# Patient Record
Sex: FEMALE | Race: WHITE | NOT HISPANIC OR LATINO | Employment: FULL TIME | URBAN - METROPOLITAN AREA
[De-identification: names, ages, dates, MRNs, and addresses within clinical notes are randomized per-mention and may not be internally consistent; named-entity substitution may affect disease eponyms.]

---

## 2017-01-03 ENCOUNTER — COMMUNICATION - HEALTHEAST (OUTPATIENT)
Dept: NEUROSURGERY | Facility: CLINIC | Age: 57
End: 2017-01-03

## 2017-01-11 ENCOUNTER — AMBULATORY - HEALTHEAST (OUTPATIENT)
Dept: NEUROSURGERY | Facility: CLINIC | Age: 57
End: 2017-01-11

## 2017-02-01 ENCOUNTER — AMBULATORY - HEALTHEAST (OUTPATIENT)
Dept: NEUROSURGERY | Facility: CLINIC | Age: 57
End: 2017-02-01

## 2017-02-22 ENCOUNTER — AMBULATORY - HEALTHEAST (OUTPATIENT)
Dept: NEUROSURGERY | Facility: CLINIC | Age: 57
End: 2017-02-22

## 2017-02-22 ENCOUNTER — OFFICE VISIT - HEALTHEAST (OUTPATIENT)
Dept: NEUROSURGERY | Facility: CLINIC | Age: 57
End: 2017-02-22

## 2017-02-22 DIAGNOSIS — R51.9 HEAD ACHE: ICD-10-CM

## 2017-03-06 ENCOUNTER — TRANSFERRED RECORDS (OUTPATIENT)
Dept: HEALTH INFORMATION MANAGEMENT | Facility: CLINIC | Age: 57
End: 2017-03-06

## 2017-03-07 ENCOUNTER — TRANSFERRED RECORDS (OUTPATIENT)
Dept: HEALTH INFORMATION MANAGEMENT | Facility: CLINIC | Age: 57
End: 2017-03-07

## 2017-03-08 ENCOUNTER — TRANSFERRED RECORDS (OUTPATIENT)
Dept: HEALTH INFORMATION MANAGEMENT | Facility: CLINIC | Age: 57
End: 2017-03-08

## 2017-03-13 ENCOUNTER — TRANSFERRED RECORDS (OUTPATIENT)
Dept: HEALTH INFORMATION MANAGEMENT | Facility: CLINIC | Age: 57
End: 2017-03-13

## 2017-03-21 ENCOUNTER — TRANSFERRED RECORDS (OUTPATIENT)
Dept: HEALTH INFORMATION MANAGEMENT | Facility: CLINIC | Age: 57
End: 2017-03-21

## 2017-03-23 ENCOUNTER — TRANSFERRED RECORDS (OUTPATIENT)
Dept: HEALTH INFORMATION MANAGEMENT | Facility: CLINIC | Age: 57
End: 2017-03-23

## 2017-03-27 ENCOUNTER — OFFICE VISIT (OUTPATIENT)
Dept: NEUROSURGERY | Facility: CLINIC | Age: 57
End: 2017-03-27

## 2017-03-27 VITALS
DIASTOLIC BLOOD PRESSURE: 67 MMHG | HEART RATE: 70 BPM | RESPIRATION RATE: 20 BRPM | OXYGEN SATURATION: 96 % | HEIGHT: 65 IN | SYSTOLIC BLOOD PRESSURE: 98 MMHG | WEIGHT: 178 LBS | BODY MASS INDEX: 29.66 KG/M2 | TEMPERATURE: 97.6 F

## 2017-03-27 DIAGNOSIS — Z91.89 AT RISK FOR CEREBROSPINAL FLUID LEAKAGE: ICD-10-CM

## 2017-03-27 DIAGNOSIS — M54.2 CERVICALGIA: Primary | ICD-10-CM

## 2017-03-27 RX ORDER — LIDOCAINE 50 MG/G
1 PATCH TOPICAL PRN
COMMUNITY
Start: 2016-10-31 | End: 2017-10-09

## 2017-03-27 RX ORDER — CYCLOBENZAPRINE HCL 10 MG
5-10 TABLET ORAL PRN
COMMUNITY
Start: 2016-10-28 | End: 2017-10-09

## 2017-03-27 ASSESSMENT — ENCOUNTER SYMPTOMS
EYE IRRITATION: 0
MYALGIAS: 1
JOINT SWELLING: 0
MUSCLE CRAMPS: 0
BACK PAIN: 1
EYE PAIN: 0
NECK PAIN: 1
MUSCLE WEAKNESS: 0
HEADACHES: 1
POLYDIPSIA: 0
EYE REDNESS: 0
POLYPHAGIA: 0
TINGLING: 0
DIZZINESS: 1
FEVER: 0
WEIGHT LOSS: 0
ARTHRALGIAS: 0
NUMBNESS: 0
SEIZURES: 0
CHILLS: 0
ALTERED TEMPERATURE REGULATION: 0
NIGHT SWEATS: 0
LOSS OF CONSCIOUSNESS: 0
WEAKNESS: 0
DOUBLE VISION: 0
HALLUCINATIONS: 0
FATIGUE: 0
EYE WATERING: 0
DECREASED APPETITE: 0
SPEECH CHANGE: 0
INCREASED ENERGY: 0
STIFFNESS: 1
WEIGHT GAIN: 0
PARALYSIS: 0
TREMORS: 0
MEMORY LOSS: 1
DISTURBANCES IN COORDINATION: 1

## 2017-03-27 ASSESSMENT — PAIN SCALES - GENERAL: PAINLEVEL: EXTREME PAIN (8)

## 2017-03-27 NOTE — NURSING NOTE
Chief Complaint   Patient presents with     Consult     UMP- HEADACHES AFTER CERVICAL STENOSIS SURGERY

## 2017-03-27 NOTE — MR AVS SNAPSHOT
After Visit Summary   3/27/2017    Layla Starks    MRN: [id number]           Patient Information     Date Of Birth          1960        Visit Information        Provider Department      3/27/2017 10:30 AM Byron Barclay MD [facility] Neurosurgery        Today's Diagnoses     Cervicalgia    -  1    At risk for cerebrospinal fluid leakage           Follow-ups after your visit        Your next 10 appointments already scheduled     May 03, 2017  1:00 PM CDT   XR MYELOGRAPHY LUMBAR SPINE with UCXR3   [facility] Xray ([facility])    [address] [address] [phone]           For nerve root injection, please send or bring copies of any MRIs or other scans you have had.  Bring a list of your current medicines to your exam. (Include vitamins, minerals and over-the-counter medicines.) Leave your valuables at home.  Plan to have someone drive you home afterward.  Stop taking the following medicines (but talk to your doctor first):   If you take blood thinners, you may need to stop taking them a few days before treatment. Talk to your doctor before stopping these medicines.Stop taking Coumadin (warfarin) 3 days before treatment. Restart the day after treatment.   If you take Plavix, Ticlid, Pletal or Persantine, please ask your doctor if you should stop these medicines. You may need extra tests on the morning of your scan.   Stop taking medicine for depression or other mental health concerns 24 hours before your exam, if your doctor says it is safe to do so. You may take your other medicines as normal.  Stop all food and drink (including water) 3 hours before your test or treatment. Drink at least four to six glasses of water the night before your exam.  Please tell the doctor:   If you are allergic to X-ray dye (contrast fluid).   If you may be pregnant.  Injections take about 30 to 45 minutes. Most people spend up to 2  hours in the clinic or hospital.  Please call the Imaging Department at your exam site with any questions            May 03, 2017  1:30 PM CDT   XR MYELOGRAM THORACIC SPINE with UCXR3   [facility] Xray ([facility])    [address] [place] [address] [phone]           For nerve root injection, please send or bring copies of any MRIs or other scans you have had.  Bring a list of your current medicines to your exam. (Include vitamins, minerals and over-the-counter medicines.) Leave your valuables at home.  Plan to have someone drive you home afterward.  Stop taking the following medicines (but talk to your doctor first):   If you take blood thinners, you may need to stop taking them a few days before treatment. Talk to your doctor before stopping these medicines.Stop taking Coumadin (warfarin) 3 days before treatment. Restart the day after treatment.   If you take Plavix, Ticlid, Pletal or Persantine, please ask your doctor if you should stop these medicines. You may need extra tests on the morning of your scan.   Stop taking medicine for depression or other mental health concerns 24 hours before your exam, if your doctor says it is safe to do so. You may take your other medicines as normal.  Stop all food and drink (including water) 3 hours before your test or treatment. Drink at least four to six glasses of water the night before your exam.  Please tell the doctor:   If you are allergic to X-ray dye (contrast fluid).   If you may be pregnant.  Injections take about 30 to 45 minutes. Most people spend up to 2 hours in the clinic or hospital.  Please call the Imaging Department at your exam site with any questions            May 03, 2017  1:40 PM CDT   (Arrive by 1:25 PM)   CT LUMBAR SPINE W CONTRAST with UCCT1   [facility] CT ([facility])    [address] [address]    [phone]           Please bring any scans or X-rays taken at other hospitals, if similar tests were done. Also bring a list of your medicines, including vitamins, minerals and over-the-counter drugs. It is safest to leave personal items at home.  Be sure to tell your doctor:   If you have any allergies.   If there s any chance you are pregnant.   If you are breastfeeding.   If you have any special needs.  You will have contrast for this exam. To prepare:   Do not eat or drink for 2 hours before your exam. If you need to take medicine, you may take it with small sips of water. (We may ask you to take liquid medicine as well.)   The day before your exam, drink extra fluids at least six 8-ounce glasses (unless your doctor tells you to restrict your fluids).  Patients over 70 or patients with diabetes or kidney problems:   If you haven t had a blood test (creatinine test) within the last 30 days, go to your clinic or Diagnostic Imaging Department for this test.  If you have diabetes:   If your kidney function is normal, continue taking your metformin (Avandamet, Glucophage, Glucovance, Metaglip) on the day of your exam.   If your kidney function is abnormal, wait 48 hours before restarting this medicine.  Please wear loose clothing, such as a sweat suit or jogging clothes. Avoid snaps, zippers and other metal. We may ask you to undress and put on a hospital gown.  If you have any questions, please call the Imaging Department where you will have your exam.            May 03, 2017  2:00 PM CDT   (Arrive by 1:45 PM)   CT THORACIC SPINE W CONTRAST with UCCT1   [facility] CT ([facility])    [address]  [facility] [address] [phone]           Please bring any scans or X-rays taken at other hospitals, if similar tests were done. Also bring a list of your medicines, including vitamins, minerals and over-the-counter drugs. It is safest to leave personal  items at home.  Be sure to tell your doctor:   If you have any allergies.   If there s any chance you are pregnant.   If you are breastfeeding.   If you have any special needs.  You will have contrast for this exam. To prepare:   Do not eat or drink for 2 hours before your exam. If you need to take medicine, you may take it with small sips of water. (We may ask you to take liquid medicine as well.)   The day before your exam, drink extra fluids at least six 8-ounce glasses (unless your doctor tells you to restrict your fluids).  Patients over 70 or patients with diabetes or kidney problems:   If you haven t had a blood test (creatinine test) within the last 30 days, go to your clinic or Diagnostic Imaging Department for this test.  If you have diabetes:   If your kidney function is normal, continue taking your metformin (Avandamet, Glucophage, Glucovance, Metaglip) on the day of your exam.   If your kidney function is abnormal, wait 48 hours before restarting this medicine.  Please wear loose clothing, such as a sweat suit or jogging clothes. Avoid snaps, zippers and other metal. We may ask you to undress and put on a hospital gown.  If you have any questions, please call the Imaging Department where you will have your exam.              Who to contact     Please call your clinic at [phone] to:    Ask questions about your health    Make or cancel appointments    Discuss your medicines    Learn about your test results    Speak to your doctor   If you have compliments or concerns about an experience at your clinic, or if you wish to file a complaint, please contact [facility] Patient Relations at [phone] or email us at [email or web address]         Additional Information About Your Visit        [employer] Information     [employer] is an electronic gateway that provides easy, online access to your medical records. With [employer], you can request a clinic appointment, read your  "test results, renew a prescription or communicate with your care team.     To sign up for [employer] visit the website at [email or web address]   You will be asked to enter the access code listed below, as well as some personal information. Please follow the directions to create your username and password.     Your access code is: [email or web address]  Expires: 2017  3:09 PM     Your access code will  in 90 days. If you need help or a new code, please contact your [facility] or call [phone] for assistance.        Care EveryWhere ID     This is your Care EveryWhere ID. This could be used by other organizations to access your [place] medical records  [id number]        Your Vitals Were     Pulse Temperature Respirations Height Pulse Oximetry Breastfeeding?    70 97.6  F (36.4  C) (Oral) 20 1.657 m (5' 5.25\") 96% No    BMI (Body Mass Index)                   29.39 kg/m2            Blood Pressure from Last 3 Encounters:   17 98/67    Weight from Last 3 Encounters:   17 80.7 kg (178 lb)              We Performed the Following     X-ray Cervical spine 2-3 views (AP and lateral) [IMG56]     X-ray Cervical spine 4 views (AP, lateral, flexion, extension)  [[id number]]        Primary Care Provider Office Phone # Fax #    Brittani Grant [phone]       [facility] [address]    [facility] [address]        Thank you!     Thank you for choosing [facility] NEUROSURGERY  for your care. Our goal is always to provide you with excellent care. Hearing back from our patients is one way we can continue to improve our services. Please take a few minutes to complete the written survey that you may receive in the mail after your visit with us. Thank you!             Your Updated Medication List - Protect others around you: Learn how to safely use, store and throw away your medicines at www.disposemymeds.org.          This list is accurate as " of: 3/27/17 11:59 PM.  Always use your most recent med list.                   Brand Name Dispense Instructions for use    calcium 500 MG Chew      Take 2 tablets by mouth daily       cyclobenzaprine 10 MG tablet    FLEXERIL     Take 5-10 mg by mouth as needed       GABAPENTIN PO      Take 600 mg by mouth At Bedtime       lidocaine 5 % Patch    LIDODERM     Place 1 patch onto the skin as needed       MULTIVITAL PO      Take 2 tablets by mouth daily       OMEGA-3 EPA FISH OIL PO      Take 1 g by mouth daily       VERAPAMIL HCL PO      Take 120 mg by mouth once       VITAMIN D-1000 MAX ST 1000 UNITS Tabs   Generic drug:  cholecalciferol      Take 1,000 Units by mouth daily

## 2017-03-27 NOTE — LETTER
3/27/2017       RE: Layla Starks  [address] [place] [address]     Dear Colleague,    Thank you for referring your patient, Layla Starks, to the [facility] NEUROSURGERY at [facility]. Please see a copy of my visit note below.        Progress Note - Neurosurgery Clinic    REASON FOR VISIT: seeking second opinion     HISTORY OF PRESENT ILLNESS:   Ms. Starks is a 56 year old female s/p R C5-7 open door laminoplasty performed at an outside hospital on 9/9/2016, who began to develop positional headache shortly after the surgery, with extensive work-up for concern of CSF leak but has since been negative. She presents to our neurosurgery clinic now seeking a second opinion.   She states that the headache is worse when she is upright and better when laying down, and also endorses photophobia, as well as new neck pain different that the pain she had prior to surgery. Patient has undergone CT myelograms as well as blood patches in attempts to seal off any leak they may be causing this headache although there has not been definitive evidence for CSF leak. Blood patches have not been successful in providing relief.  She has also attempted facet joint injections which have not improved the pain or headache.       Denies any other weakness, tingling, or numbness. No nausea/vomiting, fever, or chills.    ROS: 10 point ROS of systems including Constitutional, Eyes, Respiratory, Cardiovascular, Gastroenterology, Genitourinary, Integumentary, Muscularskeletal, Psychiatric were all negative except for pertinent positives noted in my HPI.    PHYSICAL EXAM:  Alert and oriented to person, place, and time. NAD.   PERRL, EOMI. + photophobia. Face symmetric. Tongue midline.   Uvula midline and palate elevated symmetrically.   Strong eye closure, jaw clench, and cheek puff.  Trapezii and SCM muscles 5/5 bilaterally.  No pronator drift.   BUE and BLE 5/5 throughout.   Reflexes 2+ throughout.    Sensation intact and symmetric to light touch throughout.   Normal FNF, normal HTS test. Gait is normal.     IMAGES:   Reviewed outside hospital imaging that we currently have. No definitive evidence from CT cervical spine myelograms or MRI cervical spine imaging for CSF leak or pseudomeningocele.     No imaging of the TL-spine included in CT myelogram.     ASSESSMENT: 56 year old female presenting with symptoms that do seem consistent with CSF leak, but there is not a clear location for this leak in order to treat. Need further imaging, both new imaging of TL-spine included in a CT myelogram as well as old imaging from other hospitals in order to better evaluate.      PLAN:   - Please obtain flexion/extension XR cervical spine today   - Need all imaging from OSHs that patient has seen (including [facility], [facility], [facility] [place]) scanned into the system   - CT myelogram of TL spine at earliest availability   - F/U in clinic after these are completed   - Was counseled to please contact us with any acute worsening of symptoms, or with any questions or concerns.     I saw the patient with the resident.  I have reviewed and edited the resident note and agree with the plan of care.      Byron Barclay MD       The patient was discussed with neurosurgery faculty, and agrees with the above.    NIMESH DING MD, PGY-2

## 2017-03-27 NOTE — LETTER
2017    Re:  Layla Starks,  1960      To Whom It May Concern:      Layla is being seen by my service for her Neurosurgical needs.  She was seen today in clinic.  We would like to limit her lifting to 10 pounds at this time and until she is seen in follow up.  If you have questions or concerns, please call DAVID Gonzalez Care Coordinator at [phone].  Thank you for your attention to this matter.    Sincerely,           Byron Barclay MD

## 2017-03-29 ENCOUNTER — TELEPHONE (OUTPATIENT)
Dept: GENERAL RADIOLOGY | Facility: CLINIC | Age: 57
End: 2017-03-29

## 2017-03-30 NOTE — PROGRESS NOTES
Progress Note - Neurosurgery Clinic    REASON FOR VISIT: seeking second opinion     HISTORY OF PRESENT ILLNESS:   Ms. Starks is a 56 year old female s/p R C5-7 open door laminoplasty performed at an outside hospital on 9/9/2016, who began to develop positional headache shortly after the surgery, with extensive work-up for concern of CSF leak but has since been negative. She presents to our neurosurgery clinic now seeking a second opinion.   She states that the headache is worse when she is upright and better when laying down, and also endorses photophobia, as well as new neck pain different that the pain she had prior to surgery. Patient has undergone CT myelograms as well as blood patches in attempts to seal off any leak they may be causing this headache although there has not been definitive evidence for CSF leak. Blood patches have not been successful in providing relief.  She has also attempted facet joint injections which have not improved the pain or headache.       Denies any other weakness, tingling, or numbness. No nausea/vomiting, fever, or chills.    ROS: 10 point ROS of systems including Constitutional, Eyes, Respiratory, Cardiovascular, Gastroenterology, Genitourinary, Integumentary, Muscularskeletal, Psychiatric were all negative except for pertinent positives noted in my HPI.    PHYSICAL EXAM:  Alert and oriented to person, place, and time. NAD.   PERRL, EOMI. + photophobia. Face symmetric. Tongue midline.   Uvula midline and palate elevated symmetrically.   Strong eye closure, jaw clench, and cheek puff.  Trapezii and SCM muscles 5/5 bilaterally.  No pronator drift.   BUE and BLE 5/5 throughout.   Reflexes 2+ throughout.   Sensation intact and symmetric to light touch throughout.   Normal FNF, normal HTS test. Gait is normal.     IMAGES:   Reviewed outside hospital imaging that we currently have. No definitive evidence from CT cervical spine myelograms or MRI cervical spine imaging for CSF leak or  pseudomeningocele.     No imaging of the TL-spine included in CT myelogram.     ASSESSMENT: 56 year old female presenting with symptoms that do seem consistent with CSF leak, but there is not a clear location for this leak in order to treat. Need further imaging, both new imaging of TL-spine included in a CT myelogram as well as old imaging from other hospitals in order to better evaluate.      PLAN:   - Please obtain flexion/extension XR cervical spine today   - Need all imaging from OSHs that patient has seen (including [facility], [facility], [facility]) scanned into the system   - CT myelogram of TL spine at earliest availability   - F/U in clinic after these are completed   - Was counseled to please contact us with any acute worsening of symptoms, or with any questions or concerns.     I saw the patient with the resident.  I have reviewed and edited the resident note and agree with the plan of care.      Byron Barclay MD       The patient was discussed with neurosurgery faculty, and agrees with the above.    NIMESH DING MD, PGY-2

## 2017-03-31 ENCOUNTER — TELEPHONE (OUTPATIENT)
Dept: GENERAL RADIOLOGY | Facility: CLINIC | Age: 57
End: 2017-03-31

## 2017-05-11 ENCOUNTER — RADIANT APPOINTMENT (OUTPATIENT)
Dept: GENERAL RADIOLOGY | Facility: CLINIC | Age: 57
End: 2017-05-11
Attending: NEUROLOGICAL SURGERY

## 2017-05-11 VITALS
HEART RATE: 67 BPM | OXYGEN SATURATION: 99 % | RESPIRATION RATE: 20 BRPM | SYSTOLIC BLOOD PRESSURE: 111 MMHG | DIASTOLIC BLOOD PRESSURE: 73 MMHG | TEMPERATURE: 97.4 F

## 2017-05-11 DIAGNOSIS — Z91.89 AT RISK FOR CEREBROSPINAL FLUID LEAKAGE: ICD-10-CM

## 2017-05-11 RX ORDER — IOPAMIDOL 408 MG/ML
20 INJECTION, SOLUTION INTRATHECAL ONCE
Status: DISCONTINUED | OUTPATIENT
Start: 2017-05-11 | End: 2017-05-11 | Stop reason: HOSPADM

## 2017-05-11 RX ORDER — LIDOCAINE HYDROCHLORIDE 10 MG/ML
30 INJECTION, SOLUTION EPIDURAL; INFILTRATION; INTRACAUDAL; PERINEURAL ONCE
Status: COMPLETED | OUTPATIENT
Start: 2017-05-11 | End: 2017-05-11

## 2017-05-11 RX ORDER — IOPAMIDOL 612 MG/ML
15 INJECTION, SOLUTION INTRATHECAL ONCE
Status: COMPLETED | OUTPATIENT
Start: 2017-05-11 | End: 2017-05-11

## 2017-05-11 RX ADMIN — LIDOCAINE HYDROCHLORIDE 50 MG: 10 INJECTION, SOLUTION EPIDURAL; INFILTRATION; INTRACAUDAL; PERINEURAL at 08:22

## 2017-05-11 RX ADMIN — IOPAMIDOL 12 ML: 612 INJECTION, SOLUTION INTRATHECAL at 08:22

## 2017-05-11 NOTE — MR AVS SNAPSHOT
MRN:[id number]                      After Visit Summary   5/11/2017    Layla Starks    MRN: [id number]           Visit Information        Provider Department      5/11/2017 8:30 AM  NEURO RAD;  IMAGING NURSE; X[facility]           Review of your medicines          These changes are accurate as of: 5/11/17  8:32 AM.  If you have any questions, ask your nurse or doctor.               CONTINUE these medicines which have NOT CHANGED        Dose / Directions    calcium 500 MG Chew        Dose:  2 tablet   Take 2 tablets by mouth daily   Refills:  0       cyclobenzaprine 10 MG tablet   Commonly known as:  FLEXERIL        Dose:  5-10 mg   Take 5-10 mg by mouth as needed   Refills:  0       GABAPENTIN PO        Dose:  600 mg   Take 600 mg by mouth At Bedtime   Refills:  0       lidocaine 5 % Patch   Commonly known as:  LIDODERM        Dose:  1 patch   Place 1 patch onto the skin as needed   Refills:  0       MULTIVITAL PO        Dose:  2 tablet   Take 2 tablets by mouth daily   Refills:  0       OMEGA-3 EPA FISH OIL PO        Dose:  1 g   Take 1 g by mouth daily   Refills:  0       VERAPAMIL HCL PO        Dose:  120 mg   Take 120 mg by mouth once   Refills:  0       VITAMIN D-1000 MAX ST 1000 UNITS Tabs   Generic drug:  cholecalciferol        Dose:  1000 Units   Take 1,000 Units by mouth daily   Refills:  0                Protect others around you: Learn how to safely use, store and throw away your medicines at www.disposemymeds.org.         Follow-ups after your visit        Your next 10 appointments already scheduled     May 11, 2017  9:00 AM CDT   (Arrive by 8:45 AM)   CT LUMBAR SPINE W CONTRAST with UCCT1   [facility] ([facility])    [address] [address] [phone]           Please bring any scans or X-rays taken at other hospitals, if similar tests were done. Also bring a list of your  medicines, including vitamins, minerals and over-the-counter drugs. It is safest to leave personal items at home.  Be sure to tell your doctor:   If you have any allergies.   If there s any chance you are pregnant.   If you are breastfeeding.   If you have any special needs.  You will have contrast for this exam. To prepare:   Do not eat or drink for 2 hours before your exam. If you need to take medicine, you may take it with small sips of water. (We may ask you to take liquid medicine as well.)   The day before your exam, drink extra fluids at least six 8-ounce glasses (unless your doctor tells you to restrict your fluids).  Patients over 70 or patients with diabetes or kidney problems:   If you haven t had a blood test (creatinine test) within the last 30 days, go to your clinic or Diagnostic Imaging Department for this test.  If you have diabetes:   If your kidney function is normal, continue taking your metformin (Avandamet, Glucophage, Glucovance, Metaglip) on the day of your exam.   If your kidney function is abnormal, wait 48 hours before restarting this medicine.  Please wear loose clothing, such as a sweat suit or jogging clothes. Avoid snaps, zippers and other metal. We may ask you to undress and put on a hospital gown.  If you have any questions, please call the Imaging Department where you will have your exam.            May 11, 2017  9:20 AM CDT   (Arrive by 9:05 AM)   CT THORACIC SPINE W CONTRAST with UCCT1   [facility] CT ([facility])    [address] [facility]  [address] [facility] [address] [phone]           Please bring any scans or X-rays taken at other hospitals, if similar tests were done. Also bring a list of your medicines, including vitamins, minerals and over-the-counter drugs. It is safest to leave personal items at home.  Be sure to tell your doctor:   If you have any allergies.   If there s any chance you are pregnant.   If you are  breastfeeding.   If you have any special needs.  You will have contrast for this exam. To prepare:   Do not eat or drink for 2 hours before your exam. If you need to take medicine, you may take it with small sips of water. (We may ask you to take liquid medicine as well.)   The day before your exam, drink extra fluids at least six 8-ounce glasses (unless your doctor tells you to restrict your fluids).  Patients over 70 or patients with diabetes or kidney problems:   If you haven t had a blood test (creatinine test) within the last 30 days, go to your clinic or Diagnostic Imaging Department for this test.  If you have diabetes:   If your kidney function is normal, continue taking your metformin (Avandamet, Glucophage, Glucovance, Metaglip) on the day of your exam.   If your kidney function is abnormal, wait 48 hours before restarting this medicine.  Please wear loose clothing, such as a sweat suit or jogging clothes. Avoid snaps, zippers and other metal. We may ask you to undress and put on a hospital gown.  If you have any questions, please call the Imaging Department where you will have your exam.            May 11, 2017  9:40 AM CDT   (Arrive by 9:25 AM)   CT CERVICAL SPINE W CONTRAST with UCCT1   [facility] CT ([facility])    [address] [address] [phone]           Please bring any scans or X-rays taken at other hospitals, if similar tests were done. Also bring a list of your medicines, including vitamins, minerals and over-the-counter drugs. It is safest to leave personal items at home.  Be sure to tell your doctor:   If you have any allergies.   If there s any chance you are pregnant.   If you are breastfeeding.   If you have any special needs.  You will have contrast for this exam. To prepare:   Do not eat or drink for 2 hours before your exam. If you need to take medicine, you may take it with small sips of water. (We may ask  you to take liquid medicine as well.)   The day before your exam, drink extra fluids at least six 8-ounce glasses (unless your doctor tells you to restrict your fluids).  Patients over 70 or patients with diabetes or kidney problems:   If you haven t had a blood test (creatinine test) within the last 30 days, go to your clinic or Diagnostic Imaging Department for this test.  If you have diabetes:   If your kidney function is normal, continue taking your metformin (Avandamet, Glucophage, Glucovance, Metaglip) on the day of your exam.   If your kidney function is abnormal, wait 48 hours before restarting this medicine.  Please wear loose clothing, such as a sweat suit or jogging clothes. Avoid snaps, zippers and other metal. We may ask you to undress and put on a hospital gown.  If you have any questions, please call the Imaging Department where you will have your exam.            Jun 19, 2017 11:30 AM CDT   (Arrive by 11:15 AM)   Return Visit with Byron Barclay MD   [facility] Neurosurgery ([facility])    [address] [place] [address] [phone]               Care Instructions        Further instructions from your care team          Radiology Discharge Instructions After Your Myelogram         AFTER YOU GO HOME      Relax and take it easy for 24 hours    Keep your head elevated at least 30     You may resume normal activity tomorrow    You may remove the bandage on your back in the evening or the next morning    You may resume bathing the next day    Drink at least 4 glasses of extra fluid today if not on a fluid restriction    DO NOT drive or operate machinery at home or at work for at least 24 hours    If you develop a headache you can take over the counter medicines and lay  down.  You can try drinking caffeine-coffee, soda.       Do not submerge injection site in water for 24 hours, (no baths. pools, hot tubs).  Showers are OK.                        CALL YOU PRIMARY PHYSICIAN IF:    If you start to leak a large amount of fluid from the puncture site, lie down flat on your back. Call your primary physician; they will tell you if you need to return  to the hospital.    You develop a severe headache    You develop nausea or vomiting     You develop a temperature of 101  or greater    Call 911 if you have a seizure.     Additional Information About Your Visit        [employer] Information     "[employer]" is an electronic gateway that provides easy, online access to your medical records. With "[employer]", you can request a clinic appointment, read your test results, renew a prescription or communicate with your care team.     To sign up for "[employer]" visit the website at [email or web address]   You will be asked to enter the access code listed below, as well as some personal information. Please follow the directions to create your username and password.     Your access code is: [email or web address]  Expires: 2017  3:09 PM     Your access code will  in 90 days. If you need help or a new code, please contact your [facility] or call [phone] for assistance.        Care EveryWhere ID     This is your Care EveryWhere ID. This could be used by other organizations to access your [facility] medical records  [id number]        Your Vitals Were     Blood Pressure Pulse Temperature Respirations Pulse Oximetry       116/73 73 97.4  F (36.3  C) (Oral) 20 99%        Primary Care Provider Office Phone # Fax #    Brittani LOTT Juanita [phone]      Thank you!     Thank you for choosing [facility] for your care. Our goal is always to provide you with excellent care. Hearing back from our patients is one way we can continue to improve our services. Please take a few minutes to complete the written survey that you may receive in the mail after you visit with us. Thank you!             Medication List: This is a list of all your medications and when  to take them. Check marks below indicate your daily home schedule. Keep this list as a reference.          This list is accurate as of: 5/11/17  8:32 AM.  Always use your most recent med list.               Medications           Morning Afternoon Evening Bedtime As Needed    calcium 500 MG Chew   Take 2 tablets by mouth daily                                cyclobenzaprine 10 MG tablet   Commonly known as:  FLEXERIL   Take 5-10 mg by mouth as needed                                GABAPENTIN PO   Take 600 mg by mouth At Bedtime                                lidocaine 5 % Patch   Commonly known as:  LIDODERM   Place 1 patch onto the skin as needed                                MULTIVITAL PO   Take 2 tablets by mouth daily                                OMEGA-3 EPA FISH OIL PO   Take 1 g by mouth daily                                VERAPAMIL HCL PO   Take 120 mg by mouth once                                VITAMIN D-1000 MAX ST 1000 UNITS Tabs   Take 1,000 Units by mouth daily   Generic drug:  cholecalciferol

## 2017-05-11 NOTE — DISCHARGE INSTRUCTIONS
Radiology Discharge Instructions After Your Myelogram         AFTER YOU GO HOME      Relax and take it easy for 24 hours    Keep your head elevated at least 30     You may resume normal activity tomorrow    You may remove the bandage on your back in the evening or the next morning    You may resume bathing the next day    Drink at least 4 glasses of extra fluid today if not on a fluid restriction    DO NOT drive or operate machinery at home or at work for at least 24 hours    If you develop a headache you can take over the counter medicines and lay  down.  You can try drinking caffeine-coffee, soda.       Do not submerge injection site in water for 24 hours, (no baths. pools, hot tubs).  Showers are OK.                       CALL YOU PRIMARY PHYSICIAN IF:    If you start to leak a large amount of fluid from the puncture site, lie down flat on your back. Call your primary physician; they will tell you if you need to return  to the hospital.    You develop a severe headache    You develop nausea or vomiting     You develop a temperature of 101  or greater    Call 911 if you have a seizure.

## 2017-05-11 NOTE — PROGRESS NOTES
Pt did well with the procedure.  No complications, continues to have HA that she currently always has. VSS. AVS given and pt escorted to the waiting room.    Soumya, Imaging Nurse

## 2017-05-12 ENCOUNTER — TELEPHONE (OUTPATIENT)
Dept: NEUROSURGERY | Facility: CLINIC | Age: 57
End: 2017-05-12

## 2017-05-12 NOTE — TELEPHONE ENCOUNTER
PC to pt in response to in-basket message requesting call back in regards to CT Myelogram results.  No answer at given number.  Left VM with contact info and instructions to return call at patient's convenience.

## 2017-06-05 ENCOUNTER — OFFICE VISIT (OUTPATIENT)
Dept: NEUROSURGERY | Facility: CLINIC | Age: 57
End: 2017-06-05

## 2017-06-05 VITALS — DIASTOLIC BLOOD PRESSURE: 65 MMHG | HEIGHT: 65 IN | HEART RATE: 72 BPM | SYSTOLIC BLOOD PRESSURE: 113 MMHG

## 2017-06-05 DIAGNOSIS — M54.12 CERVICAL RADICULOPATHY AT C7: Primary | ICD-10-CM

## 2017-06-05 ASSESSMENT — PAIN SCALES - GENERAL: PAINLEVEL: EXTREME PAIN (9)

## 2017-06-05 NOTE — MR AVS SNAPSHOT
"              After Visit Summary   2017    Layla Starks    MRN: [id number]           Patient Information     Date Of Birth          1960        Visit Information        Provider Department      2017 11:30 AM Byron Barclay MD [facility] Neurosurgery        Today's Diagnoses     Cervical radiculopathy at C7    -  1       Follow-ups after your visit        Who to contact     Please call your clinic at [phone] to:    Ask questions about your health    Make or cancel appointments    Discuss your medicines    Learn about your test results    Speak to your doctor   If you have compliments or concerns about an experience at your clinic, or if you wish to file a complaint, please contact [facility] Patient Relations at [phone] or email us at [email or web address]         Additional Information About Your Visit        MyChart Information     [employer] is an electronic gateway that provides easy, online access to your medical records. With [employer], you can request a clinic appointment, read your test results, renew a prescription or communicate with your care team.     To sign up for [employer] visit the website at [email or web address]   You will be asked to enter the access code listed below, as well as some personal information. Please follow the directions to create your username and password.     Your access code is: [email or web address]  Expires: 9/10/2017 11:15 PM     Your access code will  in 90 days. If you need help or a new code, please contact your [facility] or call [phone] for assistance.        Care EveryWhere ID     This is your Care EveryWhere ID. This could be used by other organizations to access your [facility] medical records  [id number]        Your Vitals Were     Pulse Height                72 1.657 m (5' 5.25\")           Blood Pressure from Last 3 Encounters:   No data found for BP    Weight from " Last 3 Encounters:   No data found for Wt              Today, you had the following     No orders found for display       Primary Care Provider Office Phone # Fax #    Brittani LOTT Juanita [phone]       [facility] [place] [address]    [facility] [address]        Thank you!     Thank you for choosing [facility]  for your care. Our goal is always to provide you with excellent care. Hearing back from our patients is one way we can continue to improve our services. Please take a few minutes to complete the written survey that you may receive in the mail after your visit with us. Thank you!             Your Updated Medication List - Protect others around you: Learn how to safely use, store and throw away your medicines at www.disposemymeds.org.          This list is accurate as of: 6/5/17 11:59 PM.  Always use your most recent med list.                   Brand Name Dispense Instructions for use    calcium 500 MG Chew      Take 2 tablets by mouth daily       cyclobenzaprine 10 MG tablet    FLEXERIL     Take 5-10 mg by mouth as needed       CYMBALTA PO      Take 30 mg by mouth       GABAPENTIN PO      Take 600 mg by mouth At Bedtime       lidocaine 5 % Patch    LIDODERM     Place 1 patch onto the skin as needed       MULTIVITAL PO      Take 2 tablets by mouth daily       OMEGA-3 EPA FISH OIL PO      Take 1 g by mouth daily       VERAPAMIL HCL PO      Take 120 mg by mouth once       VITAMIN D-1000 MAX ST 1000 UNITS Tabs   Generic drug:  cholecalciferol      Take 1,000 Units by mouth daily

## 2017-06-05 NOTE — LETTER
6/5/2017       RE: Layla Starks  [address] [place] [address]     Dear Colleague,    Thank you for referring your patient, Layla Starks, to the [facility] NEUROSURGERY at [facility]. Please see a copy of my visit note below.    Please see dictated note #[id number].    HISTORY OF PRESENT ILLNESS:  Ms. Starks is a 56-year-old female seen in Neurosurgery Clinic today for followup of positional headaches, which developed shortly after a C5-C7 open door laminoplasty that was performed 09/09/2016 at the [facility].  She was last seen in Neurosurgery Clinic 03/27/2017, and at that time we requested a CT myelogram to evaluate for the possibility of CSF leak following the operation.      Since her last clinic visit her headache has persistent, and if anything, it has worsened.  It continues to be positional, worsening significantly when standing upright.  She believes that the headache originates from tenseness  in her neck which is provoked with being upright.  She noted that the headache persisted following the LP which was performed during a CT myelogram.  She also had several episodes of vomiting following the LP.      She notes that this sequence of workup is similar to that which she underwent at the [facility] by neurologists.  She reportedly had elevated opening pressure with LP (however, we do not have these records available to us at this time).  She also reports being started on Diamox for a brief period of time; however, this had no alleviating effect on the headache.  Finally, she was evaluated by neuroophthalmologist at [place] who did not appreciate any visual field deficits or optic nerve abnormalities.      In addition to the above persistent head and neck pain and headache, the patient also notes she has paresthesias in her left first 2 fingers.  These symptoms have worsened over the past several months, and she asked several questions related as to whether this is  related to recurrent cervical stenosis.      PHYSICAL EXAMINATION:   GENERAL:  This is a middle-aged female sitting in exam chair in no acute distress.   STRENGTH:  Appears full in the upper extremities, and she has reduced sensation over the first 2 fingers in her left upper extremity which extends up into her proximal wrist.   GAIT:  Normal base and stable.      IMAGING:  CT myelogram acquired 05/11/2017 was reviewed of the cervical, thoracic and lumbar spine.  There is no evidence of CSF leak demonstrated on the study.  There is evidence of moderate foraminal stenosis with left C6-7 level.  In addition, the opening pressure was noted to be 25 when the study was performed.      ASSESSMENT:  Ms. Starks is a 56-year-old female with persistent positional headache which does not appear to be due to a CSF leak, as there is no objective data to support this.  However, she is adamant that there is a positional component to these findings and would like to pursue further evaluation with upright MRI imaging of the cervical spine.  Furthermore, she has symptoms consistent with a left C7 radiculopathy, and we will evaluate this further with a selective nerve block at this level.  If it is effective, we will refer her for cervical epidural steroid injections.      PLAN:     1.  Upright MRI of the cervical spine.     2.  A selective nerve block of the left C7 nerve root.  If effective, we will  make further referrals for epidural steroid injections at this level. Might consider surgical intervention at that level if SNRB effective.      I saw the patient with the resident.  I have reviewed and edited the resident note and agree with the plan of care.      Byron Barclay MD          As dictated by JUAN CARLOS LAM MD, PHD, PGY1 neurosurgery resident.          Again, thank you for allowing me to participate in the care of your patient.      Sincerely,    Byron Barclay MD

## 2017-06-05 NOTE — PROGRESS NOTES
HISTORY OF PRESENT ILLNESS:  Ms. Starks is a 56-year-old female seen in Neurosurgery Clinic today for followup of positional headaches, which developed shortly after a C5-C7 open door laminoplasty that was performed 09/09/2016 at the [facility].  She was last seen in Neurosurgery Clinic 03/27/2017, and at that time we requested a CT myelogram to evaluate for the possibility of CSF leak following the operation.      Since her last clinic visit her headache has persistent, and if anything, it has worsened.  It continues to be positional, worsening significantly when standing upright.  She believes that the headache originates from tenseness  in her neck which is provoked with being upright.  She noted that the headache persisted following the LP which was performed during a CT myelogram.  She also had several episodes of vomiting following the LP.      She notes that this sequence of workup is similar to that which she underwent at the [facility] by neurologists.  She reportedly had elevated opening pressure with LP (however, we do not have these records available to us at this time).  She also reports being started on Diamox for a brief period of time; however, this had no alleviating effect on the headache.  Finally, she was evaluated by neuroophthalmologist at [place] who did not appreciate any visual field deficits or optic nerve abnormalities.      In addition to the above persistent head and neck pain and headache, the patient also notes she has paresthesias in her left first 2 fingers.  These symptoms have worsened over the past several months, and she asked several questions related as to whether this is related to recurrent cervical stenosis.      PHYSICAL EXAMINATION:   GENERAL:  This is a middle-aged female sitting in exam chair in no acute distress.   STRENGTH:  Appears full in the upper extremities, and she has reduced sensation over the first 2 fingers in her left upper extremity which extends up into her  proximal wrist.   GAIT:  Normal base and stable.      IMAGING:  CT myelogram acquired 2017 was reviewed of the cervical, thoracic and lumbar spine.  There is no evidence of CSF leak demonstrated on the study.  There is evidence of moderate foraminal stenosis with left C6-7 level.  In addition, the opening pressure was noted to be 25 when the study was performed.      ASSESSMENT:  Ms. Starks is a 56-year-old female with persistent positional headache which does not appear to be due to a CSF leak, as there is no objective data to support this.  However, she is adamant that there is a positional component to these findings and would like to pursue further evaluation with upright MRI imaging of the cervical spine.  Furthermore, she has symptoms consistent with a left C7 radiculopathy, and we will evaluate this further with a selective nerve block at this level.  If it is effective, we will refer her for cervical epidural steroid injections.      PLAN:     1.  Upright MRI of the cervical spine.     2.  A selective nerve block of the left C7 nerve root.  If effective, we will  make further referrals for epidural steroid injections at this level. Might consider surgical intervention at that level if SNRB effective.      I saw the patient with the resident.  I have reviewed and edited the resident note and agree with the plan of care.      MD CLARA Watters MD       As dictated by JUAN CARLOS LAM MD, PHD, PGY1 neurosurgery resident.             D: 2017 12:48   T: 2017 15:45   MT: JESSICA      Name:     CAYLA STARKS   MRN:      [id number]        Account:      [id number]   :      1960           Service Date: 2017      Document: [id number]

## 2017-06-09 ENCOUNTER — TRANSFERRED RECORDS (OUTPATIENT)
Dept: HEALTH INFORMATION MANAGEMENT | Facility: CLINIC | Age: 57
End: 2017-06-09

## 2017-06-16 ENCOUNTER — TELEPHONE (OUTPATIENT)
Dept: NEUROSURGERY | Facility: CLINIC | Age: 57
End: 2017-06-16

## 2017-06-16 NOTE — TELEPHONE ENCOUNTER
Phone call to patient in response to in-basket message requesting call back in regards to latest MRI reasults.   No answer at given number.  Left VM with contact info and instructions to return call at patient's convenience.     Images were done at [facility], not pushed into PACs until yesterday.  Will message Dr Barclay MRI is avoidable for review.  By [facility] charting , it appears ordered SNRB has not been done as of yet.

## 2017-07-18 ENCOUNTER — TELEPHONE (OUTPATIENT)
Dept: NEUROSURGERY | Facility: CLINIC | Age: 57
End: 2017-07-18

## 2017-07-18 NOTE — TELEPHONE ENCOUNTER
PC to pt in response to in-basket message from MD with next step recommendations.  Dr Barclay continues to recommend Left C7 SNRB originally ordered in June.  Unclear if patient has completed.  No answer at given number.  Left VM with contact info and instructions to return call at patient's convenience.

## 2017-10-09 ENCOUNTER — HOSPITAL ENCOUNTER (EMERGENCY)
Facility: CLINIC | Age: 57
Discharge: HOME | End: 2017-10-09
Attending: EMERGENCY MEDICINE | Admitting: EMERGENCY MEDICINE
Payer: COMMERCIAL

## 2017-10-09 VITALS
DIASTOLIC BLOOD PRESSURE: 80 MMHG | BODY MASS INDEX: 30.22 KG/M2 | SYSTOLIC BLOOD PRESSURE: 130 MMHG | OXYGEN SATURATION: 100 % | WEIGHT: 183 LBS | RESPIRATION RATE: 16 BRPM | TEMPERATURE: 97.8 F | HEART RATE: 60 BPM

## 2017-10-09 DIAGNOSIS — G89.29 CHRONIC NECK PAIN: ICD-10-CM

## 2017-10-09 DIAGNOSIS — M54.2 CHRONIC NECK PAIN: ICD-10-CM

## 2017-10-09 PROCEDURE — 99283 EMERGENCY DEPT VISIT LOW MDM: CPT | Mod: Z6 | Performed by: EMERGENCY MEDICINE

## 2017-10-09 PROCEDURE — 99282 EMERGENCY DEPT VISIT SF MDM: CPT | Performed by: EMERGENCY MEDICINE

## 2017-10-09 ASSESSMENT — ENCOUNTER SYMPTOMS
DIARRHEA: 0
HEMATURIA: 0
NUMBNESS: 0
RHINORRHEA: 0
DYSURIA: 0
HEADACHES: 1
SORE THROAT: 0
FEVER: 0
NAUSEA: 0
WEAKNESS: 0
NECK PAIN: 1
ABDOMINAL PAIN: 0
VOMITING: 0
SHORTNESS OF BREATH: 0

## 2017-10-09 NOTE — CONSULTS
[facility]  NEUROSURGERY CONSULTATION NOTE    This consultation was requested by Dr. Zamora from the Emergency Medicine service.    Reason for Consultation  Neck pain and headache    HPI:  Ms Starks is 56 year old female with no known medical issues who presents to the Emergency Department today with complaints of neck and right>left parascapular pain, as well as ongoing positional headaches.    The patient was recently seen in Neurosurgery clinic by Dr Byron Barclay on 06/05/2017 with complaints of similar symptoms.  At that time conservative treatment with selective nerve root injection on the left at C7 and upright Cervical MRI were recommended.  The patient states she did complete MRI imaging, however did not schedule the injection.    The patient has history of C5-7 open door laminoplasty with Dr Valdez on 09/09/2016.   The patient states she was experiencing similar neck and parascapular pain prior to surgery and did experience good relief of symptoms for approximately one month following surgery, however when the cervical collar was removed at one month the patient states the pain returned.  It should be noted that prior to the surgical procedure the patient did undergo bilateral C5-6 and C6-7 selective nerve root injections at an outside facility, per the patient she did receive improvement of pain for greater than one year.   Following surgery the patient was also complaining of positional headache and this issue was worked up extensively at [facility] by Neurology.  She reportedly had elevated opening pressure with LP (however, we do not have these records available to us at this time).  She also reports being started on Diamox for a brief period of time; however, this had no alleviating effect on the headache.  Finally, she was evaluated by neuroophthalmologist at [place] who did not appreciate any visual field deficits or optic nerve abnormalities.  It  was determined there was no objective data to support diagnosis of CSF leak.    Today the patient denies history of any recent trauma or injury.  Denies change in dexterity, problems with instability or gait, or weakness in bilateral upper or lower extremities.  No change in bowel or bladder function.  States she has been undergoing PT but has not noted significant improvement in symptoms.  Patient is currently utilizing Advil for pain.  The patient states her headaches do improve when she is supine and worsen when she is upright.  The pain does not affect her sleep.  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  The patient states overall her current symptoms have not changed compared to the symptoms she was experiencing in June when she last saw Dr Barclay.            PAST MEDICAL HISTORY:   No known past medical issues    PAST SURGICAL HISTORY:   C5-7 open door laminoplasty on 09/09/2016 with Dr Valdez     SOCIAL HISTORY:   Social History   Substance Use Topics     Smoking status: Never Smoker     Smokeless tobacco: Never Used     Alcohol use 0.6 - 1.2 oz/week     1 - 2 Standard drinks or equivalent per week      Comment: EVERY 6 MONTHS       MEDICATIONS:  Current Outpatient Prescriptions   Medication Sig Dispense Refill     calcium 500 MG CHEW Take 2 tablets by mouth daily       cholecalciferol (VITAMIN D-1000 MAX ST) 1000 UNITS TABS Take 1,000 Units by mouth daily       Multiple Vitamins-Minerals (MULTIVITAL PO) Take 2 tablets by mouth daily       Omega-3 Fatty Acids (OMEGA-3 EPA FISH OIL PO) Take 1 g by mouth daily         Allergies:  Allergies   Allergen Reactions     Dilaudid [Hydromorphone] GI Disturbance     Morphine Nausea and Vomiting     Oxycodone Nausea and Vomiting     Tramadol Nausea and Vomiting         ROS: 10 point ROS of systems including Constitutional, Eyes, Respiratory, Cardiovascular, Gastroenterology, Genitourinary, Integumentary,  Muscularskeletal, Psychiatric were all negative except for pertinent positives noted in my HPI.    EXAM:  /80  Pulse 60  Temp 97.8  F (36.6  C) (Oral)  Resp 16  Wt 83 kg (183 lb)  SpO2 100%  BMI 30.22 kg/m2  Exam:   Physical Exam  /80  Pulse 60  Temp 97.8  F (36.6  C) (Oral)  Resp 16  Wt 83 kg (183 lb)  SpO2 100%  BMI 30.22 kg/m2  General: Appears comfortable, NAD  Neurologic Exam:  - AOx3.  - Follows commands.  - Speech fluent, spontaneous. No aphasia or dysarthria.  - No gaze preference. No apparent hemineglect.  - PERRL, EOMI.  - Face symmetric with sensation intact to light touch.  - Palate elevates symmetrically, uvula midline, tongue protrudes midline.  - Trapezii and sternocleidomastoid muscles 5/5 bilaterally.  - No pronator drift.  Motor: Normal bulk/tone; no tremor, rigidity, or bradykinesia.   Right:  Deltoid 5/5, tricep 5/5, bicep 5/5, wrist flexor 5/5, wrist extensor 5/5, finger intrinsic 5/5  Left:  Deltoid 5/5, tricep 5/5, bicep 5/5, wrist flexor 5/5, wrist extensor 5/5, finger intrinsic 5/5  Right: Iliopsoas 5/5, quadricep 5/5, hamstring 5/5, tibialis anterior 5/5, gastrocnemius 5/5, EHL 5/5  Left:  Iliopsoas 5/5, quadricep 5/5, hamstring 5/5, tibialis anterior 5/5, gastrocnemius 5/5, EHL 5/5    Sensation intact in bilateral L4-S1 dermatones     Negative Mantilla's bilaterally   Negative clonus bilaterally   Reflexes 2+ bilaterally in upper and lower extremities       LABS/IMAGING:  Cervical MRI 6/2017:  Evidence of post surgical changes from C5-7 in the form of laminoplasty.  Multilevel degenerative changes noted resulting in right foraminal narrowing at C4-5, C5-6 and bilaterally at C6-7.  No significant central canal narrowing noted at any segment.     No orders to display       ASSESSMENT:  56 year old female with ongoing neck, parascapular pain and headaches with evidence of multilevel degenerative changes with bilateral foraminal narrowing at C6-7    RECOMMENDATIONS:  -  Recommend conservative management with bilateral C6-7 selective nerve root injections as previously discussed in clinic with Dr Barclay  -Continue with PT  -Follow up with Dr Barclay in clinic as scheduled  -Consider TENS unit for myofascial component of pain     The patient was discussed with Dr. Vogel, neurosurgery chief resident, and Dr Barclay, staff physician who agree with the above.    Eloise Helton, CNP  Department of Neurosurgery  Pager: [phone]

## 2017-10-09 NOTE — ED AVS SNAPSHOT
[facility], [place], Emergency Department    [address] [address]    Phone:  [phone]                                       Layla Starks   MRN: [id number]    Department:  [facility], [place], Emergency Department   Date of Visit:  10/9/2017           After Visit Summary Signature Page     I have received my discharge instructions, and my questions have been answered. I have discussed any challenges I see with this plan with the nurse or doctor.    ..........................................................................................................................................  Patient/Patient Representative Signature      ..........................................................................................................................................  Patient Representative Print Name and Relationship to Patient    ..................................................               ................................................  Date                                            Time    ..........................................................................................................................................  Reviewed by Signature/Title    ...................................................              ..............................................  Date                                                            Time

## 2017-10-09 NOTE — ED AVS SNAPSHOT
[facility], Emergency Department    [address] [address]    Phone:  [phone]                                       Layla Starks   MRN: [id number]    Department:  [facility], Emergency Department   Date of Visit:  10/9/2017           Patient Information     Date Of Birth          1960        Your diagnoses for this visit were:     Chronic neck pain        You were seen by Kristy Zamora MD.        Discharge Instructions       You have been seen in the ER for your chronic neck pain.  The neurosurgery team has seen you and recommended that you follow up in clinic with Dr. Barclay.  You can consider getting the injections as previously recommended.  The clinic can help set this up for you.    The neurosurgery nurse practitioner has sent a message to the  in the clinic to try to get you a sooner appointment.  If you do not hear from them in a few days, you can call to check on the status of this.    Please make an appointment to follow up with Neurosurgery Clinic (phone: [phone]).      Discharge References/Attachments     CHRONIC PAIN (ENGLISH)    BACK AND NECK PAIN, GENERAL (ENGLISH)    NECK PAIN (ENGLISH)    NECK PROBLEMS: RELIEVING YOUR SYMPTOMS (ENGLISH)      Future Appointments        Provider Department Dept Phone Center    11/27/2017 8:30 AM Byron Barclay MD [facility] Neurosurgery [phone] [facility]      24 Hour Appointment Hotline       To make an appointment at any [facility], call [phone] ([phone]). If you don't have a family doctor or clinic, we will help you find one. [facility] are conveniently located to serve the needs of you and your family.             Review of your medicines      Our records show that you are taking the medicines listed below. If these are incorrect, please call your family doctor or clinic.        Dose / Directions Last dose taken    calcium 500 MG Chew   Dose:  2 tablet        Take 2 tablets by  "mouth daily   Refills:  0        MULTIVITAL PO   Dose:  2 tablet        Take 2 tablets by mouth daily   Refills:  0        OMEGA-3 EPA FISH OIL PO   Dose:  1 g        Take 1 g by mouth daily   Refills:  0        VITAMIN D-1000 MAX ST 1000 UNITS Tabs   Dose:  1000 Units   Generic drug:  cholecalciferol        Take 1,000 Units by mouth daily   Refills:  0                Orders Needing Specimen Collection     None      Pending Results     No orders found from 10/7/2017 to 10/10/2017.            Pending Culture Results     No orders found from 10/7/2017 to 10/10/2017.            Pending Results Instructions     If you had any lab results that were not finalized at the time of your Discharge, you can call the ED Lab Result RN at [phone]. You will be contacted by this team for any positive Lab results or changes in treatment. The nurses are available 7 days a week from 10A to 6:30P.  You can leave a message 24 hours per day and they will return your call.        Thank you for choosing [facility]       Thank you for choosing [facility] for your care. Our goal is always to provide you with excellent care. Hearing back from our patients is one way we can continue to improve our services. Please take a few minutes to complete the written survey that you may receive in the mail after you visit with us. Thank you!        [employer] Current [employer] Information     [employer] lets you send messages to your doctor, view your test results, renew your prescriptions, schedule appointments and more. To sign up, go to [email or web address] . Click on \"Log in\" on the left side of the screen, which will take you to the Welcome page. Then click on \"Sign up Now\" on the right side of the page.     You will be asked to enter the access code listed below, as well as some personal information. Please follow the directions to create your username and password.     Your access code is: [email or web address]  Expires: 2018  2:10 PM     Your access code will  in 90 " days. If you need help or a new code, please call your [facility] clinic or [phone].        Care EveryWhere ID     This is your Care EveryWhere ID. This could be used by other organizations to access your [facility] medical records  [id number]        Equal Access to Services     DIGNA LYNN : Anni Barragan, waljda luqadaha, qaybta kaalmada ana, mickey gonzalez. So [facility] [phone].    ATENCIÓN: Si habla español, tiene a ramos disposición servicios gratuitos de asistencia lingüística. Llame al [phone].    We comply with applicable federal civil rights laws and [place] laws. We do not discriminate on the basis of race, color, national origin, age, disability, sex, sexual orientation, or gender identity.            After Visit Summary       This is your record. Keep this with you and show to your community pharmacist(s) and doctor(s) at your next visit.

## 2017-10-09 NOTE — ED PROVIDER NOTES
"  History     Chief Complaint   Patient presents with     Neck Pain     HPI  Layla Starks is a 56 year old female with a history of degenerative disc disease, cervical cord myelopathy s/p C5-C7 laminoplasty (9 September 2017), chronic neck pain and chronic cervicogenic and tension-type headaches who presents for evaluation of neck pain. The patient reports a history of neck pain. She underwent an \"injection\" in 2004 and she had resolution of her pain for 12 years, with recurrence last year. She ultimately underwent C5 through C7 laminoplasty at [facility]) on 9 September 2009 and indicates that approximately 1 month after the procedure, when her cervical collar was removed, she had immediate return of the same symptoms she had prior to the procedure with neck pain and cervicogenic headaches, which have persisting constantly since. She states that there were no complications surrounding the procedure itself, such as postoperative site infection. The patient reports that she's had extensive workup for this recurrence of neck pain and headaches. She underwent MRI in March 2017 which showed a concern for possible CSF leak; however, she states that she had a comprehensive evaluation through the [facility], and CSF leak was ruled out. The patient has also been tried on Botox injections through her primary care clinic, as well as nerve blocks--bilateral C2, C3, and C4 facet nerve blocks through [facility] in March 2017 and fluoroscopically bilateral C2-C3 facet joint injections on 12 April 2017--and none of these interventions have provided any relief. She has been taking Advil with limited effect, stating she avoids medications like muscle relaxants or narcotics as these only make her sleep, with full recurrence of symptoms upon waking. The patient has also been attending physical therapy at [facility], currently once weekly.    The patient comes in today as she " "states her pain over the past 1 month has been worsening, now to the point of being quite difficult for her to tolerate. She describes the sensation \"like someone is pushing really hard on [her] neck,\" which is the same sensation that has been ongoing since her pain recurred last year but which is more intense. Pain originates in the midline neck and occasionally radiates to the left or right side of her neck. In the past month she has also developed a sensation of \"heaviness\" in the 3rd-5th fingers of her left hand, which is new. No focal numbness, paresthesias, or weakness. She has not had trouble holding things. She is right-hand-dominant. The patient denies any recent injury or trauma. She's had no fevers or chills, or nausea or vomiting, but has had decreased PO intake due to her pain. She denies cough, cold, runny nose, sore throat, abdominal pain, loss of bowel or bladder control, or dysuria or hematuria. The patient does report some ongoing double and blurry vision since her procedure last year that is unchanged from baseline and for which she was evaluated by the [facility] with no etiology elucidated. The patient has had decreased range of motion in her neck since her procedure last year that is unchanged.    The patient has been following with Dr. Bacrlay. She was at one point recommended injection at the level of C7 but did not undergo this as she was wary to undergo any further injections. She has follow-up with Dr. Barclay next month but, again, due to the current intensity of her pain she did not feel she could wait and so presents now to the ER for evaluation. Besides the above noted the patient has undergone no other neck surgeries. She states that besides her neck-related issues as well as left knee arthritis, she is otherwise healthy and on no regular medications.      No current facility-administered medications for this encounter.      Current Outpatient Prescriptions   Medication     calcium 500 MG " "CHEW     cholecalciferol (VITAMIN D-1000 MAX ST) 1000 UNITS TABS     Multiple Vitamins-Minerals (MULTIVITAL PO)     Omega-3 Fatty Acids (OMEGA-3 EPA FISH OIL PO)     History reviewed. No pertinent past medical history.    History reviewed. No pertinent surgical history.    No family history on file.    Social History   Substance Use Topics     Smoking status: Never Smoker     Smokeless tobacco: Never Used     Alcohol use 0.6 - 1.2 oz/week     1 - 2 Standard drinks or equivalent per week      Comment: EVERY 6 MONTHS        Allergies   Allergen Reactions     Dilaudid [Hydromorphone] GI Disturbance     Morphine Nausea and Vomiting     Oxycodone Nausea and Vomiting     Tramadol Nausea and Vomiting       I have reviewed the Medications, Allergies, Past Medical and Surgical History, and Social History in the Epic system.    Review of Systems   Constitutional: Negative for fever.   HENT: Negative for congestion, rhinorrhea and sore throat.    Respiratory: Negative for shortness of breath.    Cardiovascular: Negative for chest pain.   Gastrointestinal: Negative for abdominal pain, diarrhea, nausea and vomiting.        Neg for loss of bowel control   Genitourinary: Negative for dysuria, hematuria, vaginal bleeding and vaginal discharge.        Neg for loss of bladder control   Musculoskeletal: Positive for neck pain.   Neurological: Positive for headaches. Negative for weakness and numbness.        \"Heaviness\" in left hand   All other systems reviewed and are negative.      Physical Exam   BP: 130/80  Pulse: 60  Temp: 97.8  F (36.6  C)  Resp: 16  Weight: 83 kg (183 lb)  SpO2: 100 %  Physical Exam   Constitutional: She is oriented to person, place, and time.   Middle aged female, alert, cooperative, mild distress.   HENT:   Head: Normocephalic and atraumatic.   Mouth/Throat: Oropharynx is clear and moist. No oropharyngeal exudate.   Eyes: Pupils are equal, round, and reactive to light. No scleral icterus.   Cardiovascular: " Normal rate, regular rhythm, normal heart sounds and intact distal pulses.    No murmur heard.  Pulmonary/Chest: Effort normal and breath sounds normal. No respiratory distress. She has no wheezes. She has no rales.   Abdominal: Soft. Bowel sounds are normal. There is no tenderness.   Musculoskeletal: She exhibits no edema or tenderness.   Surgical incision over posterior neck diffusely TTP particularly along inferior aspect. No erythema and no sign of open wounds, no drainage. Some TTP along L upper trapezius.    Neurological: She is alert and oriented to person, place, and time. She has normal strength. No sensory deficit. Coordination normal. GCS eye subscore is 4. GCS verbal subscore is 5. GCS motor subscore is 6.   Reflex Scores:       Brachioradialis reflexes are 2+ on the right side and 2+ on the left side.  Normal finger spread B. Sensation intact in all distributions. Intact wrist flexion/extension B, intact elbow flexion/extension B.     Skin: Skin is warm. No rash noted. She is not diaphoretic.   Nursing note and vitals reviewed.      ED Course     ED Course     Procedures             Critical Care time:  none             Labs Ordered and Resulted from Time of ED Arrival Up to the Time of Departure from the ED - No data to display    Consults  Neurosurgery: Responded (10/09/17 6993)    Assessments & Plan (with Medical Decision Making)   This is a 56-year-old female with a history of chronic neck pain who presents to the emergency department today for neck pain.  It sounds as if she has had difficulties with neck pain for quite some time.  In September 2016 she underwent a C5 through C7 open-door laminoplasty which was done at [facility].  She s had continued problems.  The patient saw Dr. Barclay in June of this year and at that point the plan was for an upright MRI of the cervical spine and a selective nerve block of the left C7 root.  There is an MRI in our system which was scanned into our system, the  MRI was done in an outside facility at [facility].  MRI was performed on June 9th.  This demonstrated a C4 to C6 laminoplasty with no central stenosis or cord impingement at the operative levels.  She has severe right-sided C4-5 and moderate right medial C5-6 foraminal stenosis with moderate bilateral C6-C7 foraminal stenosis.  There is mild to moderate multilevel facet arthropathy with no definite acute inflammatory change and no cord lesion or osseous abnormality.    The patient felt that her symptoms were greatly increasing in the past week to month and scheduled an appointment with Dr. Barclay, however it is not until the end of November and she felt she could not wait so she comes here.    On my evaluation in the ED she is alert, cooperative, in no distress.  She has multiple prior records of prior MRIs and injections and other procedures that have been done at various institutions.  On neuro exam she is intact.  She is describing almost a radicular-type sensation with a  heaviness  to the left thumb, left pointer finger and left middle finger, though sensation is intact.  I do not notice any difference in reflexes or in strength.  She is somewhat tender to palpation in the posterior cervical spine over the surgical site but I do not see any sign of erythema and there is no evidence of open skin over the surgical incision.  She has some decreased range of motion which I suspect is expected after this particular procedure.    Differential diagnosis is extensive.  My underlying suspicion for serious or new acute pathology is quite low given the chronicity of her symptoms.  Infection is always a consideration, however she does not have a fever and when I specifically asked her about postop complications she denies any infections of the surgical site after her initial surgery.    I spoke to the on-call neurosurgery NP.  She is going to come and see the patient.  Recommendations at this point are to follow up with Dr. Barclay in  clinic - the NP was able to send a message to the schedulers to try to get a sooner appointment. The patient should call the clinic to check to see if this was able to be arranged.  Also, if she is interested in an injection, she should call the clinic to have this scheduled for her.     This part of the document was transcribed by Gerard Painting for Kristy Zamora MD.    I have reviewed the nursing notes.    I have reviewed the findings, diagnosis, plan and need for follow up with the patient.    Discharge Medication List as of 10/9/2017  2:10 PM          Final diagnoses:   Chronic neck pain     I, Gerard Painting, am serving as a trained medical scribe to document services personally performed by Kristy Zamora MD, based on the provider's statements to me.      I, Kristy Zamora MD, was physically present and have reviewed and verified the accuracy of this note documented by Gerard Painting.    10/9/2017   [facility], [place], EMERGENCY DEPARTMENT     Kristy Zamora MD  10/09/17 7617

## 2017-10-09 NOTE — DISCHARGE INSTRUCTIONS
You have been seen in the ER for your chronic neck pain.  The neurosurgery team has seen you and recommended that you follow up in clinic with Dr. Barclay.  You can consider getting the injections as previously recommended.  The clinic can help set this up for you.    The neurosurgery nurse practitioner has sent a message to the  in the clinic to try to get you a sooner appointment.  If you do not hear from them in a few days, you can call to check on the status of this.    Please make an appointment to follow up with Neurosurgery Clinic (phone: [phone]).

## 2017-10-10 DIAGNOSIS — M54.12 CERVICAL RADICULOPATHY AT C7: Primary | ICD-10-CM

## 2017-10-22 ENCOUNTER — HEALTH MAINTENANCE LETTER (OUTPATIENT)
Age: 57
End: 2017-10-22

## 2017-11-27 ENCOUNTER — OFFICE VISIT (OUTPATIENT)
Dept: NEUROSURGERY | Facility: CLINIC | Age: 57
End: 2017-11-27

## 2017-11-27 VITALS
DIASTOLIC BLOOD PRESSURE: 73 MMHG | WEIGHT: 183 LBS | HEIGHT: 65 IN | BODY MASS INDEX: 30.49 KG/M2 | HEART RATE: 72 BPM | SYSTOLIC BLOOD PRESSURE: 115 MMHG

## 2017-11-27 DIAGNOSIS — M50.10 CERVICAL DISC DISORDER WITH RADICULOPATHY OF CERVICAL REGION: Primary | ICD-10-CM

## 2017-11-27 RX ORDER — SODIUM PHOSPHATE,MONO-DIBASIC 19G-7G/118
2 ENEMA (ML) RECTAL 2 TIMES DAILY
COMMUNITY
End: 2021-01-25

## 2017-11-27 RX ORDER — OMEGA-3 FATTY ACIDS/FISH OIL 300-1000MG
600 CAPSULE ORAL EVERY 8 HOURS PRN
COMMUNITY
End: 2022-05-11

## 2017-11-27 ASSESSMENT — PAIN SCALES - GENERAL: PAINLEVEL: EXTREME PAIN (9)

## 2017-11-27 NOTE — PROGRESS NOTES
"HISTORY OF PRESENT ILLNESS:  Ms. Starks is a 57-year-old female returning to Neurosurgery Clinic today for evaluation of ongoing right-sided neck pain with postural headaches.  She underwent a C5-C7 open door laminoplasty at [facility] 09/09/2016 for cervical myelopathy.  Postoperatively, she had resolution of the neck pain; however, over the interval time, she has had return of predominantly right-sided neck \"tightness\" with headaches.  She describes the headaches as radiating up from her neck over the top of her head.  At times, she complains of them being postural, worse when standing and better when lying down.  Given the postural effects of her headaches, she has been worked up for potential CSF leak at [place], which was negative.  She presented to the [facility] emergency room on 10/09/2017 with regards to her ongoing symptoms and was offered selective nerve root injections to help localize the level that was causing the most trouble.  She underwent a right C7 nerve root injection which she did not find to be helpful.  Subsequently, she underwent a right C5 selective nerve root injection which she found to be beneficial for approximately 5 days.  During this time, her headaches had also improved.      The patient continues to complain of ongoing left hand \"pressure.\"  She is unclear as to the timing of these symptoms; however, she feels that they probably started after her laminoplasty.      PHYSICAL EXAMINATION:  The patient is alert and appropriately communicative.  Sensation is intact in upper and lower extremities bilaterally.  Strength is 5/5 in upper and lower extremities bilaterally.  Left upper extremity DTRs 3+, otherwise normal and symmetric throughout.  Positive Mantilla's sign bilaterally.  No clonus.  Negative Babinski.      IMAGING:  Cervical MRI dated 06/09/2017 was reviewed.  This study demonstrates the patient's previous laminoplasties at the C5-C7 levels.  There is a right C4-C5 " neural foraminal stenosis with nerve root impingement.  No indications of ongoing myelopathy or signal change within the cord.      ASSESSMENT:    1. Possible right C5 radiculopathy.  2. Residual upper extremity myelopathy     PLAN:     1.  The patient was offered a right C4-5 foraminotomy.  After a thorough conversation of the risks, potential benefits and expected outcomes, the patient was interested in moving forward with the offered surgery.  It is not clear that decompressing the right C5 nerve root will alleviate all of the patient's symptoms, however, given the image findings, she was willing to try and assess for positive effect.  The patient was informed that the stated procedure, though minor, may not alleviate her symptoms.     I saw the patient with the resident.  I have reviewed and edited the resident note and agree with the plan of care.      Clara Yang MD          Dictated by Alexa Tate MD, Resident         CLARA YANG MD       As dictated by ALEXA TATE MD            D: 2017 10:04   T: 2017 10:31   MT: JESSICA      Name:     CAYLA TURNER   MRN:      [id number]        Account:      [id number]   :      1960           Service Date: 2017      Document: [id number]

## 2017-11-27 NOTE — LETTER
"11/27/2017       RE: Layla Starks  [address] [address] [place] [id number]     Dear Colleague,    Thank you for referring your patient, Layla Starks, to the [facility] NEUROSURGERY at [facility]. Please see a copy of my visit note below.    HISTORY OF PRESENT ILLNESS:  Ms. Starks is a 57-year-old female returning to Neurosurgery Clinic today for evaluation of ongoing right-sided neck pain with postural headaches.  She underwent a C5-C7 open door laminoplasty at [facility] 09/09/2016 for cervical myelopathy.  Postoperatively, she had resolution of the neck pain; however, over the interval time, she has had return of predominantly right-sided neck \"tightness\" with headaches.  She describes the headaches as radiating up from her neck over the top of her head.  At times, she complains of them being postural, worse when standing and better when lying down.  Given the postural effects of her headaches, she has been worked up for potential CSF leak at [place], which was negative.  She presented to the [facility] emergency room on 10/09/2017 with regards to her ongoing symptoms and was offered selective nerve root injections to help localize the level that was causing the most trouble.  She underwent a right C7 nerve root injection which she did not find to be helpful.  Subsequently, she underwent a right C5 selective nerve root injection which she found to be beneficial for approximately 5 days.  During this time, her headaches had also improved.      The patient continues to complain of ongoing left hand \"pressure.\"  She is unclear as to the timing of these symptoms; however, she feels that they probably started after her laminoplasty.      PHYSICAL EXAMINATION:  The patient is alert and appropriately communicative.  Sensation is intact in upper and lower extremities bilaterally.  Strength is 5/5 in upper and lower extremities bilaterally.  Left upper extremity DTRs 3+, " otherwise normal and symmetric throughout.  Positive Mantilla's sign bilaterally.  No clonus.  Negative Babinski.      IMAGING:  Cervical MRI dated 2017 was reviewed.  This study demonstrates the patient's previous laminoplasties at the C5-C7 levels.  There is a right C4-C5 neural foraminal stenosis with nerve root impingement.  No indications of ongoing myelopathy or signal change within the cord.      ASSESSMENT:    1. Possible right C5 radiculopathy.  2. Residual upper extremity myelopathy     PLAN:     1.  The patient was offered a right C4-5 foraminotomy.  After a thorough conversation of the risks, potential benefits and expected outcomes, the patient was interested in moving forward with the offered surgery.  It is not clear that decompressing the right C5 nerve root will alleviate all of the patient's symptoms, however, given the image findings, she was willing to try and assess for positive effect.  The patient was informed that the stated procedure, though minor, may not alleviate her symptoms.     I saw the patient with the resident.  I have reviewed and edited the resident note and agree with the plan of care.      Clara Yang MD          Dictated by Alexa Tate MD, Resident         CLARA YANG MD       As dictated by ALEXA TATE MD            D: 2017 10:04   T: 2017 10:31   MT: JESSICA      Name:     CAYLA TURNER   MRN:      [id number]        Account:      [id number]   :      1960           Service Date: 2017      Document: [id number]        Again, thank you for allowing me to participate in the care of your patient.      Sincerely,    Clara Yang MD

## 2017-11-27 NOTE — MR AVS SNAPSHOT
After Visit Summary   11/27/2017    Layla Starks    MRN: [id number]           Patient Information     Date Of Birth          1960        Visit Information        Provider Department      11/27/2017 8:30 AM Byron Barclay MD [facility] Neurosurgery        Today's Diagnoses     Cervical disc disorder with radiculopathy of cervical region    -  1       Follow-ups after your visit        Additional Services     PAC Visit Referral (For [facility] Only)                 Your next 10 appointments already scheduled     Dec 06, 2017  8:00 AM CST   (Arrive by 7:45 AM)   PAC EVALUATION with  Pac Calvin 1   [facility] Preoperative Assessment Center ([facility] [place])    [address]  [address] [facility] [address]   [phone]            Dec 06, 2017  9:00 AM CST   (Arrive by 8:45 AM)   PAC RN ASSESSMENT with  Pac Rn   [facility] Preoperative Assessment [place] ([facility] [place])    [address]  [address] [facility] [id number] [phone]            Dec 06, 2017  9:30 AM CST   (Arrive by 9:15 AM)   PAC Anesthesia Consult with  Pac Anesthesiologist   [facility] Preoperative Assessment [place] ([facility] [place])    [address]  [address] [facility] [id number] [phone]            Dec 13, 2017   Procedure with Byron Barclay MD   [facility], [place], Same Day Surgery (--)    [address] [address] [phone]              Who to contact     Please call your clinic at [phone] to:    Ask questions about your health    Make or cancel appointments    Discuss your medicines    Learn about your test results    Speak to your doctor   If you have compliments or concerns about an experience at your clinic, or if you wish to file a complaint, please contact [facility] Patient Relations at [phone] or email us at [email or web address]         Additional  "Information About Your Visit        Marvelhart Information     Chicory gives you secure access to your electronic health record. If you see a primary care provider, you can also send messages to your care team and make appointments. If you have questions, please call your primary care clinic.  If you do not have a primary care provider, please call [phone] and they will assist you.      Chicory is an electronic gateway that provides easy, online access to your medical records. With Chicory, you can request a clinic appointment, read your test results, renew a prescription or communicate with your care team.     To access your existing account, please contact your [facility] or call [phone] for assistance.        Care EveryWhere ID     This is your Care EveryWhere ID. This could be used by other organizations to access your [facility] medical records  [id number]        Your Vitals Were     Pulse Height BMI (Body Mass Index)             72 1.657 m (5' 5.25\") 30.22 kg/m2          Blood Pressure from Last 3 Encounters:   11/27/17 115/73   10/09/17 130/80   06/05/17 113/65    Weight from Last 3 Encounters:   11/27/17 83 kg (183 lb)   10/09/17 83 kg (183 lb)   03/27/17 80.7 kg (178 lb)              We Performed the Following     Evelin-Operative Worksheet        Primary Care Provider Office Phone # Fax #    Brittani LOTT Juanita [phone]       [facility] [place] [address]     [facility] [address]        Equal Access to Services     DIGNA LYNN : Hadii aad ku hadasho Soomaali, waaxda luqadaha, qaybta kaalmada adeegyada, waxmariia noemi haygirishn juan gonzalez. So [facility] [phone].    ATENCIÓN: Si habla español, tiene a ramos disposición servicios gratuitos de asistencia lingüística. Llame al [phone].    We comply with applicable federal civil rights laws and [place] laws. We do not discriminate on the basis of race, color, national origin, age, " disability, sex, sexual orientation, or gender identity.            Thank you!     Thank you for choosing [facility] NEUROSURGERY  for your care. Our goal is always to provide you with excellent care. Hearing back from our patients is one way we can continue to improve our services. Please take a few minutes to complete the written survey that you may receive in the mail after your visit with us. Thank you!             Your Updated Medication List - Protect others around you: Learn how to safely use, store and throw away your medicines at www.disposemymeds.org.          This list is accurate as of: 11/27/17 11:59 PM.  Always use your most recent med list.                   Brand Name Dispense Instructions for use Diagnosis    ADVIL 200 MG capsule   Generic drug:  ibuprofen       Cervical disc disorder with radiculopathy of cervical region       calcium 500 MG Chew      Take 2 tablets by mouth daily        glucosamine-chondroitin 500-400 MG Caps per capsule      Take 1 capsule by mouth daily    Cervical disc disorder with radiculopathy of cervical region       MULTIVITAL PO      Take 2 tablets by mouth daily        OMEGA-3 EPA FISH OIL PO      Take 1 g by mouth daily        UNABLE TO FIND      MEDICATION NAME: XenoProtx    Cervical disc disorder with radiculopathy of cervical region       VITAMIN D-1000 MAX ST 1000 UNITS Tabs   Generic drug:  cholecalciferol      Take 1,000 Units by mouth daily

## 2017-11-28 NOTE — NURSING NOTE
Pre-op Teaching    Procedure:Right C4-5 Posterior Foraminotomy  Planned Surgery Date: 12/13/2017  Surgeon: Deny                Discussed pre-op routine and requirements to include:  surgical procedure, post-op recovery and expectations, need for H&P, NPO prior to OR, pre-op antibacterial showers, pain control and importance of follow-up visits.  Surgery scheduling will coordinate OR time/date and update patient as appropriate.  3C will call to re-inforce instructions 24-48 hours prior to surgery.       Ample time was provided for patient questions and in-depth discussion of topics of heightened interest.  Reviewed medication list and provided instructions regarding what medications to stop prior to surgery: NSAID and Omega-3.   Anti-bacterial soap solution for pre-op showers will be provided at PAC visit as well as specific instructions for use. Approximately 20 minutes spent with patient discussing and reviewing.      Patient provided triage contact number for questions or concerns that may arise prior to surgery. Pre-op folder with specific written instructions given to patient for review.

## 2017-12-06 ENCOUNTER — ALLIED HEALTH/NURSE VISIT (OUTPATIENT)
Dept: SURGERY | Facility: CLINIC | Age: 57
End: 2017-12-06

## 2017-12-06 ENCOUNTER — ANESTHESIA EVENT (OUTPATIENT)
Dept: SURGERY | Facility: CLINIC | Age: 57
End: 2017-12-06
Payer: COMMERCIAL

## 2017-12-06 ENCOUNTER — OFFICE VISIT (OUTPATIENT)
Dept: SURGERY | Facility: CLINIC | Age: 57
End: 2017-12-06

## 2017-12-06 VITALS
RESPIRATION RATE: 16 BRPM | SYSTOLIC BLOOD PRESSURE: 112 MMHG | WEIGHT: 188.7 LBS | HEIGHT: 65 IN | DIASTOLIC BLOOD PRESSURE: 71 MMHG | TEMPERATURE: 98.2 F | BODY MASS INDEX: 31.44 KG/M2 | HEART RATE: 64 BPM | OXYGEN SATURATION: 97 %

## 2017-12-06 DIAGNOSIS — M54.12 CERVICAL RADICULOPATHY: ICD-10-CM

## 2017-12-06 DIAGNOSIS — Z01.818 PREOP EXAMINATION: Primary | ICD-10-CM

## 2017-12-06 LAB
CREAT SERPL-MCNC: 0.7 MG/DL (ref 0.52–1.04)
GFR SERPL CREATININE-BSD FRML MDRD: 87 ML/MIN/1.7M2
GLUCOSE SERPL-MCNC: 89 MG/DL (ref 70–99)
HGB BLD-MCNC: 14.7 G/DL (ref 11.7–15.7)
POTASSIUM SERPL-SCNC: 4.3 MMOL/L (ref 3.4–5.3)

## 2017-12-06 ASSESSMENT — LIFESTYLE VARIABLES: TOBACCO_USE: 1

## 2017-12-06 NOTE — PATIENT INSTRUCTIONS
Preparing for Your Surgery      Name:  Layla Starks   MRN:  [id number]   :  1960   Today's Date:  2017     Arriving for surgery:  Surgery date:  2017  Arrival time:  1:40 pm  Please come to:       [facility] [address]  [address] [place]  [address]    -   parking is available in front of the hospital from 5:15 am to 8:00 pm    -  Stop at the Information Desk in the lobby    -   Inform the information person that you are here for surgery. An escort to 3c will be provided. If you would not like an escort, please proceed to 3C on the 3rd floor. [phone]     What can I eat or drink?  -  You may have solid food or milk products until 8 hours prior to your surgery.  -  You may have water, apple juice or 7up/Sprite until 2 hours prior to your surgery.    Which medicines can I take?  -  Do NOT take these medications in the morning, the day of surgery:  Stop Stop omega-3 Fatty Acids 7 days before.      Stop Advil 3 days before    -  Please take these medications the day of surgery:  NONE    How do I prepare myself?  -  Take two showers: one the night before surgery; and one the morning of surgery.         Use Scrubcare or Hibiclens to wash from neck down.  You may use your own shampoo and conditioner. No other hair products.   -  Do NOT use lotion, powder, deodorant, or antiperspirant the day of your surgery.  -  Do NOT wear any makeup, fingernail polish or jewelry.    -Do not bring your own medications to the hospital, except for inhalers and eye drops.  -  Bring your ID and insurance card.    Questions or Concerns:  If you have questions or concerns, please call the  Preoperative Assessment Center, Monday-Friday 7AM-7PM:  [phone]    AFTER YOUR SURGERY  Breathing exercises   Breathing exercises help you recover faster. Take deep breaths and let the air out slowly. This will:     Help you wake up after surgery.    Help prevent complications  like pneumonia.  Preventing complications will help you go home sooner.   We may give you a breathing device (incentive spirometer) to encourage you to breathe deeply.   Nausea and vomiting   You may feel sick to your stomach after surgery; if so, let your nurse know.    Pain control:  After surgery, you may have pain. Our goal is to help you manage your pain. Pain medicine will help you feel comfortable enough to do activities that will help you heal.  These activities may include breathing exercises, walking and physical therapy.   To help your health care team treat your pain we will ask: 1) If you have pain  2) where it is located 3) describe your pain in your words  Methods of pain control include medications given by mouth, vein or by nerve block for some surgeries.  We may give you a pain control pump that will:  1) Deliver the medicine through a tube placed in your vein  2) Control the amount of medicine you receive  3) Allow you to push a button to deliver a dose of pain medicine  Sequential Compression Device (SCD) or Pneumo Boots:  You may need to wear SCD S on your legs or feet. These are wraps connected to a machine that pumps in air and releases it. The repeated pumping helps prevent blood clots from forming.

## 2017-12-06 NOTE — H&P
Pre-Operative H & P     CC:  Preoperative exam to assess for increased cardiopulmonary risk while undergoing surgery and anesthesia.    Date of Encounter: 12/6/2017  Primary Care Physician:  Brittani Grant  Layla Starks is a 57 year old female who presents for pre-operative H & P in preparation for minimally invasive posterior cervical foraminotomy with Dr. Barclay on 12/13/17 at [facility]. History is obtained from the patient.     Patient who recently consulted with Dr. Barclay for ongoing right side neck pain and postural headaches. She is s/p C5-C7 laminoplasty at [place] in 9/2016. She initially had relief of pain but over time she has had return of right side neck tightness and headaches. She had tried C7 root injection in the interim that was not helpful. She was counseled for above procedure.   Past Medical History  Past Medical History:   Diagnosis Date     Cervical disc disorder with radiculopathy of cervical region 11/27/2017     Cervical spinal stenosis      DDD (degenerative disc disease), cervical      OA (osteoarthritis)      PONV (postoperative nausea and vomiting)        Past Surgical History  Past Surgical History:   Procedure Laterality Date     C5-7 laminoplasty Right 2016     COLONOSCOPY  2016     Excision anal skin tags       MAMMOPLASTY REDUCTION  1980     Partial meniscectomy Left 05/17/2016       Hx of Blood transfusions/reactions: Denies.      Hx of abnormal bleeding or anti-platelet use: Denies.     Menstrual history: Patient's last menstrual period was 02/01/2017.    Steroid use in the last year: Yes, for neck pain.    Personal or FH with difficulty with Anesthesia:  PONV.    Prior to Admission Medications  Current Outpatient Prescriptions   Medication Sig Dispense Refill     glucosamine-chondroitin 500-400 MG CAPS per capsule Take 2 capsules by mouth 2 times daily        ibuprofen (ADVIL) 200 MG capsule Take 600 mg by mouth every 8  hours as needed        UNABLE TO FIND Take 2 capsules by mouth 2 times daily MEDICATION NAME: XenoProtx        calcium 500 MG CHEW Take 2 tablets by mouth At Bedtime        cholecalciferol (VITAMIN D-1000 MAX ST) 1000 UNITS TABS Take 1,000 Units by mouth every morning        Multiple Vitamins-Minerals (MULTIVITAL PO) Take 1 tablet by mouth 2 times daily        Omega-3 Fatty Acids (OMEGA-3 EPA FISH OIL PO) Take 1 g by mouth every morning          Allergies  Allergies   Allergen Reactions     Dilaudid [Hydromorphone] GI Disturbance     Morphine Nausea and Vomiting     Oxycodone Nausea and Vomiting     Tramadol Nausea and Vomiting       Social History  Social History     Social History     Marital status:      Spouse name: N/A     Number of children: N/A     Years of education: N/A     Occupational History     Not on file.     Social History Main Topics     Smoking status: Former Smoker     Packs/day: 0.50     Years: 3.00     Types: Cigarettes     Smokeless tobacco: Never Used      Comment: Quit 30 years ago     Alcohol use 0.6 - 1.2 oz/week     1 - 2 Standard drinks or equivalent per week      Comment: EVERY 6 MONTHS     Drug use: No     Sexual activity: Not on file     Other Topics Concern     Not on file     Social History Narrative       Family History  Family History   Problem Relation Age of Onset     Arthritis Mother      Macular Degeneration Mother      Hearing Loss Father      Multiple Sclerosis Sister        Review of Systems  The complete review of systems is negative other than noted in the HPI or here.   Constitutional: Denies fever, chills, weight loss.  HEENT: Wears glasses for reading. Denies swallowing difficulty.  Respiratory: Denies cough or shortness of breath. Denies concern for MARIA L.  CV: Denies chest pain or irregular HR. Good exercise tolerance. Reports history of murmur all her life, no testing.  GI: Denies abdominal pain or bowel issues.  : Denies dysuria.  M/S: Left knee pain.  Neuro:  "Persistent neck tightness and HEADACHEs.    Temp: 98.2  F (36.8  C) Temp src: Oral BP: 112/71 Pulse: 64   Resp: 16 SpO2: 97 %         188 lbs 11.2 oz  5' 5.25\"   Body mass index is 31.16 kg/(m^2).       Physical Exam  Constitutional: Awake, alert, cooperative, no apparent distress, and appears stated age.  Eyes: Pupils equal, round and reactive to light, extra ocular muscles intact, sclera clear, conjunctiva normal.  HENT: Normocephalic, oral pharynx with moist mucus membranes, good dentition. No goiter appreciated.   Respiratory: Clear to auscultation bilaterally, no crackles or wheezing. No cough or obvious dyspnea.  Cardiovascular: Regular rate and rhythm, normal S1 and S2, and no murmur noted. Carotids, no bruits. No edema. Palpable pulses to radial  DP and PT arteries.   GI: Normal bowel sounds, soft, non-distended, non-tender, no masses palpated, no hepatosplenomegaly.    Lymph/Hematologic: No cervical lymphadenopathy and no supraclavicular lymphadenopathy.  Genitourinary: Deferred.  Skin: Warm and dry.  No rashes at anticipated surgical site.   Musculoskeletal: Mildly limited neck extension. There is no redness, warmth, or swelling of the joints. Gross motor strength is normal.    Neurologic: Awake, alert, oriented to name, place and time. Cranial nerves II-XII are grossly intact. Gait is normal.   Neuropsychiatric: Calm, cooperative. Normal affect.     Labs: (personally reviewed) 12/6/17  Hgb 14.7  K 4.3  Cr 0.70  GFR 87  Glucose 89  EKG: OSH 2016 Sinus rhythm    MRI 6/9/17  Conclusion  C4-C6 laminoplasty with no central stenosis or cord impingement at the operative levels.   Severe right C4-5 and moderate right medial C5-6 foraminal stenosis. Also, moderate bilateral C6-7 foraminal stenosis.  Mild to moderate multilevel facet arthropathy with no definite acute inflammatory change.   No cord lesion or osseous abnormality.    Outside records reviewed from: Care Everywhere    ASSESSMENT and PLAN  Layla" Tereza is a 57 year old female scheduled to undergo minimally invasive posterior cervical foraminotomy with Dr. Barclay on 12/13/17. She has the following specific operative considerations:   - RCRI : No serious cardiac risks.    - Anesthesia considerations:  Refer to PAC assessment in anesthesia records  - VTE risk: 0.26%  - MARIA L # of risks 1/8 = Low risk.  - Risk of PONV score = 3.  If > 2, anti-emetic intervention recommended. If 3 or > anti emetic intervention recommended with two or more meds      --Residual upper extremity myelopathy and possible right C5 radiculopathy. Neck tightness and HEADACHEs. Above procedure planned.   --PONV. Significant history. Sensitive to narcotics. Final decisions regarding prophylaxis by Anesthesia on DOS.    --No cardiac history, symptoms or meds. EKG in 2016 SR. Good exercise tolerance.   --Former smoker 1.5 pack years. Quit 30 years ago. Denies pulmonary symptoms.     Arrival time, NPO, shower and medication instructions provided by nursing staff today. Preparing For Your Surgery handout given.    Patient was discussed with Dr Paris.    SIA Katz CNS  Preoperative Assessment Center  [facility]  Clinic and Surgery Center  Phone: [phone]  Fax: [phone]

## 2017-12-06 NOTE — ANESTHESIA PREPROCEDURE EVALUATION
Anesthesia Evaluation     . Pt has had prior anesthetic. Type: General    History of anesthetic complications   - PONV        ROS/MED HX    ENT/Pulmonary:     (+)tobacco use, Past use , . .    Neurologic:     (+)other neuro Possible C5 radiculopathy    Cardiovascular:  - neg cardiovascular ROS   (+) ----. : . . . :. . Previous cardiac testing date:results:date: results:ECG reviewed date:2016 results:SR date: results:          METS/Exercise Tolerance:  >4 METS   Hematologic:  - neg hematologic  ROS       Musculoskeletal:   (+) arthritis, , , other musculoskeletal- DDD      GI/Hepatic:  - neg GI/hepatic ROS       Renal/Genitourinary:  - ROS Renal section negative       Endo:  - neg endo ROS       Psychiatric:  - neg psychiatric ROS       Infectious Disease:  - neg infectious disease ROS       Malignancy:      - no malignancy   Other:    (+) C-spine cleared: N/A, H/O Chronic Pain,no other significant disability                    Physical Exam      Airway   Mallampati: II  TM distance: >3 FB  Neck ROM: full  Comment: Moves neck gingerly    Dental     Cardiovascular   Rhythm and rate: regular and normal      Pulmonary    breath sounds clear to auscultation    Other findings: For further details of assessment, testing, and physical exam please see H and P completed on same date.           PAC Discussion and Assessment    ASA Classification: 2  Case is suitable for: [place]  Anesthetic techniques and relevant risks discussed: GA  Invasive monitoring and risk discussed: No  Types:   Possibility and Risk of blood transfusion discussed: No  NPO instructions given:   Additional anesthetic preparation and risks discussed:   Needs early admission to pre-op area:   Other:     PAC Resident/NP Anesthesia Assessment:  Layla Starks is a 57 year old female scheduled to undergo minimally invasive posterior cervical foraminotomy with Dr. Barclay on 12/13/17. She has the following specific operative considerations:   - RCRI : No  serious cardiac risks.    - VTE risk: 0.26%  - MARIA L # of risks 1/8 = Low risk.  - Risk of PONV score = 3.  If > 2, anti-emetic intervention recommended. If 3 or > anti emetic intervention recommended with two or more meds    Last procedure colonoscopy. Vomiting afterwards. Last GA for laminoplasty in 2016.       --Residual upper extremity myelopathy and possible right C5 radiculopathy. Neck tightness and HEADACHEs. Above procedure planned.   --PONV. Significant history. Sensitive to narcotics. Final decisions regarding prophylaxis by Anesthesia on DOS.    --No cardiac history, symptoms or meds. EKG in 2016 SR. Good exercise tolerance.   --Former smoker 1.5 pack years. Quit 30 years ago. Denies pulmonary symptoms.     Patient was discussed with Dr Paris.      Reviewed and Signed by PAC Mid-Level Provider/Resident  Mid-Level Provider/Resident: SIA Molina, CNS  Date: 12/6/17  Time: 7:48am    Attending Anesthesiologist Anesthesia Assessment:  STAFF:  Pleasant 57 y.o. woman with neck pain cervical nerve disease for C4-5 formainotomy  by Dr. Barclay  using general anesthesia.   History summarized above.  Otherwise healthy and active.  LIMITED neck extension, but opens well.   Instructions given and questions answered.   Final plans by anesthesiology team on DOS.   ---rcp      Reviewed and Signed by PAC Anesthesiologist  Anesthesiologist: kiley  Date: 12/6  Time:   Pass/Fail: Pass  Disposition:     PAC Pharmacist Assessment:        Pharmacist:   Date:   Time:      Anesthesia Plan      History & Physical Review  History and physical reviewed and following examination; no interval change.    ASA Status:  2 .    NPO Status:  > 8 hours (>2h for clear liquids)    Plan for General and ETT with Intravenous induction. Maintenance will be Balanced.    PONV prophylaxis:  Ondansetron (or other 5HT-3), Dexamethasone or Solumedrol and Scopolamine patch  Additional equipment: 2nd IV     - Standard ASA monitors  - Abx per surgical  team  - Discuss risks of prone positioning including blindness with pt prior to surgery  - Adjust anesthetic per monitoring needs    Final anesthetic plan per staff anesthesiologist on the day of surgery.    I discussed the risks and benefits of general anesthesia with the patient.  Questions were sought and answered.      Yaron Alcaraz MD  Attending Anesthesiologist        Postoperative Care  Postoperative pain management:  IV analgesics.      Consents  Anesthetic plan, risks, benefits and alternatives discussed with:  Patient and Spouse..      Jerman Woods MD  Anesthesiology Resident CA2, PGY3                      .

## 2017-12-13 ENCOUNTER — HOSPITAL ENCOUNTER (OUTPATIENT)
Facility: CLINIC | Age: 57
Discharge: HOME | End: 2017-12-14
Attending: NEUROLOGICAL SURGERY | Admitting: NEUROLOGICAL SURGERY
Payer: COMMERCIAL

## 2017-12-13 ENCOUNTER — ANESTHESIA (OUTPATIENT)
Dept: SURGERY | Facility: CLINIC | Age: 57
End: 2017-12-13
Payer: COMMERCIAL

## 2017-12-13 ENCOUNTER — APPOINTMENT (OUTPATIENT)
Dept: GENERAL RADIOLOGY | Facility: CLINIC | Age: 57
End: 2017-12-13
Attending: NEUROLOGICAL SURGERY
Payer: COMMERCIAL

## 2017-12-13 DIAGNOSIS — M50.10 CERVICAL DISC DISORDER WITH RADICULOPATHY OF CERVICAL REGION: Primary | ICD-10-CM

## 2017-12-13 DIAGNOSIS — M54.2 CERVICALGIA: ICD-10-CM

## 2017-12-13 LAB
ABO + RH BLD: NORMAL
ABO + RH BLD: NORMAL
BLD GP AB SCN SERPL QL: NORMAL
BLOOD BANK CMNT PATIENT-IMP: NORMAL
GLUCOSE BLDC GLUCOMTR-MCNC: 81 MG/DL (ref 70–99)
HCG UR QL: NEGATIVE
SPECIMEN EXP DATE BLD: NORMAL

## 2017-12-13 PROCEDURE — 82962 GLUCOSE BLOOD TEST: CPT

## 2017-12-13 PROCEDURE — 25000125 ZZHC RX 250: Performed by: NURSE ANESTHETIST, CERTIFIED REGISTERED

## 2017-12-13 PROCEDURE — 25000128 H RX IP 250 OP 636: Performed by: NURSE ANESTHETIST, CERTIFIED REGISTERED

## 2017-12-13 PROCEDURE — 27210794 ZZH OR GENERAL SUPPLY STERILE: Performed by: NEUROLOGICAL SURGERY

## 2017-12-13 PROCEDURE — 36000066 ZZH SURGERY LEVEL 4 W FLUORO 1ST 30 MIN - UMMC: Performed by: NEUROLOGICAL SURGERY

## 2017-12-13 PROCEDURE — 86850 RBC ANTIBODY SCREEN: CPT | Performed by: ANESTHESIOLOGY

## 2017-12-13 PROCEDURE — 40000277 XR SURGERY CARM FLUORO LESS THAN 5 MIN W STILLS: Mod: TC

## 2017-12-13 PROCEDURE — 71000015 ZZH RECOVERY PHASE 1 LEVEL 2 EA ADDTL HR: Performed by: NEUROLOGICAL SURGERY

## 2017-12-13 PROCEDURE — 25000565 ZZH ISOFLURANE, EA 15 MIN: Performed by: NEUROLOGICAL SURGERY

## 2017-12-13 PROCEDURE — 37000009 ZZH ANESTHESIA TECHNICAL FEE, EACH ADDTL 15 MIN: Performed by: NEUROLOGICAL SURGERY

## 2017-12-13 PROCEDURE — 86900 BLOOD TYPING SEROLOGIC ABO: CPT | Performed by: ANESTHESIOLOGY

## 2017-12-13 PROCEDURE — 25000128 H RX IP 250 OP 636: Performed by: ANESTHESIOLOGY

## 2017-12-13 PROCEDURE — 36415 COLL VENOUS BLD VENIPUNCTURE: CPT | Performed by: ANESTHESIOLOGY

## 2017-12-13 PROCEDURE — C9399 UNCLASSIFIED DRUGS OR BIOLOG: HCPCS | Performed by: NURSE ANESTHETIST, CERTIFIED REGISTERED

## 2017-12-13 PROCEDURE — 71000014 ZZH RECOVERY PHASE 1 LEVEL 2 FIRST HR: Performed by: NEUROLOGICAL SURGERY

## 2017-12-13 PROCEDURE — 25000125 ZZHC RX 250: Performed by: ANESTHESIOLOGY

## 2017-12-13 PROCEDURE — 37000008 ZZH ANESTHESIA TECHNICAL FEE, 1ST 30 MIN: Performed by: NEUROLOGICAL SURGERY

## 2017-12-13 PROCEDURE — 25000128 H RX IP 250 OP 636: Performed by: NEUROLOGICAL SURGERY

## 2017-12-13 PROCEDURE — 81025 URINE PREGNANCY TEST: CPT | Performed by: ANESTHESIOLOGY

## 2017-12-13 PROCEDURE — 36000064 ZZH SURGERY LEVEL 4 EA 15 ADDTL MIN - UMMC: Performed by: NEUROLOGICAL SURGERY

## 2017-12-13 PROCEDURE — 86901 BLOOD TYPING SEROLOGIC RH(D): CPT | Performed by: ANESTHESIOLOGY

## 2017-12-13 PROCEDURE — 40000170 ZZH STATISTIC PRE-PROCEDURE ASSESSMENT II: Performed by: NEUROLOGICAL SURGERY

## 2017-12-13 RX ORDER — ONDANSETRON 4 MG/1
4 TABLET, ORALLY DISINTEGRATING ORAL EVERY 30 MIN PRN
Status: DISCONTINUED | OUTPATIENT
Start: 2017-12-13 | End: 2017-12-13

## 2017-12-13 RX ORDER — ONDANSETRON 4 MG/1
4 TABLET, ORALLY DISINTEGRATING ORAL EVERY 6 HOURS PRN
Status: DISCONTINUED | OUTPATIENT
Start: 2017-12-13 | End: 2017-12-14 | Stop reason: HOSPADM

## 2017-12-13 RX ORDER — AMOXICILLIN 250 MG
3 CAPSULE ORAL 2 TIMES DAILY
Status: DISCONTINUED | OUTPATIENT
Start: 2017-12-13 | End: 2017-12-14 | Stop reason: HOSPADM

## 2017-12-13 RX ORDER — LIDOCAINE HYDROCHLORIDE 20 MG/ML
INJECTION, SOLUTION INFILTRATION; PERINEURAL PRN
Status: DISCONTINUED | OUTPATIENT
Start: 2017-12-13 | End: 2017-12-13

## 2017-12-13 RX ORDER — SENNOSIDES A AND B 8.6 MG/1
2 TABLET, FILM COATED ORAL 2 TIMES DAILY
Qty: 80 TABLET | Refills: 1 | Status: SHIPPED | OUTPATIENT
Start: 2017-12-13 | End: 2018-02-26

## 2017-12-13 RX ORDER — ONDANSETRON 2 MG/ML
4 INJECTION INTRAMUSCULAR; INTRAVENOUS EVERY 6 HOURS PRN
Status: DISCONTINUED | OUTPATIENT
Start: 2017-12-13 | End: 2017-12-14 | Stop reason: HOSPADM

## 2017-12-13 RX ORDER — OXYCODONE HYDROCHLORIDE 5 MG/1
5 TABLET ORAL EVERY 4 HOURS PRN
Qty: 60 TABLET | Refills: 0 | Status: SHIPPED | OUTPATIENT
Start: 2017-12-13 | End: 2017-12-20 | Stop reason: ALTCHOICE

## 2017-12-13 RX ORDER — ACETAMINOPHEN 10 MG/ML
1000 INJECTION, SOLUTION INTRAVENOUS ONCE
Status: COMPLETED | OUTPATIENT
Start: 2017-12-13 | End: 2017-12-13

## 2017-12-13 RX ORDER — FENTANYL CITRATE 50 UG/ML
INJECTION, SOLUTION INTRAMUSCULAR; INTRAVENOUS PRN
Status: DISCONTINUED | OUTPATIENT
Start: 2017-12-13 | End: 2017-12-13

## 2017-12-13 RX ORDER — EPHEDRINE SULFATE 50 MG/ML
INJECTION, SOLUTION INTRAMUSCULAR; INTRAVENOUS; SUBCUTANEOUS PRN
Status: DISCONTINUED | OUTPATIENT
Start: 2017-12-13 | End: 2017-12-13

## 2017-12-13 RX ORDER — GLYCOPYRROLATE 0.2 MG/ML
INJECTION, SOLUTION INTRAMUSCULAR; INTRAVENOUS PRN
Status: DISCONTINUED | OUTPATIENT
Start: 2017-12-13 | End: 2017-12-13

## 2017-12-13 RX ORDER — ONDANSETRON 2 MG/ML
INJECTION INTRAMUSCULAR; INTRAVENOUS PRN
Status: DISCONTINUED | OUTPATIENT
Start: 2017-12-13 | End: 2017-12-13

## 2017-12-13 RX ORDER — POLYETHYLENE GLYCOL 3350 17 G/17G
1 POWDER, FOR SOLUTION ORAL 2 TIMES DAILY
Qty: 30 PACKET | Refills: 1 | Status: SHIPPED | OUTPATIENT
Start: 2017-12-13 | End: 2018-02-26

## 2017-12-13 RX ORDER — DEXAMETHASONE SODIUM PHOSPHATE 4 MG/ML
INJECTION, SOLUTION INTRA-ARTICULAR; INTRALESIONAL; INTRAMUSCULAR; INTRAVENOUS; SOFT TISSUE PRN
Status: DISCONTINUED | OUTPATIENT
Start: 2017-12-13 | End: 2017-12-13

## 2017-12-13 RX ORDER — ONDANSETRON 2 MG/ML
4 INJECTION INTRAMUSCULAR; INTRAVENOUS EVERY 30 MIN PRN
Status: DISCONTINUED | OUTPATIENT
Start: 2017-12-13 | End: 2017-12-13

## 2017-12-13 RX ORDER — LABETALOL HYDROCHLORIDE 5 MG/ML
10 INJECTION, SOLUTION INTRAVENOUS
Status: DISCONTINUED | OUTPATIENT
Start: 2017-12-13 | End: 2017-12-13

## 2017-12-13 RX ORDER — PROPOFOL 10 MG/ML
INJECTION, EMULSION INTRAVENOUS PRN
Status: DISCONTINUED | OUTPATIENT
Start: 2017-12-13 | End: 2017-12-13

## 2017-12-13 RX ORDER — PROPOFOL 10 MG/ML
INJECTION, EMULSION INTRAVENOUS CONTINUOUS PRN
Status: DISCONTINUED | OUTPATIENT
Start: 2017-12-13 | End: 2017-12-13

## 2017-12-13 RX ORDER — LIDOCAINE 4 G/G
3 PATCH TOPICAL
Status: DISCONTINUED | OUTPATIENT
Start: 2017-12-13 | End: 2017-12-14 | Stop reason: HOSPADM

## 2017-12-13 RX ORDER — ONDANSETRON 4 MG/1
4-8 TABLET, FILM COATED ORAL EVERY 8 HOURS PRN
Qty: 60 TABLET | Refills: 1 | Status: SHIPPED | OUTPATIENT
Start: 2017-12-13 | End: 2021-01-25

## 2017-12-13 RX ORDER — CYCLOBENZAPRINE HCL 5 MG
10 TABLET ORAL 3 TIMES DAILY PRN
Status: DISCONTINUED | OUTPATIENT
Start: 2017-12-13 | End: 2017-12-14 | Stop reason: HOSPADM

## 2017-12-13 RX ORDER — MEPERIDINE HYDROCHLORIDE 50 MG/ML
12.5 INJECTION INTRAMUSCULAR; INTRAVENOUS; SUBCUTANEOUS
Status: DISCONTINUED | OUTPATIENT
Start: 2017-12-13 | End: 2017-12-13

## 2017-12-13 RX ORDER — DIAZEPAM 2 MG
2 TABLET ORAL EVERY 6 HOURS PRN
Status: DISCONTINUED | OUTPATIENT
Start: 2017-12-13 | End: 2017-12-14 | Stop reason: HOSPADM

## 2017-12-13 RX ORDER — METOCLOPRAMIDE 10 MG/1
10 TABLET ORAL EVERY 6 HOURS PRN
Status: DISCONTINUED | OUTPATIENT
Start: 2017-12-13 | End: 2017-12-14 | Stop reason: HOSPADM

## 2017-12-13 RX ORDER — METOCLOPRAMIDE HYDROCHLORIDE 5 MG/ML
10 INJECTION INTRAMUSCULAR; INTRAVENOUS EVERY 6 HOURS PRN
Status: DISCONTINUED | OUTPATIENT
Start: 2017-12-13 | End: 2017-12-14 | Stop reason: HOSPADM

## 2017-12-13 RX ORDER — CEFAZOLIN SODIUM 2 G/100ML
2 INJECTION, SOLUTION INTRAVENOUS
Status: COMPLETED | OUTPATIENT
Start: 2017-12-13 | End: 2017-12-13

## 2017-12-13 RX ORDER — HYDROMORPHONE HYDROCHLORIDE 1 MG/ML
.3-.5 INJECTION, SOLUTION INTRAMUSCULAR; INTRAVENOUS; SUBCUTANEOUS EVERY 10 MIN PRN
Status: DISCONTINUED | OUTPATIENT
Start: 2017-12-13 | End: 2017-12-13

## 2017-12-13 RX ORDER — OXYCODONE HYDROCHLORIDE 5 MG/1
5-10 TABLET ORAL
Status: DISCONTINUED | OUTPATIENT
Start: 2017-12-13 | End: 2017-12-13

## 2017-12-13 RX ORDER — NALOXONE HYDROCHLORIDE 0.4 MG/ML
.1-.4 INJECTION, SOLUTION INTRAMUSCULAR; INTRAVENOUS; SUBCUTANEOUS
Status: DISCONTINUED | OUTPATIENT
Start: 2017-12-13 | End: 2017-12-13

## 2017-12-13 RX ORDER — PROCHLORPERAZINE MALEATE 5 MG
10 TABLET ORAL EVERY 6 HOURS PRN
Status: DISCONTINUED | OUTPATIENT
Start: 2017-12-13 | End: 2017-12-14 | Stop reason: HOSPADM

## 2017-12-13 RX ORDER — SODIUM CHLORIDE, SODIUM LACTATE, POTASSIUM CHLORIDE, CALCIUM CHLORIDE 600; 310; 30; 20 MG/100ML; MG/100ML; MG/100ML; MG/100ML
INJECTION, SOLUTION INTRAVENOUS CONTINUOUS
Status: DISCONTINUED | OUTPATIENT
Start: 2017-12-13 | End: 2017-12-13 | Stop reason: HOSPADM

## 2017-12-13 RX ORDER — IBUPROFEN 600 MG/1
600 TABLET, FILM COATED ORAL EVERY 8 HOURS PRN
Status: DISCONTINUED | OUTPATIENT
Start: 2017-12-13 | End: 2017-12-14 | Stop reason: HOSPADM

## 2017-12-13 RX ORDER — FENTANYL CITRATE 50 UG/ML
25-50 INJECTION, SOLUTION INTRAMUSCULAR; INTRAVENOUS
Status: DISCONTINUED | OUTPATIENT
Start: 2017-12-13 | End: 2017-12-13

## 2017-12-13 RX ORDER — SCOLOPAMINE TRANSDERMAL SYSTEM 1 MG/1
1 PATCH, EXTENDED RELEASE TRANSDERMAL
Status: DISCONTINUED | OUTPATIENT
Start: 2017-12-13 | End: 2017-12-13 | Stop reason: HOSPADM

## 2017-12-13 RX ORDER — CEFAZOLIN SODIUM 1 G/3ML
1 INJECTION, POWDER, FOR SOLUTION INTRAMUSCULAR; INTRAVENOUS SEE ADMIN INSTRUCTIONS
Status: DISCONTINUED | OUTPATIENT
Start: 2017-12-13 | End: 2017-12-13 | Stop reason: HOSPADM

## 2017-12-13 RX ORDER — ALBUTEROL SULFATE 0.83 MG/ML
2.5 SOLUTION RESPIRATORY (INHALATION) EVERY 4 HOURS PRN
Status: DISCONTINUED | OUTPATIENT
Start: 2017-12-13 | End: 2017-12-13

## 2017-12-13 RX ORDER — NALOXONE HYDROCHLORIDE 0.4 MG/ML
.1-.4 INJECTION, SOLUTION INTRAMUSCULAR; INTRAVENOUS; SUBCUTANEOUS
Status: DISCONTINUED | OUTPATIENT
Start: 2017-12-13 | End: 2017-12-14 | Stop reason: HOSPADM

## 2017-12-13 RX ORDER — SODIUM CHLORIDE, SODIUM LACTATE, POTASSIUM CHLORIDE, CALCIUM CHLORIDE 600; 310; 30; 20 MG/100ML; MG/100ML; MG/100ML; MG/100ML
INJECTION, SOLUTION INTRAVENOUS CONTINUOUS
Status: DISCONTINUED | OUTPATIENT
Start: 2017-12-13 | End: 2017-12-13

## 2017-12-13 RX ADMIN — SUGAMMADEX 170 MG: 100 INJECTION, SOLUTION INTRAVENOUS at 18:04

## 2017-12-13 RX ADMIN — PHENYLEPHRINE HYDROCHLORIDE 100 MCG: 10 INJECTION, SOLUTION INTRAMUSCULAR; INTRAVENOUS; SUBCUTANEOUS at 17:40

## 2017-12-13 RX ADMIN — PHENYLEPHRINE HYDROCHLORIDE 100 MCG: 10 INJECTION, SOLUTION INTRAMUSCULAR; INTRAVENOUS; SUBCUTANEOUS at 17:08

## 2017-12-13 RX ADMIN — LIDOCAINE HYDROCHLORIDE 80 MG: 20 INJECTION, SOLUTION INFILTRATION; PERINEURAL at 16:13

## 2017-12-13 RX ADMIN — ONDANSETRON 4 MG: 2 INJECTION INTRAMUSCULAR; INTRAVENOUS at 17:57

## 2017-12-13 RX ADMIN — SCOPALAMINE 1 PATCH: 1 PATCH, EXTENDED RELEASE TRANSDERMAL at 15:14

## 2017-12-13 RX ADMIN — ROCURONIUM BROMIDE 10 MG: 10 INJECTION INTRAVENOUS at 17:15

## 2017-12-13 RX ADMIN — FENTANYL CITRATE 50 MCG: 50 INJECTION INTRAMUSCULAR; INTRAVENOUS at 18:49

## 2017-12-13 RX ADMIN — Medication 0.2 MG: at 16:49

## 2017-12-13 RX ADMIN — ROCURONIUM BROMIDE 50 MG: 10 INJECTION INTRAVENOUS at 16:13

## 2017-12-13 RX ADMIN — FENTANYL CITRATE 25 MCG: 50 INJECTION, SOLUTION INTRAMUSCULAR; INTRAVENOUS at 17:31

## 2017-12-13 RX ADMIN — PROPOFOL 50 MG: 10 INJECTION, EMULSION INTRAVENOUS at 16:24

## 2017-12-13 RX ADMIN — Medication 5 MG: at 17:08

## 2017-12-13 RX ADMIN — FENTANYL CITRATE 25 MCG: 50 INJECTION, SOLUTION INTRAMUSCULAR; INTRAVENOUS at 18:09

## 2017-12-13 RX ADMIN — Medication 0.2 MG: at 19:51

## 2017-12-13 RX ADMIN — FENTANYL CITRATE 50 MCG: 50 INJECTION, SOLUTION INTRAMUSCULAR; INTRAVENOUS at 16:55

## 2017-12-13 RX ADMIN — SODIUM CHLORIDE, POTASSIUM CHLORIDE, SODIUM LACTATE AND CALCIUM CHLORIDE: 600; 310; 30; 20 INJECTION, SOLUTION INTRAVENOUS at 16:00

## 2017-12-13 RX ADMIN — PROPOFOL 150 MG: 10 INJECTION, EMULSION INTRAVENOUS at 16:13

## 2017-12-13 RX ADMIN — FENTANYL CITRATE 50 MCG: 50 INJECTION, SOLUTION INTRAMUSCULAR; INTRAVENOUS at 16:13

## 2017-12-13 RX ADMIN — ROCURONIUM BROMIDE 20 MG: 10 INJECTION INTRAVENOUS at 16:45

## 2017-12-13 RX ADMIN — PROPOFOL 20 MCG/KG/MIN: 10 INJECTION, EMULSION INTRAVENOUS at 16:50

## 2017-12-13 RX ADMIN — FENTANYL CITRATE 50 MCG: 50 INJECTION, SOLUTION INTRAMUSCULAR; INTRAVENOUS at 18:18

## 2017-12-13 RX ADMIN — MIDAZOLAM 2 MG: 1 INJECTION INTRAMUSCULAR; INTRAVENOUS at 16:00

## 2017-12-13 RX ADMIN — Medication 0.2 MG: at 20:13

## 2017-12-13 RX ADMIN — Medication 5 MG: at 17:14

## 2017-12-13 RX ADMIN — Medication 0.3 MG: at 19:14

## 2017-12-13 RX ADMIN — CEFAZOLIN SODIUM 2 G: 2 INJECTION, SOLUTION INTRAVENOUS at 16:50

## 2017-12-13 RX ADMIN — Medication 5 MG: at 17:40

## 2017-12-13 RX ADMIN — FENTANYL CITRATE 50 MCG: 50 INJECTION INTRAMUSCULAR; INTRAVENOUS at 19:04

## 2017-12-13 RX ADMIN — ACETAMINOPHEN 1000 MG: 10 INJECTION, SOLUTION INTRAVENOUS at 16:45

## 2017-12-13 RX ADMIN — DEXAMETHASONE SODIUM PHOSPHATE 4 MG: 4 INJECTION, SOLUTION INTRA-ARTICULAR; INTRALESIONAL; INTRAMUSCULAR; INTRAVENOUS; SOFT TISSUE at 16:40

## 2017-12-13 RX ADMIN — PROPOFOL 50 MG: 10 INJECTION, EMULSION INTRAVENOUS at 16:14

## 2017-12-13 RX ADMIN — Medication 5 MG: at 16:45

## 2017-12-13 RX ADMIN — Medication 5 MG: at 17:04

## 2017-12-13 RX ADMIN — Medication 5 MG: at 17:46

## 2017-12-13 ASSESSMENT — PAIN DESCRIPTION - DESCRIPTORS
DESCRIPTORS: STABBING
DESCRIPTORS: DISCOMFORT
DESCRIPTORS: STABBING

## 2017-12-13 NOTE — OR NURSING
LMP February per pt, irregular periods prior to that. Dr. Alcaraz notified. HCG to be completed. Type and screen to be ordered per MDA. Will continue to monitor.

## 2017-12-13 NOTE — IP AVS SNAPSHOT
[address] Observation [address] [address]    Phone:  [phone]    Fax:  [phone]                                       After Visit Summary   12/13/2017    Layla Starks    MRN: [id number]           After Visit Summary Signature Page     I have received my discharge instructions, and my questions have been answered. I have discussed any challenges I see with this plan with the nurse or doctor.    ..........................................................................................................................................  Patient/Patient Representative Signature      ..........................................................................................................................................  Patient Representative Print Name and Relationship to Patient    ..................................................               ................................................  Date                                            Time    ..........................................................................................................................................  Reviewed by Signature/Title    ...................................................              ..............................................  Date                                                            Time

## 2017-12-14 VITALS
OXYGEN SATURATION: 96 % | DIASTOLIC BLOOD PRESSURE: 49 MMHG | SYSTOLIC BLOOD PRESSURE: 98 MMHG | WEIGHT: 187.17 LBS | HEART RATE: 73 BPM | HEIGHT: 65 IN | BODY MASS INDEX: 31.18 KG/M2 | TEMPERATURE: 98.4 F | RESPIRATION RATE: 16 BRPM

## 2017-12-14 PROCEDURE — 25000131 ZZH RX MED GY IP 250 OP 636 PS 637: Performed by: NURSE PRACTITIONER

## 2017-12-14 PROCEDURE — 25000125 ZZHC RX 250: Performed by: STUDENT IN AN ORGANIZED HEALTH CARE EDUCATION/TRAINING PROGRAM

## 2017-12-14 PROCEDURE — 25000132 ZZH RX MED GY IP 250 OP 250 PS 637: Performed by: STUDENT IN AN ORGANIZED HEALTH CARE EDUCATION/TRAINING PROGRAM

## 2017-12-14 RX ORDER — METHYLPREDNISOLONE 4 MG
TABLET, DOSE PACK ORAL
Qty: 21 TABLET | Refills: 0 | Status: SHIPPED | OUTPATIENT
Start: 2017-12-14 | End: 2018-02-26

## 2017-12-14 RX ORDER — METHYLPREDNISOLONE 8 MG/1
8 TABLET ORAL ONCE
Status: COMPLETED | OUTPATIENT
Start: 2017-12-14 | End: 2017-12-14

## 2017-12-14 RX ORDER — METHYLPREDNISOLONE 4 MG/1
4 TABLET ORAL ONCE
Status: DISCONTINUED | OUTPATIENT
Start: 2017-12-14 | End: 2017-12-14 | Stop reason: HOSPADM

## 2017-12-14 RX ORDER — METHYL SALICYLATE MENTHOL CAPSAICIN LIDOCAINE 20; 5; .035; .5 G/100G; G/100G; G/100G; G/100G
1 PATCH TOPICAL 2 TIMES DAILY PRN
Qty: 1 PATCH | Refills: 3 | Status: SHIPPED | OUTPATIENT
Start: 2017-12-14 | End: 2021-07-15

## 2017-12-14 RX ORDER — METHYLPREDNISOLONE 4 MG/1
4 TABLET ORAL ONCE
Status: COMPLETED | OUTPATIENT
Start: 2017-12-14 | End: 2017-12-14

## 2017-12-14 RX ORDER — CYCLOBENZAPRINE HCL 10 MG
10 TABLET ORAL 3 TIMES DAILY PRN
Qty: 60 TABLET | Refills: 0 | Status: SHIPPED | OUTPATIENT
Start: 2017-12-14 | End: 2017-12-28

## 2017-12-14 RX ADMIN — METHYLPREDNISOLONE 4 MG: 4 TABLET ORAL at 11:55

## 2017-12-14 RX ADMIN — CYCLOBENZAPRINE HYDROCHLORIDE 10 MG: 5 TABLET, FILM COATED ORAL at 00:46

## 2017-12-14 RX ADMIN — SENNOSIDES AND DOCUSATE SODIUM 3 TABLET: 8.6; 5 TABLET ORAL at 08:43

## 2017-12-14 RX ADMIN — DIAZEPAM 2 MG: 2 TABLET ORAL at 02:21

## 2017-12-14 RX ADMIN — DIAZEPAM 2 MG: 2 TABLET ORAL at 08:43

## 2017-12-14 RX ADMIN — ONDANSETRON 4 MG: 4 TABLET, ORALLY DISINTEGRATING ORAL at 00:24

## 2017-12-14 RX ADMIN — METHYLPREDNISOLONE 8 MG: 8 TABLET ORAL at 08:54

## 2017-12-14 RX ADMIN — CYCLOBENZAPRINE HYDROCHLORIDE 10 MG: 5 TABLET, FILM COATED ORAL at 08:43

## 2017-12-14 ASSESSMENT — PAIN DESCRIPTION - DESCRIPTORS: DESCRIPTORS: CONSTANT

## 2017-12-14 NOTE — PROGRESS NOTES
Discharge instructions reviewed, understood, and signed by patient.Lidocaine patches (3) removed. VSS, PIV removed, new medications reviewed and understood, patient has all belongings. Patient awaiting transport with daughter

## 2017-12-14 NOTE — PLAN OF CARE
Problem: Patient Care Overview  Goal: Discharge Needs Assessment  AOX4. VSS. Anterior neck incision CDI. Pt complains of constant neck pain. Flexaril given Q3. Pt. Ambulating to the bathroom. Pt voids spontaneously. Pt tolerating regular diet.

## 2017-12-14 NOTE — ANESTHESIA CARE TRANSFER NOTE
Patient: Layla Starks    Procedure(s):  Right Minimally Invasive Posterior Cervical 4-5 Foraminotomy - Wound Class: I-Clean    Diagnosis: Cervical Disc Disorder With Radiculopathy Of Cervical Region   Diagnosis Additional Information: No value filed.    Anesthesia Type:   General, ETT     Note:  Airway :Face Mask  Patient transferred to:PACU  Comments: VSS on arrival, report to RN>       Vitals: (Last set prior to Anesthesia Care Transfer)    CRNA VITALS  12/13/2017 1744 - 12/13/2017 1818      12/13/2017             Pulse: 110    SpO2: 98 %    Resp Rate (observed): (!)  7                Electronically Signed By: SIA Arreguin CRNA  December 13, 2017  6:18 PM

## 2017-12-14 NOTE — PLAN OF CARE
Problem: Patient Care Overview  Goal: Plan of Care/Patient Progress Review  POD #1 s/p R C4-5 posterior cervical foraminectomy. VSS. RUE 4/5 d/t shoulder pain. PRN flexeril and valium effective for inc and R shoulder pain mgmt. Post neck inc dressing c,d,i. Voiding spont. PVRs low. Reg diet; ate toast and good PO fluid intake. PIV SL'd. Up SBA. Continue with POC.

## 2017-12-14 NOTE — PLAN OF CARE
Problem: Patient Care Overview  Goal: Discharge Needs Assessment  Outpatient/Observation goals to be met before discharge home:    AOX4. VSS. Anterior neck incision CDI. Pt complains of constant neck pain. Flexaril given Q3. Pt. Ambulating to the bathroom. Pt voids spontaneously. Pt tolerating regular diet.

## 2017-12-14 NOTE — BRIEF OP NOTE
Brief Op Note  Pre-operative diagnosis: c4-5 foraminal stenosis  Post-operative diagnosis: same   Procedure:   1. Right C4-5 posterior cervical foraminotomy   Surgeon: MD Deny  Assistant(s):MD Garo; Blanca STRONG   Anesthesia: GETA  EBL: 30 cc  Fluids: 800 cc crystalloid  UOP: not recorded  Drains: none  Specimens: none  Implants: none  Findings:   Stenosis at C4-5.  Complications: none.  Condition: stable  Post op location: pacu then home  Comments: See dictated report for full details.    Plan:  - d/c home tonight  - OK for diet after bedside swallow  - OOB with assist  - Pain control   Page [phone] with questions

## 2017-12-14 NOTE — OR NURSING
"Pt continues to c/o severe pain in her posterior neck.  Pt states that it feels \"like there is a knife in my neck\".  Dr Leija and Dr Wyman notified.  Informed to page Surgical [facility].  Pt resting between cares on 4L nasal cannula with O2 sats of 94% but, when aroused, states that her pain is intolerable and severe.  Pt to be admitted to Unit 6D for pain control.    "

## 2017-12-14 NOTE — ADDENDUM NOTE
Addendum  created 12/14/17 0947 by Angeles Graham APRN CRNA    Anesthesia Event edited, Procedure Event Log accessed

## 2017-12-14 NOTE — OR NURSING
Assisting primary RN. Handoff received, will care for patient for short period of time. See flowsheets for assessment,etc.

## 2017-12-14 NOTE — DISCHARGE SUMMARY
[facility] Discharge Summary and Instructions    Layla Starks MRN# [id number]   Age: 57 year old YOB: 1960     Date of Admission:  12/13/2017  Date of Discharge::  12/14/2017  Admitting Physician:  Byron Barclay MD  Discharge Physician:  Byron Barclay MD          Admission Diagnoses:   Cervicalgia [M54.2]          Discharge Diagnosis:   Cervicalgia [M54.2]          Procedures:   12/13/2017 - Right Minimally Invasive Posterior C4/5 Foraminotomy by Dr. Byron Barclay           Brief History of Illness:   Ms. Starks is a very pleasant 57 year old female whose past medical history is significant for Osteoarthritis, Degenerative Disc Disease of the Cervical Spine and Cervical Stenosis. She underwent a C5-C7 Laminoplasty in 2016 for treatment of Cervical Stenosis. This operation was performed at the [facility]. Following the operation, the patient reported severe headaches. She underwent extensive work-up for evaluation of CSF Leak, and ultimately no CSF leak was identified. She was seen in the Neurosurgery Clinic by Dr. Byron Barclay on 11/27/2017 with reports of positional headaches and right-sided neck pain. The patient underwent a Right C5 Selective Nerve Root Block which alleviated both her neck pain and headaches for about 5 days. On pre-operative examination, the patient was not found to have any focal weakness in the bilateral upper extremities. She had bilateral Mantilla's, but no clonus. A Cervical MRI that was obtained on 6/9/2017 was reviewed and demonstrated Right C4/5 Neuroforaminal Stenosis with Nerve Root Impingement. There was no evidence of central canal stenosis Surgical intervention involving an MIS Right C4/5 Foraminotomy was recommended to which the patient was agreeable and consented.            Hospital Course:   The patient was admitted to the hospital on 12/13/2017 and underwent the above named operation. There were no soren-operative complications. Following her  "operation, the patient was admitted to Unit 6D, Observation, for post-operative pain management. On post-operative day #1, the patient reported weakness in her left deltoid. Upon examination, she was found to have focal motor weakness in the right deltoid (4/5). This was thought to be related to nerve root irritation during the operation. She was initiated on a Medrol Dose Pack. No post-operative imaging was obtained. On post-operative day #1, the patient had met all Observation Goals, in that her post-operative pain was well managed with oral analgesics, she was tolerating a regular diet and drinking fluids, voiding and passing flatus, and was ambulating. She was deemed medically and neurologically stable to discharge home.     Examination:  BP 98/49  Pulse 73  Temp 98.4  F (36.9  C) (Oral)  Resp 16  Ht 1.651 m (5' 5\")  Wt 84.9 kg (187 lb 2.7 oz)  LMP 02/01/2017  SpO2 96%  BMI 31.15 kg/m2  Gen: sleepy, lying in bed in no acute distress  Wound: Incision, clean, dry, intact with primapore dressing in place   Neurologic:  - Alert & Oriented to person, place, time, and situation  - Follows commands briskly  - Speech fluent, spontaneous. No aphasia or dysarthria.  - No gaze preference. No apparent hemineglect.  - PERRL, EOMI  - Face symmetric   - No pronator drift       Del Tr Bi WE WF Gr   R 4 5 5 5 5 5   L 5 5 5 5 5 5     HF KE KF DF PF EHL   R 5 5 5 5 5 5   L 5 5 5 5 5 5      Sensation intact and symmetric to light touch throughout         Discharge Medications:     Discharge Medication List as of 12/14/2017  2:22 PM      START taking these medications    Details   cyclobenzaprine (FLEXERIL) 10 MG tablet Take 1 tablet (10 mg) by mouth 3 times daily as needed for muscle spasms, Disp-60 tablet, R-0, Local PrintIndication: Neck Pain; Cervical Paraspinous Muscle Spasm      methylPREDNISolone (MEDROL DOSEPAK) 4 MG tablet Follow package instructions, Disp-21 tablet, R-0, Local PrintPlease take the medication as " prescribed on the package. Thank you.      oxyCODONE IR (ROXICODONE) 5 MG tablet Take 1 tablet (5 mg) by mouth every 4 hours as needed for pain, Disp-60 tablet, R-0, Local Print      senna (EQL SENNA LAXATIVE) 8.6 MG tablet Take 2 tablets by mouth 2 times daily, Disp-80 tablet, R-1, Local PrintContinue to take while using narcotic pain medications. You may titrate to lower doses if runny stools.      polyethylene glycol (MIRALAX/GLYCOLAX) Packet Take 17 g by mouth 2 times daily, Disp-30 packet, R-1, Local PrintContinue to take while using narcotic pain medications. You may titrate to lower doses if runny stools.      ondansetron (ZOFRAN) 4 MG tablet Take 1-2 tablets (4-8 mg) by mouth every 8 hours as needed for nausea, Disp-60 tablet, R-1, Local Print         CONTINUE these medications which have NOT CHANGED    Details   glucosamine-chondroitin 500-400 MG CAPS per capsule Take 2 capsules by mouth 2 times daily , Historical      ibuprofen (ADVIL) 200 MG capsule Take 600 mg by mouth every 8 hours as needed , Historical      UNABLE TO FIND Take 2 capsules by mouth 2 times daily MEDICATION NAME: XenoProtx , Historical      calcium 500 MG CHEW Take 2 tablets by mouth At Bedtime , Historical      cholecalciferol (VITAMIN D-1000 MAX ST) 1000 UNITS TABS Take 1,000 Units by mouth every morning , Historical      Multiple Vitamins-Minerals (MULTIVITAL PO) Take 1 tablet by mouth 2 times daily , Historical         STOP taking these medications       Omega-3 Fatty Acids (OMEGA-3 EPA FISH OIL PO) Comments:   Reason for Stopping:                       Discharge Instructions and Follow-Up:   Discharge diet: Regular     Discharge activity: You may advance activity as tolerated. No strenuous exercise or heay lifting greater than 10 lbs for 4 weeks or until seen and cleared in clinic.     Discharge follow-up: Follow-up with Etta Munguia PA-C in the Neurosurgery Clinic in 2 weeks for suture removal and wound evaluation.      Wound  care: Ok to shower,however no scrubbing of the wound and no soaking of the wound, meaning no bathtubs or swimming pools. Pat dry only. Leave wound open to air.       Please call if you have:  1. increased pain, redness, drainage, swelling at your incision  2. fevers > 101.5 F degrees  3. with any questions or concerns.  You may reach the Neurosurgery clinic at [phone] during regular work hours. ER at [phone].    and ask for the Neurosurgery Resident on call at [phone], during off hours or weekends.         Discharge Disposition:   Discharged to home        Rosemarie Chand, FLORINP-BC, CCRN, CNRN  Department of Neurosurgery  Pager: [phone]

## 2017-12-14 NOTE — OP NOTE
DATE OF PROCEDURE:  12/13/2017      PREOPERATIVE DIAGNOSIS:  Right C4-5 stenosis with radiculopathy.      POSTOPERATIVE DIAGNOSIS:  Right C4-5 stenosis with radiculopathy.      PROCEDURES PERFORMED:     1.  Right C4-C5 foraminotomy, posterior, minimally invasive with METRx tubes.   2.  Use of intraoperative fluoroscopy.     3.  Use of intraoperative microscope.      ATTENDING SURGEON:  Byron Barclay MD      RESIDENT SURGEONS:     1.  Maryse Wyman MD   2.  Mckenzie Rios      INDICATIONS FOR PROCEDURE:  Ms. Layla Starks is a 57-year-old woman who was seen in our clinic for positional headaches after a C5-7 open door laminoplasty at [facility].  She had a significant workup for her headaches; however, upon this workup she also reported that she had paresthesias in her right first 2 fingers.  Because of this she underwent a right C5 selective nerve root injection which she found to be beneficial for about 5 days and her headaches improved at the same time.  Because of this she presented for the above-mentioned procedure.      DESCRIPTION OF PROCEDURE:  After informed consent was obtained, the patient was taken to the operating suite where general endotracheal anesthesia was induced.  She was transferred on the operating table in the supine position.  Her head was affixed in the Hassan head langford and she was positioned in the sitting position with her head slightly flexed forward.  A right paramedian incision was planned, and the area was then prepped and draped in the usual sterile fashion after padding all pressure points adequately for the duration of the case.  We then brought in C-arm and arced it over the bed.  We took a localizing xray and we were confirmed the C4-5 level, thus we marked our incision, infiltrated it with 5 mL of 0.5% Marcaine with epinephrine 1:200,000.  After an appropriate timeout we made our skin incision with a #10 blade and carried this out through the fascia using monopolar cautery  to open the fascia as well and then used the smallest METRx dilating tube to go down to the lamina of C4.  We took an x-ray confirming we were at the correct location and then serially dilated up using the METRx tube.  When we had the 16 mm tube, we brought in the 5 depth bevelled tube and connected this to the arm at the side of the table.  We took an x-ray to confirm we were at the same level and removed the METRx tubes.  We then used monopolar cautery to remove the muscle above the lamina, taking care to avoid any laminoplasty defect inferiorly.  When this was done, we used the drill to remove the lamina at C4 and carried it laterally so that we could see the nerve root exiting.  We then used a combination of curets and Kerrisons to remove the ligament.  We removed any scar tissue over the nerve root.  There was a small amount of venous bleeding, likely from near the vertebral foramen that was just packed off.  We then confirmed that the nerve root was well decompressed using a long blunt nerve hook to follow the nerve out the foramen.  When this completely obtained hemostasis, irrigated copiously and proceeded to close.  The fascia was closed using 0 Vicryl stitch in an interrupted stitch.  The subcutaneous tissue was closed using 3-0 Vicryl sutures, and the skin was closed using a running 4-0 Monocryl.  The area was cleansed and Dermabond was applied and then a sterile dressing after the Dermabond had dried.  An additional 10 mL of local had been infiltrated prior to placing the dressing.  All counts were correct x1 at the end of the case.  Dr. Yang was present and scrubbed for the critical portions of the procedure and immediately available for the rest of the procedure.  The patient was then transferred to the surgical cart and extubated and taken to PACU in stable condition.         CLARA YANG MD       As dictated by VIRGINIA ANTOINE MD            D: 12/13/2017 18:28   T: 12/14/2017 06:06   MT: rohan       Name:     CAYLA TURNER   MRN:      [id number]        Account:        [id number]   :      1960           Procedure Date: 2017      Document: [id number]

## 2017-12-14 NOTE — ANESTHESIA POSTPROCEDURE EVALUATION
Patient: Layla Starks    Procedure(s):  Right Minimally Invasive Posterior Cervical 4-5 Foraminotomy - Wound Class: I-Clean    Diagnosis:Cervical Disc Disorder With Radiculopathy Of Cervical Region   Diagnosis Additional Information: No value filed.    Anesthesia Type:  General, ETT    Note:  Anesthesia Post Evaluation    Patient location during evaluation: PACU  Patient participation: Able to fully participate in evaluation  Level of consciousness: awake and alert  Pain management: adequate  Airway patency: patent  Cardiovascular status: hemodynamically stable and acceptable  Respiratory status: acceptable and spontaneous ventilation  Hydration status: acceptable  PONV: none     Anesthetic complications: None          Last vitals:  Vitals:    12/13/17 2030 12/13/17 2045 12/13/17 2100   BP: 121/67 111/65 115/63   Pulse:      Resp:  19    Temp:  36.7  C (98  F)    SpO2: 95% 95% 94%         Electronically Signed By: Mackenzie Leija MD  December 13, 2017  9:22 PM

## 2017-12-14 NOTE — PROGRESS NOTES
[facility], [place]   Neurosurgery Progress Note:    Assessment: Layla Starks is a 57 year old female postoperative day # 1 from right C4/5 cervical foraminotomy.     Plan:  - Serial neuro exams  - Pain control  - Regular diet   - Bowel regimen  - PRN antiemetics  - SCDs for DVT ppx  - Dispo: discharge home today     Discussed with Neurosurgery chief, who agrees.    Interval History: significant pain post-operatively precluded patient from discharging home.  Admitted to observation overnight for pain control.        Objective:   Temp:  [97.5  F (36.4  C)-98.3  F (36.8  C)] 98.3  F (36.8  C)  Pulse:  [60-86] 73  Heart Rate:  [60-96] 85  Resp:  [11-19] 16  BP: (101-136)/(59-76) 101/59  SpO2:  [92 %-100 %] 96 %  I/O last 3 completed shifts:  In: 900 [I.V.:900]  Out: 20 [Blood:20]    Gen: sleepy, lying in bed in no acute distress  Wound: Incision, clean, dry, intact with primapore dressing in place   Neurologic:  - Alert & Oriented to person, place, time, and situation  - Follows commands briskly  - Speech fluent, spontaneous. No aphasia or dysarthria.  - No gaze preference. No apparent hemineglect.  - PERRL, EOMI  - Face symmetric   - No pronator drift     Del Tr Bi WE WF Gr   R 5 5 5 5 5 5   L 5 5 5 5 5 5    HF KE KF DF PF EHL   R 5 5 5 5 5 5   L 5 5 5 5 5 5     Reflexes 2+ throughout  Sensation intact and symmetric to light touch throughout    LABS  Reviewed     IMAGING    Reviewed     Please contact neurosurgery resident on call with questions.    Dial * * [phone], enter [phone] when prompted.

## 2017-12-18 ENCOUNTER — HOSPITAL ENCOUNTER (EMERGENCY)
Facility: CLINIC | Age: 57
Discharge: HOME | End: 2017-12-18
Attending: EMERGENCY MEDICINE | Admitting: EMERGENCY MEDICINE
Payer: COMMERCIAL

## 2017-12-18 ENCOUNTER — APPOINTMENT (OUTPATIENT)
Dept: MRI IMAGING | Facility: CLINIC | Age: 57
End: 2017-12-18
Attending: EMERGENCY MEDICINE
Payer: COMMERCIAL

## 2017-12-18 VITALS
HEART RATE: 78 BPM | DIASTOLIC BLOOD PRESSURE: 74 MMHG | BODY MASS INDEX: 31.12 KG/M2 | SYSTOLIC BLOOD PRESSURE: 118 MMHG | WEIGHT: 187 LBS | TEMPERATURE: 98.3 F | RESPIRATION RATE: 16 BRPM | OXYGEN SATURATION: 98 %

## 2017-12-18 DIAGNOSIS — M25.9 SHOULDER PROBLEM: ICD-10-CM

## 2017-12-18 PROCEDURE — 99285 EMERGENCY DEPT VISIT HI MDM: CPT | Mod: 25 | Performed by: EMERGENCY MEDICINE

## 2017-12-18 PROCEDURE — 99283 EMERGENCY DEPT VISIT LOW MDM: CPT | Mod: Z6 | Performed by: EMERGENCY MEDICINE

## 2017-12-18 PROCEDURE — 72141 MRI NECK SPINE W/O DYE: CPT

## 2017-12-18 ASSESSMENT — ENCOUNTER SYMPTOMS
NECK PAIN: 1
FEVER: 0

## 2017-12-18 NOTE — ED AVS SNAPSHOT
[facility], [place], Emergency Department    [address] [address]    Phone:  [phone]                                       Layla Starks   MRN: [id number]    Department:  [facility], Emergency Department   Date of Visit:  12/18/2017           After Visit Summary Signature Page     I have received my discharge instructions, and my questions have been answered. I have discussed any challenges I see with this plan with the nurse or doctor.    ..........................................................................................................................................  Patient/Patient Representative Signature      ..........................................................................................................................................  Patient Representative Print Name and Relationship to Patient    ..................................................               ................................................  Date                                            Time    ..........................................................................................................................................  Reviewed by Signature/Title    ...................................................              ..............................................  Date                                                            Time

## 2017-12-18 NOTE — ED NOTES
Pt was directed to the ED from neurosurgery for a follow up MRI post neck surgery. Pt had surgery on her neck last week Wednesday between her C4 and C5. Since surgery she has not been able to move her right arm above here head. She has equal and strong  in both hands. Pt also reports neck pain. On arrival vitals stable, afebrile. Alert and oriented x4. No other deficits noted at this time.

## 2017-12-18 NOTE — ED AVS SNAPSHOT
[facility], Emergency Department    [address] [address]    Phone:  [phone]                                       Layla Starks   MRN: [id number]    Department:  [facility], Emergency Department   Date of Visit:  12/18/2017           Patient Information     Date Of Birth          1960        Your diagnoses for this visit were:     Shoulder problem right       You were seen by Sarthak Jacob MD.        Discharge Instructions       Please make an appointment to follow up with physical therapy and your neurosurgeon in the next couple weeks.    Future Appointments        Provider Department Dept Phone Center    12/28/2017 6:00 PM Etta Munguia PA-C [facility] Neurosurgery [phone] [facility]      24 Hour Appointment Hotline       To make an appointment at any [facility], call [phone] ([phone]). If you don't have a family doctor or clinic, we will help you find one. [place] clinics are conveniently located to serve the needs of you and your family.          ED Discharge Orders     PHYSICAL THERAPY REFERRAL       *This therapy referral will be filtered to a centralized scheduling office at [facility] and the patient will receive a call to schedule an appointment at a [facility] location most convenient for them. *     [facility] provides Physical Therapy evaluation and treatment and many specialty services across the [facility] system.  If requesting a specialty program, please choose from the list below.    If you have not heard from the scheduling office within 2 business days, please call [phone] for all locations, with the exception of [place], please call [phone].  Treatment: Evaluation & Treatment  Special Instructions/Modalities:   Special Programs: None    Please be aware that coverage of these services is subject to the terms and limitations of your health insurance plan.  Call member services at  "your health plan with any benefit or coverage questions.      **Note to Provider:  If you are referring outside of [facility] for the therapy appointment, please list the name of the location in the \"special instructions\" above, print the referral and give to the patient to schedule the appointment.                     Review of your medicines      Our records show that you are taking the medicines listed below. If these are incorrect, please call your family doctor or clinic.        Dose / Directions Last dose taken    ADVIL 200 MG capsule   Dose:  600 mg   Generic drug:  ibuprofen        Take 600 mg by mouth every 8 hours as needed   Refills:  0        calcium 500 MG Chew   Dose:  2 tablet        Take 2 tablets by mouth At Bedtime   Refills:  0        cyclobenzaprine 10 MG tablet   Commonly known as:  FLEXERIL   Dose:  10 mg   Quantity:  60 tablet        Take 1 tablet (10 mg) by mouth 3 times daily as needed for muscle spasms   Refills:  0        glucosamine-chondroitin 500-400 MG Caps per capsule   Dose:  2 capsule        Take 2 capsules by mouth 2 times daily   Refills:  0        MEDI-PATCH-LIDOCAINE 0.5-0.035-5-20 % Ptch   Dose:  1 Box   Quantity:  1 patch   Generic drug:  Lido-Capsaicin-Men-Methyl Sal        Externally apply 1 Box topically 2 times daily as needed   Refills:  3        methylPREDNISolone 4 MG tablet   Commonly known as:  MEDROL DOSEPAK   Quantity:  21 tablet        Follow package instructions   Refills:  0        MULTIVITAL PO   Dose:  1 tablet        Take 1 tablet by mouth 2 times daily   Refills:  0        ondansetron 4 MG tablet   Commonly known as:  ZOFRAN   Dose:  4-8 mg   Quantity:  60 tablet        Take 1-2 tablets (4-8 mg) by mouth every 8 hours as needed for nausea   Refills:  1        oxyCODONE IR 5 MG tablet   Commonly known as:  ROXICODONE   Dose:  5 mg   Quantity:  60 tablet        Take 1 tablet (5 mg) by mouth every 4 hours as needed for pain   Refills:  0        polyethylene glycol " Packet   Commonly known as:  MIRALAX/GLYCOLAX   Dose:  1 packet   Quantity:  30 packet        Take 17 g by mouth 2 times daily   Refills:  1        senna 8.6 MG tablet   Commonly known as:  EQL SENNA LAXATIVE   Dose:  2 tablet   Quantity:  80 tablet        Take 2 tablets by mouth 2 times daily   Refills:  1        UNABLE TO FIND   Dose:  2 capsule        Take 2 capsules by mouth 2 times daily MEDICATION NAME: XenoProtx   Refills:  0        VITAMIN D-1000 MAX ST 1000 UNITS Tabs   Dose:  1000 Units   Generic drug:  cholecalciferol        Take 1,000 Units by mouth every morning   Refills:  0                Procedures and tests performed during your visit     Cervical spine MRI w/o contrast      Orders Needing Specimen Collection     None      Pending Results     No orders found from 12/16/2017 to 12/19/2017.            Pending Culture Results     No orders found from 12/16/2017 to 12/19/2017.            Pending Results Instructions     If you had any lab results that were not finalized at the time of your Discharge, you can call the ED Lab Result RN at [phone]. You will be contacted by this team for any positive Lab results or changes in treatment. The nurses are available 7 days a week from 10A to 6:30P.  You can leave a message 24 hours per day and they will return your call.        Thank you for choosing [facility]       Thank you for choosing [facility] for your care. Our goal is always to provide you with excellent care. Hearing back from our patients is one way we can continue to improve our services. Please take a few minutes to complete the written survey that you may receive in the mail after you visit with us. Thank you!        [facility] Information     [facility] gives you secure access to your electronic health record. If you see a primary care provider, you can also send messages to your care team and make appointments. If you have questions, please call your primary care clinic.  If you do not have a primary  care provider, please call [phone] and they will assist you.        Care EveryWhere ID     This is your Care EveryWhere ID. This could be used by other organizations to access your [facility] medical records  [id number]        Equal Access to Services     DIGNA LYNN : Anni Barragan, wafiorella lima, qashanti kaaljennifer perez, mickey gonzalez. So [facility] [phone].    ATENCIÓN: Si habla español, tiene a ramos disposición servicios gratuitos de asistencia lingüística. Llame al [phone].    We comply with applicable federal civil rights laws and [place] laws. We do not discriminate on the basis of race, color, national origin, age, disability, sex, sexual orientation, or gender identity.            After Visit Summary       This is your record. Keep this with you and show to your community pharmacist(s) and doctor(s) at your next visit.

## 2017-12-18 NOTE — ED PROVIDER NOTES
History     Chief Complaint   Patient presents with     One-sided Weakness     HPI  Layla Starks is a 57 year old female who underwent a right minimally invasive posterior C4-5 foraminotomy on 12/13/17 under the care of the Neurosurgery department here at the [facility].  The patient states that since that operation she has not been able to abduct her right arm more than 90 .  Patient denies any other numbness, denies other  weakness, and presents to the ER for evaluation.  She complains of pain in the right posterior neck, but no fevers and was called today by Neurosurgery and told to come to the ER for further evaluation and MRI scanning.      This part of the document was transcribed by Veronica Mistry Medical Scribe.   I have reviewed the Medications, Allergies, Past Medical and Surgical History, and Social History in the [employer] system.  Past Medical History:   Diagnosis Date     Cervical disc disorder with radiculopathy of cervical region 11/27/2017     Cervical spinal stenosis      DDD (degenerative disc disease), cervical      OA (osteoarthritis)      PONV (postoperative nausea and vomiting)        Past Surgical History:   Procedure Laterality Date     C5-7 laminoplasty Right 2016     COLONOSCOPY  2016     Excision anal skin tags       FORAMINOTOMY CERVICAL POSTERIOR MINIMALLY INVASIVE ONE LEVEL Right 12/13/2017    Procedure: FORAMINOTOMY CERVICAL POSTERIOR MINIMALLY INVASIVE ONE LEVEL;  Right Minimally Invasive Posterior Cervical 4-5 Foraminotomy;  Surgeon: Byron Barclay MD;  Location: [facility] OR     MAMMOPLASTY REDUCTION  1980     Partial meniscectomy Left 05/17/2016       Family History   Problem Relation Age of Onset     Arthritis Mother      Macular Degeneration Mother      Hearing Loss Father      Multiple Sclerosis Sister        Social History   Substance Use Topics     Smoking status: Former Smoker     Packs/day: 0.50     Years: 3.00     Types: Cigarettes     Smokeless tobacco: Never Used       Comment: Quit 30 years ago     Alcohol use 0.6 - 1.2 oz/week     1 - 2 Standard drinks or equivalent per week      Comment: EVERY 6 MONTHS     Previous Medications    CALCIUM 500 MG CHEW    Take 2 tablets by mouth At Bedtime     CHOLECALCIFEROL (VITAMIN D-1000 MAX ST) 1000 UNITS TABS    Take 1,000 Units by mouth every morning     CYCLOBENZAPRINE (FLEXERIL) 10 MG TABLET    Take 1 tablet (10 mg) by mouth 3 times daily as needed for muscle spasms    GLUCOSAMINE-CHONDROITIN 500-400 MG CAPS PER CAPSULE    Take 2 capsules by mouth 2 times daily     IBUPROFEN (ADVIL) 200 MG CAPSULE    Take 600 mg by mouth every 8 hours as needed     MEDI-PATCH-LIDOCAINE 0.5-0.035-5-20 % PTCH    Externally apply 1 Box topically 2 times daily as needed    METHYLPREDNISOLONE (MEDROL DOSEPAK) 4 MG TABLET    Follow package instructions    MULTIPLE VITAMINS-MINERALS (MULTIVITAL PO)    Take 1 tablet by mouth 2 times daily     ONDANSETRON (ZOFRAN) 4 MG TABLET    Take 1-2 tablets (4-8 mg) by mouth every 8 hours as needed for nausea    OXYCODONE IR (ROXICODONE) 5 MG TABLET    Take 1 tablet (5 mg) by mouth every 4 hours as needed for pain    POLYETHYLENE GLYCOL (MIRALAX/GLYCOLAX) PACKET    Take 17 g by mouth 2 times daily    SENNA (EQL SENNA LAXATIVE) 8.6 MG TABLET    Take 2 tablets by mouth 2 times daily    UNABLE TO FIND    Take 2 capsules by mouth 2 times daily MEDICATION NAME: XenoProtx         Allergies   Allergen Reactions     Dilaudid [Hydromorphone] GI Disturbance     Morphine Nausea and Vomiting     Oxycodone Nausea and Vomiting     Tramadol Nausea and Vomiting       Review of Systems   Constitutional: Negative for fever.   Musculoskeletal: Positive for neck pain (right posterior).   All other systems reviewed and are negative.      Physical Exam   BP: 121/70  Heart Rate: 86  Temp: 98.3  F (36.8  C)  Resp: 16  Weight: 84.8 kg (187 lb)  SpO2: 95 %      Physical Exam   Constitutional: She is oriented to person, place, and time.   Alert  conversant pleasant and ambulatory   HENT:   Head: Atraumatic.   Eyes: EOM are normal. Pupils are equal, round, and reactive to light.   Neck: Neck supple.   Posteriorly the incision looks good with no evidence of infection   Cardiovascular: Normal heart sounds.    Pulmonary/Chest: Breath sounds normal.   Abdominal: Soft.   Musculoskeletal: She exhibits no edema, tenderness or deformity.   Neurological: She is alert and oriented to person, place, and time.   Patient is able to abduct her right arm to 90  but not beyond 90 .  Internal and external rotation is intact.  Distal CMS is intact.   Skin: No rash noted.   Psychiatric: She has a normal mood and affect.       ED Course     ED Course     Procedures        Results for orders placed or performed during the hospital encounter of 12/18/17   Cervical spine MRI w/o contrast    Narrative    MR CERVICAL SPINE W/O CONTRAST 12/18/2017 6:25 PM    Provided History: 5 days s/p right C4-5 foraminotomy now with  inability to abduct right arm;     Comparison: CT 5/11/2017, radiographs 3/27/2017, cervical MR 3/7/2017,  7/20/2014    Technique: Sagittal T1-weighted, sagittal T2-weighted, sagittal STIR,  sagittal diffusion weighted, axial T2-weighted, and axial T2* gradient  echo images of the cervical spine were obtained without intravenous  contrast.      Findings:  The cervical vertebrae are normally aligned. Multilevel cervical  spondylosis with loss of intervertebral disc height at C5-6 and C6-7.   There is normal signal within and normal contour of the cervical  spinal cord.  Postoperative changes of laminoplasty from C5-C7, with  more recent postsurgical change of foraminotomy on the right at C4-5.  There is a small fluid collection measuring 1.9 x 1.5 cm (series 7,  image 14). The findings on a level by level basis are as follows:    C2-3: No spinal canal or neural foraminal stenosis.    C3-4:  No spinal canal or neural foraminal stenosis    C4-5:  Disc bulge minimally  narrows the spinal canal. Metallic  susceptibility artifact obscures detail, however there appears to be  moderate right neural foraminal narrowing.    C5-6:  Disc bulge mildly narrows the spinal canal and results in  moderate bilateral neuroforaminal narrowing. Disc bulge minimally  narrows the spinal canal without significant neural foraminal  narrowing.    C6-7:  No spinal canal or neural foraminal  stenosis.    C7-T1:  No spinal canal or neural foraminal stenosis.         Impression    Impression:   1. Postoperative changes of laminoplasty at C5-7 with recent  foraminotomy on the right at C4-5. At the site of the foraminotomy,  there is a small air fluid collection immediately posterior to the  right neural foramen. No definite dural communication visualized.  2. Otherwise multilevel cervical spondylosis with disc bulges is  grossly unchanged, including moderate neural foraminal narrowing on  the right at C4-5 and bilaterally at C5-6.    I have personally reviewed the examination and initial interpretation  and I agree with the findings.    KELLY CELIS MD       Assessments & Plan (with Medical Decision Making)     I have reviewed the nursing notes.    Case discussed with neurosurgery and at this time the problem seems to be more orthopedic in nature instead of neurologic.  Neurosurgery recommended physical therapy and follow-up in the clinic.    I have reviewed the findings, diagnosis, plan and need for follow up with the patient.    New Prescriptions    No medications on file       Final diagnoses:   Shoulder problem - right     Please make an appointment to follow up with physical therapy and your neurosurgeon in the next couple weeks.          Information for [place] of athletic medicine [address] given.    Sarthak Jacob MD    12/18/2017   [facility] EMERGENCY DEPARTMENT     Sarthak Jacob MD  12/18/17 [id number]

## 2017-12-19 NOTE — PROGRESS NOTES
Brief Neurosurgery Consult Note Recommendations:  I was not able to see this patient in the ED due to an emergency and the patient did not want to wait.  I recommended out patient physical therapy and follow up in neurosurgery clinic if develops significant shoulder pain      Milan Arana MD,PhD  Neurosurgery PGY-2    Please contact neurosurgery resident on call with questions.    Dial * * [phone], enter [phone] when prompted.

## 2017-12-19 NOTE — DISCHARGE INSTRUCTIONS
Please make an appointment to follow up with physical therapy and your neurosurgeon in the next couple weeks.

## 2017-12-20 ENCOUNTER — TELEPHONE (OUTPATIENT)
Dept: NEUROSURGERY | Facility: CLINIC | Age: 57
End: 2017-12-20

## 2017-12-20 DIAGNOSIS — G89.18 POST-OPERATIVE PAIN: Primary | ICD-10-CM

## 2017-12-20 DIAGNOSIS — Z98.1 S/P CERVICAL SPINAL FUSION: Primary | ICD-10-CM

## 2017-12-20 RX ORDER — HYDROCODONE BITARTRATE AND ACETAMINOPHEN 5; 325 MG/1; MG/1
1-2 TABLET ORAL EVERY 8 HOURS PRN
Qty: 30 TABLET | Refills: 0 | Status: SHIPPED | OUTPATIENT
Start: 2017-12-20 | End: 2017-12-28

## 2017-12-20 RX ORDER — TRAMADOL HYDROCHLORIDE 50 MG/1
50-100 TABLET ORAL EVERY 6 HOURS PRN
Qty: 20 TABLET | Refills: 0 | Status: SHIPPED | OUTPATIENT
Start: 2017-12-20 | End: 2017-12-20 | Stop reason: ALTCHOICE

## 2017-12-20 NOTE — TELEPHONE ENCOUNTER
Phone call to patient in response to voice mail message regarding MRI done in ED yesterday.  Patient arrived in ED with c/o pain and decreased RO in right arm.  See ED notes for details and assessment.     Patient is concerned over MRI finding that indicate fluid in the operative area.  Talked to her quite a while. Reassure her that the MRI findings were within post-op norms and that limited ROM at this point may be a result of nerve irritation during surgery and will take time to improve.  Did promise to pass concerns on to Dr Barclay who responded inkind with an agreement to this caller's response to her questions.  Patient will keep post-op appointment 1/28/2017.

## 2017-12-28 ENCOUNTER — OFFICE VISIT (OUTPATIENT)
Dept: NEUROSURGERY | Facility: CLINIC | Age: 57
End: 2017-12-28
Payer: COMMERCIAL

## 2017-12-28 VITALS
BODY MASS INDEX: 31.86 KG/M2 | DIASTOLIC BLOOD PRESSURE: 79 MMHG | OXYGEN SATURATION: 98 % | HEART RATE: 86 BPM | WEIGHT: 191.2 LBS | SYSTOLIC BLOOD PRESSURE: 127 MMHG | TEMPERATURE: 97.6 F | RESPIRATION RATE: 24 BRPM | HEIGHT: 65 IN

## 2017-12-28 DIAGNOSIS — Z98.890 POST-OPERATIVE STATE: ICD-10-CM

## 2017-12-28 DIAGNOSIS — G89.18 POST-OPERATIVE PAIN: Primary | ICD-10-CM

## 2017-12-28 DIAGNOSIS — M54.2 CERVICALGIA: ICD-10-CM

## 2017-12-28 DIAGNOSIS — M50.10 CERVICAL DISC DISORDER WITH RADICULOPATHY OF CERVICAL REGION: ICD-10-CM

## 2017-12-28 DIAGNOSIS — M62.838 MUSCLE SPASMS OF NECK: ICD-10-CM

## 2017-12-28 RX ORDER — METHYLPREDNISOLONE 4 MG
TABLET, DOSE PACK ORAL
Qty: 21 TABLET | Refills: 0 | Status: SHIPPED | OUTPATIENT
Start: 2017-12-28 | End: 2018-02-26

## 2017-12-28 RX ORDER — HYDROCODONE BITARTRATE AND ACETAMINOPHEN 5; 325 MG/1; MG/1
1-2 TABLET ORAL EVERY 8 HOURS PRN
Qty: 60 TABLET | Refills: 0 | Status: SHIPPED | OUTPATIENT
Start: 2017-12-28 | End: 2021-01-25

## 2017-12-28 RX ORDER — CYCLOBENZAPRINE HCL 10 MG
10 TABLET ORAL 3 TIMES DAILY PRN
Qty: 60 TABLET | Refills: 0 | Status: SHIPPED | OUTPATIENT
Start: 2017-12-28 | End: 2018-02-26

## 2017-12-28 ASSESSMENT — PAIN SCALES - GENERAL: PAINLEVEL: EXTREME PAIN (9)

## 2017-12-28 NOTE — PROGRESS NOTES
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       PROCEDURES PERFORMED:     1.  Right C4-C5 foraminotomy, posterior, minimally invasive with METRx tubes.      Ms. Layla Starks is a 57-year-old woman who was seen in our clinic for positional headaches after a C5-7 open door laminoplasty at [facility].  She had a significant workup for her headaches; however, upon this workup she also reported that she had paresthesias in her right first 2 fingers.  Because of this she underwent a right C5 selective nerve root injection which she found to be beneficial for about 5 days and her headaches improved at the same time.  Because of this she presented for the above-mentioned procedure.    Inpatient:  The patient was admitted to the hospital on 12/13/2017 and underwent the above named operation. There were no soren-operative complications. Following her operation, the patient was admitted to Unit 6D, Observation, for post-operative pain management. On post-operative day #1, the patient reported weakness in her left deltoid. Upon examination, she was found to have focal motor weakness in the right deltoid (4/5). This was thought to be related to nerve root irritation during the operation. She was initiated on a Medrol Dose Pack. No post-operative imaging was obtained. On post-operative day #1, the patient had met all Observation Goals, in that her post-operative pain was well managed with oral analgesics, she was tolerating a regular diet and drinking fluids, voiding and passing flatus, and was ambulating. She was deemed stable to discharge home.   Outpatient:   She is now 2 weeks post op.     Since discharge her left arm feels quite good, but she has residual muscle spasm and right-sided neck pain in the right trap, nothing down the right arm. She still has right-sided headaches. She went so far as  to go into the ER when she reported this. She doesn't think her deltoid is any stronger now than it was. In the ER some new imaging was done. It shows air-fluid level at the area of the surgery, this is not felt to be a leak, she denies positional headaches.  She has had no imaging done for today and presents for routine wound check.     Patient Supplied Answers To the [facility] Pain Questionnaire  [facility] Pain -  Patient Entered Questionnaire/Answers 12/28/2017   What number best describes your pain right now:  0 = No pain  to  10 = Worst pain imaginable 10   How would you describe the pain? sharp   Which of the following worsen your pain? standing, sitting, walking   Which of the following improve or reduce your pain?  lying down   What number best describes your average pain for the past week:  0 = No pain  to  10 = Worst pain imaginable 10   What number best describes your LOWEST pain in past 24 hours:  0 = No pain  to  10 = Worst pain imaginable 10   What number best describes your WORST pain in past 24 hours:  0 = No pain  to  10 = Worst pain imaginable 10   When is your pain worst? Constant   What non-medicine treatments have you already had for your pain? -   Have you tried treating your pain with medication?  Yes   Are you currently taking medications for your pain? Yes       Current Outpatient Prescriptions:      methylPREDNISolone (MEDROL DOSEPAK) 4 MG tablet, Follow package instructions, Disp: 21 tablet, Rfl: 0     cyclobenzaprine (FLEXERIL) 10 MG tablet, Take 1 tablet (10 mg) by mouth 3 times daily as needed for muscle spasms, Disp: 60 tablet, Rfl: 0     HYDROcodone-acetaminophen (NORCO) 5-325 MG per tablet, Take 1-2 tablets by mouth every 8 hours as needed for moderate to severe pain maximum 3 tablet(s) per day, Disp: 60 tablet, Rfl: 0     methylPREDNISolone (MEDROL DOSEPAK) 4 MG tablet, Follow package instructions, Disp: 21 tablet, Rfl: 0     MEDI-PATCH-LIDOCAINE 0.5-0.035-5-20 % PTCH, Externally apply 1 Box  "topically 2 times daily as needed, Disp: 1 patch, Rfl: 3     senna (EQL SENNA LAXATIVE) 8.6 MG tablet, Take 2 tablets by mouth 2 times daily, Disp: 80 tablet, Rfl: 1     polyethylene glycol (MIRALAX/GLYCOLAX) Packet, Take 17 g by mouth 2 times daily, Disp: 30 packet, Rfl: 1     ondansetron (ZOFRAN) 4 MG tablet, Take 1-2 tablets (4-8 mg) by mouth every 8 hours as needed for nausea, Disp: 60 tablet, Rfl: 1     glucosamine-chondroitin 500-400 MG CAPS per capsule, Take 2 capsules by mouth 2 times daily , Disp: , Rfl:      ibuprofen (ADVIL) 200 MG capsule, Take 600 mg by mouth every 8 hours as needed , Disp: , Rfl:      UNABLE TO FIND, Take 2 capsules by mouth 2 times daily MEDICATION NAME: XenoProtx , Disp: , Rfl:      calcium 500 MG CHEW, Take 2 tablets by mouth At Bedtime , Disp: , Rfl:      cholecalciferol (VITAMIN D-1000 MAX ST) 1000 UNITS TABS, Take 1,000 Units by mouth every morning , Disp: , Rfl:      Multiple Vitamins-Minerals (MULTIVITAL PO), Take 1 tablet by mouth 2 times daily , Disp: , Rfl:   Allergies   Allergen Reactions     Dilaudid [Hydromorphone] GI Disturbance     Morphine Nausea and Vomiting     Oxycodone Nausea and Vomiting     Tramadol Nausea and Vomiting     PMH, SOC HIST, FAM HIST, PROBLEM LIST:  All reviewed in EPIC.    OBJECTIVE:  /79  Pulse 86  Temp 97.6  F (36.4  C) (Oral)  Resp 24  Ht 1.651 m (5' 5\")  Wt 86.7 kg (191 lb 3.2 oz)  LMP 02/01/2017  SpO2 98%  Breastfeeding? No  BMI 31.82 kg/m2    Imaging:  None new.     EXAM:  Well developed well nourished female found seated comfortably in exam chair.  No apparent distress. She is accompanied.  A&O X3. She asks the same question multiple times. I investigated this with her and she says it's because she just recently took a pain pill. Mood and affect WNL. Language and fund of knowledge intact.  Is able to sit and rise independently.   She has a nicely healing incision.  I prepped the wound with chloroprep and cleanly removed tissue " glue and one long Monocryl.  Exam at discharge:          Del Tr Bi WE WF Gr   R 4 5 5 5 5 5   L 5 5 5 5 5 5      HF KE KF DF PF EHL   R 5 5 5 5 5 5   L 5 5 5 5 5 5       Exam today:  Essentially unchanged, although I think the right deltoid may be 4+/5    Assessment/Plan:  1. Post-operative pain    2. Cervical disc disorder with radiculopathy of cervical region    3. Muscle spasms of neck    4. Cervicalgia    5. Post-operative state      Layla Starks is doing well on the left but her right trap is still painful, with muscle spasm, her right arm is nonpainful. She feels subjectively that the deltoid is weak and it does exhibit give way but I'm not sure I see real weakness.    I plan medication management. We discussed repeating a Medrol Dosepak versus an injection. I'm not in favor of an injection at this juncture. She doesn't remember getting much benefit from the Medrol but is willing to try to again. Physical therapy has been discussed. It might be helpful but it also might stir things up. I think we will hold off on that for now. I renewed her hydrocodone and Flexeril and encouraged her to use ice or heat, whichever feels better, on her spasms. She might benefit from trigger points down the road to break the spasm cycle.    The wound is healthy without evidence of infection.     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 I don't think she would cause herself harm, but I think she would cause herself a lot of pain.    Return to clinic in 4 weeks with me, and in 10 weeks with Dr. Barclay. No imaging for either of those visits.     We appreciate the opportunity to be of service in the care of this pleasant patient.  Please do call if there is anything more we can do    Etta Munguia PA-C  [facility]  Department of Neurosurgery  Phone: [phone]  Fax: [phone]    This note was generated using voice recognition software. While edited for  content some inaccurate phrasing may be found.

## 2017-12-28 NOTE — LETTER
12/28/2017       RE: Layal Starks  [address] [facility] [address]     Dear Colleague,    Thank you for referring your patient, Layla Starks, to the [facility] NEUROSURGERY at [facility]. Please see a copy of my visit note below.      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       PROCEDURES PERFORMED:     1.  Right C4-C5 foraminotomy, posterior, minimally invasive with METRx tubes.      Ms. Layla Starks is a 57-year-old woman who was seen in our clinic for positional headaches after a C5-7 open door laminoplasty at [facility].  She had a significant workup for her headaches; however, upon this workup she also reported that she had paresthesias in her right first 2 fingers.  Because of this she underwent a right C5 selective nerve root injection which she found to be beneficial for about 5 days and her headaches improved at the same time.  Because of this she presented for the above-mentioned procedure.    Inpatient:  The patient was admitted to the hospital on 12/13/2017 and underwent the above named operation. There were no soren-operative complications. Following her operation, the patient was admitted to Unit 6D, Observation, for post-operative pain management. On post-operative day #1, the patient reported weakness in her left deltoid. Upon examination, she was found to have focal motor weakness in the right deltoid (4/5). This was thought to be related to nerve root irritation during the operation. She was initiated on a Medrol Dose Pack. No post-operative imaging was obtained. On post-operative day #1, the patient had met all Observation Goals, in that her post-operative pain was well managed with oral analgesics, she was tolerating a regular diet and drinking fluids, voiding and passing flatus, and was ambulating. She was deemed  stable to discharge home.   Outpatient:   She is now 2 weeks post op.     Since discharge her left arm feels quite good, but she has residual muscle spasm and right-sided neck pain in the right trap, nothing down the right arm. She still has right-sided headaches. She went so far as to go into the ER when she reported this. She doesn't think her deltoid is any stronger now than it was. In the ER some new imaging was done. It shows air-fluid level at the area of the surgery, this is not felt to be a leak, she denies positional headaches.  She has had no imaging done for today and presents for routine wound check.     Patient Supplied Answers To the  Pain Questionnaire  UC Pain -  Patient Entered Questionnaire/Answers 12/28/2017   What number best describes your pain right now:  0 = No pain  to  10 = Worst pain imaginable 10   How would you describe the pain? sharp   Which of the following worsen your pain? standing, sitting, walking   Which of the following improve or reduce your pain?  lying down   What number best describes your average pain for the past week:  0 = No pain  to  10 = Worst pain imaginable 10   What number best describes your LOWEST pain in past 24 hours:  0 = No pain  to  10 = Worst pain imaginable 10   What number best describes your WORST pain in past 24 hours:  0 = No pain  to  10 = Worst pain imaginable 10   When is your pain worst? Constant   What non-medicine treatments have you already had for your pain? -   Have you tried treating your pain with medication?  Yes   Are you currently taking medications for your pain? Yes       Current Outpatient Prescriptions:      methylPREDNISolone (MEDROL DOSEPAK) 4 MG tablet, Follow package instructions, Disp: 21 tablet, Rfl: 0     cyclobenzaprine (FLEXERIL) 10 MG tablet, Take 1 tablet (10 mg) by mouth 3 times daily as needed for muscle spasms, Disp: 60 tablet, Rfl: 0     HYDROcodone-acetaminophen (NORCO) 5-325 MG per tablet, Take 1-2 tablets by mouth  "every 8 hours as needed for moderate to severe pain maximum 3 tablet(s) per day, Disp: 60 tablet, Rfl: 0     methylPREDNISolone (MEDROL DOSEPAK) 4 MG tablet, Follow package instructions, Disp: 21 tablet, Rfl: 0     MEDI-PATCH-LIDOCAINE 0.5-0.035-5-20 % PTCH, Externally apply 1 Box topically 2 times daily as needed, Disp: 1 patch, Rfl: 3     senna (EQL SENNA LAXATIVE) 8.6 MG tablet, Take 2 tablets by mouth 2 times daily, Disp: 80 tablet, Rfl: 1     polyethylene glycol (MIRALAX/GLYCOLAX) Packet, Take 17 g by mouth 2 times daily, Disp: 30 packet, Rfl: 1     ondansetron (ZOFRAN) 4 MG tablet, Take 1-2 tablets (4-8 mg) by mouth every 8 hours as needed for nausea, Disp: 60 tablet, Rfl: 1     glucosamine-chondroitin 500-400 MG CAPS per capsule, Take 2 capsules by mouth 2 times daily , Disp: , Rfl:      ibuprofen (ADVIL) 200 MG capsule, Take 600 mg by mouth every 8 hours as needed , Disp: , Rfl:      UNABLE TO FIND, Take 2 capsules by mouth 2 times daily MEDICATION NAME: XenoProtx , Disp: , Rfl:      calcium 500 MG CHEW, Take 2 tablets by mouth At Bedtime , Disp: , Rfl:      cholecalciferol (VITAMIN D-1000 MAX ST) 1000 UNITS TABS, Take 1,000 Units by mouth every morning , Disp: , Rfl:      Multiple Vitamins-Minerals (MULTIVITAL PO), Take 1 tablet by mouth 2 times daily , Disp: , Rfl:   Allergies   Allergen Reactions     Dilaudid [Hydromorphone] GI Disturbance     Morphine Nausea and Vomiting     Oxycodone Nausea and Vomiting     Tramadol Nausea and Vomiting     PMH, SOC HIST, FAM HIST, PROBLEM LIST:  All reviewed in EPIC.    OBJECTIVE:  /79  Pulse 86  Temp 97.6  F (36.4  C) (Oral)  Resp 24  Ht 1.651 m (5' 5\")  Wt 86.7 kg (191 lb 3.2 oz)  LMP 02/01/2017  SpO2 98%  Breastfeeding? No  BMI 31.82 kg/m2    Imaging:  None new.     EXAM:  Well developed well nourished female found seated comfortably in exam chair.  No apparent distress. She is accompanied.  A&O X3. She asks the same question multiple times. I " investigated this with her and she says it's because she just recently took a pain pill. Mood and affect WNL. Language and fund of knowledge intact.  Is able to sit and rise independently.   She has a nicely healing incision.  I prepped the wound with chloroprep and cleanly removed tissue glue and one long Monocryl.  Exam at discharge:          Del Tr Bi WE WF Gr   R 4 5 5 5 5 5   L 5 5 5 5 5 5      HF KE KF DF PF EHL   R 5 5 5 5 5 5   L 5 5 5 5 5 5       Exam today:  Essentially unchanged, although I think the right deltoid may be 4+/5    Assessment/Plan:  1. Post-operative pain    2. Cervical disc disorder with radiculopathy of cervical region    3. Muscle spasms of neck    4. Cervicalgia    5. Post-operative state      Layla Starks is doing well on the left but her right trap is still painful, with muscle spasm, her right arm is nonpainful. She feels subjectively that the deltoid is weak and it does exhibit give way but I'm not sure I see real weakness.    I plan medication management. We discussed repeating a Medrol Dosepak versus an injection. I'm not in favor of an injection at this juncture. She doesn't remember getting much benefit from the Medrol but is willing to try to again. Physical therapy has been discussed. It might be helpful but it also might stir things up. I think we will hold off on that for now. I renewed her hydrocodone and Flexeril and encouraged her to use ice or heat, whichever feels better, on her spasms. She might benefit from trigger points down the road to break the spasm cycle.    The wound is healthy without evidence of infection.     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 I don't think she would cause herself harm, but I think she would cause herself a lot of pain.    Return to clinic in 4 weeks with me, and in 10 weeks with Dr. Barclay. No imaging for either of those visits.     We appreciate the opportunity to be of  service in the care of this pleasant patient.  Please do call if there is anything more we can do    Etta Munguia PA-C  [facility]  Department of Neurosurgery  Phone: [phone]  Fax: [phone]    This note was generated using voice recognition software. While edited for content some inaccurate phrasing may be found.

## 2017-12-28 NOTE — MR AVS SNAPSHOT
After Visit Summary   12/28/2017    Layla Starks    MRN: [id number]           Patient Information     Date Of Birth          1960        Visit Information        Provider Department      12/28/2017 2:00 PM Etta Munguia PA-C [facility] Neurosurgery        Today's Diagnoses     Post-operative pain    -  1    Cervical disc disorder with radiculopathy of cervical region        Muscle spasms of neck        Cervicalgia           Follow-ups after your visit        Your next 10 appointments already scheduled     Jan 25, 2018  2:30 PM CST   (Arrive by 2:15 PM)   Return Visit with [facility] Neurosurgery ([facility] [place])    [address]  [address] [facility] [address] [phone]              Who to contact     Please call your clinic at [phone] to:    Ask questions about your health    Make or cancel appointments    Discuss your medicines    Learn about your test results    Speak to your doctor   If you have compliments or concerns about an experience at your clinic, or if you wish to file a complaint, please contact [facility] Patient Relations at [phone] or email us at [email or web address]         Additional Information About Your Visit        MyChart Information     [employer] gives you secure access to your electronic health record. If you see a primary care provider, you can also send messages to your care team and make appointments. If you have questions, please call your primary care clinic.  If you do not have a primary care provider, please call [phone] and they will assist you.      [employer] is an electronic gateway that provides easy, online access to your medical records. With [employer], you can request a clinic appointment, read your test results, renew a prescription or communicate with your care team.     To access your existing account, please contact your [facility]  "[place] [facility] or call [phone] for assistance.        Care EveryWhere ID     This is your Care EveryWhere ID. This could be used by other organizations to access your [place] medical records  [id number]        Your Vitals Were     Pulse Temperature Respirations Height Last Period Pulse Oximetry    86 97.6  F (36.4  C) (Oral) 24 1.651 m (5' 5\") 02/01/2017 98%    Breastfeeding? BMI (Body Mass Index)                No 31.82 kg/m2           Blood Pressure from Last 3 Encounters:   12/28/17 127/79   12/18/17 118/74   12/14/17 98/49    Weight from Last 3 Encounters:   12/28/17 86.7 kg (191 lb 3.2 oz)   12/18/17 84.8 kg (187 lb)   12/13/17 84.9 kg (187 lb 2.7 oz)              Today, you had the following     No orders found for display         Today's Medication Changes          These changes are accurate as of: 12/28/17  3:08 PM.  If you have any questions, ask your nurse or doctor.               These medicines have changed or have updated prescriptions.        Dose/Directions    * methylPREDNISolone 4 MG tablet   Commonly known as:  MEDROL DOSEPAK   This may have changed:  Another medication with the same name was added. Make sure you understand how and when to take each.   Used for:  Cervical disc disorder with radiculopathy of cervical region        Follow package instructions   Quantity:  21 tablet   Refills:  0       * methylPREDNISolone 4 MG tablet   Commonly known as:  MEDROL DOSEPAK   This may have changed:  You were already taking a medication with the same name, and this prescription was added. Make sure you understand how and when to take each.   Used for:  Cervical disc disorder with radiculopathy of cervical region   Changed by:  Etta Munguia PA-C        Follow package instructions   Quantity:  21 tablet   Refills:  0       * Notice:  This list has 2 medication(s) that are the same as other medications prescribed for you. Read the directions carefully, and ask your doctor or other " care provider to review them with you.         Where to get your medicines      Some of these will need a paper prescription and others can be bought over the counter.  Ask your nurse if you have questions.     Bring a paper prescription for each of these medications     cyclobenzaprine 10 MG tablet    HYDROcodone-acetaminophen 5-325 MG per tablet    methylPREDNISolone 4 MG tablet                Primary Care Provider Office Phone # Fax #    Brittani Grant [phone]       [facility] [address]    [facility] [place] [address]        Equal Access to Services     DIGNA LYNN : Hadii kishan ku hadasho Soomaali, waaxda luqadaha, qaybta kaalmada adeegyada, mickey soto . So [facility] [phone].    ATENCIÓN: Si habla español, tiene a ramos disposición servicios gratuitos de asistencia lingüística. [facility] [phone].    We comply with applicable federal civil rights laws and [place] laws. We do not discriminate on the basis of race, color, national origin, age, disability, sex, sexual orientation, or gender identity.            Thank you!     Thank you for choosing [facility] NEUROSURGERY  for your care. Our goal is always to provide you with excellent care. Hearing back from our patients is one way we can continue to improve our services. Please take a few minutes to complete the written survey that you may receive in the mail after your visit with us. Thank you!             Your Updated Medication List - Protect others around you: Learn how to safely use, store and throw away your medicines at www.disposemymeds.org.          This list is accurate as of: 12/28/17  3:08 PM.  Always use your most recent med list.                   Brand Name Dispense Instructions for use Diagnosis    ADVIL 200 MG capsule   Generic drug:  ibuprofen      Take 600 mg by mouth every 8 hours as needed    Cervical disc disorder with radiculopathy of cervical region       calcium 500  MG Chew      Take 2 tablets by mouth At Bedtime        cyclobenzaprine 10 MG tablet    FLEXERIL    60 tablet    Take 1 tablet (10 mg) by mouth 3 times daily as needed for muscle spasms    Cervicalgia, Cervical disc disorder with radiculopathy of cervical region       glucosamine-chondroitin 500-400 MG Caps per capsule      Take 2 capsules by mouth 2 times daily    Cervical disc disorder with radiculopathy of cervical region       HYDROcodone-acetaminophen 5-325 MG per tablet    NORCO    60 tablet    Take 1-2 tablets by mouth every 8 hours as needed for moderate to severe pain maximum 3 tablet(s) per day    Post-operative pain       MEDI-PATCH-LIDOCAINE 0.5-0.035-5-20 % Ptch   Generic drug:  Lido-Capsaicin-Men-Methyl Sal     1 patch    Externally apply 1 Box topically 2 times daily as needed    Cervical disc disorder with radiculopathy of cervical region       * methylPREDNISolone 4 MG tablet    MEDROL DOSEPAK    21 tablet    Follow package instructions    Cervical disc disorder with radiculopathy of cervical region       * methylPREDNISolone 4 MG tablet    MEDROL DOSEPAK    21 tablet    Follow package instructions    Cervical disc disorder with radiculopathy of cervical region       MULTIVITAL PO      Take 1 tablet by mouth 2 times daily        ondansetron 4 MG tablet    ZOFRAN    60 tablet    Take 1-2 tablets (4-8 mg) by mouth every 8 hours as needed for nausea    Cervical disc disorder with radiculopathy of cervical region       polyethylene glycol Packet    MIRALAX/GLYCOLAX    30 packet    Take 17 g by mouth 2 times daily    Cervical disc disorder with radiculopathy of cervical region       senna 8.6 MG tablet    EQL SENNA LAXATIVE    80 tablet    Take 2 tablets by mouth 2 times daily    Cervical disc disorder with radiculopathy of cervical region       UNABLE TO FIND      Take 2 capsules by mouth 2 times daily MEDICATION NAME: XenoProtx    Cervical disc disorder with radiculopathy of cervical region        VITAMIN D-1000 MAX ST 1000 UNITS Tabs   Generic drug:  cholecalciferol      Take 1,000 Units by mouth every morning        * Notice:  This list has 2 medication(s) that are the same as other medications prescribed for you. Read the directions carefully, and ask your doctor or other care provider to review them with you.

## 2017-12-28 NOTE — NURSING NOTE
Chief Complaint   Patient presents with     RECHECK     [facility]- 2 WEEKS POST-OP F/U     Marek Haynes, CMA

## 2018-01-15 ENCOUNTER — TELEPHONE (OUTPATIENT)
Dept: NEUROSURGERY | Facility: CLINIC | Age: 58
End: 2018-01-15

## 2018-01-15 NOTE — TELEPHONE ENCOUNTER
----- Message from Etta Munguia PA-C sent at 1/15/2018 12:47 PM CST -----  Regarding: RE: needs a RTW letter for Monday   Contact: [phone]  OK. Form filled out and you can mail it, fax it, or she can pick it up.  Make sure you keep a copy to scan into the record since she is returning contrary to advice.      ----- Message -----     From: Erin Emerson LPN     Sent: 1/12/2018   3:22 PM       To: Etta Munguia PA-C  Subject: FW: needs a RTW letter for Monday                I spoke with Layla. She would like to return to work this week. She stated that she will be riding with another person and understands her lifting restrictions as she does not want to go through another surgery again.   Please call her to talk about her returning to work this week. She will need a RTW note.   Arline     ----- Message -----     From: Lisa Vivar RN     Sent: 1/12/2018   2:57 PM       To: Etta Munguia PA-C, Erin Emerson LPN  Subject: needs a RTW letter for Monday

## 2018-01-25 ENCOUNTER — OFFICE VISIT (OUTPATIENT)
Dept: NEUROSURGERY | Facility: CLINIC | Age: 58
End: 2018-01-25
Payer: COMMERCIAL

## 2018-01-25 VITALS
DIASTOLIC BLOOD PRESSURE: 76 MMHG | HEART RATE: 84 BPM | WEIGHT: 191.4 LBS | HEIGHT: 65 IN | SYSTOLIC BLOOD PRESSURE: 129 MMHG | BODY MASS INDEX: 31.89 KG/M2

## 2018-01-25 DIAGNOSIS — M62.838 MUSCLE SPASMS OF NECK: ICD-10-CM

## 2018-01-25 DIAGNOSIS — M50.10 CERVICAL DISC DISORDER WITH RADICULOPATHY OF CERVICAL REGION: ICD-10-CM

## 2018-01-25 DIAGNOSIS — Z98.890 POST-OPERATIVE STATE: Primary | ICD-10-CM

## 2018-01-25 DIAGNOSIS — Z98.1 S/P CERVICAL SPINAL FUSION: ICD-10-CM

## 2018-01-25 ASSESSMENT — PAIN SCALES - GENERAL: PAINLEVEL: EXTREME PAIN (8)

## 2018-01-25 NOTE — LETTER
1/25/2018       RE: Layla Starks  [address] [facility] [address]     Dear Colleague,    Thank you for referring your patient, Layla Starks, to the [facility] NEUROSURGERY at [facility]. Please see a copy of my visit note below.      Neurosurgery clinic post op   Date of visit: 1/25/18    Procedure:   12/13/2017  Garo YANG Sand  DIAGNOSIS:  Right C4-5 stenosis with radiculopathy.       PROCEDURES PERFORMED:     1.  Right C4-C5 foraminotomy, posterior, minimally invasive with METRx tubes.      Ms. Layla Starks is a 57-year-old woman who was seen in our clinic for positional headaches after a C5-7 open door laminoplasty at [facility].  She had a significant workup for her headaches; however, upon this workup she also reported that she had paresthesias in her right first 2 fingers.  Because of this she underwent a right C5 selective nerve root injection which she found to be beneficial for about 5 days and her headaches improved at the same time.  Because of this she presented for the above-mentioned procedure.    Inpatient:  The patient was admitted to the hospital on 12/13/2017 and underwent the above named operation. There were no soren-operative complications. Following her operation, the patient was admitted to Unit 6D, Observation, for post-operative pain management. On post-operative day #1, the patient reported weakness in her left deltoid. Upon examination, she was found to have focal motor weakness in the right deltoid (4/5). This was thought to be related to nerve root irritation during the operation. She was initiated on a Medrol Dose Pack. No post-operative imaging was obtained. On post-operative day #1 she was deemed stable to discharge home.   Outpatient:   She is now 6 weeks post op.     Since discharge her left arm feels quite good, but she has residual muscle spasm and right-sided neck pain in the right trap and deltoid, nothing down the distal  right arm. She still has right-sided headaches, although they are improved.  Since surgery she has had dose packs, tried gabapentin as well as narcotics, none of that has made much difference for either the arm pain or the residual headaches.  The narcotic and gabapentin had side effects of making her feel very goofy or very lethargic. She has had no imaging done for today and presents for routine followup      Patient Supplied Answers To the UC Pain Questionnaire  UC Pain -  Patient Entered Questionnaire/Answers 1/23/2018   What number best describes your pain right now:  0 = No pain  to  10 = Worst pain imaginable 9   How would you describe the pain? cutting, pressure   Which of the following worsen your pain? -   Which of the following improve or reduce your pain?  lying down   What number best describes your average pain for the past week:  0 = No pain  to  10 = Worst pain imaginable 8   What number best describes your LOWEST pain in past 24 hours:  0 = No pain  to  10 = Worst pain imaginable 7   What number best describes your WORST pain in past 24 hours:  0 = No pain  to  10 = Worst pain imaginable 10   When is your pain worst? AM, PM, Constant   What non-medicine treatments have you already had for your pain? -   Have you tried treating your pain with medication?  Yes   Are you currently taking medications for your pain? Yes       Current Outpatient Prescriptions:      methylPREDNISolone (MEDROL DOSEPAK) 4 MG tablet, Follow package instructions, Disp: 21 tablet, Rfl: 0     cyclobenzaprine (FLEXERIL) 10 MG tablet, Take 1 tablet (10 mg) by mouth 3 times daily as needed for muscle spasms, Disp: 60 tablet, Rfl: 0     HYDROcodone-acetaminophen (NORCO) 5-325 MG per tablet, Take 1-2 tablets by mouth every 8 hours as needed for moderate to severe pain maximum 3 tablet(s) per day, Disp: 60 tablet, Rfl: 0     methylPREDNISolone (MEDROL DOSEPAK) 4 MG tablet, Follow package instructions, Disp: 21 tablet, Rfl: 0      "MEDI-PATCH-LIDOCAINE 0.5-0.035-5-20 % PTCH, Externally apply 1 Box topically 2 times daily as needed, Disp: 1 patch, Rfl: 3     senna (EQL SENNA LAXATIVE) 8.6 MG tablet, Take 2 tablets by mouth 2 times daily, Disp: 80 tablet, Rfl: 1     polyethylene glycol (MIRALAX/GLYCOLAX) Packet, Take 17 g by mouth 2 times daily, Disp: 30 packet, Rfl: 1     ondansetron (ZOFRAN) 4 MG tablet, Take 1-2 tablets (4-8 mg) by mouth every 8 hours as needed for nausea, Disp: 60 tablet, Rfl: 1     glucosamine-chondroitin 500-400 MG CAPS per capsule, Take 2 capsules by mouth 2 times daily , Disp: , Rfl:      ibuprofen (ADVIL) 200 MG capsule, Take 600 mg by mouth every 8 hours as needed , Disp: , Rfl:      UNABLE TO FIND, Take 2 capsules by mouth 2 times daily MEDICATION NAME: XenoProtx , Disp: , Rfl:      calcium 500 MG CHEW, Take 2 tablets by mouth At Bedtime , Disp: , Rfl:      cholecalciferol (VITAMIN D-1000 MAX ST) 1000 UNITS TABS, Take 1,000 Units by mouth every morning , Disp: , Rfl:      Multiple Vitamins-Minerals (MULTIVITAL PO), Take 1 tablet by mouth 2 times daily , Disp: , Rfl:   Allergies   Allergen Reactions     Dilaudid [Hydromorphone] GI Disturbance     Morphine Nausea and Vomiting     Oxycodone Nausea and Vomiting     Tramadol Nausea and Vomiting     PMH, SOC HIST, FAM HIST, PROBLEM LIST:  All reviewed in EPIC.    OBJECTIVE:  /76 (BP Location: Left arm, Patient Position: Chair, Cuff Size: Adult Regular)  Pulse 84  Ht 1.645 m (5' 4.75\")  Wt 86.8 kg (191 lb 6.4 oz)  BMI 32.1 kg/m2    Imaging:  None new.     EXAM:  Well developed well nourished female found seated comfortably in exam chair.  No apparent distress. She is unaccompanied.  A&O X3.  Mood and affect WNL. Language and fund of knowledge intact.  Is able to sit and rise independently.  She has a nicely healed incision.    Exam at discharge:          Del Tr Bi WE WF Gr   R 4 5 5 5 5 5   L 5 5 5 5 5 5      HF KE KF DF PF EHL   R 5 5 5 5 5 5   L 5 5 5 5 5 5 "       Exam today:  Essentially unchanged, although I think the right deltoid may be 4+/5    Assessment/Plan:  1. Post-operative state    2. S/P cervical spinal fusion    3. Cervical radiculopathy at C7      Layla Starks is doing reasonably well postop, her headache pain is improved from preop.  Her right trap is still painful with muscle spasm, and the deltoid and proximal lateral armis quite sore.  She feels that the deltoid is weak and it objectively weak.  I reassured her that while she is 6 weeks postop this is still relatively early in the healing process,  we will give this 12 weeks and see where we are at. I've seen this before, I think she may improve.    Medication management is generally not successful for her. Dosepaks have not been any help all, narcotics cause her to feel very goofy.  Flexeril and gabapentin make her extremely sleepy and lethargic. Physical therapy seems a reasonable option and I would lean heavily toward modalities such as ultrasound and massage especially for her spasms. I would also consider trigger point injections, and may be even a selective nerve root block for pain.  Will start with the physical therapy, and hold off on the more invasive therapies,injections, for now.    She is thinking about returning to work at her other job. She wants to start the therapy first, do it for a week and see if she feels any better before making a final decision. I think that's a reasonable plan She'll give me a call and let me know how she is feeling about returning to work and we'll write to return if she is ready at that point.    Return to clinic in 6 weeks with Dr. Barclay. No imaging for that visit.  Has that appointment already.      We appreciate the opportunity to be of service in the care of this pleasant patient.  Please do call if there is anything more we can do      This note was generated using voice recognition software. While edited for content some inaccurate phrasing may be  found.    Again, thank you for allowing me to participate in the care of your patient.      Sincerely,    Etta Munguia PA-C

## 2018-01-25 NOTE — PROGRESS NOTES
Neurosurgery clinic post op   Date of visit: 1/25/18    Procedure:   12/13/2017  Garo YANG Sand  DIAGNOSIS:  Right C4-5 stenosis with radiculopathy.       PROCEDURES PERFORMED:     1.  Right C4-C5 foraminotomy, posterior, minimally invasive with METRx tubes.      Ms. Layla Starks is a 57-year-old woman who was seen in our clinic for positional headaches after a C5-7 open door laminoplasty at [facility].  She had a significant workup for her headaches; however, upon this workup she also reported that she had paresthesias in her right first 2 fingers.  Because of this she underwent a right C5 selective nerve root injection which she found to be beneficial for about 5 days and her headaches improved at the same time.  Because of this she presented for the above-mentioned procedure.    Inpatient:  The patient was admitted to the hospital on 12/13/2017 and underwent the above named operation. There were no soren-operative complications. Following her operation, the patient was admitted to Unit 6D, Observation, for post-operative pain management. On post-operative day #1, the patient reported weakness in her left deltoid. Upon examination, she was found to have focal motor weakness in the right deltoid (4/5). This was thought to be related to nerve root irritation during the operation. She was initiated on a Medrol Dose Pack. No post-operative imaging was obtained. On post-operative day #1 she was deemed stable to discharge home.   Outpatient:   She is now 6 weeks post op.     Since discharge her left arm feels quite good, but she has residual muscle spasm and right-sided neck pain in the right trap and deltoid, nothing down the distal right arm. She still has right-sided headaches, although they are improved.  Since surgery she has had dose packs, tried gabapentin as well as narcotics, none of that has made much difference for either the arm pain or the residual headaches.  The narcotic and gabapentin had side  effects of making her feel very goofy or very lethargic. She has had no imaging done for today and presents for routine followup      Patient Supplied Answers To the UC Pain Questionnaire  UC Pain -  Patient Entered Questionnaire/Answers 1/23/2018   What number best describes your pain right now:  0 = No pain  to  10 = Worst pain imaginable 9   How would you describe the pain? cutting, pressure   Which of the following worsen your pain? -   Which of the following improve or reduce your pain?  lying down   What number best describes your average pain for the past week:  0 = No pain  to  10 = Worst pain imaginable 8   What number best describes your LOWEST pain in past 24 hours:  0 = No pain  to  10 = Worst pain imaginable 7   What number best describes your WORST pain in past 24 hours:  0 = No pain  to  10 = Worst pain imaginable 10   When is your pain worst? AM, PM, Constant   What non-medicine treatments have you already had for your pain? -   Have you tried treating your pain with medication?  Yes   Are you currently taking medications for your pain? Yes       Current Outpatient Prescriptions:      methylPREDNISolone (MEDROL DOSEPAK) 4 MG tablet, Follow package instructions, Disp: 21 tablet, Rfl: 0     cyclobenzaprine (FLEXERIL) 10 MG tablet, Take 1 tablet (10 mg) by mouth 3 times daily as needed for muscle spasms, Disp: 60 tablet, Rfl: 0     HYDROcodone-acetaminophen (NORCO) 5-325 MG per tablet, Take 1-2 tablets by mouth every 8 hours as needed for moderate to severe pain maximum 3 tablet(s) per day, Disp: 60 tablet, Rfl: 0     methylPREDNISolone (MEDROL DOSEPAK) 4 MG tablet, Follow package instructions, Disp: 21 tablet, Rfl: 0     MEDI-PATCH-LIDOCAINE 0.5-0.035-5-20 % PTCH, Externally apply 1 Box topically 2 times daily as needed, Disp: 1 patch, Rfl: 3     senna (EQL SENNA LAXATIVE) 8.6 MG tablet, Take 2 tablets by mouth 2 times daily, Disp: 80 tablet, Rfl: 1     polyethylene glycol (MIRALAX/GLYCOLAX) Packet,  "Take 17 g by mouth 2 times daily, Disp: 30 packet, Rfl: 1     ondansetron (ZOFRAN) 4 MG tablet, Take 1-2 tablets (4-8 mg) by mouth every 8 hours as needed for nausea, Disp: 60 tablet, Rfl: 1     glucosamine-chondroitin 500-400 MG CAPS per capsule, Take 2 capsules by mouth 2 times daily , Disp: , Rfl:      ibuprofen (ADVIL) 200 MG capsule, Take 600 mg by mouth every 8 hours as needed , Disp: , Rfl:      UNABLE TO FIND, Take 2 capsules by mouth 2 times daily MEDICATION NAME: XenoProtx , Disp: , Rfl:      calcium 500 MG CHEW, Take 2 tablets by mouth At Bedtime , Disp: , Rfl:      cholecalciferol (VITAMIN D-1000 MAX ST) 1000 UNITS TABS, Take 1,000 Units by mouth every morning , Disp: , Rfl:      Multiple Vitamins-Minerals (MULTIVITAL PO), Take 1 tablet by mouth 2 times daily , Disp: , Rfl:   Allergies   Allergen Reactions     Dilaudid [Hydromorphone] GI Disturbance     Morphine Nausea and Vomiting     Oxycodone Nausea and Vomiting     Tramadol Nausea and Vomiting     PMH, SOC HIST, FAM HIST, PROBLEM LIST:  All reviewed in EPIC.    OBJECTIVE:  /76 (BP Location: Left arm, Patient Position: Chair, Cuff Size: Adult Regular)  Pulse 84  Ht 1.645 m (5' 4.75\")  Wt 86.8 kg (191 lb 6.4 oz)  BMI 32.1 kg/m2    Imaging:  None new.     EXAM:  Well developed well nourished female found seated comfortably in exam chair.  No apparent distress. She is unaccompanied.  A&O X3.  Mood and affect WNL. Language and fund of knowledge intact.  Is able to sit and rise independently.  She has a nicely healed incision.    Exam at discharge:          Del Tr Bi WE WF Gr   R 4 5 5 5 5 5   L 5 5 5 5 5 5      HF KE KF DF PF EHL   R 5 5 5 5 5 5   L 5 5 5 5 5 5       Exam today:  Essentially unchanged, although I think the right deltoid may be 4+/5    Assessment/Plan:  1. Post-operative state    2. S/P cervical spinal fusion    3. Cervical radiculopathy at C7      Layla Starks is doing reasonably well postop, her headache pain is improved " from preop.  Her right trap is still painful with muscle spasm, and the deltoid and proximal lateral armis quite sore.  She feels that the deltoid is weak and it objectively weak.  I reassured her that while she is 6 weeks postop this is still relatively early in the healing process,  we will give this 12 weeks and see where we are at. I've seen this before, I think she may improve.    Medication management is generally not successful for her. Dosepaks have not been any help all, narcotics cause her to feel very goofy.  Flexeril and gabapentin make her extremely sleepy and lethargic. Physical therapy seems a reasonable option and I would lean heavily toward modalities such as ultrasound and massage especially for her spasms. I would also consider trigger point injections, and may be even a selective nerve root block for pain.  Will start with the physical therapy, and hold off on the more invasive therapies,injections, for now.    She is thinking about returning to work at her other job. She wants to start the therapy first, do it for a week and see if she feels any better before making a final decision. I think that's a reasonable plan She'll give me a call and let me know how she is feeling about returning to work and we'll write to return if she is ready at that point.    Return to clinic in 6 weeks with Dr. Barclay. No imaging for that visit.  Has that appointment already.      We appreciate the opportunity to be of service in the care of this pleasant patient.  Please do call if there is anything more we can do    Etta Munguia PA-C  [facility]  Department of Neurosurgery  Phone: [phone]  Fax: [phone]    This note was generated using voice recognition software. While edited for content some inaccurate phrasing may be found.

## 2018-01-25 NOTE — MR AVS SNAPSHOT
After Visit Summary   1/25/2018    Layla Starks    MRN: [id number]           Patient Information     Date Of Birth          1960        Visit Information        Provider Department      1/25/2018 2:30 PM Etta Munguia PA-C [facility] Neurosurgery        Today's Diagnoses     Post-operative state    -  1    S/P cervical spinal fusion        Cervical disc disorder with radiculopathy of cervical region        Muscle spasms of neck           Follow-ups after your visit        Additional Services     PT Evaluation and Treatment (External Referral) [9048]       Your provider has referred you to: External referral    Please be aware that coverage of these services is subject to the terms and limitations of your health insurance plan.  Call member services at your health plan with any benefit or coverage questions.      Treatment: Evaluation & Treatment for C5 weakness and radiculopathy  Special Instructions: None  Special Programs: None  Modalities: As indicated and Ultrasound,Massage  Equipment: None  Duration and Frequency: Per Therapist Evaluation    Please bring the following to your appointment:  >>   Any x-rays, CTs or MRIs which have been performed.  Contact the facility where they were done to arrange for  prior to your scheduled appointment.  Any new CT, MRI or other procedures ordered by your specialist must be performed at a [facility] facility or coordinated by your clinic's referral office.    >>   List of current medications   >>   This referral request   >>   Any documents/labs given to you for this referral      **Note to Provider** To refer patients to therapy outside of the Location list, change the order class to External Referral in the General section.                  Your next 10 appointments already scheduled     Mar 12, 2018  8:30 AM CDT   (Arrive by 8:15 AM)   Return Visit with Byron Barclay MD   [facility] Neurosurgery ([facility])    [address] [id number] [phone]              Who to contact     Please call your clinic at [phone] to:    Ask questions about your health    Make or cancel appointments    Discuss your medicines    Learn about your test results    Speak to your doctor   If you have compliments or concerns about an experience at your clinic, or if you wish to file a complaint, please contact [facility] Patient Relations at [phone] or email us at [email or web address]         Additional Information About Your Visit        [employer] Information     [employer] gives you secure access to your electronic health record. If you see a primary care provider, you can also send messages to your care team and make appointments. If you have questions, please call your primary care clinic.  If you do not have a primary care provider, please call [phone] and they will assist you.      [employer] is an electronic gateway that provides easy, online access to your medical records. With [employer], you can request a clinic appointment, read your test results, renew a prescription or communicate with your care team.     To access your existing account, please contact your [facility] Clinic or call [phone] for assistance.        Care EveryWhere ID     This is your Care EveryWhere ID. This could be used by other organizations to access your [place] medical records  [id number]        Your Vitals Were     Pulse Height BMI (Body Mass Index)             84 1.645 m (5' 4.75\") 32.1 kg/m2          Blood Pressure from Last 3 Encounters:   01/25/18 129/76   12/28/17 127/79   12/18/17 118/74    Weight from Last 3 Encounters:   01/25/18 86.8 kg (191 lb 6.4 oz)   12/28/17 86.7 kg (191 lb 3.2 oz)   12/18/17 84.8 kg (187 lb)              We Performed the Following     PT Evaluation and Treatment (External Referral) [3111]        Primary Care Provider Office " Phone # Fax #    Brittani Grant [phone]       [facility] [address]    [facility] [address]        Equal Access to Services     DIGNA CAVANAUGHKOFFI : Hadii kishan ku kwasio Socherrieali, waaxda luqadaha, qaybta kaalmada adeegyada, mickey ralphbk carlos. So [facility] [phone].    ATENCIÓN: Si habla español, tiene a ramos disposición servicios gratuitos de asistencia lingüística. Llame al [phone].    We comply with applicable federal civil rights laws and [place] laws. We do not discriminate on the basis of race, color, national origin, age, disability, sex, sexual orientation, or gender identity.            Thank you!     Thank you for choosing [facility]  for your care. Our goal is always to provide you with excellent care. Hearing back from our patients is one way we can continue to improve our services. Please take a few minutes to complete the written survey that you may receive in the mail after your visit with us. Thank you!             Your Updated Medication List - Protect others around you: Learn how to safely use, store and throw away your medicines at www.disposemymeds.org.          This list is accurate as of 1/25/18  6:03 PM.  Always use your most recent med list.                   Brand Name Dispense Instructions for use Diagnosis    ADVIL 200 MG capsule   Generic drug:  ibuprofen      Take 600 mg by mouth every 8 hours as needed    Cervical disc disorder with radiculopathy of cervical region       calcium 500 MG Chew      Take 2 tablets by mouth At Bedtime        cyclobenzaprine 10 MG tablet    FLEXERIL    60 tablet    Take 1 tablet (10 mg) by mouth 3 times daily as needed for muscle spasms    Cervicalgia, Cervical disc disorder with radiculopathy of cervical region       glucosamine-chondroitin 500-400 MG Caps per capsule      Take 2 capsules by mouth 2 times daily    Cervical disc disorder with radiculopathy of cervical region        HYDROcodone-acetaminophen 5-325 MG per tablet    NORCO    60 tablet    Take 1-2 tablets by mouth every 8 hours as needed for moderate to severe pain maximum 3 tablet(s) per day    Post-operative pain       MEDI-PATCH-LIDOCAINE 0.5-0.035-5-20 % Ptch   Generic drug:  Lido-Capsaicin-Men-Methyl Sal     1 patch    Externally apply 1 Box topically 2 times daily as needed    Cervical disc disorder with radiculopathy of cervical region       * methylPREDNISolone 4 MG tablet    MEDROL DOSEPAK    21 tablet    Follow package instructions    Cervical disc disorder with radiculopathy of cervical region       * methylPREDNISolone 4 MG tablet    MEDROL DOSEPAK    21 tablet    Follow package instructions    Cervical disc disorder with radiculopathy of cervical region       MULTIVITAL PO      Take 1 tablet by mouth 2 times daily        ondansetron 4 MG tablet    ZOFRAN    60 tablet    Take 1-2 tablets (4-8 mg) by mouth every 8 hours as needed for nausea    Cervical disc disorder with radiculopathy of cervical region       polyethylene glycol Packet    MIRALAX/GLYCOLAX    30 packet    Take 17 g by mouth 2 times daily    Cervical disc disorder with radiculopathy of cervical region       senna 8.6 MG tablet    EQL SENNA LAXATIVE    80 tablet    Take 2 tablets by mouth 2 times daily    Cervical disc disorder with radiculopathy of cervical region       UNABLE TO FIND      Take 2 capsules by mouth 2 times daily MEDICATION NAME: XenoProtx    Cervical disc disorder with radiculopathy of cervical region       VITAMIN D-1000 MAX ST 1000 UNITS Tabs   Generic drug:  cholecalciferol      Take 1,000 Units by mouth every morning        * Notice:  This list has 2 medication(s) that are the same as other medications prescribed for you. Read the directions carefully, and ask your doctor or other care provider to review them with you.

## 2018-02-19 ENCOUNTER — PRE VISIT (OUTPATIENT)
Dept: NEUROSURGERY | Facility: CLINIC | Age: 58
End: 2018-02-19

## 2018-02-26 ENCOUNTER — OFFICE VISIT (OUTPATIENT)
Dept: NEUROSURGERY | Facility: CLINIC | Age: 58
End: 2018-02-26
Payer: COMMERCIAL

## 2018-02-26 VITALS — DIASTOLIC BLOOD PRESSURE: 66 MMHG | SYSTOLIC BLOOD PRESSURE: 131 MMHG | HEIGHT: 65 IN | HEART RATE: 73 BPM

## 2018-02-26 DIAGNOSIS — M54.2 CERVICALGIA: Primary | ICD-10-CM

## 2018-02-26 RX ORDER — ANTIARTHRITIC COMBINATION NO.2 900 MG
0.5 TABLET ORAL DAILY
COMMUNITY
End: 2021-01-25

## 2018-02-26 ASSESSMENT — PAIN SCALES - GENERAL: PAINLEVEL: WORST PAIN (10)

## 2018-02-26 NOTE — LETTER
2/26/2018       RE: Layla Starks  [address] [facility] [address]     Dear Colleague,    Thank you for referring your patient, Layla Starks, to the [facility] NEUROSURGERY at [facility]. Please see a copy of my visit note below.        Neurosurgery Clinic Note    REASON FOR VISIT:   3 months follow-up on right C4-5 foraminotomy    HISTORY OF PRESENT ILLNESS:  Ms. Layla Starks is a 57-year-old woman who was seen in our clinic for positional headaches after a C5-7 open door laminoplasty at [facility]. She had a significant workup for her headaches; however, upon this workup she also reported that she had paresthesias in her right first 2 fingers.  Because of this she underwent a right C5 selective nerve root injection which she found to be beneficial for about 5 days and her headaches improved at the same time. She was then offered right C4-5 foraminotomy which she underwent on 12/13/17. She presents today for follow-up. Patient reports that her symptoms have persisted post-op. She denies any other weakness, tingling, or numbness. No nausea/vomiting, fever, chills, headaches.    ROS: 10 point ROS of systems including Constitutional, Eyes, Respiratory, Cardiovascular, Gastroenterology, Genitourinary, Integumentary, Muscularskeletal, Psychiatric were all negative except for pertinent positives noted in my HPI.    PHYSICAL EXAM:  Alert and oriented to person, place, and time. NAD.   PERRL, EOMI. Face symmetric. Tongue midline.        Del Tr Bi WE WF Gr   R 5 5 5 5 5 5   L 5 5 5 5 5 5      HF KE KF DF PF EHL   R 5 5 5 5 5 5   L 5 5 5 5 5 5     Reflexes 2+ throughout.   Sensation intact and symmetric to light touch throughout.   Normal FNF, normal HTS test. Gait is normal.     IMAGES:  MRI cervical spine (12/18/17): no residual stenosis or foraminal narrowing noted.    ASSESSMENT:  Ms. Starks is a 57 year old woman who underwent right C4-5 foraminotomy. However, her symptoms  persist. Post-op MRI does not show any residual stenosis or foraminal narrowing. At this point, no further surgeries would be helpful for her. We would recommend pain clinic evaluation.    PLAN:     Follow-up with neurosurgery PRN    Recommend pain clinic evaluation    The patient was discussed with neurosurgery faculty, Dr. Barclay.    Juanita Zuleta MD, MS  Neurosurgery PGY-1      I saw the patient with the resident.  I have reviewed and edited the resident note and agree with the plan of care.      Byron Barclay MD           Neurosurgery Clinic Note    REASON FOR VISIT:   3 months follow-up on right C4-5 foraminotomy    HISTORY OF PRESENT ILLNESS:  Ms. Layla Starks is a 57-year-old woman who was seen in our clinic for positional headaches after a C5-7 open door laminoplasty at [facility]. She had a significant workup for her headaches; however, upon this workup she also reported that she had paresthesias in her right first 2 fingers.  Because of this she underwent a right C5 selective nerve root injection which she found to be beneficial for about 5 days and her headaches improved at the same time. She was then offered right C4-5 foraminotomy which she underwent on 12/13/17. She presents today for follow-up. Patient reports that her symptoms have persisted post-op. She denies any other weakness, tingling, or numbness. No nausea/vomiting, fever, chills, headaches.    ROS: 10 point ROS of systems including Constitutional, Eyes, Respiratory, Cardiovascular, Gastroenterology, Genitourinary, Integumentary, Muscularskeletal, Psychiatric were all negative except for pertinent positives noted in my HPI.    PHYSICAL EXAM:  Alert and oriented to person, place, and time. NAD.   PERRL, EOMI. Face symmetric. Tongue midline.        Del Tr Bi WE WF Gr   R 5 5 5 5 5 5   L 5 5 5 5 5 5      HF KE KF DF PF EHL   R 5 5 5 5 5 5   L 5 5 5 5 5 5     Reflexes 2+ throughout.   Sensation intact and symmetric to light touch  throughout.   Normal FNF, normal HTS test. Gait is normal.     IMAGES:  MRI cervical spine (12/18/17): no residual stenosis or foraminal narrowing noted.    ASSESSMENT:  Ms. Starks is a 57 year old woman who underwent right C4-5 foraminotomy. However, her symptoms persist. Post-op MRI does not show any residual stenosis or foraminal narrowing. At this point, no further surgeries would be helpful for her. We would recommend pain clinic evaluation.    PLAN:     Follow-up with neurosurgery PRN    Recommend pain clinic evaluation    The patient was discussed with neurosurgery faculty, Dr. Barclay.    Juanita Zuleta MD, MS  Neurosurgery PGY-1      I saw the patient with the resident.  I have reviewed and edited the resident note and agree with the plan of care.      Byron Barclay MD

## 2018-02-26 NOTE — PROGRESS NOTES
Neurosurgery Clinic Note    REASON FOR VISIT:   3 months follow-up on right C4-5 foraminotomy    HISTORY OF PRESENT ILLNESS:  Ms. Layla Starks is a 57-year-old woman who was seen in our clinic for positional headaches after a C5-7 open door laminoplasty at [facility]. She had a significant workup for her headaches; however, upon this workup she also reported that she had paresthesias in her right first 2 fingers.  Because of this she underwent a right C5 selective nerve root injection which she found to be beneficial for about 5 days and her headaches improved at the same time. She was then offered right C4-5 foraminotomy which she underwent on 12/13/17. She presents today for follow-up. Patient reports that her symptoms have persisted post-op. She denies any other weakness, tingling, or numbness. No nausea/vomiting, fever, chills, headaches.    ROS: 10 point ROS of systems including Constitutional, Eyes, Respiratory, Cardiovascular, Gastroenterology, Genitourinary, Integumentary, Muscularskeletal, Psychiatric were all negative except for pertinent positives noted in my HPI.    PHYSICAL EXAM:  Alert and oriented to person, place, and time. NAD.   PERRL, EOMI. Face symmetric. Tongue midline.        Del Tr Bi WE WF Gr   R 5 5 5 5 5 5   L 5 5 5 5 5 5      HF KE KF DF PF EHL   R 5 5 5 5 5 5   L 5 5 5 5 5 5     Reflexes 2+ throughout.   Sensation intact and symmetric to light touch throughout.   Normal FNF, normal HTS test. Gait is normal.     IMAGES:  MRI cervical spine (12/18/17): no residual stenosis or foraminal narrowing noted.    ASSESSMENT:  Ms. Starks is a 57 year old woman who underwent right C4-5 foraminotomy. However, her symptoms persist. Post-op MRI does not show any residual stenosis or foraminal narrowing. At this point, no further surgeries would be helpful for her. We would recommend pain clinic evaluation.    PLAN:     Follow-up with neurosurgery PRN    Recommend pain clinic evaluation    The  patient was discussed with neurosurgery faculty, Dr. Barclay.    Juanita Zuleta MD, MS  Neurosurgery PGY-1      I saw the patient with the resident.  I have reviewed and edited the resident note and agree with the plan of care.      Byron Barclay MD

## 2018-02-26 NOTE — MR AVS SNAPSHOT
"              After Visit Summary   2/26/2018    Layla Starks    MRN: [id number]           Patient Information     Date Of Birth          1960        Visit Information        Provider Department      2/26/2018 11:00 AM Byron Barclay MD [facility] Neurosurgery        Today's Diagnoses     Cervicalgia    -  1       Follow-ups after your visit        Who to contact     Please call your clinic at [phone] to:    Ask questions about your health    Make or cancel appointments    Discuss your medicines    Learn about your test results    Speak to your doctor            Additional Information About Your Visit        MyChart Information     St[facility] gives you secure access to your electronic health record. If you see a primary care provider, you can also send messages to your care team and make appointments. If you have questions, please call your primary care clinic.  If you do not have a primary care provider, please call [phone] and they will assist you.      Stellar is an electronic gateway that provides easy, online access to your medical records. With [facility], you can request a clinic appointment, read your test results, renew a prescription or communicate with your care team.     To access your existing account, please contact your [facility] or call [phone] for assistance.        Care EveryWhere ID     This is your Care EveryWhere ID. This could be used by other organizations to access your [place] medical records  [id number]        Your Vitals Were     Pulse Height                73 1.645 m (5' 4.75\")           Blood Pressure from Last 3 Encounters:   02/26/18 131/66   01/25/18 129/76   12/28/17 127/79    Weight from Last 3 Encounters:   01/25/18 86.8 kg (191 lb 6.4 oz)   12/28/17 86.7 kg (191 lb 3.2 oz)   12/18/17 84.8 kg (187 lb)              Today, you had the following     No orders found for display       Primary Care Provider Office Phone # Fax #    " Brittani LOTT Juanita [phone]       [facility] [place] [address]    [facility] [place] [address]        Equal Access to Services     ABRILKELVIN SHANE : Anni kishan marquez sandra Barragan, waljda luqadaha, qamaricelta kaalmada ana, mickey ralphbk carlos. So [facility] [phone].    ATENCIÓN: Si habla español, tiene a ramos disposición servicios gratuitos de asistencia lingüística. Llame al [phone].    We comply with applicable federal civil rights laws and [place] laws. We do not discriminate on the basis of race, color, national origin, age, disability, sex, sexual orientation, or gender identity.            Thank you!     Thank you for choosing [facility] NEUROSURGERY  for your care. Our goal is always to provide you with excellent care. Hearing back from our patients is one way we can continue to improve our services. Please take a few minutes to complete the written survey that you may receive in the mail after your visit with us. Thank you!             Your Updated Medication List - Protect others around you: Learn how to safely use, store and throw away your medicines at www.disposemymeds.org.          This list is accurate as of 2/26/18 11:59 PM.  Always use your most recent med list.                   Brand Name Dispense Instructions for use Diagnosis    ADVIL 200 MG capsule   Generic drug:  ibuprofen      Take 600 mg by mouth every 8 hours as needed    Cervical disc disorder with radiculopathy of cervical region       dhea 25 MG Tabs      Take 0.5 mg by mouth daily        glucosamine-chondroitin 500-400 MG Caps per capsule      Take 2 capsules by mouth 2 times daily    Cervical disc disorder with radiculopathy of cervical region       HYDROcodone-acetaminophen 5-325 MG per tablet    NORCO    60 tablet    Take 1-2 tablets by mouth every 8 hours as needed for moderate to severe pain maximum 3 tablet(s) per day    Post-operative pain       MEDI-PATCH-LIDOCAINE  0.5-0.035-5-20 % Ptch   Generic drug:  Lido-Capsaicin-Men-Methyl Sal     1 patch    Externally apply 1 Box topically 2 times daily as needed    Cervical disc disorder with radiculopathy of cervical region       MULTIVITAL PO      Take 1 tablet by mouth 2 times daily        ondansetron 4 MG tablet    ZOFRAN    60 tablet    Take 1-2 tablets (4-8 mg) by mouth every 8 hours as needed for nausea    Cervical disc disorder with radiculopathy of cervical region       UNABLE TO FIND      Take 2 capsules by mouth 2 times daily MEDICATION NAME: XenoProtx    Cervical disc disorder with radiculopathy of cervical region       VITAMIN D-1000 MAX ST 1000 UNITS Tabs   Generic drug:  cholecalciferol      Take 1,000 Units by mouth every morning

## 2018-03-05 NOTE — PROGRESS NOTES
Neurosurgery Clinic Note    REASON FOR VISIT:   3 months follow-up on right C4-5 foraminotomy    HISTORY OF PRESENT ILLNESS:  Ms. Layla Starks is a 57-year-old woman who was seen in our clinic for positional headaches after a C5-7 open door laminoplasty at [facility]. She had a significant workup for her headaches; however, upon this workup she also reported that she had paresthesias in her right first 2 fingers.  Because of this she underwent a right C5 selective nerve root injection which she found to be beneficial for about 5 days and her headaches improved at the same time. She was then offered right C4-5 foraminotomy which she underwent on 12/13/17. She presents today for follow-up. Patient reports that her symptoms have persisted post-op. She denies any other weakness, tingling, or numbness. No nausea/vomiting, fever, chills, headaches.    ROS: 10 point ROS of systems including Constitutional, Eyes, Respiratory, Cardiovascular, Gastroenterology, Genitourinary, Integumentary, Muscularskeletal, Psychiatric were all negative except for pertinent positives noted in my HPI.    PHYSICAL EXAM:  Alert and oriented to person, place, and time. NAD.   PERRL, EOMI. Face symmetric. Tongue midline.        Del Tr Bi WE WF Gr   R 5 5 5 5 5 5   L 5 5 5 5 5 5      HF KE KF DF PF EHL   R 5 5 5 5 5 5   L 5 5 5 5 5 5     Reflexes 2+ throughout.   Sensation intact and symmetric to light touch throughout.   Normal FNF, normal HTS test. Gait is normal.     IMAGES:  MRI cervical spine (12/18/17): no residual stenosis or foraminal narrowing noted.    ASSESSMENT:  Ms. Starks is a 57 year old woman who underwent right C4-5 foraminotomy. However, her symptoms persist. Post-op MRI does not show any residual stenosis or foraminal narrowing. At this point, no further surgeries would be helpful for her. We would recommend pain clinic evaluation.    PLAN:     Follow-up with neurosurgery PRN    Recommend pain clinic evaluation    The  patient was discussed with neurosurgery faculty, Dr. Barclay.    Juanita Zuleta MD, MS  Neurosurgery PGY-1      I saw the patient with the resident.  I have reviewed and edited the resident note and agree with the plan of care.      Byron Barclay MD

## 2018-03-13 ENCOUNTER — COMMUNICATION - HEALTHEAST (OUTPATIENT)
Dept: NEUROSURGERY | Facility: CLINIC | Age: 58
End: 2018-03-13

## 2018-03-28 ENCOUNTER — RECORDS - HEALTHEAST (OUTPATIENT)
Dept: ADMINISTRATIVE | Facility: OTHER | Age: 58
End: 2018-03-28

## 2018-04-03 ENCOUNTER — RECORDS - HEALTHEAST (OUTPATIENT)
Dept: ADMINISTRATIVE | Facility: OTHER | Age: 58
End: 2018-04-03

## 2018-04-03 ENCOUNTER — OFFICE VISIT - HEALTHEAST (OUTPATIENT)
Dept: NEUROSURGERY | Facility: CLINIC | Age: 58
End: 2018-04-03

## 2018-04-03 ENCOUNTER — HOSPITAL ENCOUNTER (OUTPATIENT)
Dept: RADIOLOGY | Facility: CLINIC | Age: 58
Discharge: HOME | End: 2018-04-03

## 2018-04-03 ENCOUNTER — RECORDS - HEALTHEAST (OUTPATIENT)
Dept: RADIOLOGY | Facility: CLINIC | Age: 58
End: 2018-04-03

## 2018-04-03 ENCOUNTER — TRANSFERRED RECORDS (OUTPATIENT)
Dept: HEALTH INFORMATION MANAGEMENT | Facility: CLINIC | Age: 58
End: 2018-04-03

## 2018-04-03 DIAGNOSIS — R51.9 INTRACTABLE HEADACHE: ICD-10-CM

## 2018-04-03 DIAGNOSIS — G95.20 CORD COMPRESSION MYELOPATHY (H): ICD-10-CM

## 2018-04-03 DIAGNOSIS — G54.9 NERVE ROOT COMPRESSION: ICD-10-CM

## 2018-04-04 ENCOUNTER — TRANSFERRED RECORDS (OUTPATIENT)
Dept: HEALTH INFORMATION MANAGEMENT | Facility: CLINIC | Age: 58
End: 2018-04-04

## 2018-04-04 ENCOUNTER — AMBULATORY - HEALTHEAST (OUTPATIENT)
Dept: PHYSICAL MEDICINE AND REHAB | Facility: CLINIC | Age: 58
End: 2018-04-04

## 2018-04-04 ENCOUNTER — HOSPITAL ENCOUNTER (OUTPATIENT)
Dept: PHYSICAL MEDICINE AND REHAB | Facility: CLINIC | Age: 58
Discharge: HOME | End: 2018-04-04
Attending: PHYSICAL MEDICINE & REHABILITATION

## 2018-04-04 DIAGNOSIS — G24.3 CERVICAL DYSTONIA: ICD-10-CM

## 2018-04-04 DIAGNOSIS — G95.20 CORD COMPRESSION MYELOPATHY (H): ICD-10-CM

## 2018-04-04 DIAGNOSIS — M48.02 FORAMINAL STENOSIS OF CERVICAL REGION: ICD-10-CM

## 2018-04-04 DIAGNOSIS — M79.18 MYOFASCIAL PAIN: ICD-10-CM

## 2018-04-04 DIAGNOSIS — G54.9 NERVE ROOT COMPRESSION: ICD-10-CM

## 2018-04-04 DIAGNOSIS — M54.2 NECK PAIN: ICD-10-CM

## 2018-04-04 DIAGNOSIS — M54.12 CERVICAL RADICULITIS: ICD-10-CM

## 2018-04-04 DIAGNOSIS — R51.9 INTRACTABLE HEADACHE: ICD-10-CM

## 2018-04-04 ASSESSMENT — MIFFLIN-ST. JEOR: SCORE: 1437.71

## 2018-04-09 ENCOUNTER — COMMUNICATION - HEALTHEAST (OUTPATIENT)
Dept: NEUROSURGERY | Facility: CLINIC | Age: 58
End: 2018-04-09

## 2018-04-10 ENCOUNTER — RECORDS - HEALTHEAST (OUTPATIENT)
Dept: ADMINISTRATIVE | Facility: OTHER | Age: 58
End: 2018-04-10

## 2018-04-18 ENCOUNTER — HOSPITAL ENCOUNTER (OUTPATIENT)
Dept: PHYSICAL MEDICINE AND REHAB | Facility: CLINIC | Age: 58
Discharge: HOME | End: 2018-04-18
Attending: PHYSICAL MEDICINE & REHABILITATION

## 2018-04-18 DIAGNOSIS — G24.3 CERVICAL DYSTONIA: ICD-10-CM

## 2018-04-18 DIAGNOSIS — M54.2 TRIGGER POINT OF NECK: ICD-10-CM

## 2018-04-18 DIAGNOSIS — M79.18 MYOFASCIAL PAIN: ICD-10-CM

## 2018-04-18 DIAGNOSIS — M54.2 NECK PAIN: ICD-10-CM

## 2018-04-18 DIAGNOSIS — G54.9 NERVE ROOT COMPRESSION: ICD-10-CM

## 2018-04-23 ENCOUNTER — HOSPITAL ENCOUNTER (OUTPATIENT)
Dept: PHYSICAL MEDICINE AND REHAB | Facility: CLINIC | Age: 58
Discharge: HOME | End: 2018-04-23
Attending: PHYSICAL MEDICINE & REHABILITATION

## 2018-04-23 DIAGNOSIS — R51.9 PERSISTENT HEADACHES: ICD-10-CM

## 2018-04-23 DIAGNOSIS — M79.18 MYOFASCIAL PAIN: ICD-10-CM

## 2018-04-23 DIAGNOSIS — M54.2 NECK PAIN: ICD-10-CM

## 2018-04-23 DIAGNOSIS — G24.3 CERVICAL DYSTONIA: ICD-10-CM

## 2018-04-24 ENCOUNTER — COMMUNICATION - HEALTHEAST (OUTPATIENT)
Dept: PHYSICAL MEDICINE AND REHAB | Facility: CLINIC | Age: 58
End: 2018-04-24

## 2018-04-24 DIAGNOSIS — M79.18 MYOFASCIAL PAIN: ICD-10-CM

## 2018-05-01 ENCOUNTER — COMMUNICATION - HEALTHEAST (OUTPATIENT)
Dept: PHYSICAL MEDICINE AND REHAB | Facility: CLINIC | Age: 58
End: 2018-05-01

## 2018-05-01 DIAGNOSIS — G54.9 NERVE ROOT COMPRESSION: ICD-10-CM

## 2018-05-23 ENCOUNTER — COMMUNICATION - HEALTHEAST (OUTPATIENT)
Dept: PHYSICAL MEDICINE AND REHAB | Facility: CLINIC | Age: 58
End: 2018-05-23

## 2018-05-23 DIAGNOSIS — M79.18 MYOFASCIAL PAIN: ICD-10-CM

## 2018-06-04 ENCOUNTER — TRANSFERRED RECORDS (OUTPATIENT)
Dept: HEALTH INFORMATION MANAGEMENT | Facility: CLINIC | Age: 58
End: 2018-06-04

## 2018-06-18 ENCOUNTER — TRANSFERRED RECORDS (OUTPATIENT)
Dept: HEALTH INFORMATION MANAGEMENT | Facility: CLINIC | Age: 58
End: 2018-06-18

## 2018-06-22 ENCOUNTER — COMMUNICATION - HEALTHEAST (OUTPATIENT)
Dept: PHYSICAL MEDICINE AND REHAB | Facility: CLINIC | Age: 58
End: 2018-06-22

## 2018-06-22 DIAGNOSIS — M79.18 MYOFASCIAL PAIN: ICD-10-CM

## 2018-07-17 ENCOUNTER — TRANSFERRED RECORDS (OUTPATIENT)
Dept: HEALTH INFORMATION MANAGEMENT | Facility: CLINIC | Age: 58
End: 2018-07-17

## 2018-07-27 ENCOUNTER — COMMUNICATION - HEALTHEAST (OUTPATIENT)
Dept: PHYSICAL MEDICINE AND REHAB | Facility: CLINIC | Age: 58
End: 2018-07-27

## 2018-07-27 DIAGNOSIS — M79.18 MYOFASCIAL PAIN: ICD-10-CM

## 2018-08-06 ENCOUNTER — TRANSFERRED RECORDS (OUTPATIENT)
Dept: HEALTH INFORMATION MANAGEMENT | Facility: CLINIC | Age: 58
End: 2018-08-06

## 2018-09-13 ENCOUNTER — TRANSFERRED RECORDS (OUTPATIENT)
Dept: HEALTH INFORMATION MANAGEMENT | Facility: CLINIC | Age: 58
End: 2018-09-13

## 2018-09-19 ENCOUNTER — HOSPITAL ENCOUNTER (OUTPATIENT)
Dept: PHYSICAL MEDICINE AND REHAB | Facility: CLINIC | Age: 58
Discharge: HOME | End: 2018-09-19
Attending: PHYSICAL MEDICINE & REHABILITATION

## 2018-09-19 DIAGNOSIS — M54.2 NECK PAIN: ICD-10-CM

## 2018-09-19 DIAGNOSIS — G24.3 CERVICAL DYSTONIA: ICD-10-CM

## 2018-09-19 DIAGNOSIS — M79.18 MYOFASCIAL PAIN: ICD-10-CM

## 2018-09-19 DIAGNOSIS — R51.9 PERSISTENT HEADACHES: ICD-10-CM

## 2018-09-19 ASSESSMENT — MIFFLIN-ST. JEOR: SCORE: 1461.3

## 2018-10-10 ENCOUNTER — COMMUNICATION - HEALTHEAST (OUTPATIENT)
Dept: PHYSICAL MEDICINE AND REHAB | Facility: CLINIC | Age: 58
End: 2018-10-10

## 2018-10-10 DIAGNOSIS — G24.3 CERVICAL DYSTONIA: ICD-10-CM

## 2018-10-10 DIAGNOSIS — M54.2 NECK PAIN: ICD-10-CM

## 2018-10-17 ENCOUNTER — RECORDS - HEALTHEAST (OUTPATIENT)
Dept: RADIOLOGY | Facility: CLINIC | Age: 58
End: 2018-10-17

## 2018-10-17 ENCOUNTER — HOSPITAL ENCOUNTER (OUTPATIENT)
Dept: PHYSICAL MEDICINE AND REHAB | Facility: CLINIC | Age: 58
Discharge: HOME | End: 2018-10-17
Attending: PHYSICAL MEDICINE & REHABILITATION
Payer: COMMERCIAL

## 2018-10-17 DIAGNOSIS — M54.2 NECK PAIN: ICD-10-CM

## 2018-10-17 DIAGNOSIS — M79.18 MYOFASCIAL PAIN: ICD-10-CM

## 2018-10-17 DIAGNOSIS — G24.3 CERVICAL DYSTONIA: ICD-10-CM

## 2018-11-14 ENCOUNTER — HOSPITAL ENCOUNTER (OUTPATIENT)
Dept: PHYSICAL MEDICINE AND REHAB | Facility: CLINIC | Age: 58
Discharge: HOME | End: 2018-11-14
Attending: PHYSICAL MEDICINE & REHABILITATION

## 2018-11-14 ENCOUNTER — TRANSFERRED RECORDS (OUTPATIENT)
Dept: HEALTH INFORMATION MANAGEMENT | Facility: CLINIC | Age: 58
End: 2018-11-14

## 2018-11-14 DIAGNOSIS — R51.9 PERSISTENT HEADACHES: ICD-10-CM

## 2018-11-14 DIAGNOSIS — M47.812 CERVICAL FACET JOINT SYNDROME: ICD-10-CM

## 2018-11-14 DIAGNOSIS — M47.812 FACET ARTHROPATHY, CERVICAL: ICD-10-CM

## 2018-11-14 DIAGNOSIS — M54.2 NECK PAIN: ICD-10-CM

## 2018-11-14 DIAGNOSIS — M79.18 MYOFASCIAL PAIN: ICD-10-CM

## 2018-11-14 DIAGNOSIS — G24.3 CERVICAL DYSTONIA: ICD-10-CM

## 2018-11-15 ENCOUNTER — TRANSFERRED RECORDS (OUTPATIENT)
Dept: HEALTH INFORMATION MANAGEMENT | Facility: CLINIC | Age: 58
End: 2018-11-15

## 2018-11-15 ENCOUNTER — MEDICAL CORRESPONDENCE (OUTPATIENT)
Dept: HEALTH INFORMATION MANAGEMENT | Facility: CLINIC | Age: 58
End: 2018-11-15

## 2018-11-16 ENCOUNTER — COMMUNICATION - HEALTHEAST (OUTPATIENT)
Dept: PHYSICAL MEDICINE AND REHAB | Facility: CLINIC | Age: 58
End: 2018-11-16

## 2018-11-16 DIAGNOSIS — M47.812 CERVICAL FACET JOINT SYNDROME: ICD-10-CM

## 2018-11-16 DIAGNOSIS — M47.812 FACET ARTHROPATHY, CERVICAL: ICD-10-CM

## 2018-11-16 DIAGNOSIS — M54.2 CERVICALGIA: ICD-10-CM

## 2018-11-19 ENCOUNTER — HOSPITAL ENCOUNTER (OUTPATIENT)
Dept: PHYSICAL MEDICINE AND REHAB | Facility: CLINIC | Age: 58
Discharge: HOME | End: 2018-11-19
Attending: PHYSICAL MEDICINE & REHABILITATION

## 2018-11-19 ENCOUNTER — TRANSFERRED RECORDS (OUTPATIENT)
Dept: HEALTH INFORMATION MANAGEMENT | Facility: CLINIC | Age: 58
End: 2018-11-19

## 2018-11-19 DIAGNOSIS — M54.2 CERVICALGIA: ICD-10-CM

## 2018-11-19 DIAGNOSIS — M47.812 CERVICAL FACET JOINT SYNDROME: ICD-10-CM

## 2018-11-19 DIAGNOSIS — M47.812 FACET ARTHROPATHY, CERVICAL: ICD-10-CM

## 2018-11-21 ENCOUNTER — HOSPITAL ENCOUNTER (OUTPATIENT)
Dept: PHYSICAL MEDICINE AND REHAB | Facility: CLINIC | Age: 58
Discharge: HOME | End: 2018-11-21
Attending: PHYSICAL MEDICINE & REHABILITATION

## 2018-11-21 ENCOUNTER — TRANSFERRED RECORDS (OUTPATIENT)
Dept: HEALTH INFORMATION MANAGEMENT | Facility: CLINIC | Age: 58
End: 2018-11-21

## 2018-11-21 DIAGNOSIS — M79.18 MYOFASCIAL PAIN: ICD-10-CM

## 2018-11-21 DIAGNOSIS — G24.3 CERVICAL DYSTONIA: ICD-10-CM

## 2018-11-21 DIAGNOSIS — M47.812 ARTHROPATHY OF CERVICAL FACET JOINT: ICD-10-CM

## 2018-11-21 DIAGNOSIS — M54.2 NECK PAIN: ICD-10-CM

## 2018-11-21 DIAGNOSIS — M47.812 CERVICAL FACET JOINT SYNDROME: ICD-10-CM

## 2018-11-21 DIAGNOSIS — R51.9 PERSISTENT HEADACHES: ICD-10-CM

## 2018-11-21 DIAGNOSIS — M54.2 TRIGGER POINT OF NECK: ICD-10-CM

## 2018-11-27 ENCOUNTER — TRANSFERRED RECORDS (OUTPATIENT)
Dept: HEALTH INFORMATION MANAGEMENT | Facility: CLINIC | Age: 58
End: 2018-11-27

## 2018-11-27 ENCOUNTER — HOSPITAL ENCOUNTER (OUTPATIENT)
Dept: PHYSICAL MEDICINE AND REHAB | Facility: CLINIC | Age: 58
Discharge: HOME | End: 2018-11-27
Attending: PHYSICAL MEDICINE & REHABILITATION

## 2018-11-27 DIAGNOSIS — M79.18 MYOFASCIAL PAIN: ICD-10-CM

## 2018-11-27 DIAGNOSIS — M54.2 NECK PAIN: ICD-10-CM

## 2018-11-27 DIAGNOSIS — M54.2 TRIGGER POINT OF NECK: ICD-10-CM

## 2018-12-05 ENCOUNTER — HOSPITAL ENCOUNTER (OUTPATIENT)
Dept: PHYSICAL MEDICINE AND REHAB | Facility: CLINIC | Age: 58
Discharge: HOME | End: 2018-12-05
Attending: PHYSICAL MEDICINE & REHABILITATION

## 2018-12-05 ENCOUNTER — TRANSFERRED RECORDS (OUTPATIENT)
Dept: HEALTH INFORMATION MANAGEMENT | Facility: CLINIC | Age: 58
End: 2018-12-05

## 2018-12-05 DIAGNOSIS — M47.812 ARTHROPATHY OF CERVICAL FACET JOINT: ICD-10-CM

## 2018-12-05 DIAGNOSIS — M47.812 CERVICAL FACET JOINT SYNDROME: ICD-10-CM

## 2018-12-05 DIAGNOSIS — M54.2 NECK PAIN: ICD-10-CM

## 2018-12-05 DIAGNOSIS — M54.2 TRIGGER POINT OF NECK: ICD-10-CM

## 2018-12-05 DIAGNOSIS — G24.3 CERVICAL DYSTONIA: ICD-10-CM

## 2018-12-05 DIAGNOSIS — R51.9 PERSISTENT HEADACHES: ICD-10-CM

## 2018-12-05 DIAGNOSIS — M79.18 MYOFASCIAL PAIN: ICD-10-CM

## 2018-12-06 ENCOUNTER — COMMUNICATION - HEALTHEAST (OUTPATIENT)
Dept: PHYSICAL MEDICINE AND REHAB | Facility: CLINIC | Age: 58
End: 2018-12-06

## 2018-12-07 ENCOUNTER — COMMUNICATION - HEALTHEAST (OUTPATIENT)
Dept: PHYSICAL MEDICINE AND REHAB | Facility: CLINIC | Age: 58
End: 2018-12-07

## 2018-12-11 ENCOUNTER — COMMUNICATION - HEALTHEAST (OUTPATIENT)
Dept: PHYSICAL MEDICINE AND REHAB | Facility: CLINIC | Age: 58
End: 2018-12-11

## 2018-12-28 ENCOUNTER — TRANSFERRED RECORDS (OUTPATIENT)
Dept: HEALTH INFORMATION MANAGEMENT | Facility: CLINIC | Age: 58
End: 2018-12-28

## 2019-01-19 ENCOUNTER — TRANSFERRED RECORDS (OUTPATIENT)
Dept: HEALTH INFORMATION MANAGEMENT | Facility: CLINIC | Age: 59
End: 2019-01-19

## 2019-01-19 ENCOUNTER — OFFICE VISIT - HEALTHEAST (OUTPATIENT)
Dept: FAMILY MEDICINE | Facility: CLINIC | Age: 59
End: 2019-01-19

## 2019-01-19 DIAGNOSIS — R42 DIZZINESS: ICD-10-CM

## 2019-02-07 ENCOUNTER — TRANSFERRED RECORDS (OUTPATIENT)
Dept: HEALTH INFORMATION MANAGEMENT | Facility: CLINIC | Age: 59
End: 2019-02-07

## 2019-03-01 ENCOUNTER — TRANSFERRED RECORDS (OUTPATIENT)
Dept: HEALTH INFORMATION MANAGEMENT | Facility: CLINIC | Age: 59
End: 2019-03-01

## 2019-03-13 ENCOUNTER — TRANSFERRED RECORDS (OUTPATIENT)
Dept: HEALTH INFORMATION MANAGEMENT | Facility: CLINIC | Age: 59
End: 2019-03-13

## 2019-03-14 ENCOUNTER — TRANSFERRED RECORDS (OUTPATIENT)
Dept: HEALTH INFORMATION MANAGEMENT | Facility: CLINIC | Age: 59
End: 2019-03-14

## 2019-04-05 ENCOUNTER — TRANSFERRED RECORDS (OUTPATIENT)
Dept: HEALTH INFORMATION MANAGEMENT | Facility: CLINIC | Age: 59
End: 2019-04-05

## 2019-04-05 ENCOUNTER — AMBULATORY - HEALTHEAST (OUTPATIENT)
Dept: MULTI SPECIALTY CLINIC | Facility: CLINIC | Age: 59
End: 2019-04-05

## 2019-04-08 LAB — MAMMOGRAM: NORMAL

## 2019-04-09 ENCOUNTER — TRANSFERRED RECORDS (OUTPATIENT)
Dept: HEALTH INFORMATION MANAGEMENT | Facility: CLINIC | Age: 59
End: 2019-04-09

## 2019-04-11 ENCOUNTER — TRANSFERRED RECORDS (OUTPATIENT)
Dept: HEALTH INFORMATION MANAGEMENT | Facility: CLINIC | Age: 59
End: 2019-04-11

## 2019-08-01 ENCOUNTER — TRANSFERRED RECORDS (OUTPATIENT)
Dept: MULTI SPECIALTY CLINIC | Facility: CLINIC | Age: 59
End: 2019-08-01

## 2019-08-01 LAB
HPV ABSTRACT: NORMAL
PAP-ABSTRACT: NORMAL

## 2019-09-20 ENCOUNTER — TRANSFERRED RECORDS (OUTPATIENT)
Dept: HEALTH INFORMATION MANAGEMENT | Facility: CLINIC | Age: 59
End: 2019-09-20

## 2019-10-21 ENCOUNTER — TRANSFERRED RECORDS (OUTPATIENT)
Dept: HEALTH INFORMATION MANAGEMENT | Facility: CLINIC | Age: 59
End: 2019-10-21

## 2019-10-31 ENCOUNTER — TRANSFERRED RECORDS (OUTPATIENT)
Dept: HEALTH INFORMATION MANAGEMENT | Facility: CLINIC | Age: 59
End: 2019-10-31

## 2019-11-01 ENCOUNTER — TRANSFERRED RECORDS (OUTPATIENT)
Dept: HEALTH INFORMATION MANAGEMENT | Facility: CLINIC | Age: 59
End: 2019-11-01

## 2019-11-07 ENCOUNTER — HEALTH MAINTENANCE LETTER (OUTPATIENT)
Age: 59
End: 2019-11-07

## 2019-11-07 ENCOUNTER — TRANSFERRED RECORDS (OUTPATIENT)
Dept: HEALTH INFORMATION MANAGEMENT | Facility: CLINIC | Age: 59
End: 2019-11-07

## 2020-02-14 ENCOUNTER — TRANSFERRED RECORDS (OUTPATIENT)
Dept: HEALTH INFORMATION MANAGEMENT | Facility: CLINIC | Age: 60
End: 2020-02-14

## 2020-02-23 ENCOUNTER — HEALTH MAINTENANCE LETTER (OUTPATIENT)
Age: 60
End: 2020-02-23

## 2020-02-28 ENCOUNTER — TRANSFERRED RECORDS (OUTPATIENT)
Dept: HEALTH INFORMATION MANAGEMENT | Facility: CLINIC | Age: 60
End: 2020-02-28

## 2020-03-13 ENCOUNTER — TRANSFERRED RECORDS (OUTPATIENT)
Dept: HEALTH INFORMATION MANAGEMENT | Facility: CLINIC | Age: 60
End: 2020-03-13

## 2020-04-15 ENCOUNTER — TRANSFERRED RECORDS (OUTPATIENT)
Dept: HEALTH INFORMATION MANAGEMENT | Facility: CLINIC | Age: 60
End: 2020-04-15

## 2020-06-01 ENCOUNTER — TRANSFERRED RECORDS (OUTPATIENT)
Dept: HEALTH INFORMATION MANAGEMENT | Facility: CLINIC | Age: 60
End: 2020-06-01

## 2020-06-11 ENCOUNTER — TRANSFERRED RECORDS (OUTPATIENT)
Dept: HEALTH INFORMATION MANAGEMENT | Facility: CLINIC | Age: 60
End: 2020-06-11

## 2020-06-18 ENCOUNTER — TRANSFERRED RECORDS (OUTPATIENT)
Dept: HEALTH INFORMATION MANAGEMENT | Facility: CLINIC | Age: 60
End: 2020-06-18

## 2020-07-17 ENCOUNTER — TRANSFERRED RECORDS (OUTPATIENT)
Dept: HEALTH INFORMATION MANAGEMENT | Facility: CLINIC | Age: 60
End: 2020-07-17

## 2020-07-20 ENCOUNTER — TRANSFERRED RECORDS (OUTPATIENT)
Dept: HEALTH INFORMATION MANAGEMENT | Facility: CLINIC | Age: 60
End: 2020-07-20

## 2020-07-24 ENCOUNTER — TRANSFERRED RECORDS (OUTPATIENT)
Dept: HEALTH INFORMATION MANAGEMENT | Facility: CLINIC | Age: 60
End: 2020-07-24

## 2020-07-29 ENCOUNTER — RECORDS - HEALTHEAST (OUTPATIENT)
Dept: RADIOLOGY | Facility: CLINIC | Age: 60
End: 2020-07-29

## 2020-08-06 ENCOUNTER — TRANSFERRED RECORDS (OUTPATIENT)
Dept: HEALTH INFORMATION MANAGEMENT | Facility: CLINIC | Age: 60
End: 2020-08-06

## 2020-08-11 ENCOUNTER — TRANSFERRED RECORDS (OUTPATIENT)
Dept: HEALTH INFORMATION MANAGEMENT | Facility: CLINIC | Age: 60
End: 2020-08-11

## 2020-08-14 ENCOUNTER — TRANSFERRED RECORDS (OUTPATIENT)
Dept: HEALTH INFORMATION MANAGEMENT | Facility: CLINIC | Age: 60
End: 2020-08-14

## 2020-08-17 ENCOUNTER — RECORDS - HEALTHEAST (OUTPATIENT)
Dept: RADIOLOGY | Facility: CLINIC | Age: 60
End: 2020-08-17

## 2020-08-17 ENCOUNTER — AMBULATORY - HEALTHEAST (OUTPATIENT)
Dept: NEUROSURGERY | Facility: CLINIC | Age: 60
End: 2020-08-17

## 2020-08-18 ENCOUNTER — RECORDS - HEALTHEAST (OUTPATIENT)
Dept: ADMINISTRATIVE | Facility: OTHER | Age: 60
End: 2020-08-18

## 2020-08-18 ENCOUNTER — TRANSFERRED RECORDS (OUTPATIENT)
Dept: HEALTH INFORMATION MANAGEMENT | Facility: CLINIC | Age: 60
End: 2020-08-18

## 2020-08-18 ENCOUNTER — TELEPHONE (OUTPATIENT)
Dept: NEUROSURGERY | Facility: CLINIC | Age: 60
End: 2020-08-18

## 2020-08-18 ENCOUNTER — OFFICE VISIT - HEALTHEAST (OUTPATIENT)
Dept: NEUROSURGERY | Facility: CLINIC | Age: 60
End: 2020-08-18

## 2020-08-18 ENCOUNTER — MEDICAL CORRESPONDENCE (OUTPATIENT)
Dept: HEALTH INFORMATION MANAGEMENT | Facility: CLINIC | Age: 60
End: 2020-08-18

## 2020-08-18 DIAGNOSIS — G95.20 CORD COMPRESSION MYELOPATHY (H): ICD-10-CM

## 2020-08-18 ASSESSMENT — MIFFLIN-ST. JEOR: SCORE: 1474

## 2020-08-18 NOTE — TELEPHONE ENCOUNTER
[facility]    Phone Message    May a detailed message be left on voicemail: yes     Reason for Call: Layla calling to schedule an appointment with Dr. Loera. Layla previously saw Dr. parada in the neurosurgery clinic, but would like to schedule with Dr. Loera now. Dr. Granger at the [facility] Neurosurgery clinic had recommended that Layla see Dr. Loera. Please give Layla a call back at your earliest convenience to discuss.    Action Taken: Message routed to:  [facility]  Neuro Surg    Travel Screening: Not Applicable

## 2020-08-19 NOTE — TELEPHONE ENCOUNTER
[facility]    Phone Message    May a detailed message be left on voicemail: yes     Reason for Call: Appointment Intake    Referring Provider Name: Dr. Granger  Diagnosis and/or Symptoms: Sagittal imbalance, per pt    Action Taken: Message routed to:  [facility]  NEUROSURGERY    Travel Screening: Not Applicable

## 2020-08-22 ENCOUNTER — COMMUNICATION - HEALTHEAST (OUTPATIENT)
Dept: NEUROSURGERY | Facility: CLINIC | Age: 60
End: 2020-08-22

## 2020-08-24 ENCOUNTER — TELEPHONE (OUTPATIENT)
Dept: NEUROSURGERY | Facility: CLINIC | Age: 60
End: 2020-08-24

## 2020-08-24 NOTE — TELEPHONE ENCOUNTER
[facility] Center    Phone Message    May a detailed message be left on voicemail: yes     Reason for Call: Other: Layla calling to request an earlier appointment if Dr. Loera has any openings for this week. Layla said any day would work this week for a sooner appointment, and she is open to any time. Layla is currently scheduled to see Dr. Loera on 9/3/20. Please give Layla a call back at your earliest convenience to discuss.      Action Taken: Message routed to:  [facility]  Neuro Surg    Travel Screening: Not Applicable

## 2020-08-25 ENCOUNTER — RECORDS - HEALTHEAST (OUTPATIENT)
Dept: ADMINISTRATIVE | Facility: OTHER | Age: 60
End: 2020-08-25

## 2020-08-25 ASSESSMENT — ENCOUNTER SYMPTOMS
BACK PAIN: 1
JOINT SWELLING: 1
MUSCLE CRAMPS: 0
MYALGIAS: 1
ARTHRALGIAS: 1
STIFFNESS: 1
MUSCLE WEAKNESS: 0
NECK PAIN: 1

## 2020-08-25 NOTE — TELEPHONE ENCOUNTER
RECORDS RECEIVED FROM: External   Date of Appt: 8/27/20   NOTES (FOR ALL VISITS) STATUS DETAILS   OFFICE NOTE from referring provider Care Everywhere Dr. Granger @ [facility] Neurosurgery:  8/18/20   OFFICE NOTE from other specialist Care Everywhere Dr Jace Wagoner @ [facility]:  7/17/20    Dr Andi Kat @ [facility]:  7/10/20  3/13/20  2/14/20  1/10/20  3/23/18  (additional encounters in Care Everywhere)    Dr Barclay @ [facility] Neurosurgery:  2/26/18 11/27/17 6/5/17   DISCHARGE SUMMARY from hospital Care Everywhere [facility]:  3/13/19-3/15/19   DISCHARGE REPORT from the ER N/A    OPERATIVE REPORT Care Everywhere [facility]:  3/13/19 ANTERIOR CERVICAL DECOMPRESSION AND FUSION C4-5, C6-7 WITH HARDWARE REMOVAL C5-6 SUPINE    [facility]:  8/6/18 Anterior Cervical Decompression Fusion C5 to C6    [facility]:  12/13/17 Right Minimally Invasive Posterior Cervical 4-5 Foraminotomy    MEDICATION LIST Care Everywhere    IMAGING  (FOR ALL VISITS)     EMG N/A    EEG N/A    MRI (HEAD, NECK, SPINE) Received [facility]:  MRI Cervical Spine 8/14/20  MRI Cervical Spine 10/21/19  MRI Cervical Spine 12/28/18    [facility]:  MRI Cervical Spine 12/18/17   LUMBAR PUNCTURE N/A    JOVANNY Scan N/A    DEXA Scan N/A    Ultrasound N/A    CT (HEAD, NECK, SPINE) Received [facility]:  CT Cervical Spine 6/18/20  CT Cervical Post Myelo 6/4/18

## 2020-08-27 ENCOUNTER — VIRTUAL VISIT (OUTPATIENT)
Dept: NEUROSURGERY | Facility: CLINIC | Age: 60
End: 2020-08-27
Payer: COMMERCIAL

## 2020-08-27 ENCOUNTER — PRE VISIT (OUTPATIENT)
Dept: NEUROSURGERY | Facility: CLINIC | Age: 60
End: 2020-08-27

## 2020-08-27 DIAGNOSIS — M54.2 NECK PAIN: Primary | ICD-10-CM

## 2020-08-27 NOTE — LETTER
"8/27/2020     RE: Layla Starks  [address] [facility] [address]     Dear Colleague,    Thank you for referring your patient, Layla Starks, to the [facility] NEUROSURGERY at [facility]. Please see a copy of my visit note below.    Layla Starks is a 59 year old female who is being evaluated via a billable video visit.      The patient has been notified of following:     \"This video visit will be conducted via a call between you and your physician/provider. We have found that certain health care needs can be provided without the need for an in-person physical exam.  This service lets us provide the care you need with a video conversation.  If a prescription is necessary we can send it directly to your pharmacy.  If lab work is needed we can place an order for that and you can then stop by our lab to have the test done at a later time.    Video visits are billed at different rates depending on your insurance coverage.  Please reach out to your insurance provider with any questions.    If during the course of the call the physician/provider feels a video visit is not appropriate, you will not be charged for this service.\"    Patient has given verbal consent for Video visit? yes  How would you like to obtain your AVS? mychart  If you are dropped from the video visit, the video invite should be resent to: cell  Will anyone else be joining your video visit? no    Video-Visit Details  Type of service:  Video Visit  Video Start Time: 1:10pm  Video End Time: 1:26 PM  Originating Location (pt. Location): Home  Distant Location (provider location):  [facility] NEUROSURGERY   Platform used for Video Visit: Rolo Emerson, EMT    Provider Notes:  This is a video visit conducted due to systemwide and statewide restrictions on non-urgent clinic visits due to COVID-19 pandemic concerns. The patient did not identify any urgent or red-flag symptoms that would require in-person " evaluation.        Neurosurgery Clinic Note    Chief Complaint: neck pain    History of Present Illness:  It was a pleasure to evaluate Layla Starks in clinic today at the kind referral of Dr. Lesa Steward    Layla Starks is a 59 year old female presenting with right sided neck pain, non radiating, exacerbated by sitting upright, relieved minorly with rest, ice.    Has focal pressure point of pain in posterior neck, says was present since laminoplasty; hurts immediately upon awakening from surgery for laminoplasty 2016    Multiple prior cervical spine surgeries-  Had prior surgery with  laminoplasty in 2016 José Miguel; C5-C7; Dr. Barclay for C4-5 foraminotomy in 2017; C5-6 Dr. Lopez;  C4-C7 ACDF Dr. Kat    No weakness/numbness in arms/hands  No bladder changes  Balance chronic problem  Headaches daily- has had blood patches and myelograms, no signs of CSF leak on dye studies done for headaches  Has some difficulty swallowing  Dr. Au is current pain management specialist      Answers for HPI/ROS submitted by the patient on 8/25/2020   General Symptoms: No  Skin Symptoms: No  HENT Symptoms: No  EYE SYMPTOMS: No  HEART SYMPTOMS: No  LUNG SYMPTOMS: No  INTESTINAL SYMPTOMS: No  URINARY SYMPTOMS: No  GYNECOLOGIC SYMPTOMS: No  BREAST SYMPTOMS: No  SKELETAL SYMPTOMS: Yes  BLOOD SYMPTOMS: No  NERVOUS SYSTEM SYMPTOMS: No  MENTAL HEALTH SYMPTOMS: No  Back pain: Yes  Muscle aches: Yes  Neck pain: Yes  Swollen joints: Yes  Joint pain: Yes  Bone pain: Yes  Muscle cramps: No  Muscle weakness: No  Joint stiffness: Yes  Bone fracture: Yes        Past Medical History:   Diagnosis Date     Cervical disc disorder with radiculopathy of cervical region 11/27/2017     Cervical spinal stenosis      DDD (degenerative disc disease), cervical      OA (osteoarthritis)      PONV (postoperative nausea and vomiting)        Past Surgical History:   Procedure Laterality Date     C5-7 laminoplasty Right 2016     COLONOSCOPY   2016     Excision anal skin tags       FORAMINOTOMY CERVICAL POSTERIOR MINIMALLY INVASIVE ONE LEVEL Right 12/13/2017    Procedure: FORAMINOTOMY CERVICAL POSTERIOR MINIMALLY INVASIVE ONE LEVEL;  Right Minimally Invasive Posterior Cervical 4-5 Foraminotomy;  Surgeon: Byron Barclay MD;  Location: [facility] OR     MAMMOPLASTY REDUCTION  1980     Partial meniscectomy Left 05/17/2016       Social History     Socioeconomic History     Marital status:      Spouse name: Not on file     Number of children: Not on file     Years of education: Not on file     Highest education level: Not on file   Occupational History     Not on file   Social Needs     Financial resource strain: Not on file     Food insecurity     Worry: Not on file     Inability: Not on file     Transportation needs     Medical: Not on file     Non-medical: Not on file   Tobacco Use     Smoking status: Former Smoker     Packs/day: 0.50     Years: 3.00     Pack years: 1.50     Types: Cigarettes     Smokeless tobacco: Never Used     Tobacco comment: Quit 30 years ago   Substance and Sexual Activity     Alcohol use: Yes     Alcohol/week: 1.0 - 2.0 standard drinks     Types: 1 - 2 Standard drinks or equivalent per week     Comment: EVERY 6 MONTHS     Drug use: No     Sexual activity: Yes     Partners: Male   Lifestyle     Physical activity     Days per week: Not on file     Minutes per session: Not on file     Stress: Not on file   Relationships     Social connections     Talks on phone: Not on file     Gets together: Not on file     Attends Mosque service: Not on file     Active member of club or organization: Not on file     Attends meetings of clubs or organizations: Not on file     Relationship status: Not on file     Intimate partner violence     Fear of current or ex partner: Not on file     Emotionally abused: Not on file     Physically abused: Not on file     Forced sexual activity: Not on file   Other Topics Concern     Not on file   Social  "History Narrative     Not on file     family history includes Arthritis in her mother; Hearing Loss in her father; Macular Degeneration in her mother; Multiple Sclerosis in her sister.    IMAGING per my own measurement and interpretation:  CT myelogram cervical spine 6/18/20 with no central canal stenosis  No recent upright xrays  MRI        Resulted Imaging/Labs:  Bone Density:  No results found.      Vitamin D:  Vitamin D Deficiency Screening Results:  No results found for: VITDT  No results found for: ITN996, YDBM138, LMYR65SAMZM, VITD3, D2VIT, D3VIT, DTOT, [id number], [id number], [id number], [id number], [id number], [id number]      Nutritional Status:  Estimated body mass index is 32.1 kg/m  as calculated from the following:    Height as of 2/26/18: 1.645 m (5' 4.75\").    Weight as of 1/25/18: 86.8 kg (191 lb 6.4 oz).    No results found for: ALBUMIN    Diabetes Screening:  No results found for: A1C    Nicotine Usage:    No               Video Physical Exam   There were no vitals taken for this visit.  Constitutional: Oriented to person, place, and time. Appears well-developed and well-nourished. Cooperative. No distress.     Musculoskeletal:   Cervical flexion-extension range of motion: limited due to prior fusion      Neurological: alert and oriented to person, place, and time.   No cranial nerve deficit   sensory deficit denies  Gait normal          ASSESSMENT:  Layla Starks is a 59 year old female with history of multiple prior cervical spine surgeries; chronic right sided neck pain      PLAN:    The patient will require standing long-cassette xrays with full limb evaluation on EOS machine to evaluate sagittal and coronal spinal balance.    Ordered EOS Xrays, call my nurse Serge Sanderson once completed, I will view films. If no significant sagittal malalignment, then patient can followup with Dr. Au for further non-operative management. If there is significant sagittal malalignment, then we will arrange " for further followup with me to discuss.      Jennie Loera MD    [facility] Department of Neurosurgery  Complex Spinal Deformity, Scoliosis, and Minimally Invasive Spine Surgery Specialist  Office: [phone]    8/27/2020    I spent 30 minutes in patient care with greater than 50% spent in counseling and/or coordination of care.

## 2020-08-27 NOTE — PATIENT INSTRUCTIONS
Dr. Loera has ordered special xrays for you that need to be done on the EOS machine here at the [facility] [place] at [address] [facility].    Call Dr. Loera' nurse Serge Sanderson once completed, Dr. Loera will view films and let you know the next steps.

## 2020-08-27 NOTE — PROGRESS NOTES
"Layla Starks is a 59 year old female who is being evaluated via a billable video visit.      The patient has been notified of following:     \"This video visit will be conducted via a call between you and your physician/provider. We have found that certain health care needs can be provided without the need for an in-person physical exam.  This service lets us provide the care you need with a video conversation.  If a prescription is necessary we can send it directly to your pharmacy.  If lab work is needed we can place an order for that and you can then stop by our lab to have the test done at a later time.    Video visits are billed at different rates depending on your insurance coverage.  Please reach out to your insurance provider with any questions.    If during the course of the call the physician/provider feels a video visit is not appropriate, you will not be charged for this service.\"    Patient has given verbal consent for Video visit? yes  How would you like to obtain your AVS? mychart  If you are dropped from the video visit, the video invite should be resent to: cell  Will anyone else be joining your video visit? no        Video-Visit Details    Type of service:  Video Visit    Video Start Time: 1:10pm  Video End Time: 1:26 PM    Originating Location (pt. Location): Home    Distant Location (provider location):  [facility] NEUROSURGERY     Platform used for Video Visit: Rolo Emerson, LEWIS              Provider Notes:  This is a video visit conducted due to systemwide and statewide restrictions on non-urgent clinic visits due to COVID-19 pandemic concerns. The patient did not identify any urgent or red-flag symptoms that would require in-person evaluation.        Neurosurgery Clinic Note    Chief Complaint: neck pain    History of Present Illness:  It was a pleasure to evaluate Layla Starks in clinic today at the kind referral of Dr. Lesa Steward    Layla Starks is a 59 year old " female presenting with right sided neck pain, non radiating, exacerbated by sitting upright, relieved minorly with rest, ice.    Has focal pressure point of pain in posterior neck, says was present since laminoplasty; hurts immediately upon awakening from surgery for laminoplasty 2016    Multiple prior cervical spine surgeries-  Had prior surgery with  laminoplasty in 2016 José Miguel; C5-C7; Dr. Barclay for C4-5 foraminotomy in 2017; C5-6 Dr. Lopez;  C4-C7 ACDF Dr. Kat    No weakness/numbness in arms/hands  No bladder changes  Balance chronic problem  Headaches daily- has had blood patches and myelograms, no signs of CSF leak on dye studies done for headaches  Has some difficulty swallowing  Dr. Au is current pain management specialist      Answers for HPI/ROS submitted by the patient on 8/25/2020   General Symptoms: No  Skin Symptoms: No  HENT Symptoms: No  EYE SYMPTOMS: No  HEART SYMPTOMS: No  LUNG SYMPTOMS: No  INTESTINAL SYMPTOMS: No  URINARY SYMPTOMS: No  GYNECOLOGIC SYMPTOMS: No  BREAST SYMPTOMS: No  SKELETAL SYMPTOMS: Yes  BLOOD SYMPTOMS: No  NERVOUS SYSTEM SYMPTOMS: No  MENTAL HEALTH SYMPTOMS: No  Back pain: Yes  Muscle aches: Yes  Neck pain: Yes  Swollen joints: Yes  Joint pain: Yes  Bone pain: Yes  Muscle cramps: No  Muscle weakness: No  Joint stiffness: Yes  Bone fracture: Yes        Past Medical History:   Diagnosis Date     Cervical disc disorder with radiculopathy of cervical region 11/27/2017     Cervical spinal stenosis      DDD (degenerative disc disease), cervical      OA (osteoarthritis)      PONV (postoperative nausea and vomiting)        Past Surgical History:   Procedure Laterality Date     C5-7 laminoplasty Right 2016     COLONOSCOPY  2016     Excision anal skin tags       FORAMINOTOMY CERVICAL POSTERIOR MINIMALLY INVASIVE ONE LEVEL Right 12/13/2017    Procedure: FORAMINOTOMY CERVICAL POSTERIOR MINIMALLY INVASIVE ONE LEVEL;  Right Minimally Invasive Posterior Cervical 4-5  Foraminotomy;  Surgeon: Byron Barclay MD;  Location: [facility] OR     MAMMOPLASTY REDUCTION  1980     Partial meniscectomy Left 05/17/2016       Social History     Socioeconomic History     Marital status:      Spouse name: Not on file     Number of children: Not on file     Years of education: Not on file     Highest education level: Not on file   Occupational History     Not on file   Social Needs     Financial resource strain: Not on file     Food insecurity     Worry: Not on file     Inability: Not on file     Transportation needs     Medical: Not on file     Non-medical: Not on file   Tobacco Use     Smoking status: Former Smoker     Packs/day: 0.50     Years: 3.00     Pack years: 1.50     Types: Cigarettes     Smokeless tobacco: Never Used     Tobacco comment: Quit 30 years ago   Substance and Sexual Activity     Alcohol use: Yes     Alcohol/week: 1.0 - 2.0 standard drinks     Types: 1 - 2 Standard drinks or equivalent per week     Comment: EVERY 6 MONTHS     Drug use: No     Sexual activity: Yes     Partners: Male   Lifestyle     Physical activity     Days per week: Not on file     Minutes per session: Not on file     Stress: Not on file   Relationships     Social connections     Talks on phone: Not on file     Gets together: Not on file     Attends Islam service: Not on file     Active member of club or organization: Not on file     Attends meetings of clubs or organizations: Not on file     Relationship status: Not on file     Intimate partner violence     Fear of current or ex partner: Not on file     Emotionally abused: Not on file     Physically abused: Not on file     Forced sexual activity: Not on file   Other Topics Concern     Not on file   Social History Narrative     Not on file       family history includes Arthritis in her mother; Hearing Loss in her father; Macular Degeneration in her mother; Multiple Sclerosis in her sister.          IMAGING per my own measurement and  "interpretation:  CT myelogram cervical spine 6/18/20 with no central canal stenosis  No recent upright xrays  MRI        Resulted Imaging/Labs:  Bone Density:  No results found.      Vitamin D:  Vitamin D Deficiency Screening Results:  No results found for: VITDT  No results found for: FPX365, PAML013, [id number], VITD3, D2VIT, D3VIT, DTOT, [id number], [id number], [id number], [id number], [id number], [id number]      Nutritional Status:  Estimated body mass index is 32.1 kg/m  as calculated from the following:    Height as of 2/26/18: 1.645 m (5' 4.75\").    Weight as of 1/25/18: 86.8 kg (191 lb 6.4 oz).    No results found for: ALBUMIN    Diabetes Screening:  No results found for: A1C    Nicotine Usage:    No               Video Physical Exam   There were no vitals taken for this visit.  Constitutional: Oriented to person, place, and time. Appears well-developed and well-nourished. Cooperative. No distress.     Musculoskeletal:   Cervical flexion-extension range of motion: limited due to prior fusion      Neurological: alert and oriented to person, place, and time.   No cranial nerve deficit   sensory deficit denies  Gait normal          ASSESSMENT:  Layla Starks is a 59 year old female with history of multiple prior cervical spine surgeries; chronic right sided neck pain      PLAN:    The patient will require standing long-cassette xrays with full limb evaluation on EOS machine to evaluate sagittal and coronal spinal balance.    Ordered EOS Xrays, call my nurse Serge Sanderson once completed, I will view films. If no significant sagittal malalignment, then patient can followup with Dr. Au for further non-operative management. If there is significant sagittal malalignment, then we will arrange for further followup with me to discuss.        Jennie Loera MD    [facility] Department of Neurosurgery  Complex Spinal Deformity, Scoliosis, and Minimally Invasive Spine Surgery " Specialist  Office: [phone]    8/27/2020          I spent 30 minutes in patient care with greater than 50% spent in counseling and/or coordination of care.

## 2020-08-28 ENCOUNTER — ANCILLARY PROCEDURE (OUTPATIENT)
Dept: GENERAL RADIOLOGY | Facility: CLINIC | Age: 60
End: 2020-08-28
Attending: NEUROLOGICAL SURGERY
Payer: COMMERCIAL

## 2020-08-28 DIAGNOSIS — M54.2 NECK PAIN: ICD-10-CM

## 2020-09-11 ENCOUNTER — TELEPHONE (OUTPATIENT)
Dept: NEUROSURGERY | Facility: CLINIC | Age: 60
End: 2020-09-11

## 2020-09-11 NOTE — TELEPHONE ENCOUNTER
MD reviewed EOS x-rays.  Spinal alignment is normal and does not need to follow up with neurosurgery.  This information was relayed to the pt and she expressed understanding.  Pt asked of MD would be willing to take out laminoplasty plates.  This message will need to relayed to the MD.

## 2020-09-11 NOTE — TELEPHONE ENCOUNTER
[facility] Call Center    Phone Message    May a detailed message be left on voicemail: yes     Reason for Call: Requesting Results   Name/type of test: X-rays  Date of test: 8/28/20  Was test done at a location other than [facility] (Please fill in the location if not [facility])?: Yes: [facility]    Patient calling to get X-ray results from 8/28 stated that Serge was supposed to call but patient still has not heard back.     Please advise.       Action Taken: Other:  NEUROSURGERY     Travel Screening: Not Applicable

## 2020-10-07 ENCOUNTER — OFFICE VISIT - HEALTHEAST (OUTPATIENT)
Dept: FAMILY MEDICINE | Facility: CLINIC | Age: 60
End: 2020-10-07

## 2020-10-07 ENCOUNTER — RECORDS - HEALTHEAST (OUTPATIENT)
Dept: GENERAL RADIOLOGY | Facility: CLINIC | Age: 60
End: 2020-10-07

## 2020-10-07 DIAGNOSIS — Z13.29 SCREENING FOR THYROID DISORDER: ICD-10-CM

## 2020-10-07 DIAGNOSIS — M25.562 CHRONIC PAIN OF BOTH KNEES: ICD-10-CM

## 2020-10-07 DIAGNOSIS — Z79.899 MEDICATION MANAGEMENT: ICD-10-CM

## 2020-10-07 DIAGNOSIS — M54.2 CHRONIC NECK PAIN: ICD-10-CM

## 2020-10-07 DIAGNOSIS — R91.8 OTHER NONSPECIFIC ABNORMAL FINDING OF LUNG FIELD: ICD-10-CM

## 2020-10-07 DIAGNOSIS — Z13.21 ENCOUNTER FOR VITAMIN DEFICIENCY SCREENING: ICD-10-CM

## 2020-10-07 DIAGNOSIS — M25.561 CHRONIC PAIN OF BOTH KNEES: ICD-10-CM

## 2020-10-07 DIAGNOSIS — R91.8 OPACITY OF LUNG ON IMAGING STUDY: ICD-10-CM

## 2020-10-07 DIAGNOSIS — G89.29 CHRONIC PAIN OF BOTH KNEES: ICD-10-CM

## 2020-10-07 DIAGNOSIS — G89.29 CHRONIC NECK PAIN: ICD-10-CM

## 2020-10-07 LAB
ALBUMIN SERPL-MCNC: 4.4 G/DL (ref 3.5–5)
ANION GAP SERPL CALCULATED.3IONS-SCNC: 14 MMOL/L (ref 5–18)
BUN SERPL-MCNC: 15 MG/DL (ref 8–22)
C REACTIVE PROTEIN LHE: 0.9 MG/DL (ref 0–0.8)
CALCIUM SERPL-MCNC: 9.8 MG/DL (ref 8.5–10.5)
CHLORIDE BLD-SCNC: 106 MMOL/L (ref 98–107)
CO2 SERPL-SCNC: 22 MMOL/L (ref 22–31)
CREAT SERPL-MCNC: 0.79 MG/DL (ref 0.6–1.1)
ERYTHROCYTE [SEDIMENTATION RATE] IN BLOOD BY WESTERGREN METHOD: 4 MM/HR (ref 0–20)
GFR SERPL CREATININE-BSD FRML MDRD: >60 ML/MIN/1.73M2
GLUCOSE BLD-MCNC: 95 MG/DL (ref 70–125)
PHOSPHATE SERPL-MCNC: 4.1 MG/DL (ref 2.5–4.5)
POTASSIUM BLD-SCNC: 4.4 MMOL/L (ref 3.5–5)
SODIUM SERPL-SCNC: 142 MMOL/L (ref 136–145)
TSH SERPL DL<=0.005 MIU/L-ACNC: 1.66 UIU/ML (ref 0.3–5)

## 2020-10-07 ASSESSMENT — MIFFLIN-ST. JEOR: SCORE: 1502.35

## 2020-10-08 LAB
25(OH)D3 SERPL-MCNC: 56.3 NG/ML (ref 30–80)
25(OH)D3 SERPL-MCNC: 56.3 NG/ML (ref 30–80)

## 2020-10-13 ENCOUNTER — RECORDS - HEALTHEAST (OUTPATIENT)
Dept: ADMINISTRATIVE | Facility: OTHER | Age: 60
End: 2020-10-13

## 2020-11-10 ENCOUNTER — COMMUNICATION - HEALTHEAST (OUTPATIENT)
Dept: FAMILY MEDICINE | Facility: CLINIC | Age: 60
End: 2020-11-10

## 2020-11-10 DIAGNOSIS — G89.29 CHRONIC NECK PAIN: ICD-10-CM

## 2020-11-10 DIAGNOSIS — M54.2 CHRONIC NECK PAIN: ICD-10-CM

## 2020-11-19 ENCOUNTER — TRANSCRIBE ORDERS (OUTPATIENT)
Dept: OTHER | Age: 60
End: 2020-11-19

## 2020-11-19 ENCOUNTER — MEDICAL CORRESPONDENCE (OUTPATIENT)
Dept: HEALTH INFORMATION MANAGEMENT | Facility: CLINIC | Age: 60
End: 2020-11-19

## 2020-11-19 DIAGNOSIS — M54.2 CERVICALGIA: Primary | ICD-10-CM

## 2020-11-19 NOTE — TELEPHONE ENCOUNTER
RECORDS RECEIVED FROM: Ref by Dr. Owen Smith from [facility].    Dx: Continued C5 concers, issues with the plates from the Laminoplasty    MRI in [facility], records to be faxed with referral   DATE RECEIVED: Dec 8, 2020     NOTES STATUS DETAILS   OFFICE NOTE from referring provider In process    OFFICE NOTE from other specialist N/A    DISCHARGE SUMMARY from hospital N/A    DISCHARGE REPORT from the ER N/A    OPERATIVE REPORT N/A    MEDICATION LIST Internal    IMPLANT RECORD/STICKER N/A    LABS     CBC/DIFF N/A    CULTURES N/A    INJECTIONS DONE IN RADIOLOGY N/A    MRI Received    CT SCAN N/A    XRAYS (IMAGES & REPORTS) N/A    TUMOR     PATHOLOGY  Slides & report N/A    12/04/20   4:10 PM   Some patient records sent via my chart. Sent message to Jody to see if she knows how to get those into the media tab  Tracy Burns CMA    12/03/20   1:45 PM   [facility] records scanned to chart  Tracy Burns CMA    11/19/20   12:28 PM   Records being faxed  Tracy Burns CMA

## 2020-12-01 ENCOUNTER — RECORDS - HEALTHEAST (OUTPATIENT)
Dept: ADMINISTRATIVE | Facility: OTHER | Age: 60
End: 2020-12-01

## 2020-12-04 ENCOUNTER — OFFICE VISIT - HEALTHEAST (OUTPATIENT)
Dept: FAMILY MEDICINE | Facility: CLINIC | Age: 60
End: 2020-12-04

## 2020-12-04 ENCOUNTER — TELEPHONE (OUTPATIENT)
Dept: ORTHOPEDICS | Facility: CLINIC | Age: 60
End: 2020-12-04

## 2020-12-04 ENCOUNTER — COMMUNICATION - HEALTHEAST (OUTPATIENT)
Dept: SCHEDULING | Facility: CLINIC | Age: 60
End: 2020-12-04

## 2020-12-04 DIAGNOSIS — J01.10 ACUTE NON-RECURRENT FRONTAL SINUSITIS: ICD-10-CM

## 2020-12-04 DIAGNOSIS — U07.1 INFECTION DUE TO 2019 NOVEL CORONAVIRUS: ICD-10-CM

## 2020-12-04 NOTE — TELEPHONE ENCOUNTER
I called the patient back to let her know that we cannot accept records via email. I told her that she could try uploading it via [employer]. I tried to walk her through how to upload it on [employer]. She will try this otherwise she will have to fax or mail it to us.    Betsy Muñoz, ATC

## 2020-12-06 ENCOUNTER — HEALTH MAINTENANCE LETTER (OUTPATIENT)
Age: 60
End: 2020-12-06

## 2020-12-07 NOTE — PROGRESS NOTES
"Spine Surgical Hx:  09/09/2016 - Laminoplasty C5-C7 (3 levels); foraminotomies R C4-5, Bilateral C5-6 and C6-7 ([facility]).  [Implants: Synthes maxillofacial titanium plate].  12/13/2017 - MIS R foraminotomy C4-5 (Deny, [facility]).  08/06/2018 - ACDF with ant plate fixation C5-6; use of [place] DBM putty ([place], [place]).  [Implants: Spinal USA (Ascend) plate; Titan spine titanium cage].  03/13/2019 - ACDF C4-5 and C6-7, with ant plate fixation C4-C7; removal of plate C4-5; use of Progenix Plus DMB putty ([facility]). [Implants: [place] aviator plate; tritanium cages].      VIDEO VISIT:  Patient is evaluated today via billable video visit.    The patient has been notified of following:   \"This video visit will be conducted via a call between you and your physician/provider. We have found that certain health care needs can be provided without the need for an in-person physical exam.  This service lets us provide the care you need with a video conversation.  If a prescription is necessary we can send it directly to your pharmacy.  If lab work is needed we can place an order for that and you can then stop by our lab to have the test done at a later time.  If during the course of the call the physician/provider feels a video visit is not appropriate, you will not be charged for this service.\"     Physician has received verbal consent for a Video Visit from the patient.  Video software/patrick used:  [employer]  Video time:  9:10am to 9:48am  Originating Location (pt. Location): Home  Distant Location (provider location):  [facility] [place]     Chief Complaint   Patient presents with     Consult     Continued C5 concers, issues with the plates from the Laminoplasty       S>  60 year old female, RHD, referred by Dr. Owen Smith ([facility]) for neck issues.    8/27/20 - was evaluated by Dr. Jennie Loera ([facility]).  Full body x-rays taken; " alignment deemed reasonable.  No surgical recommendations.  Was told that she does not need to be seen further by Neurosurgery.  Continue f/u with Dr. Au (Pain Mgmt, [facility]).  Per patient, Dr. Au used to be with [facility], but now in independent practice.    Per patient, at the time of first surgery in 2016, was having lots of pain between shoulder blades.  Per patient, the surgery did not help, and in fact made things worse.  Pain worsened on the right side.  Over the next few months, was in and out of ER's ([facility], [place]).  Worked up for CSF leak; had blood patch procedures.  No relief.  Saw different providers.  Eventually had R foraminotomy C4-5 (Dr. Barclay) in Dec 2017.  Per patient, made things worse.  R arm got significantly weaker, cannot raise R arm/shoulder.  Over time, weakness improved.    Aug 2018 surgery by Dr. Lopez (ACDF C5-6).  Per patient, no benefit, although this time the surgery did not make things worse either.  In March 2019, had surgery by Dr. Kat (ACDF C4-C7).  Per patient, no benefit either.    90% neck, 10% arm, only on the R side.  Worse: any activity.  Better: laying down; ice packs.    Previous tx:  PT - last course ~ 1 yr ago.  Per patient, no relief.  Following with Dr. Au for many years now (even prior to surgeries).  He had performed multiple interventional procedures over the years (also some by Dr. Kebede).  These have included nerve root blocks, facet blocks, MBB's, RFA's, botox injections.  Per patient, have generally not worked.  Surgeries as above.  (-) opioids.  Tylenol prn.  Celebrex (prescribed by PCP).  No relief.    PSHx:  , lives with  and daughter.  Works from home (since the pandemic).  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  Nonsmoker.  EtOH ~ 1-2x/month.    I have reviewed and updated the patient's Past Medical History, Social History, Family History and Medication  List.    ALLERGIES  Dilaudid [hydromorphone], Morphine, Oxycodone, and Tramadol      Neck Disability Index (NDI) Questionnaire    Neck Disability Index (NDI) 12/8/2020   Neck Disability Index: Count 10   NDI: Total Score = SUM (points for all 10 findings) 42   Neck Disability in Percent = (Total Score) / 50 * 100 84 (%)   Some recent data might be hidden                 PROMIS-10 Scores             Physical Examination:    This was a video visit, so very limited exam could be performed.  On video, patient appeared alert, oriented x 3, cooperative, with coherent speech, and not in cardiorespiratory distress.  Able to respond to questions appropriately and follow instructions.    Imaging:    Cervical CT myelogram from 6/18/20 shows postsurgical changes: 3-level ACDF C4-C7 using titanium cages and with anterior plate fixation.  No sign of loosening or failure; fusion may already be solid at C4-5 and C5-6; cannot see bridging bone across C6-7.  The C7 screws are quite low in the vertebral body, and one of the screws seems to have gone thru the inferior endplate and invaded the C7-T1 disc.  Also with previous open-door laminoplasty with mini-plate fixation C5-C7 (3 levels); opening on right side, hinge on left.  The hinges are already healed.  There is still significant right foraminal stenosis C4-5 that may be causing R C5 radiculopathy.  Canal is patent.  Other foramina patent.    Cervical MRI from 8/14/20 reviewed.  Shows AP post-surgical changes as above.  Images somewhat obscured by metallic artifact, especially at operative levels.  Thus, the foraminal stenosis I noted on CT is not well appreciated on the MRI.  Spinal cord seems well decompressed; no myelomalacia.    EOS Full body x-rays from 8/28/20.  Shows c-spine postsurgical changes as above.      Assessment:    1.  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  2.  Chronic right-sided neck and shoulder pain.  3.  Multiple cervical spine surgeries as above,  including ACDF C4-C7, laminoplasties C5-C7, R foraminotomy C4-5.    Plan:    Had good long discussion with patient regarding her condition.  Her situation is quite complex given that she has had for previous cervical spine surgeries, none of which have given her any benefit.  I suspect that her current pain is mainly related to the CT myelogram finding of foraminal stenosis primarily at right C4-5, but also at C5-6.  I would like to be able to also read the radiologist report and see whether the radiologist agrees with my assessment.    Patient has had multiple previous interventional procedures performed by Dr. Au.  I also saw a report of right C6 nerve root block performed at [facility] on June 2018.  Per report, 50% relief.  Patient herself does not really remember.  At any rate, I believe an injection at the supra adjacent level (C5 nerve root) would be more helpful from a diagnostic standpoint.    - Retrieve reports of CT myelogram and MRI, and upload to PACS.  - R C5 SNRB (anesthetic + steroid) (c/o Dr. Au - [facility]).    Advised to take note of pain relief for the first couple of hours.  Explained that rationale for diagnostic injection to the patient.  Advised her to give us a call 1 to 2 days later to report pain response.  If with good relief, may perform revision foraminotomy right C4-5, possible right C5-6.  If no relief, may repeat right C6 nerve root block.    RTC prn, with cervical flex-ext x-rays.      Stevo Waite MD    Orthopaedic Spine Surgery  Dept Orthopaedic Surgery, [facility]  [phone] office, [phone] pager  [email or web address]    Addendum:  I discussed with Dr. Au over the phone later in the day.  I told him my assessment, and my suspicion that this may be being mediated by the right C5 nerve root (with possible contribution of right C6) secondary to foraminal stenosis.  He would be happy to schedule the patient for a right C5 nerve  root block with anesthetic and steroid.

## 2020-12-08 ENCOUNTER — TELEPHONE (OUTPATIENT)
Dept: ORTHOPEDICS | Facility: CLINIC | Age: 60
End: 2020-12-08

## 2020-12-08 ENCOUNTER — VIRTUAL VISIT (OUTPATIENT)
Dept: ORTHOPEDICS | Facility: CLINIC | Age: 60
End: 2020-12-08
Payer: COMMERCIAL

## 2020-12-08 ENCOUNTER — PRE VISIT (OUTPATIENT)
Dept: ORTHOPEDICS | Facility: CLINIC | Age: 60
End: 2020-12-08

## 2020-12-08 VITALS — HEIGHT: 65 IN | WEIGHT: 195 LBS | BODY MASS INDEX: 32.49 KG/M2

## 2020-12-08 DIAGNOSIS — M54.12 CERVICAL RADICULOPATHY: Primary | ICD-10-CM

## 2020-12-08 DIAGNOSIS — M54.2 CERVICALGIA: ICD-10-CM

## 2020-12-08 PROCEDURE — 99204 OFFICE O/P NEW MOD 45 MIN: CPT | Mod: 95 | Performed by: ORTHOPAEDIC SURGERY

## 2020-12-08 ASSESSMENT — MIFFLIN-ST. JEOR: SCORE: 1460.39

## 2020-12-08 NOTE — TELEPHONE ENCOUNTER
See phone message.  I called pt back to F/U after apt this am with -see dictation & told her we will fax injection order & dictation to fax# in message. Call back prn. Pt agreed.  Kellie Castillo RN.

## 2020-12-08 NOTE — NURSING NOTE
"Reason For Visit:   Chief Complaint   Patient presents with     Consult     Continued C5 concers, issues with the plates from the Laminoplasty       Ht 1.651 m (5' 5\")   Wt 88.5 kg (195 lb)   BMI 32.45 kg/m      Pain Assessment  Patient Currently in Pain: Yes  0-10 Pain Scale: 10  Primary Pain Location: Neck    Betsy Muñoz ATC    "

## 2020-12-08 NOTE — LETTER
"    12/8/2020         RE: Layla Starks  [address] [facility] [address]        Dear Colleague,    Thank you for referring your patient, Layla Starks, to the [facility]. Please see a copy of my visit note below.    Spine Surgical Hx:  09/09/2016 - Laminoplasty C5-C7 (3 levels); foraminotomies R C4-5, Bilateral C5-6 and C6-7 ([facility]).  [Implants: Synthes maxillofacial titanium plate].  12/13/2017 - MIS R foraminotomy C4-5 ([facility]).  08/06/2018 - ACDF with ant plate fixation C5-6; use of [place] DBM putty (SARABJIT Lopez).  [Implants: Spinal USA (Ascend) plate; Titan spine titanium cage].  03/13/2019 - ACDF C4-5 and C6-7, with ant plate fixation C4-C7; removal of plate C4-5; use of Progenix Plus DMB putty ([facility]). [Implants: [place] aviator plate; tritanium cages].      VIDEO VISIT:  Patient is evaluated today via billable video visit.    The patient has been notified of following:   \"This video visit will be conducted via a call between you and your physician/provider. We have found that certain health care needs can be provided without the need for an in-person physical exam.  This service lets us provide the care you need with a video conversation.  If a prescription is necessary we can send it directly to your pharmacy.  If lab work is needed we can place an order for that and you can then stop by our lab to have the test done at a later time.  If during the course of the call the physician/provider feels a video visit is not appropriate, you will not be charged for this service.\"     Physician has received verbal consent for a Video Visit from the patient.  Video software/patrick used:  [employer]  Video time:  9:10am to 9:48am  Originating Location (pt. Location): Home  Distant Location (provider location):  [facility]     Chief Complaint   Patient presents with     Consult     Continued " C5 concers, issues with the plates from the Laminoplasty       S>  60 year old female, RHD, referred by Dr. Owen Smith ([facility]) for neck issues.    8/27/20 - was evaluated by Dr. Jennie Loera ([facility]).  Full body x-rays taken; alignment deemed reasonable.  No surgical recommendations.  Was told that she does not need to be seen further by Neurosurgery.  Continue f/u with Dr. Au (Pain Mgmt, [facility]).  Per patient, Dr. Au used to be with [facility], but now in independent practice.    Per patient, at the time of first surgery in 2016, was having lots of pain between shoulder blades.  Per patient, the surgery did not help, and in fact made things worse.  Pain worsened on the right side.  Over the next few months, was in and out of ER's ([facility], [place]).  Worked up for CSF leak; had blood patch procedures.  No relief.  Saw different providers.  Eventually had R foraminotomy C4-5 (Dr. Barclay) in Dec 2017.  Per patient, made things worse.  R arm got significantly weaker, cannot raise R arm/shoulder.  Over time, weakness improved.    Aug 2018 surgery by Dr. Lopez (ACDF C5-6).  Per patient, no benefit, although this time the surgery did not make things worse either.  In March 2019, had surgery by Dr. Kat (ACDF C4-C7).  Per patient, no benefit either.    90% neck, 10% arm, only on the R side.  Worse: any activity.  Better: laying down; ice packs.    Previous tx:  PT - last course ~ 1 yr ago.  Per patient, no relief.  Following with Dr. Au for many years now (even prior to surgeries).  He had performed multiple interventional procedures over the years (also some by Dr. Kebede).  These have included nerve root blocks, facet blocks, MBB's, RFA's, botox injections.  Per patient, have generally not worked.  Surgeries as above.  (-) opioids.  Tylenol prn.  Celebrex (prescribed by PCP).  No relief.    PSHx:  , lives with  and  daughter.  Works from home (since the pandemic).  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  Nonsmoker.  EtOH ~ 1-2x/month.    I have reviewed and updated the patient's Past Medical History, Social History, Family History and Medication List.    ALLERGIES  Dilaudid [hydromorphone], Morphine, Oxycodone, and Tramadol      Neck Disability Index (NDI) Questionnaire    Neck Disability Index (NDI) 12/8/2020   Neck Disability Index: Count 10   NDI: Total Score = SUM (points for all 10 findings) 42   Neck Disability in Percent = (Total Score) / 50 * 100 84 (%)   Some recent data might be hidden                 PROMIS-10 Scores             Physical Examination:    This was a video visit, so very limited exam could be performed.  On video, patient appeared alert, oriented x 3, cooperative, with coherent speech, and not in cardiorespiratory distress.  Able to respond to questions appropriately and follow instructions.    Imaging:    Cervical CT myelogram from 6/18/20 shows postsurgical changes: 3-level ACDF C4-C7 using titanium cages and with anterior plate fixation.  No sign of loosening or failure; fusion may already be solid at C4-5 and C5-6; cannot see bridging bone across C6-7.  The C7 screws are quite low in the vertebral body, and one of the screws seems to have gone thru the inferior endplate and invaded the C7-T1 disc.  Also with previous open-door laminoplasty with mini-plate fixation C5-C7 (3 levels); opening on right side, hinge on left.  The hinges are already healed.  There is still significant right foraminal stenosis C4-5 that may be causing R C5 radiculopathy.  Canal is patent.  Other foramina patent.    Cervical MRI from 8/14/20 reviewed.  Shows AP post-surgical changes as above.  Images somewhat obscured by metallic artifact, especially at operative levels.  Thus, the foraminal stenosis I noted on CT is not well appreciated on the MRI.  Spinal cord seems well decompressed; no myelomalacia.    EOS Full  body x-rays from 8/28/20.  Shows c-spine postsurgical changes as above.      Assessment:    1.  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  2.  Chronic right-sided neck and shoulder pain.  3.  Multiple cervical spine surgeries as above, including ACDF C4-C7, laminoplasties C5-C7, R foraminotomy C4-5.    Plan:    Had good long discussion with patient regarding her condition.  Her situation is quite complex given that she has had for previous cervical spine surgeries, none of which have given her any benefit.  I suspect that her current pain is mainly related to the CT myelogram finding of foraminal stenosis primarily at right C4-5, but also at C5-6.  I would like to be able to also read the radiologist report and see whether the radiologist agrees with my assessment.    Patient has had multiple previous interventional procedures performed by Dr. Au.  I also saw a report of right C6 nerve root block performed at [facility] on June 2018.  Per report, 50% relief.  Patient herself does not really remember.  At any rate, I believe an injection at the supra adjacent level (C5 nerve root) would be more helpful from a diagnostic standpoint.    - Retrieve reports of CT myelogram and MRI, and upload to PACS.  - R C5 SNRB (anesthetic + steroid) (c/o Dr. Au - [facility]).    Advised to take note of pain relief for the first couple of hours.  Explained that rationale for diagnostic injection to the patient.  Advised her to give us a call 1 to 2 days later to report pain response.  If with good relief, may perform revision foraminotomy right C4-5, possible right C5-6.  If no relief, may repeat right C6 nerve root block.    RTC prn, with cervical flex-ext x-rays.      Stevo Waite MD    Orthopaedic Spine Surgery  Dept Orthopaedic Surgery, [facility]  [phone] office, [phone] pager  [email or web address]    Addendum:  I discussed with Dr. Au over the phone later in the  day.  I told him my assessment, and my suspicion that this may be being mediated by the right C5 nerve root (with possible contribution of right C6) secondary to foraminal stenosis.  He would be happy to schedule the patient for a right C5 nerve root block with anesthetic and steroid.        Stevo Waite MD

## 2020-12-08 NOTE — TELEPHONE ENCOUNTER
[facility] Call Center    Phone Message    May a detailed message be left on voicemail: yes     Reason for Call: Other: This pt of Dr. Waite's was returning [place]'s call in regards to having records faxed to  [facility]. The clinic's fax number is [phone].    Action Taken: Message routed to:  [facility] ([facility] Ortho    Travel Screening: Not Applicable

## 2020-12-11 ENCOUNTER — RECORDS - HEALTHEAST (OUTPATIENT)
Dept: ADMINISTRATIVE | Facility: OTHER | Age: 60
End: 2020-12-11

## 2020-12-28 ENCOUNTER — MYC MEDICAL ADVICE (OUTPATIENT)
Dept: ORTHOPEDICS | Facility: CLINIC | Age: 60
End: 2020-12-28

## 2020-12-31 NOTE — TELEPHONE ENCOUNTER
See my Chart message.  Due to Insurance change, pt needs recommendation of different place to have spinal injection done.    See last dictation.  I called pt back & recommended [facility] due to experience & pt agreed   Faxed order to [facility] & pt will call to schedule.  I reminded pt to call us back after injection about result to determine next step per dictation.  She agreed.  Kellie Castillo RN.

## 2021-01-11 ENCOUNTER — DOCUMENTATION ONLY (OUTPATIENT)
Dept: CARE COORDINATION | Facility: CLINIC | Age: 61
End: 2021-01-11

## 2021-01-12 ENCOUNTER — VIRTUAL VISIT (OUTPATIENT)
Dept: ORTHOPEDICS | Facility: CLINIC | Age: 61
End: 2021-01-12
Payer: COMMERCIAL

## 2021-01-12 DIAGNOSIS — Z98.1 S/P CERVICAL SPINAL FUSION: ICD-10-CM

## 2021-01-12 DIAGNOSIS — M47.812 CERVICAL SPONDYLOSIS: ICD-10-CM

## 2021-01-12 DIAGNOSIS — M54.12 CERVICAL RADICULOPATHY: Primary | ICD-10-CM

## 2021-01-12 PROCEDURE — 99215 OFFICE O/P EST HI 40 MIN: CPT | Mod: 95 | Performed by: ORTHOPAEDIC SURGERY

## 2021-01-12 NOTE — PROGRESS NOTES
"Spine Surgical Hx:  09/09/2016 - Laminoplasty C5-C7 (3 levels); foraminotomies R C4-5, Bilateral C5-6 and C6-7 ([facility]).  [Implants: Synthes maxillofacial titanium plate].  12/13/2017 - MIS R foraminotomy C4-5 ([facility]).  08/06/2018 - ACDF with ant plate fixation C5-6; use of [place] DBM putty (SARABJIT Lopez).  [Implants: Spinal USA (Ascend) plate; Titan spine titanium cage].  03/13/2019 - ACDF C4-5 and C6-7, with ant plate fixation C4-C7; removal of plate C4-5; use of Progenix Plus DMB putty ([facility]). [Implants: [place] aviator plate; tritanium cages].      VIRTUAL VISIT:  Patient is evaluated today via billable virtual visit.    The patient has been notified of following:   \"This virtual visit will be conducted via a call between you and your physician/provider. We have found that certain health care needs can be provided without the need for an in-person physical exam.  This service lets us provide the care you need with a virtual conversation.  If a prescription is necessary we can send it directly to your pharmacy.  If lab work is needed we can place an order for that and you can then stop by our lab to have the test done at a later time.  If during the course of the call the physician/provider feels a virtual visit is not appropriate, you will not be charged for this service.\"     Physician has received verbal consent for a Virtual Visit from the patient.  Platform used:  [employer]  Time:  1:13pm to 1:57pm  Originating Location (pt. Location): Home  Distant Location (provider location):  [facility]     Chief Complaint   Patient presents with     RECHECK     F/U Neck pain        Last Visit Date: 12/8/20  Previous Impression:  1.  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  2.  Chronic right-sided neck and shoulder pain.  3.  Multiple cervical spine surgeries as above, including ACDF C4-C7, laminoplasties C5-C7, R foraminotomy " C4-5.  Previous Plan:  - Retrieve reports of CT myelogram and MRI, and upload to PACS.  - R C5 SNRB (anesthetic + steroid) (c/o Dr. Au - [facility]).  If with good relief, may perform revision foraminotomy right C4-5, possible right C5-6.  If no relief, may repeat right C6 nerve root block.  RTC prn, with cervical flex-ext x-rays.      S>  60 year old female, here to review injection response, and discuss further options.    1/5/21 - R C5 SNRB with steroid (Dr. Au).  Report scanned in media.  Per report, (+) significant but partial relief of preop pain.  Per patient, 50% relief x 30 mins.      Per patient, her symptoms remain the same as when I last saw her.  Still with right-sided neck and shoulder symptoms.  Also with discomfort in the lower part of her neck/upper back area.  Also feels pain right over the laminoplasty site, which she suspects may be related to the presence of the laminoplasty hardware.    Continues to have significant disabling symptoms.  These are very debilitating to her, and she was removed 50 years several times during our video visit.      Neck Disability Index (NDI) Questionnaire    Neck Disability Index (NDI) 1/11/2021   Neck Disability Index: Count 10   NDI: Total Score = SUM (points for all 10 findings) 40   Neck Disability in Percent = (Total Score) / 50 * 100 80 (%)   Some recent data might be hidden      NDI 12/8/20 84%       PROMIS-10 Scores  Global Mental Health Score: (P) 13  Global Physical Health Score: (P) 12  PROMIS TOTAL - SUBSCORES: (P) 25    Physical Examination:    This was a virtual visit, so very limited exam could be performed.  Patient seemed alert, oriented x 3, cooperative, with coherent speech, and not in distress.  Able to respond to questions appropriately and follow instructions.    Imaging:    Please refer to previous clinic notes.  No new images reviewed for today's visit, although we reviewed the previous images, particularly the cervical CT  scan.    Assessment:    1.  Residual right foraminal stenosis C4-5 and C5-6, with positive response (50% relief) to R C5 SNRB (1/5/2021, Dr. Au).  2.  Subjacent level spondylosis C7-T1.  3.  Chronic right-sided neck and shoulder pain.  4.  Multiple cervical spine surgeries as above, including ACDF C4-C7, laminoplasties C5-C7, R foraminotomy C4-5.    Plan:    Had good long discussion with patient regarding her cervical spine condition, potential pain generators, her response to the recent injection, and treatment options.  At this point, she does not think she could simply continue living with her symptoms.  She has already exhausted nonoperative treatment measures.  She has had previous cervical spine surgeries; unfortunately, continues to have significant debilitating symptoms.    I believe her right C5 nerve root is still a primary pain generator; her partial relief to the recent right C5 nerve root block confirms this.  In addition, however, she has other possible sources of ongoing pain.  These include the subjacent level of C7-T1, with evidence of disc degeneration anteriorly and facet degeneration posteriorly.  She suspects that her laminoplasty plate are also contributing to her pain.  While I am left convinced about this, I believe they could now be safely removed, as the laminoplasty hinges have already fully healed, and thus the laminoplasty plates are not serving a useful purpose anymore.    Follow surgery in the form of revision foraminotomies/facetectomies right C4-5 and C5-6; posterior instrumented spinal fusion C4-T1; and removal of laminoplasty plate C4-C7.  Discussed the rationale, risks, benefits and alternatives with the patient.  She elects to proceed.  We will also plan to give her back.he removed hardware (laminoplasty plates and screws).    - Case request:  PISF C4-T1; Revision foraminotomies Right C4-5 and C5-6; Removal of laminoplasty plates C4-C7.  - PAC referral.    TT > 40 mins, > 50%  CC.    Stevo Waite MD    Orthopaedic Spine Surgery  Dept Orthopaedic Surgery, [facility]  [phone] office, [phone] pager  [email or web address]

## 2021-01-12 NOTE — LETTER
"    1/12/2021         RE: Layla Starks  [address] [facility] [address]        Dear Colleague,    Thank you for referring your patient, Layla Starks, to the [facility]. Please see a copy of my visit note below.    Spine Surgical Hx:  09/09/2016 - Laminoplasty C5-C7 (3 levels); foraminotomies R C4-5, Bilateral C5-6 and C6-7 ([facility]  [Implants: Synthes maxillofacial titanium plate].  12/13/2017 - MIS R foraminotomy C4-5 ([facility], [facility]).  08/06/2018 - ACDF with ant plate fixation C5-6; use of [place] DBM putty (SARABJIT Lopez).  [Implants: Spinal USA (Ascend) plate; Titan spine titanium cage].  03/13/2019 - ACDF C4-5 and C6-7, with ant plate fixation C4-C7; removal of plate C4-5; use of Progenix Plus DMB putty ([facility]). [Implants: Savage aviator plate; tritanium cages].      VIRTUAL VISIT:  Patient is evaluated today via billable virtual visit.    The patient has been notified of following:   \"This virtual visit will be conducted via a call between you and your physician/provider. We have found that certain health care needs can be provided without the need for an in-person physical exam.  This service lets us provide the care you need with a virtual conversation.  If a prescription is necessary we can send it directly to your pharmacy.  If lab work is needed we can place an order for that and you can then stop by our lab to have the test done at a later time.  If during the course of the call the physician/provider feels a virtual visit is not appropriate, you will not be charged for this service.\"     Physician has received verbal consent for a Virtual Visit from the patient.  Platform used:  Amwell  Time:  1:13pm to 1:57pm  Originating Location (pt. Location): Home  Distant Location (provider location):  [facility]     Chief Complaint   Patient presents with     RECHECK     F/U Neck pain "        Last Visit Date: 12/8/20  Previous Impression:  1.  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  2.  Chronic right-sided neck and shoulder pain.  3.  Multiple cervical spine surgeries as above, including ACDF C4-C7, laminoplasties C5-C7, R foraminotomy C4-5.  Previous Plan:  - Retrieve reports of CT myelogram and MRI, and upload to PACS.  - R C5 SNRB (anesthetic + steroid) (c/o Dr. Au - [facility]).  If with good relief, may perform revision foraminotomy right C4-5, possible right C5-6.  If no relief, may repeat right C6 nerve root block.  RTC prn, with cervical flex-ext x-rays.      S>  60 year old female, here to review injection response, and discuss further options.    1/5/21 - R C5 SNRB with steroid (Dr. Au).  Report scanned in media.  Per report, (+) significant but partial relief of preop pain.  Per patient, 50% relief x 30 mins.      Per patient, her symptoms remain the same as when I last saw her.  Still with right-sided neck and shoulder symptoms.  Also with discomfort in the lower part of her neck/upper back area.  Also feels pain right over the laminoplasty site, which she suspects may be related to the presence of the laminoplasty hardware.    Continues to have significant disabling symptoms.  These are very debilitating to her, and she was removed 50 years several times during our video visit.      Neck Disability Index (NDI) Questionnaire    Neck Disability Index (NDI) 1/11/2021   Neck Disability Index: Count 10   NDI: Total Score = SUM (points for all 10 findings) 40   Neck Disability in Percent = (Total Score) / 50 * 100 80 (%)   Some recent data might be hidden      NDI 12/8/20 84%       PROMIS-10 Scores  Global Mental Health Score: (P) 13  Global Physical Health Score: (P) 12  PROMIS TOTAL - SUBSCORES: (P) 25    Physical Examination:    This was a virtual visit, so very limited exam could be performed.  Patient seemed alert, oriented x 3, cooperative, with coherent  speech, and not in distress.  Able to respond to questions appropriately and follow instructions.    Imaging:    Please refer to previous clinic notes.  No new images reviewed for today's visit, although we reviewed the previous images, particularly the cervical CT scan.    Assessment:    1.  Residual right foraminal stenosis C4-5 and C5-6, with positive response (50% relief) to R C5 SNRB (1/5/2021, Dr. Au).  2.  Subjacent level spondylosis C7-T1.  3.  Chronic right-sided neck and shoulder pain.  4.  Multiple cervical spine surgeries as above, including ACDF C4-C7, laminoplasties C5-C7, R foraminotomy C4-5.    Plan:    Had good long discussion with patient regarding her cervical spine condition, potential pain generators, her response to the recent injection, and treatment options.  At this point, she does not think she could simply continue living with her symptoms.  She has already exhausted nonoperative treatment measures.  She has had previous cervical spine surgeries; unfortunately, continues to have significant debilitating symptoms.    I believe her right C5 nerve root is still a primary pain generator; her partial relief to the recent right C5 nerve root block confirms this.  In addition, however, she has other possible sources of ongoing pain.  These include the subjacent level of C7-T1, with evidence of disc degeneration anteriorly and facet degeneration posteriorly.  She suspects that her laminoplasty plate are also contributing to her pain.  While I am left convinced about this, I believe they could now be safely removed, as the laminoplasty hinges have already fully healed, and thus the laminoplasty plates are not serving a useful purpose anymore.    Follow surgery in the form of revision foraminotomies/facetectomies right C4-5 and C5-6; posterior instrumented spinal fusion C4-T1; and removal of laminoplasty plate C4-C7.  Discussed the rationale, risks, benefits and alternatives with the patient.   She elects to proceed.  We will also plan to give her back.he removed hardware (laminoplasty plates and screws).    - Case request:  PISF C4-T1; Revision foraminotomies Right C4-5 and C5-6; Removal of laminoplasty plates C4-C7.  - PAC referral.    TT > 40 mins, > 50% CC.    Stevo Waite MD    Orthopaedic Spine Surgery  Dept Orthopaedic Surgery, [facility]  [phone] office, [phone] pager  [email or web address]

## 2021-01-13 ENCOUNTER — MYC MEDICAL ADVICE (OUTPATIENT)
Dept: ORTHOPEDICS | Facility: CLINIC | Age: 61
End: 2021-01-13

## 2021-01-15 ENCOUNTER — COMMUNICATION - HEALTHEAST (OUTPATIENT)
Dept: FAMILY MEDICINE | Facility: CLINIC | Age: 61
End: 2021-01-15

## 2021-01-15 DIAGNOSIS — Z78.0 POSTMENOPAUSAL STATUS: ICD-10-CM

## 2021-01-21 ENCOUNTER — OFFICE VISIT - HEALTHEAST (OUTPATIENT)
Dept: FAMILY MEDICINE | Facility: CLINIC | Age: 61
End: 2021-01-21

## 2021-01-21 DIAGNOSIS — M54.2 NECK PAIN: ICD-10-CM

## 2021-01-22 ENCOUNTER — TELEPHONE (OUTPATIENT)
Dept: ORTHOPEDICS | Facility: CLINIC | Age: 61
End: 2021-01-22

## 2021-01-22 PROBLEM — Z98.1 S/P CERVICAL SPINAL FUSION: Status: ACTIVE | Noted: 2021-01-22

## 2021-01-22 PROBLEM — M47.812 CERVICAL SPONDYLOSIS: Status: ACTIVE | Noted: 2021-01-22

## 2021-01-22 PROBLEM — M54.12 CERVICAL RADICULOPATHY: Status: ACTIVE | Noted: 2021-01-22

## 2021-01-22 NOTE — TELEPHONE ENCOUNTER
FUTURE VISIT INFORMATION      SURGERY INFORMATION:    Date: 2.19.21    Location: [facility] OR    Surgeon:  Ravindra    Anesthesia Type:  General    Procedure: Posterior instrumented spinal fusion cervical 4 to thoracic 1; Revision foraminotomies/factectomies right cervical 4-6 (2 levels);    Consult: 1.25.21      RECORDS REQUESTED FROM:       Primary Care Provider: Brittani Grant    Most recent ECHO: 4.11.19

## 2021-01-22 NOTE — TELEPHONE ENCOUNTER
Patient is scheduled for surgery with Dr. Waite      Spoke or left message with: Spoke with Layla    Date of Surgery: 2/19/21    Location: [facility]    Informed patient they will need an adult  Yes    Pre-op with surgeon (if applicable): Complete    H&P: Scheduled with PAC    Additional imaging/appointments: Patient will await call from [facility] scheduling team    Surgery packet: Given in clinic     Additional comments: Patient will receive arrival time at PAC

## 2021-01-25 ENCOUNTER — ANESTHESIA EVENT (OUTPATIENT)
Dept: SURGERY | Facility: CLINIC | Age: 61
End: 2021-01-25

## 2021-01-25 ENCOUNTER — PRE VISIT (OUTPATIENT)
Dept: SURGERY | Facility: CLINIC | Age: 61
End: 2021-01-25

## 2021-01-25 ENCOUNTER — OFFICE VISIT (OUTPATIENT)
Dept: SURGERY | Facility: CLINIC | Age: 61
End: 2021-01-25
Payer: COMMERCIAL

## 2021-01-25 VITALS
SYSTOLIC BLOOD PRESSURE: 107 MMHG | DIASTOLIC BLOOD PRESSURE: 64 MMHG | OXYGEN SATURATION: 98 % | WEIGHT: 196 LBS | HEIGHT: 65 IN | HEART RATE: 66 BPM | RESPIRATION RATE: 16 BRPM | BODY MASS INDEX: 32.65 KG/M2

## 2021-01-25 DIAGNOSIS — M54.12 CERVICAL RADICULOPATHY: ICD-10-CM

## 2021-01-25 DIAGNOSIS — Z01.818 PRE-OP EXAMINATION: Primary | ICD-10-CM

## 2021-01-25 LAB
ANION GAP SERPL CALCULATED.3IONS-SCNC: 4 MMOL/L (ref 3–14)
BUN SERPL-MCNC: 16 MG/DL (ref 7–30)
CALCIUM SERPL-MCNC: 9.2 MG/DL (ref 8.5–10.1)
CHLORIDE SERPL-SCNC: 109 MMOL/L (ref 94–109)
CO2 SERPL-SCNC: 28 MMOL/L (ref 20–32)
CREAT SERPL-MCNC: 0.82 MG/DL (ref 0.52–1.04)
ERYTHROCYTE [DISTWIDTH] IN BLOOD BY AUTOMATED COUNT: 12.9 % (ref 10–15)
GFR SERPL CREATININE-BSD FRML MDRD: 78 ML/MIN/{1.73_M2}
GLUCOSE SERPL-MCNC: 92 MG/DL (ref 70–99)
HCT VFR BLD AUTO: 45.2 % (ref 35–47)
HGB BLD-MCNC: 14.7 G/DL (ref 11.7–15.7)
MCH RBC QN AUTO: 30.1 PG (ref 26.5–33)
MCHC RBC AUTO-ENTMCNC: 32.5 G/DL (ref 31.5–36.5)
MCV RBC AUTO: 92 FL (ref 78–100)
PLATELET # BLD AUTO: 296 10E9/L (ref 150–450)
POTASSIUM SERPL-SCNC: 4 MMOL/L (ref 3.4–5.3)
RBC # BLD AUTO: 4.89 10E12/L (ref 3.8–5.2)
SODIUM SERPL-SCNC: 141 MMOL/L (ref 133–144)
WBC # BLD AUTO: 4.2 10E9/L (ref 4–11)

## 2021-01-25 PROCEDURE — 80048 BASIC METABOLIC PNL TOTAL CA: CPT | Performed by: PATHOLOGY

## 2021-01-25 PROCEDURE — 85027 COMPLETE CBC AUTOMATED: CPT | Performed by: PATHOLOGY

## 2021-01-25 PROCEDURE — 99202 OFFICE O/P NEW SF 15 MIN: CPT | Performed by: PHYSICIAN ASSISTANT

## 2021-01-25 PROCEDURE — 86850 RBC ANTIBODY SCREEN: CPT | Performed by: PATHOLOGY

## 2021-01-25 PROCEDURE — 86900 BLOOD TYPING SEROLOGIC ABO: CPT | Performed by: PATHOLOGY

## 2021-01-25 PROCEDURE — 36415 COLL VENOUS BLD VENIPUNCTURE: CPT | Performed by: PATHOLOGY

## 2021-01-25 PROCEDURE — 86901 BLOOD TYPING SEROLOGIC RH(D): CPT | Performed by: PATHOLOGY

## 2021-01-25 RX ORDER — SCOLOPAMINE TRANSDERMAL SYSTEM 1 MG/1
1 PATCH, EXTENDED RELEASE TRANSDERMAL ONCE
Status: CANCELLED | OUTPATIENT
Start: 2021-01-25 | End: 2021-01-25

## 2021-01-25 RX ORDER — ANTIARTHRITIC COMBINATION NO.2 900 MG
1 TABLET ORAL EVERY MORNING
COMMUNITY
End: 2021-09-09

## 2021-01-25 SDOH — HEALTH STABILITY: MENTAL HEALTH: HOW OFTEN DO YOU HAVE 6 OR MORE DRINKS ON ONE OCCASION?: NOT ASKED

## 2021-01-25 SDOH — HEALTH STABILITY: MENTAL HEALTH: HOW OFTEN DO YOU HAVE A DRINK CONTAINING ALCOHOL?: NOT ASKED

## 2021-01-25 SDOH — HEALTH STABILITY: MENTAL HEALTH: HOW MANY STANDARD DRINKS CONTAINING ALCOHOL DO YOU HAVE ON A TYPICAL DAY?: NOT ASKED

## 2021-01-25 ASSESSMENT — LIFESTYLE VARIABLES: TOBACCO_USE: 1

## 2021-01-25 ASSESSMENT — PAIN SCALES - GENERAL: PAINLEVEL: EXTREME PAIN (8)

## 2021-01-25 ASSESSMENT — ENCOUNTER SYMPTOMS: SEIZURES: 0

## 2021-01-25 ASSESSMENT — MIFFLIN-ST. JEOR: SCORE: 1464.93

## 2021-01-25 NOTE — PATIENT INSTRUCTIONS
Preparing for Your Surgery      Name:  Layla Starks   MRN:  [id number]   :  1960   Today's Date:  2021       Arriving for surgery:  Surgery date:  21  Arrival time:  5:30 am    Restrictions due to COVID 19:  One consistent visitor per patient is allowed  No ill visitors  All visitors must wear face mask     parking is available for anyone with mobility limitations or disabilities.  ([place]  24 hours/ 7 days a week; [facility]  7 am- 3:30 pm, Mon- Fri)    Please come to:     [facility] [address]  [address] [place] [address]     -Enter through the [facility] entrance. If you prefer, park your car in the Green Lot.    -Proceed to the 3rd floor, check in at the Adult Surgery Waiting Lounge. [phone]    If an escort is needed stop at the Information Desk in the lobby. Inform the information person that you are here for surgery. An escort to the Adult Surgery Waiting Lounge will be provided.        What can I eat or drink?  -  You may eat and drink normally for up to 8 hours before your surgery. (Until 11:30 pm)  -  You may have clear liquids until 2 hours before surgery. (Until 5:30 am)    Examples of clear liquids:  Water  Clear broth  Juices (apple, white grape, white cranberry  and cider) without pulp  Noncarbonated, powder based beverages  (lemonade and Raymond-Aid)  Sodas (Sprite, 7-Up, ginger ale and seltzer)  Coffee or tea (without milk or cream)  Gatorade    -  No Alcohol for at least 24 hours before surgery     Which medicines can I take?    Hold Aspirin for 7 days before surgery.   Hold Multivitamins for 7 days before surgery.  Hold Supplements (including FISH OIL) for 7 days before surgery.  Hold Ibuprofen (Advil, Motrin) for 1 day before surgery.  Hold Naproxen (Aleve) for 4 days before surgery.    How do I prepare myself?  - Please take 2 showers before surgery using Scrubcare or Hibiclens soap.    Use this soap  only from the neck to your toes.     Leave the soap on your skin for one minute--then rinse thoroughly.      You may use your own shampoo and conditioner; no other hair products.   - Please remove all jewelry and body piercings.  - No lotions, deodorants or fragrance.  - No makeup or fingernail polish.   - Bring your ID and insurance card.    - All patients are required to have a Covid-19 test within 4 days of surgery/procedure.      -Patients will be contacted by the [facility] scheduling team within 1 week of surgery to make an appointment.      - Patients may call the Scheduling team at [phone] if they have not been scheduled within 4 days of  surgery.    Questions or Concerns:    - For any questions regarding the day of surgery or your hospital stay, please contact the Pre Admission Nursing Office at [phone].       - If you have health changes between today and your surgery please call your surgeon.       For questions after surgery please call your surgeons office.

## 2021-01-25 NOTE — H&P
Pre-Operative H & P     CC:  Preoperative exam to assess for increased cardiopulmonary risk while undergoing surgery and anesthesia.    Date of Encounter: 1/25/2021  Primary Care Physician:  Brittani Grant     Reason for visit: pre operative examination, cervical radiculopathy and spondylosis, s/p spinal fusion    HPI  Layla Starks is a 60 year old female who presents for pre-operative H & P in preparation for Posterior instrumented spinal fusion cervical 4 to thoracic 1; Revision foraminotomies/facetectomies right cervical 4-6 (2 levels); removal of laminoplasty plates cervical 4-7; right or left posterior iliac crest bone graft harves with Dr. Waite on 2/19/21 at [facility].     The patient is a 60 year old woman who has a past medical history significant for PONV, former smoker, generalized anxiety disorder, depression and ongoing neck pain. The patient has had multiple prior neck surgeries starting in 2016 and the last one in 3/2019. She met with Dr. Waite on 12/8/20. She reported ongoing neck pain and arm weakness. She had previously tried physical therapy, nerve root blocks, facet blocks, botox injections, MBB s and LO s without improvement. Dr. Waite reviewed her imaging and they discussed her ongoing foraminal stenosis and treatment options. The patient is now scheduled for the procedure as above.      History is obtained from the patient and chart review    Past Medical History  Past Medical History:   Diagnosis Date     Cervical disc disorder with radiculopathy of cervical region 11/27/2017     Cervical spinal stenosis      DDD (degenerative disc disease), cervical      Depression      SHAHIDA (generalized anxiety disorder)      OA (osteoarthritis)      Obesity      Personal history of covid-19      PONV (postoperative nausea and vomiting)        Past Surgical History  Past Surgical History:   Procedure Laterality Date     AS HYSTEROSCOPY, SURGICAL;  W/ ENDOMETRIAL ABLATION, ANY METHOD N/A      C5-7 laminoplasty Right 09/09/2016     COLONOSCOPY  2016     DECOMPRESSION, FUSION CERVICAL ANTERIOR THREE+ LEVELS, COMBINED N/A 03/13/2019    C4-5, C6-7, C5-6 with hardware removal     Excision anal skin tags N/A 01/18/2010     FORAMINOTOMY CERVICAL POSTERIOR MINIMALLY INVASIVE ONE LEVEL Right 12/13/2017    Procedure: FORAMINOTOMY CERVICAL POSTERIOR MINIMALLY INVASIVE ONE LEVEL;  Right Minimally Invasive Posterior Cervical 4-5 Foraminotomy;  Surgeon: Byron Barclay MD;  Location: [facility] OR     FUSION CERVICAL ANTERIOR ONE LEVEL N/A 08/06/2018    unspecified cervical fusion     MAMMOPLASTY REDUCTION  1980     MAMMOPLASTY REDUCTION BILATERAL Bilateral 1980     Partial meniscectomy Left 05/17/2016     MD KNEE SCOPE,MED/LAT MENISCUS REPAIR Left        Hx of Blood transfusions/reactions: denies     Hx of abnormal bleeding or anti-platelet use: none    Menstrual history: Patient's last menstrual period was 02/01/2017.:     Steroid use in the last year: none    Personal or FH with difficulty with Anesthesia:  PONV    Prior to Admission Medications  Current Outpatient Medications   Medication Sig Dispense Refill     Biotin 5000 MCG TABS Take 1 tablet by mouth every morning       CALCIUM CITRATE PO Take 1 tablet by mouth every morning       Flax Oil-Fish Oil-Borage Oil ([employer] OMEGA-3 PO) Take 1 capsule by mouth 2 times daily       ibuprofen (ADVIL) 200 MG capsule Take 600 mg by mouth every 8 hours as needed        MAGNESIUM GLYCINATE PO Take 300 mg by mouth At Bedtime       MEDI-PATCH-LIDOCAINE 0.5-0.035-5-20 % PTCH Externally apply 1 Box topically 2 times daily as needed 1 patch 3     Misc Natural Products (ADVANCED JOINT RELIEF PO) Take 1 tablet by mouth 2 times daily       Multiple Vitamins-Minerals (AIRBORNE) CHEW Take 1 chew tab by mouth 3 times daily       Multiple Vitamins-Minerals (MULTIVITAL PO) Take 1 tablet by mouth 2 times daily Twice a day       PREBIOTIC PRODUCT PO  Take 1 capsule by mouth 2 times daily       Probiotic Product (PROBIOTIC-10 PO) Take 1 capsule by mouth 2 times daily       VITAMIN D PO Take 5,000 Units by mouth every morning         Allergies  Allergies   Allergen Reactions     Dilaudid [Hydromorphone] GI Disturbance     Morphine Nausea and Vomiting     Oxycodone Nausea and Vomiting     Tramadol Nausea and Vomiting       Social History  Social History     Socioeconomic History     Marital status:      Spouse name: Not on file     Number of children: Not on file     Years of education: Not on file     Highest education level: Not on file   Occupational History     Not on file   Social Needs     Financial resource strain: Not on file     Food insecurity     Worry: Not on file     Inability: Not on file     Transportation needs     Medical: Not on file     Non-medical: Not on file   Tobacco Use     Smoking status: Former Smoker     Packs/day: 0.50     Years: 1.00     Pack years: 0.50     Types: Cigarettes     Quit date:      Years since quittin.0     Smokeless tobacco: Never Used     Tobacco comment: Quit 30 years ago   Substance and Sexual Activity     Alcohol use: Yes     Alcohol/week: 1.0 - 2.0 standard drinks     Types: 1 - 2 Standard drinks or equivalent per week     Comment: rare     Drug use: No     Sexual activity: Yes     Partners: Male   Lifestyle     Physical activity     Days per week: Not on file     Minutes per session: Not on file     Stress: Not on file   Relationships     Social connections     Talks on phone: Not on file     Gets together: Not on file     Attends Spiritism service: Not on file     Active member of club or organization: Not on file     Attends meetings of clubs or organizations: Not on file     Relationship status: Not on file     Intimate partner violence     Fear of current or ex partner: Not on file     Emotionally abused: Not on file     Physically abused: Not on file     Forced sexual activity: Not on file    Other Topics Concern     Not on file   Social History Narrative     Not on file       Family History  Family History   Problem Relation Age of Onset     Arthritis Mother      Macular Degeneration Mother      Hearing Loss Father      Multiple Sclerosis Sister        Review of Systems  ROS/MED HX    ENT/Pulmonary:     (+) tobacco use (0.5 pack year history and quit in 1979), Past use, recent URI, resolved, COVID-19 positive 11/29/20. She reports head congestion and feeling neck pain. These are resolved. :     Neurologic:     (+) other neuro, Cervical radiculopathy. (-) no seizures, no CVA, no TIA and migraines   Cardiovascular:  - neg cardiovascular ROS   (+) -----Previous cardiac testing   Echo: Date: 4/11/19 Results:  Conclusion: Normal left ventricular chamber size. Normal left ventricular wall thickness. No regional wall motion abnormalities. Normal left ventricular systolic function. Estimated left ventricular ejection fraction is 65%. Normal right ventricular chamber size. Normal right ventricular systolic function. Estimated right ventricular systolic pressure is 32 mmHg. Aortic valve sclerosis without stenosis. Mild aortic valve regurgitation. Mildly thickened mitral valve. Trivial mitral valve regurgitation. Mild tricuspid valve regurgitation.  Stress Test: Date: Results:    ECG Reviewed: Date: 2016 Results:  SR  Cath: Date: Results:        METS/Exercise Tolerance:  >4 METS   Hematologic:  - neg hematologic  ROS    (-) history of blood clots, anemia and History of Transfusion   Musculoskeletal:   (+) arthritis, other musculoskeletal- DDD,       GI/Hepatic:  - neg GI/hepatic ROS    (-) GERD   Renal/Genitourinary:  - neg Renal ROS       Endo:     (+) Obesity,       Psychiatric:     (+) psychiatric history anxiety and depression    (-) chronic opioid use history   Infectious Disease:  - neg infectious disease ROS       Malignancy:       Other:    (+) , H/O Chronic Pain,           The complete review of systems  "is negative other than noted in the HPI or here.       BP: 107/64 Pulse: 66   Resp: 16 SpO2: 98 %         196 lbs 0 oz  5' 5\"   Body mass index is 32.62 kg/m .       Physical Exam  Constitutional: Awake, alert, cooperative, no apparent distress, and appears stated age.  Eyes: Pupils equal, round and reactive to light, extra ocular muscles intact, sclera clear, conjunctiva normal.  HENT: Normocephalic, oral pharynx with moist mucus membranes, good dentition. No goiter appreciated.   Respiratory: Clear to auscultation bilaterally, no crackles or wheezing.  Cardiovascular: Regular rate and rhythm, normal S1 and S2, and very soft murmur noted.  Carotids +2, no bruits. No edema. Palpable pulses to radial  DP and PT arteries.   GI: Normal bowel sounds, soft, non-distended, non-tender, no masses palpated, no hepatosplenomegaly.  Surgical scars: well healed  Lymph/Hematologic: No cervical lymphadenopathy and no supraclavicular lymphadenopathy.  Genitourinary:  defer  Skin: Warm and dry.  No rashes at anticipated surgical site.   Musculoskeletal: Limited ROM of neck. There is no redness, warmth, or swelling of the joints. Gross motor strength is normal.    Neurologic: Awake, alert, oriented to name, place and time. Cranial nerves II-XII are grossly intact. Gait is normal.   Neuropsychiatric: Calm, cooperative. Normal affect.     Labs: (personally reviewed)  Results for CAYLA TURNER (MRN [id number]) as of 1/25/2021 09:54   Ref. Range 1/25/2021 09:22   Sodium Latest Ref Range: 133 - 144 mmol/L 141   Potassium Latest Ref Range: 3.4 - 5.3 mmol/L 4.0   Chloride Latest Ref Range: 94 - 109 mmol/L 109   Carbon Dioxide Latest Ref Range: 20 - 32 mmol/L 28   Urea Nitrogen Latest Ref Range: 7 - 30 mg/dL 16   Creatinine Latest Ref Range: 0.52 - 1.04 mg/dL 0.82   GFR Estimate Latest Ref Range: >60 mL/min/1.73_m2 78   GFR Estimate If Black Latest Ref Range: >60 mL/min/1.73_m2 >90   Calcium Latest Ref Range: 8.5 - 10.1 mg/dL 9.2 "   Anion Gap Latest Ref Range: 3 - 14 mmol/L 4   Glucose Latest Ref Range: 70 - 99 mg/dL 92   WBC Latest Ref Range: 4.0 - 11.0 10e9/L 4.2   Hemoglobin Latest Ref Range: 11.7 - 15.7 g/dL 14.7   Hematocrit Latest Ref Range: 35.0 - 47.0 % 45.2   Platelet Count Latest Ref Range: 150 - 450 10e9/L 296   RBC Count Latest Ref Range: 3.8 - 5.2 10e12/L 4.89   MCV Latest Ref Range: 78 - 100 fl 92   MCH Latest Ref Range: 26.5 - 33.0 pg 30.1   MCHC Latest Ref Range: 31.5 - 36.5 g/dL 32.5   RDW Latest Ref Range: 10.0 - 15.0 % 12.9     EK  Sinus rhythm    ECHO 19 Conclusion: Normal left ventricular chamber size. Normal left ventricular wall thickness. No regional wall motion abnormalities. Normal left ventricular systolic function. Estimated left ventricular ejection fraction is 65%. Normal right ventricular chamber size. Normal right ventricular systolic function. Estimated right ventricular systolic pressure is 32 mmHg. Aortic valve sclerosis without stenosis. Mild aortic valve regurgitation. Mildly thickened mitral valve. Trivial mitral valve regurgitation. Mild tricuspid valve regurgitation. GRISEL BLANCHARD MD     US Carotid 19 Mild atheromatous plaque in the carotid arteries. No significant stenosis on either side by velocity criteria. LOU SCHULTZ    XR Leg Length 20, XR Spine 20    Impression:    1. Post C4-C7 fusion instrumentation. Intact appearing hardware    2. No substantial coronal curvature. No global coronal imbalance    3. No global sagittal imbalance.    4. Weight bearing axis as detailed above.    5. Nodular opacities in the right lung lower zone, may consider    correlation with prior imaging, CT chest or follow-up for further    characterization    NICOLE GILMAN MD      MRI Spine 08/15/20    Conclusion: Multilevel cervical spondylosis in lordotic alignment, C4-7 ACDF and right    laminoplasty and the following specific findings:    1. Cervical facet degeneration left  greater than right C2-3, C3-4, and C7-T1 with    borderline spondylolisthesis at C7-T1.    2. No residual central stenosis or foraminal stenosis at the surgical levels.    3. Foraminal stenosis is mild C3-4 and C2-3.    4. Upper thoracic facet degeneration, slightly greater on the right.    No significant interval change since post myelographic CT of the cervical spine dated    06/18/2020, given differences in imaging modality.    ANTWON SANCHEZ MD      CT Cervical Spine 06/18/20    Conclusion:    1. Status post ACDF at C4-7 and laminoplasty at C5-7 without evidence for    complication.    2. Multilevel degenerative facet arthropathy. There is arthrodesis on the right at    C4-5.    NICOL KNAPP MD      The patient's records and results personally reviewed by this provider.     Outside records reviewed from: [facility] everywhere     ASSESSMENT and PLAN  Layla is a 60 year old woman who is scheduled for Posterior instrumented spinal fusion cervical 4 to thoracic 1; Revision foraminotomies/facetectomies right cervical 4-6 (2 levels); removal of laminoplasty plates cervical 4-7; right or left posterior iliac crest bone graft harvest on 2/19/21 by Dr. Waite in treatment of cervical radiculopathy and spondylosis, s/p spinal fusio.  PAC referral for risk assessment and optimization for anesthesia with comorbid conditions of former smoker, SHAHIDA, depression, arthritis and obesity:    Pre-operative considerations:  1.  Cardiac:  Functional status- METS >4, the patient bikes on the recumbent bike daily. She denies any cardiac symptoms. She had a normal sinus EKG in 2016 and a recent Echo in 2019 with EF65% and no wall motion abnormalities. No further cardiac testing indicated. .  Intermediate risk surgery with 0.4% (RCRI #) risk of major adverse cardiac event.   2.  Pulm:  Airway feasible.  MARIA L risk: Low (age)  ~ Former smoker - 0.5 pack year history and quit in 1979  3. Endo: Obesity, BMI 32 - consideration for safe  lifting techniques.   4. GI:  Risk of PONV score = 4.  If > 2, anti-emetic intervention recommended. Patient reports scopolamine patch was helpful in the past.   5. Psych: SHAHIDA/ depression - not on medications.   6. Musculoskeletal: arthritis/ cervical stenosis - procedure as above.   7. ID: The patient was COVID-19 positive on 11/29/20. She reports head congestion and feeling neck pain. These are resolved except her ongoing neck issues. She will be within the 90 day time period for her surgery so doesn't need repeat COVID-19 test for surgery.     VTE risk: 0.26%    The patient is optimized for their procedure. AVS with information on surgery time/arrival time, meds and NPO status given by nursing staff.        Sudha Blackmon PA-C  Preoperative Assessment Center  [facility]  [facility]  Phone: [phone]  Fax: [phone]

## 2021-01-25 NOTE — ANESTHESIA PREPROCEDURE EVALUATION
"Anesthesia Pre-Procedure Evaluation    Patient: Layla Starks   MRN:     [id number] Gender:   female   Age:    60 year old :      1960        Preoperative Diagnosis: * No surgery found *        Results for LAYLA STARKS (MRN [id number]) as of 2021 09:54   Ref. Range 2021 09:22   Sodium Latest Ref Range: 133 - 144 mmol/L 141   Potassium Latest Ref Range: 3.4 - 5.3 mmol/L 4.0   Chloride Latest Ref Range: 94 - 109 mmol/L 109   Carbon Dioxide Latest Ref Range: 20 - 32 mmol/L 28   Urea Nitrogen Latest Ref Range: 7 - 30 mg/dL 16   Creatinine Latest Ref Range: 0.52 - 1.04 mg/dL 0.82   GFR Estimate Latest Ref Range: >60 mL/min/1.73_m2 78   GFR Estimate If Black Latest Ref Range: >60 mL/min/1.73_m2 >90   Calcium Latest Ref Range: 8.5 - 10.1 mg/dL 9.2   Anion Gap Latest Ref Range: 3 - 14 mmol/L 4   Glucose Latest Ref Range: 70 - 99 mg/dL 92   WBC Latest Ref Range: 4.0 - 11.0 10e9/L 4.2   Hemoglobin Latest Ref Range: 11.7 - 15.7 g/dL 14.7   Hematocrit Latest Ref Range: 35.0 - 47.0 % 45.2   Platelet Count Latest Ref Range: 150 - 450 10e9/L 296   RBC Count Latest Ref Range: 3.8 - 5.2 10e12/L 4.89   MCV Latest Ref Range: 78 - 100 fl 92   MCH Latest Ref Range: 26.5 - 33.0 pg 30.1   MCHC Latest Ref Range: 31.5 - 36.5 g/dL 32.5   RDW Latest Ref Range: 10.0 - 15.0 % 12.9     Preop Vitals    BP Readings from Last 3 Encounters:   18 131/66   18 129/76   17 127/79    Pulse Readings from Last 3 Encounters:   18 73   18 84   17 86      Resp Readings from Last 3 Encounters:   17 24   17 16   17 16    SpO2 Readings from Last 3 Encounters:   17 98%   17 98%   17 96%      Temp Readings from Last 1 Encounters:   17 97.6  F (36.4  C) (Oral)    Ht Readings from Last 1 Encounters:   20 1.651 m (5' 5\")      Wt Readings from Last 1 Encounters:   20 88.5 kg (195 lb)    Estimated body mass index is 32.45 kg/m  as calculated from the " "following:    Height as of 12/8/20: 1.651 m (5' 5\").    Weight as of 12/8/20: 88.5 kg (195 lb).     LDA:        Past Medical History:   Diagnosis Date     Cervical disc disorder with radiculopathy of cervical region 11/27/2017     Cervical spinal stenosis      DDD (degenerative disc disease), cervical      Depression      SHAHIDA (generalized anxiety disorder)      OA (osteoarthritis)      Obesity      PONV (postoperative nausea and vomiting)       Past Surgical History:   Procedure Laterality Date     AS HYSTEROSCOPY, SURGICAL; W/ ENDOMETRIAL ABLATION, ANY METHOD N/A      C5-7 laminoplasty Right 09/09/2016     COLONOSCOPY  2016     DECOMPRESSION, FUSION CERVICAL ANTERIOR THREE+ LEVELS, COMBINED N/A 03/13/2019    C4-5, C6-7, C5-6 with hardware removal     Excision anal skin tags N/A 01/18/2010     FORAMINOTOMY CERVICAL POSTERIOR MINIMALLY INVASIVE ONE LEVEL Right 12/13/2017    Procedure: FORAMINOTOMY CERVICAL POSTERIOR MINIMALLY INVASIVE ONE LEVEL;  Right Minimally Invasive Posterior Cervical 4-5 Foraminotomy;  Surgeon: Byron Barclay MD;  Location: [facility] OR     FUSION CERVICAL ANTERIOR ONE LEVEL N/A 08/06/2018    unspecified cervical fusion     MAMMOPLASTY REDUCTION  1980     MAMMOPLASTY REDUCTION BILATERAL Bilateral 1980     Partial meniscectomy Left 05/17/2016     MO KNEE SCOPE,MED/LAT MENISCUS REPAIR Left       Allergies   Allergen Reactions     Dilaudid [Hydromorphone] GI Disturbance     Morphine Nausea and Vomiting     Oxycodone Nausea and Vomiting     Tramadol Nausea and Vomiting        Anesthesia Evaluation     . Pt has had prior anesthetic. Type: General.    History of anesthetic complications   - PONV.        ROS/MED HX    ENT/Pulmonary: Comment: Easy; Direct laryngoscopy; Straight; Oral; 03/13/19; 1251 (created via procedure documentation); 7 mm; Yes; Fisher; 2; 1; 1; End tidal CO2, Auscultation, Cuff palpation, Symmetrical chest wall movement; Pharynx clear, Atraumatic, Dentition unchanged; 22; Lips; Yes; " Soft; 0 (not difficult); CRNA; 03/13/19; 1359    (+) tobacco use (0.5 pack year history and quit in 1979), Past use, recent URI, resolved, COVID-19 positive 11/29/20. She reports head congestion and feeling neck pain. These are resolved. :     Neurologic:     (+) other neuro, Cervical radiculopathy. (-) no seizures, no CVA, no TIA and migraines   Cardiovascular:  - neg cardiovascular ROS   (+) -----Previous cardiac testing   Echo: Date: 4/11/19 Results:  Conclusion: Normal left ventricular chamber size. Normal left ventricular wall thickness. No regional wall motion abnormalities. Normal left ventricular systolic function. Estimated left ventricular ejection fraction is 65%. Normal right ventricular chamber size. Normal right ventricular systolic function. Estimated right ventricular systolic pressure is 32 mmHg. Aortic valve sclerosis without stenosis. Mild aortic valve regurgitation. Mildly thickened mitral valve. Trivial mitral valve regurgitation. Mild tricuspid valve regurgitation.  Stress Test: Date: Results:    ECG Reviewed: Date: 2016 Results:  SR  Cath: Date: Results:        METS/Exercise Tolerance:  >4 METS   Hematologic:  - neg hematologic  ROS    (-) history of blood clots, anemia and History of Transfusion   Musculoskeletal:   (+) arthritis, other musculoskeletal- DDD,       GI/Hepatic:  - neg GI/hepatic ROS    (-) GERD   Renal/Genitourinary:  - neg Renal ROS       Endo:     (+) Obesity,       Psychiatric:     (+) psychiatric history anxiety and depression    (-) chronic opioid use history   Infectious Disease:  - neg infectious disease ROS       Malignancy:       Other:    (+) , H/O Chronic Pain,                       PHYSICAL EXAM:   Mental Status/Neuro:    Airway: Facies: Feasible  Mallampati: II  Mouth/Opening: Full  TM distance: > 6 cm  Neck ROM: Limited   Respiratory: Auscultation: CTAB     Resp. Rate: Normal     Resp. Effort: Normal      CV: Rhythm: Regular  Heart: Murmur  Edema: None  Pulses:  Normal  Neck: Normal   Comments: Very soft murmur                     JZG FV AN PLAN NO PONV RULE    [unfilled]  PAC Discussion and Assessment    ASA Classification: 2  [place] [facility] [place]  Anesthetic techniques and relevant risks discussed: GA                  PAC Resident/NP Anesthesia Assessment: Layla is a 60 year old woman who is scheduled for Posterior instrumented spinal fusion cervical 4 to thoracic 1; Revision foraminotomies/facetectomies right cervical 4-6 (2 levels); removal of laminoplasty plates cervical 4-7; right or left posterior iliac crest bone graft harvest on 2/19/21 by Dr. Waite in treatment of cervical radiculopathy and spondylosis, s/p spinal fusio.  PAC referral for risk assessment and optimization for anesthesia with comorbid conditions of former smoker, SHAHIDA, depression, arthritis and obesity:    Pre-operative considerations:  1.  Cardiac:  Functional status- METS >4, the patient bikes on the recumbent bike daily. She denies any cardiac symptoms. She had a normal sinus EKG in 2016 and a recent Echo in 2019 with EF65% and no wall motion abnormalities. No further cardiac testing indicated. .  Intermediate risk surgery with 0.4% (RCRI #) risk of major adverse cardiac event.   2.  Pulm:  Airway feasible.  MARIA L risk: Low (age)  ~ Former smoker - 0.5 pack year history and quit in 1979  3. Endo: Obesity, BMI 32 - consideration for safe lifting techniques.   4. GI:  Risk of PONV score = 4.  If > 2, anti-emetic intervention recommended. Patient reports scopolamine patch was helpful in the past.   5. Psych: SHAHIDA/ depression - not on medications.   6. Musculoskeletal: arthritis/ cervical stenosis - procedure as above.   7. ID: The patient was COVID-19 positive on 11/29/20. She reports head congestion and feeling neck pain. These are resolved except her ongoing neck issues. She will be within the 90 day time period for her surgery so doesn't need repeat COVID-19 test for surgery.      VTE risk: 0.26%    Patient is optimized and is acceptable candidate for the proposed procedure.  No further diagnostic evaluation is needed.     For further details of assessment, testing, and physical exam please see H and P completed on same date.    Sudha Blackmon PA-C        Mid-Level Provider/Resident: Sudha Blackmon PA-C  Date: 1/25/21                                  Sudha Blackmon PA-C

## 2021-01-26 ENCOUNTER — MYC MEDICAL ADVICE (OUTPATIENT)
Dept: ORTHOPEDICS | Facility: CLINIC | Age: 61
End: 2021-01-26

## 2021-01-26 NOTE — TELEPHONE ENCOUNTER
See my Chart message & attachment in message with preop Questions.    I called pt & told her now that surgery date has been set & since all those questions are procedural Questions about the Spine surgery that only  can answer, I advised she have a RTN Video appt with  to get them answered & be sure she really understands the surgery & she agreed.  appt made for next week. Reviewed preop teaching again & answered more questions.   Call back prn pt agreed.  Kellie Castillo RN.

## 2021-01-28 ENCOUNTER — RECORDS - HEALTHEAST (OUTPATIENT)
Dept: BONE DENSITY | Facility: CLINIC | Age: 61
End: 2021-01-28

## 2021-01-28 ENCOUNTER — RECORDS - HEALTHEAST (OUTPATIENT)
Dept: ADMINISTRATIVE | Facility: OTHER | Age: 61
End: 2021-01-28

## 2021-01-28 DIAGNOSIS — Z78.0 ASYMPTOMATIC MENOPAUSAL STATE: ICD-10-CM

## 2021-02-02 ENCOUNTER — VIRTUAL VISIT (OUTPATIENT)
Dept: ORTHOPEDICS | Facility: CLINIC | Age: 61
End: 2021-02-02
Payer: COMMERCIAL

## 2021-02-02 DIAGNOSIS — M54.12 CERVICAL RADICULOPATHY: Primary | ICD-10-CM

## 2021-02-02 DIAGNOSIS — Z98.1 S/P CERVICAL SPINAL FUSION: ICD-10-CM

## 2021-02-02 DIAGNOSIS — M47.812 CERVICAL SPONDYLOSIS: ICD-10-CM

## 2021-02-02 PROCEDURE — 99214 OFFICE O/P EST MOD 30 MIN: CPT | Mod: 95 | Performed by: ORTHOPAEDIC SURGERY

## 2021-02-02 NOTE — PROGRESS NOTES
"Spine Surgical Hx:  09/09/2016 - Laminoplasty C5-C7 (3 levels); foraminotomies R C4-5, Bilateral C5-6 and C6-7 ([facility]).  [Implants: Synthes maxillofacial titanium plate].  12/13/2017 - MIS R foraminotomy C4-5 ([facility], [facility]).  08/06/2018 - ACDF with ant plate fixation C5-6; use of [place] DBM putty (SARABJIT Lopez).  [Implants: Spinal USA (Ascend) plate; Titan spine titanium cage].  03/13/2019 - ACDF C4-5 and C6-7, with ant plate fixation C4-C7; removal of plate C4-5; use of Progenix Plus DMB putty ([facility]). [Implants: [place] aviator plate; tritanium cages].      VIRTUAL VISIT:  Patient is evaluated today via billable virtual visit.    The patient has been notified of following:   \"This virtual visit will be conducted via a call between you and your physician/provider. We have found that certain health care needs can be provided without the need for an in-person physical exam.  This service lets us provide the care you need with a virtual conversation.  If a prescription is necessary we can send it directly to your pharmacy.  If lab work is needed we can place an order for that and you can then stop by our lab to have the test done at a later time.  If during the course of the call the physician/provider feels a virtual visit is not appropriate, you will not be charged for this service.\"     Physician has received verbal consent for a Virtual Visit from the patient.  Platform used:  [employer] Video  Time:  2:24pm to 2:53pm  Originating Location (pt. Location): Home  Distant Location (provider location):  [facility]     Chief Complaint   Patient presents with     RECHECK     Discuss surgery questions.        Last Visit Date: 1/12/21  Previous Impression:  1.  Residual right foraminal stenosis C4-5 and C5-6, with positive response (50% relief) to R C5 SNRB (1/5/2021, Dr. Au).  2.  Subjacent level spondylosis C7-T1.  3.  Chronic right-sided " neck and shoulder pain.  4.  Multiple cervical spine surgeries as above, including ACDF C4-C7, laminoplasties C5-C7, R foraminotomy C4-5.  Previous Plan:  - Case request:  PISF C4-T1; Revision foraminotomies Right C4-5 and C5-6; Removal of laminoplasty plates C4-C7.  - PAC referral.      S>  60 year old female, RHD, here to have her questions re surgery answered.    Surgery currently scheduled 2/19/21.  Per patient, sxs seems to have gotten worse.  Seems to be getting zingers down her R shoulder, which she had not been getting before.    Has also had previous postop R C5 palsy; cannot raise her arm after surgery; took 8 months to recover.  Now can abduct R shoulder as strong as the left.    Had many other questions re the surgery.      Neck Disability Index (NDI) Questionnaire    Neck Disability Index (NDI) 1/11/2021   Neck Disability Index: Count 10   NDI: Total Score = SUM (points for all 10 findings) 40   Neck Disability in Percent = (Total Score) / 50 * 100 80 (%)   Some recent data might be hidden      NDI 1/11/21 80%       Physical Examination:    This was a virtual visit, so very limited exam could be performed.  Patient seemed alert, oriented x 3, cooperative, with coherent speech, and not in distress.  Able to respond to questions appropriately and follow instructions.    Imaging:    No new x-rays.    Assessment:    1.  Residual right foraminal stenosis C4-5 and C5-6, with positive response (50% relief) to R C5 SNRB (1/5/2021, Dr. Au).  2.  Subjacent level spondylosis C7-T1.  3.  Chronic right-sided neck and shoulder pain.  4.  Multiple cervical spine surgeries as above, including ACDF C4-C7, laminoplasties C5-C7, R foraminotomy C4-5.    Plan:    Had good long discussion with patient.  Answered all her multiple questions.  She had multiple questions regarding the surgical procedure, for example, the rationale for posterior instrumentation, for posterior fusion C7-T1, etc.  We again discussed the  rationale for removing the laminoplasty plates, now that the laminoplasty has healed.  We also discussed the rationale for the C4-5 revision foraminotomy on the right side.  This, I believe is the primary pain generator, and is the most important part of the surgery.  That said, she has history of prior postoperative C5 palsy, and admittedly is at high risk for incurring another such complication.  Last time around, it took her 8 months to recover.  We will definitely try to be careful, and we will also be using intraoperative EMG monitoring for the C5 nerve root.  However, her risk for another C5 palsy is definitely much higher compared to someone who does not have such history.    Will prescribe [place]-J collar x 4-6 wks, then soft collar from comfort.  She still has her old Orthofix stimulator.  She requests if we could prescribe another.  Patient requests to be given the implants that will be removed (laminoplasty plates).      Stevo Waite MD    Orthopaedic Spine Surgery  Dept Orthopaedic Surgery, [facility]  [phone] office, [phone] pager  [email or web address]

## 2021-02-02 NOTE — LETTER
"    2/2/2021         RE: Layla Starks  [address] [facility] [address]        Dear Colleague,    Thank you for referring your patient, Layla Starks, to the [facility]. Please see a copy of my visit note below.    Spine Surgical Hx:  09/09/2016 - Laminoplasty C5-C7 (3 levels); foraminotomies R C4-5, Bilateral C5-6 and C6-7 ([facility]).  [Implants: Synthes maxillofacial titanium plate].  12/13/2017 - MIS R foraminotomy C4-5 ([facility], [facility]).  08/06/2018 - ACDF with ant plate fixation C5-6; use of [place] DBM putty (SARABJIT Lopez).  [Implants: Spinal USA (Ascend) plate; Titan spine titanium cage].  03/13/2019 - ACDF C4-5 and C6-7, with ant plate fixation C4-C7; removal of plate C4-5; use of Progenix Plus DMB putty ([facility]). [Implants: [place] aviator plate; tritanium cages].      VIRTUAL VISIT:  Patient is evaluated today via billable virtual visit.    The patient has been notified of following:   \"This virtual visit will be conducted via a call between you and your physician/provider. We have found that certain health care needs can be provided without the need for an in-person physical exam.  This service lets us provide the care you need with a virtual conversation.  If a prescription is necessary we can send it directly to your pharmacy.  If lab work is needed we can place an order for that and you can then stop by our lab to have the test done at a later time.  If during the course of the call the physician/provider feels a virtual visit is not appropriate, you will not be charged for this service.\"     Physician has received verbal consent for a Virtual Visit from the patient.  Platform used:  [employer] Video  Time:  2:24pm to 2:53pm  Originating Location (pt. Location): Home  Distant Location (provider location):  [facility]     Chief Complaint   Patient presents with     RECHECK     Discuss " surgery questions.        Last Visit Date: 1/12/21  Previous Impression:  1.  Residual right foraminal stenosis C4-5 and C5-6, with positive response (50% relief) to R C5 SNRB (1/5/2021, Dr. Au).  2.  Subjacent level spondylosis C7-T1.  3.  Chronic right-sided neck and shoulder pain.  4.  Multiple cervical spine surgeries as above, including ACDF C4-C7, laminoplasties C5-C7, R foraminotomy C4-5.  Previous Plan:  - Case request:  PISF C4-T1; Revision foraminotomies Right C4-5 and C5-6; Removal of laminoplasty plates C4-C7.  - PAC referral.      S>  60 year old female, RHD, here to have her questions re surgery answered.    Surgery currently scheduled 2/19/21.  Per patient, sxs seems to have gotten worse.  Seems to be getting zingers down her R shoulder, which she had not been getting before.    Has also had previous postop R C5 palsy; cannot raise her arm after surgery; took 8 months to recover.  Now can abduct R shoulder as strong as the left.    Had many other questions re the surgery.      Neck Disability Index (NDI) Questionnaire    Neck Disability Index (NDI) 1/11/2021   Neck Disability Index: Count 10   NDI: Total Score = SUM (points for all 10 findings) 40   Neck Disability in Percent = (Total Score) / 50 * 100 80 (%)   Some recent data might be hidden      NDI 1/11/21 80%       Physical Examination:    This was a virtual visit, so very limited exam could be performed.  Patient seemed alert, oriented x 3, cooperative, with coherent speech, and not in distress.  Able to respond to questions appropriately and follow instructions.    Imaging:    No new x-rays.    Assessment:    1.  Residual right foraminal stenosis C4-5 and C5-6, with positive response (50% relief) to R C5 SNRB (1/5/2021, Dr. Au).  2.  Subjacent level spondylosis C7-T1.  3.  Chronic right-sided neck and shoulder pain.  4.  Multiple cervical spine surgeries as above, including ACDF C4-C7, laminoplasties C5-C7, R foraminotomy C4-5.    Plan:     Had good long discussion with patient.  Answered all her multiple questions.  She had multiple questions regarding the surgical procedure, for example, the rationale for posterior instrumentation, for posterior fusion C7-T1, etc.  We again discussed the rationale for removing the laminoplasty plates, now that the laminoplasty has healed.  We also discussed the rationale for the C4-5 revision foraminotomy on the right side.  This, I believe is the primary pain generator, and is the most important part of the surgery.  That said, she has history of prior postoperative C5 palsy, and admittedly is at high risk for incurring another such complication.  Last time around, it took her 8 months to recover.  We will definitely try to be careful, and we will also be using intraoperative EMG monitoring for the C5 nerve root.  However, her risk for another C5 palsy is definitely much higher compared to someone who does not have such history.    Will prescribe [place]-J collar x 4-6 wks, then soft collar from comfort.  She still has her old Orthofix stimulator.  She requests if we could prescribe another.  Patient requests to be given the implants that will be removed (laminoplasty plates).      Stevo Waite MD    Orthopaedic Spine Surgery  Dept Orthopaedic Surgery, [facility]  [phone] office, [phone] pager  [email or web address]

## 2021-02-19 ENCOUNTER — HOSPITAL ENCOUNTER (INPATIENT)
Facility: CLINIC | Age: 61
LOS: 5 days | Discharge: HOME HEALTH | End: 2021-02-24
Attending: ORTHOPAEDIC SURGERY | Admitting: ORTHOPAEDIC SURGERY
Payer: COMMERCIAL

## 2021-02-19 ENCOUNTER — APPOINTMENT (OUTPATIENT)
Dept: GENERAL RADIOLOGY | Facility: CLINIC | Age: 61
End: 2021-02-19
Attending: ORTHOPAEDIC SURGERY
Payer: COMMERCIAL

## 2021-02-19 ENCOUNTER — ANESTHESIA EVENT (OUTPATIENT)
Dept: SURGERY | Facility: CLINIC | Age: 61
End: 2021-02-19
Payer: COMMERCIAL

## 2021-02-19 ENCOUNTER — ANESTHESIA (OUTPATIENT)
Dept: SURGERY | Facility: CLINIC | Age: 61
End: 2021-02-19
Payer: COMMERCIAL

## 2021-02-19 DIAGNOSIS — M54.12 CERVICAL RADICULOPATHY: ICD-10-CM

## 2021-02-19 DIAGNOSIS — M47.812 CERVICAL SPONDYLOSIS: ICD-10-CM

## 2021-02-19 DIAGNOSIS — Z98.1 S/P CERVICAL SPINAL FUSION: Primary | ICD-10-CM

## 2021-02-19 DIAGNOSIS — Z98.1 S/P CERVICAL SPINAL FUSION: ICD-10-CM

## 2021-02-19 LAB
ABO + RH BLD: NORMAL
ABO + RH BLD: NORMAL
BLD GP AB SCN SERPL QL: NORMAL
BLOOD BANK CMNT PATIENT-IMP: NORMAL
BLOOD BANK CMNT PATIENT-IMP: NORMAL
GLUCOSE BLDC GLUCOMTR-MCNC: 103 MG/DL (ref 70–99)
HCT VFR BLD AUTO: 39.7 % (ref 35–47)
HGB BLD-MCNC: 13.4 G/DL (ref 11.7–15.7)
SPECIMEN EXP DATE BLD: NORMAL

## 2021-02-19 PROCEDURE — 272N000004 HC RX 272: Performed by: ORTHOPAEDIC SURGERY

## 2021-02-19 PROCEDURE — 370N000017 HC ANESTHESIA TECHNICAL FEE, PER MIN: Performed by: ORTHOPAEDIC SURGERY

## 2021-02-19 PROCEDURE — 120N000002 HC R&B MED SURG/OB UMMC

## 2021-02-19 PROCEDURE — 0RG4071 FUSION OF CERVICOTHORACIC VERTEBRAL JOINT WITH AUTOLOGOUS TISSUE SUBSTITUTE, POSTERIOR APPROACH, POSTERIOR COLUMN, OPEN APPROACH: ICD-10-PCS | Performed by: ORTHOPAEDIC SURGERY

## 2021-02-19 PROCEDURE — 99207 PR CONSULT E&M CHANGED TO SUBSEQUENT LEVEL: CPT | Performed by: INTERNAL MEDICINE

## 2021-02-19 PROCEDURE — 250N000009 HC RX 250: Performed by: PHYSICIAN ASSISTANT

## 2021-02-19 PROCEDURE — 250N000011 HC RX IP 250 OP 636: Performed by: PHYSICIAN ASSISTANT

## 2021-02-19 PROCEDURE — 258N000003 HC RX IP 258 OP 636: Performed by: NURSE ANESTHETIST, CERTIFIED REGISTERED

## 2021-02-19 PROCEDURE — 999N000180 XR SURGERY CARM FLUORO LESS THAN 5 MIN: Mod: TC

## 2021-02-19 PROCEDURE — 250N000011 HC RX IP 250 OP 636: Performed by: ANESTHESIOLOGY

## 2021-02-19 PROCEDURE — 250N000011 HC RX IP 250 OP 636: Performed by: REGISTERED NURSE

## 2021-02-19 PROCEDURE — 250N000013 HC RX MED GY IP 250 OP 250 PS 637: Performed by: NURSE ANESTHETIST, CERTIFIED REGISTERED

## 2021-02-19 PROCEDURE — 360N000085 HC SURGERY LEVEL 5 W/ FLUORO, PER MIN: Performed by: ORTHOPAEDIC SURGERY

## 2021-02-19 PROCEDURE — 250N000013 HC RX MED GY IP 250 OP 250 PS 637: Performed by: PHYSICIAN ASSISTANT

## 2021-02-19 PROCEDURE — 0QB20ZZ EXCISION OF RIGHT PELVIC BONE, OPEN APPROACH: ICD-10-PCS | Performed by: ORTHOPAEDIC SURGERY

## 2021-02-19 PROCEDURE — 250N000013 HC RX MED GY IP 250 OP 250 PS 637: Performed by: ANESTHESIOLOGY

## 2021-02-19 PROCEDURE — 710N000010 HC RECOVERY PHASE 1, LEVEL 2, PER MIN: Performed by: ORTHOPAEDIC SURGERY

## 2021-02-19 PROCEDURE — 250N000011 HC RX IP 250 OP 636: Performed by: NURSE ANESTHETIST, CERTIFIED REGISTERED

## 2021-02-19 PROCEDURE — 250N000011 HC RX IP 250 OP 636: Performed by: ORTHOPAEDIC SURGERY

## 2021-02-19 PROCEDURE — 999N001017 HC STATISTIC GLUCOSE BY METER IP

## 2021-02-19 PROCEDURE — 0RP104Z REMOVAL OF INTERNAL FIXATION DEVICE FROM CERVICAL VERTEBRAL JOINT, OPEN APPROACH: ICD-10-PCS | Performed by: ORTHOPAEDIC SURGERY

## 2021-02-19 PROCEDURE — 258N000003 HC RX IP 258 OP 636: Performed by: PHYSICIAN ASSISTANT

## 2021-02-19 PROCEDURE — 258N000003 HC RX IP 258 OP 636: Performed by: ANESTHESIOLOGY

## 2021-02-19 PROCEDURE — 85014 HEMATOCRIT: CPT | Performed by: ORTHOPAEDIC SURGERY

## 2021-02-19 PROCEDURE — 250N000009 HC RX 250: Performed by: NURSE ANESTHETIST, CERTIFIED REGISTERED

## 2021-02-19 PROCEDURE — C1713 ANCHOR/SCREW BN/BN,TIS/BN: HCPCS | Performed by: ORTHOPAEDIC SURGERY

## 2021-02-19 PROCEDURE — 4A11X4G MONITORING OF PERIPHERAL NERVOUS ELECTRICAL ACTIVITY, INTRAOPERATIVE, EXTERNAL APPROACH: ICD-10-PCS | Performed by: ORTHOPAEDIC SURGERY

## 2021-02-19 PROCEDURE — 0RG2071 FUSION OF 2 OR MORE CERVICAL VERTEBRAL JOINTS WITH AUTOLOGOUS TISSUE SUBSTITUTE, POSTERIOR APPROACH, POSTERIOR COLUMN, OPEN APPROACH: ICD-10-PCS | Performed by: ORTHOPAEDIC SURGERY

## 2021-02-19 PROCEDURE — 250N000025 HC SEVOFLURANE, PER MIN: Performed by: ORTHOPAEDIC SURGERY

## 2021-02-19 PROCEDURE — P9041 ALBUMIN (HUMAN),5%, 50ML: HCPCS | Performed by: ANESTHESIOLOGY

## 2021-02-19 PROCEDURE — 250N000013 HC RX MED GY IP 250 OP 250 PS 637: Performed by: ORTHOPAEDIC SURGERY

## 2021-02-19 PROCEDURE — 8E09XBF COMPUTER ASSISTED PROCEDURE OF HEAD AND NECK REGION, WITH FLUOROSCOPY: ICD-10-PCS | Performed by: ORTHOPAEDIC SURGERY

## 2021-02-19 PROCEDURE — 01N10ZZ RELEASE CERVICAL NERVE, OPEN APPROACH: ICD-10-PCS | Performed by: ORTHOPAEDIC SURGERY

## 2021-02-19 PROCEDURE — 999N000141 HC STATISTIC PRE-PROCEDURE NURSING ASSESSMENT: Performed by: ORTHOPAEDIC SURGERY

## 2021-02-19 PROCEDURE — C1762 CONN TISS, HUMAN(INC FASCIA): HCPCS | Performed by: ORTHOPAEDIC SURGERY

## 2021-02-19 PROCEDURE — 85018 HEMOGLOBIN: CPT | Performed by: ORTHOPAEDIC SURGERY

## 2021-02-19 PROCEDURE — 250N000009 HC RX 250: Performed by: ORTHOPAEDIC SURGERY

## 2021-02-19 PROCEDURE — 272N000001 HC OR GENERAL SUPPLY STERILE: Performed by: ORTHOPAEDIC SURGERY

## 2021-02-19 PROCEDURE — 99231 SBSQ HOSP IP/OBS SF/LOW 25: CPT | Performed by: INTERNAL MEDICINE

## 2021-02-19 DEVICE — GRAFT BONE CRUSH CANC 15ML 400075: Type: IMPLANTABLE DEVICE | Site: SPINE CERVICAL | Status: FUNCTIONAL

## 2021-02-19 RX ORDER — METHOCARBAMOL 750 MG/1
750 TABLET, FILM COATED ORAL EVERY 6 HOURS PRN
Status: DISCONTINUED | OUTPATIENT
Start: 2021-02-19 | End: 2021-02-23

## 2021-02-19 RX ORDER — AMOXICILLIN 250 MG
2 CAPSULE ORAL 2 TIMES DAILY
Status: DISCONTINUED | OUTPATIENT
Start: 2021-02-19 | End: 2021-02-23

## 2021-02-19 RX ORDER — ACETAMINOPHEN 325 MG/1
975 TABLET ORAL ONCE
Status: COMPLETED | OUTPATIENT
Start: 2021-02-19 | End: 2021-02-19

## 2021-02-19 RX ORDER — ONDANSETRON 4 MG/1
4 TABLET, ORALLY DISINTEGRATING ORAL EVERY 6 HOURS PRN
Status: DISCONTINUED | OUTPATIENT
Start: 2021-02-19 | End: 2021-02-24 | Stop reason: HOSPADM

## 2021-02-19 RX ORDER — HYDROMORPHONE HYDROCHLORIDE 2 MG/1
2-4 TABLET ORAL
Status: DISCONTINUED | OUTPATIENT
Start: 2021-02-19 | End: 2021-02-19

## 2021-02-19 RX ORDER — MAGNESIUM SULFATE HEPTAHYDRATE 40 MG/ML
2 INJECTION, SOLUTION INTRAVENOUS ONCE
Status: COMPLETED | OUTPATIENT
Start: 2021-02-19 | End: 2021-02-19

## 2021-02-19 RX ORDER — GLYCOPYRROLATE 0.2 MG/ML
INJECTION, SOLUTION INTRAMUSCULAR; INTRAVENOUS PRN
Status: DISCONTINUED | OUTPATIENT
Start: 2021-02-19 | End: 2021-02-19

## 2021-02-19 RX ORDER — HYDROCODONE BITARTRATE AND ACETAMINOPHEN 5; 325 MG/1; MG/1
1-2 TABLET ORAL EVERY 6 HOURS PRN
Status: DISCONTINUED | OUTPATIENT
Start: 2021-02-19 | End: 2021-02-22

## 2021-02-19 RX ORDER — FENTANYL CITRATE 50 UG/ML
INJECTION, SOLUTION INTRAMUSCULAR; INTRAVENOUS PRN
Status: DISCONTINUED | OUTPATIENT
Start: 2021-02-19 | End: 2021-02-19

## 2021-02-19 RX ORDER — HYDROMORPHONE HYDROCHLORIDE 1 MG/ML
.3-.5 INJECTION, SOLUTION INTRAMUSCULAR; INTRAVENOUS; SUBCUTANEOUS
Status: DISCONTINUED | OUTPATIENT
Start: 2021-02-19 | End: 2021-02-24 | Stop reason: HOSPADM

## 2021-02-19 RX ORDER — SODIUM CHLORIDE, SODIUM LACTATE, POTASSIUM CHLORIDE, CALCIUM CHLORIDE 600; 310; 30; 20 MG/100ML; MG/100ML; MG/100ML; MG/100ML
INJECTION, SOLUTION INTRAVENOUS CONTINUOUS
Status: DISCONTINUED | OUTPATIENT
Start: 2021-02-19 | End: 2021-02-19 | Stop reason: HOSPADM

## 2021-02-19 RX ORDER — LIDOCAINE 4 G/G
1 PATCH TOPICAL
Status: DISCONTINUED | OUTPATIENT
Start: 2021-02-20 | End: 2021-02-22

## 2021-02-19 RX ORDER — LIDOCAINE 40 MG/G
CREAM TOPICAL
Status: DISCONTINUED | OUTPATIENT
Start: 2021-02-19 | End: 2021-02-24 | Stop reason: HOSPADM

## 2021-02-19 RX ORDER — ACETAMINOPHEN 325 MG/1
650 TABLET ORAL EVERY 4 HOURS PRN
Status: DISCONTINUED | OUTPATIENT
Start: 2021-02-22 | End: 2021-02-23

## 2021-02-19 RX ORDER — CALCIUM CARBONATE 500 MG/1
500 TABLET, CHEWABLE ORAL 4 TIMES DAILY PRN
Status: DISCONTINUED | OUTPATIENT
Start: 2021-02-19 | End: 2021-02-24 | Stop reason: HOSPADM

## 2021-02-19 RX ORDER — GABAPENTIN 300 MG/1
300 CAPSULE ORAL
Status: COMPLETED | OUTPATIENT
Start: 2021-02-19 | End: 2021-02-19

## 2021-02-19 RX ORDER — ACETAMINOPHEN 325 MG/1
975 TABLET ORAL EVERY 8 HOURS
Status: DISCONTINUED | OUTPATIENT
Start: 2021-02-19 | End: 2021-02-19

## 2021-02-19 RX ORDER — GABAPENTIN 300 MG/1
300 CAPSULE ORAL 2 TIMES DAILY
Status: COMPLETED | OUTPATIENT
Start: 2021-02-19 | End: 2021-02-22

## 2021-02-19 RX ORDER — HYDROXYZINE HYDROCHLORIDE 25 MG/1
25 TABLET, FILM COATED ORAL EVERY 6 HOURS PRN
Status: DISCONTINUED | OUTPATIENT
Start: 2021-02-19 | End: 2021-02-23

## 2021-02-19 RX ORDER — CEFAZOLIN SODIUM 1 G/3ML
1 INJECTION, POWDER, FOR SOLUTION INTRAMUSCULAR; INTRAVENOUS EVERY 8 HOURS
Status: COMPLETED | OUTPATIENT
Start: 2021-02-19 | End: 2021-02-20

## 2021-02-19 RX ORDER — FENTANYL CITRATE 50 UG/ML
25-50 INJECTION, SOLUTION INTRAMUSCULAR; INTRAVENOUS
Status: DISCONTINUED | OUTPATIENT
Start: 2021-02-19 | End: 2021-02-19 | Stop reason: HOSPADM

## 2021-02-19 RX ORDER — NALOXONE HYDROCHLORIDE 0.4 MG/ML
0.4 INJECTION, SOLUTION INTRAMUSCULAR; INTRAVENOUS; SUBCUTANEOUS
Status: ACTIVE | OUTPATIENT
Start: 2021-02-19 | End: 2021-02-20

## 2021-02-19 RX ORDER — PROPOFOL 10 MG/ML
INJECTION, EMULSION INTRAVENOUS CONTINUOUS PRN
Status: DISCONTINUED | OUTPATIENT
Start: 2021-02-19 | End: 2021-02-19

## 2021-02-19 RX ORDER — EPHEDRINE SULFATE 50 MG/ML
INJECTION, SOLUTION INTRAMUSCULAR; INTRAVENOUS; SUBCUTANEOUS PRN
Status: DISCONTINUED | OUTPATIENT
Start: 2021-02-19 | End: 2021-02-19

## 2021-02-19 RX ORDER — SCOLOPAMINE TRANSDERMAL SYSTEM 1 MG/1
1 PATCH, EXTENDED RELEASE TRANSDERMAL ONCE
Status: COMPLETED | OUTPATIENT
Start: 2021-02-19 | End: 2021-02-20

## 2021-02-19 RX ORDER — MAGNESIUM HYDROXIDE 1200 MG/15ML
LIQUID ORAL PRN
Status: DISCONTINUED | OUTPATIENT
Start: 2021-02-19 | End: 2021-02-19 | Stop reason: HOSPADM

## 2021-02-19 RX ORDER — DEXAMETHASONE SODIUM PHOSPHATE 4 MG/ML
INJECTION, SOLUTION INTRA-ARTICULAR; INTRALESIONAL; INTRAMUSCULAR; INTRAVENOUS; SOFT TISSUE PRN
Status: DISCONTINUED | OUTPATIENT
Start: 2021-02-19 | End: 2021-02-19

## 2021-02-19 RX ORDER — ALBUMIN, HUMAN INJ 5% 5 %
500 SOLUTION INTRAVENOUS ONCE
Status: COMPLETED | OUTPATIENT
Start: 2021-02-19 | End: 2021-02-19

## 2021-02-19 RX ORDER — PROCHLORPERAZINE MALEATE 10 MG
10 TABLET ORAL EVERY 6 HOURS PRN
Status: DISCONTINUED | OUTPATIENT
Start: 2021-02-19 | End: 2021-02-24 | Stop reason: HOSPADM

## 2021-02-19 RX ORDER — HYDROMORPHONE HYDROCHLORIDE 1 MG/ML
.3-.5 INJECTION, SOLUTION INTRAMUSCULAR; INTRAVENOUS; SUBCUTANEOUS
Status: DISCONTINUED | OUTPATIENT
Start: 2021-02-19 | End: 2021-02-19

## 2021-02-19 RX ORDER — ACETAMINOPHEN 325 MG/1
650 TABLET ORAL EVERY 8 HOURS
Status: DISPENSED | OUTPATIENT
Start: 2021-02-19 | End: 2021-02-22

## 2021-02-19 RX ORDER — NALOXONE HYDROCHLORIDE 0.4 MG/ML
0.2 INJECTION, SOLUTION INTRAMUSCULAR; INTRAVENOUS; SUBCUTANEOUS
Status: ACTIVE | OUTPATIENT
Start: 2021-02-19 | End: 2021-02-20

## 2021-02-19 RX ORDER — AMOXICILLIN 250 MG
1 CAPSULE ORAL 2 TIMES DAILY
Status: DISCONTINUED | OUTPATIENT
Start: 2021-02-19 | End: 2021-02-23

## 2021-02-19 RX ORDER — KETAMINE HYDROCHLORIDE 10 MG/ML
INJECTION INTRAMUSCULAR; INTRAVENOUS PRN
Status: DISCONTINUED | OUTPATIENT
Start: 2021-02-19 | End: 2021-02-19

## 2021-02-19 RX ORDER — ONDANSETRON 2 MG/ML
INJECTION INTRAMUSCULAR; INTRAVENOUS PRN
Status: DISCONTINUED | OUTPATIENT
Start: 2021-02-19 | End: 2021-02-19

## 2021-02-19 RX ORDER — ONDANSETRON 2 MG/ML
4 INJECTION INTRAMUSCULAR; INTRAVENOUS EVERY 6 HOURS PRN
Status: DISCONTINUED | OUTPATIENT
Start: 2021-02-19 | End: 2021-02-24 | Stop reason: HOSPADM

## 2021-02-19 RX ORDER — LIDOCAINE HYDROCHLORIDE 20 MG/ML
INJECTION, SOLUTION INFILTRATION; PERINEURAL PRN
Status: DISCONTINUED | OUTPATIENT
Start: 2021-02-19 | End: 2021-02-19

## 2021-02-19 RX ORDER — ONDANSETRON 2 MG/ML
4 INJECTION INTRAMUSCULAR; INTRAVENOUS EVERY 30 MIN PRN
Status: DISCONTINUED | OUTPATIENT
Start: 2021-02-19 | End: 2021-02-19 | Stop reason: HOSPADM

## 2021-02-19 RX ORDER — BUPIVACAINE HYDROCHLORIDE AND EPINEPHRINE 2.5; 5 MG/ML; UG/ML
INJECTION, SOLUTION INFILTRATION; PERINEURAL PRN
Status: DISCONTINUED | OUTPATIENT
Start: 2021-02-19 | End: 2021-02-19 | Stop reason: HOSPADM

## 2021-02-19 RX ORDER — CEFAZOLIN SODIUM 2 G/100ML
2 INJECTION, SOLUTION INTRAVENOUS
Status: COMPLETED | OUTPATIENT
Start: 2021-02-19 | End: 2021-02-19

## 2021-02-19 RX ORDER — HYDROMORPHONE HYDROCHLORIDE 1 MG/ML
.3-.5 INJECTION, SOLUTION INTRAMUSCULAR; INTRAVENOUS; SUBCUTANEOUS EVERY 5 MIN PRN
Status: DISCONTINUED | OUTPATIENT
Start: 2021-02-19 | End: 2021-02-19 | Stop reason: HOSPADM

## 2021-02-19 RX ORDER — IBUPROFEN 200 MG
950 CAPSULE ORAL EVERY MORNING
Status: DISCONTINUED | OUTPATIENT
Start: 2021-02-20 | End: 2021-02-24 | Stop reason: HOSPADM

## 2021-02-19 RX ORDER — FAMOTIDINE 20 MG/1
20 TABLET, FILM COATED ORAL 2 TIMES DAILY
Status: DISCONTINUED | OUTPATIENT
Start: 2021-02-19 | End: 2021-02-24 | Stop reason: HOSPADM

## 2021-02-19 RX ORDER — POLYETHYLENE GLYCOL 3350 17 G/17G
17 POWDER, FOR SOLUTION ORAL DAILY
Status: DISCONTINUED | OUTPATIENT
Start: 2021-02-19 | End: 2021-02-23

## 2021-02-19 RX ORDER — ONDANSETRON 4 MG/1
4 TABLET, ORALLY DISINTEGRATING ORAL EVERY 30 MIN PRN
Status: DISCONTINUED | OUTPATIENT
Start: 2021-02-19 | End: 2021-02-19 | Stop reason: HOSPADM

## 2021-02-19 RX ORDER — SODIUM CHLORIDE, SODIUM LACTATE, POTASSIUM CHLORIDE, CALCIUM CHLORIDE 600; 310; 30; 20 MG/100ML; MG/100ML; MG/100ML; MG/100ML
INJECTION, SOLUTION INTRAVENOUS CONTINUOUS PRN
Status: DISCONTINUED | OUTPATIENT
Start: 2021-02-19 | End: 2021-02-19

## 2021-02-19 RX ORDER — PROPOFOL 10 MG/ML
INJECTION, EMULSION INTRAVENOUS PRN
Status: DISCONTINUED | OUTPATIENT
Start: 2021-02-19 | End: 2021-02-19

## 2021-02-19 RX ORDER — CEFAZOLIN SODIUM 1 G/3ML
1 INJECTION, POWDER, FOR SOLUTION INTRAMUSCULAR; INTRAVENOUS SEE ADMIN INSTRUCTIONS
Status: DISCONTINUED | OUTPATIENT
Start: 2021-02-19 | End: 2021-02-19 | Stop reason: HOSPADM

## 2021-02-19 RX ADMIN — PHENYLEPHRINE HYDROCHLORIDE 100 MCG: 10 INJECTION INTRAVENOUS at 12:38

## 2021-02-19 RX ADMIN — HYDROMORPHONE HYDROCHLORIDE 0.25 MG: 1 INJECTION, SOLUTION INTRAMUSCULAR; INTRAVENOUS; SUBCUTANEOUS at 11:37

## 2021-02-19 RX ADMIN — ALBUMIN HUMAN 500 ML: 0.05 INJECTION, SOLUTION INTRAVENOUS at 14:47

## 2021-02-19 RX ADMIN — LIDOCAINE HYDROCHLORIDE 80 MG: 20 INJECTION, SOLUTION INFILTRATION; PERINEURAL at 07:54

## 2021-02-19 RX ADMIN — PHENYLEPHRINE HYDROCHLORIDE 0.2 MCG/KG/MIN: 10 INJECTION INTRAVENOUS at 09:51

## 2021-02-19 RX ADMIN — PHENYLEPHRINE HYDROCHLORIDE 100 MCG: 10 INJECTION INTRAVENOUS at 10:44

## 2021-02-19 RX ADMIN — Medication 20 MG: at 12:19

## 2021-02-19 RX ADMIN — FAMOTIDINE 20 MG: 20 TABLET, FILM COATED ORAL at 19:11

## 2021-02-19 RX ADMIN — SODIUM CHLORIDE, POTASSIUM CHLORIDE, SODIUM LACTATE AND CALCIUM CHLORIDE: 600; 310; 30; 20 INJECTION, SOLUTION INTRAVENOUS at 10:13

## 2021-02-19 RX ADMIN — CEFAZOLIN 1 G: 1 INJECTION, POWDER, FOR SOLUTION INTRAMUSCULAR; INTRAVENOUS at 20:49

## 2021-02-19 RX ADMIN — HYDROMORPHONE HYDROCHLORIDE 0.3 MG: 1 INJECTION, SOLUTION INTRAMUSCULAR; INTRAVENOUS; SUBCUTANEOUS at 23:09

## 2021-02-19 RX ADMIN — Medication 2 G: at 08:26

## 2021-02-19 RX ADMIN — FENTANYL CITRATE 50 MCG: 50 INJECTION, SOLUTION INTRAMUSCULAR; INTRAVENOUS at 15:25

## 2021-02-19 RX ADMIN — TRANEXAMIC ACID 2670 MG: 100 INJECTION, SOLUTION INTRAVENOUS at 08:26

## 2021-02-19 RX ADMIN — ROCURONIUM BROMIDE 30 MG: 10 INJECTION INTRAVENOUS at 08:45

## 2021-02-19 RX ADMIN — ACETAMINOPHEN 650 MG: 325 TABLET, FILM COATED ORAL at 23:05

## 2021-02-19 RX ADMIN — HYDROMORPHONE HYDROCHLORIDE 0.3 MG: 1 INJECTION, SOLUTION INTRAMUSCULAR; INTRAVENOUS; SUBCUTANEOUS at 19:07

## 2021-02-19 RX ADMIN — FENTANYL CITRATE 50 MCG: 50 INJECTION, SOLUTION INTRAMUSCULAR; INTRAVENOUS at 15:35

## 2021-02-19 RX ADMIN — ONDANSETRON 4 MG: 2 INJECTION INTRAMUSCULAR; INTRAVENOUS at 16:46

## 2021-02-19 RX ADMIN — SODIUM CHLORIDE, POTASSIUM CHLORIDE, SODIUM LACTATE AND CALCIUM CHLORIDE: 600; 310; 30; 20 INJECTION, SOLUTION INTRAVENOUS at 07:43

## 2021-02-19 RX ADMIN — HYDROMORPHONE HYDROCHLORIDE 0.3 MG: 1 INJECTION, SOLUTION INTRAMUSCULAR; INTRAVENOUS; SUBCUTANEOUS at 16:15

## 2021-02-19 RX ADMIN — PHENYLEPHRINE HYDROCHLORIDE 50 MCG: 10 INJECTION INTRAVENOUS at 10:18

## 2021-02-19 RX ADMIN — HYDROMORPHONE HYDROCHLORIDE 0.5 MG: 1 INJECTION, SOLUTION INTRAMUSCULAR; INTRAVENOUS; SUBCUTANEOUS at 16:52

## 2021-02-19 RX ADMIN — ACETAMINOPHEN 975 MG: 325 TABLET, FILM COATED ORAL at 15:58

## 2021-02-19 RX ADMIN — PROPOFOL 50 MG: 10 INJECTION, EMULSION INTRAVENOUS at 08:15

## 2021-02-19 RX ADMIN — PHENYLEPHRINE HYDROCHLORIDE 100 MCG: 10 INJECTION INTRAVENOUS at 10:30

## 2021-02-19 RX ADMIN — SODIUM CHLORIDE, SODIUM LACTATE, POTASSIUM CHLORIDE, CALCIUM CHLORIDE: 600; 310; 30; 20 INJECTION, SOLUTION INTRAVENOUS at 08:26

## 2021-02-19 RX ADMIN — MIDAZOLAM 2 MG: 1 INJECTION INTRAMUSCULAR; INTRAVENOUS at 07:43

## 2021-02-19 RX ADMIN — DOCUSATE SODIUM AND SENNOSIDES 1 TABLET: 8.6; 5 TABLET ORAL at 23:05

## 2021-02-19 RX ADMIN — PROCHLORPERAZINE EDISYLATE 10 MG: 5 INJECTION INTRAMUSCULAR; INTRAVENOUS at 20:04

## 2021-02-19 RX ADMIN — TRANEXAMIC ACID 10 MG/KG/HR: 100 INJECTION, SOLUTION INTRAVENOUS at 08:43

## 2021-02-19 RX ADMIN — POLYETHYLENE GLYCOL 400 AND PROPYLENE GLYCOL 2 DROP: 4; 3 SOLUTION/ DROPS OPHTHALMIC at 07:56

## 2021-02-19 RX ADMIN — GABAPENTIN 300 MG: 300 CAPSULE ORAL at 19:11

## 2021-02-19 RX ADMIN — Medication 10 MG: at 08:48

## 2021-02-19 RX ADMIN — Medication 100 MG: at 07:55

## 2021-02-19 RX ADMIN — GLYCOPYRROLATE 0.2 MG: 0.2 INJECTION, SOLUTION INTRAMUSCULAR; INTRAVENOUS at 09:07

## 2021-02-19 RX ADMIN — PHENYLEPHRINE HYDROCHLORIDE 100 MCG: 10 INJECTION INTRAVENOUS at 09:21

## 2021-02-19 RX ADMIN — PHENYLEPHRINE HYDROCHLORIDE 200 MCG: 10 INJECTION INTRAVENOUS at 09:53

## 2021-02-19 RX ADMIN — Medication 10 MG: at 09:30

## 2021-02-19 RX ADMIN — HYDROMORPHONE HYDROCHLORIDE 0.4 MG: 1 INJECTION, SOLUTION INTRAMUSCULAR; INTRAVENOUS; SUBCUTANEOUS at 17:09

## 2021-02-19 RX ADMIN — PHENYLEPHRINE HYDROCHLORIDE 200 MCG: 10 INJECTION INTRAVENOUS at 09:39

## 2021-02-19 RX ADMIN — PROPOFOL 30 MG: 10 INJECTION, EMULSION INTRAVENOUS at 13:46

## 2021-02-19 RX ADMIN — Medication 10 MG: at 08:32

## 2021-02-19 RX ADMIN — Medication 40 MG: at 07:54

## 2021-02-19 RX ADMIN — SCOPALAMINE 1 PATCH: 1 PATCH, EXTENDED RELEASE TRANSDERMAL at 06:31

## 2021-02-19 RX ADMIN — Medication 10 MG: at 11:47

## 2021-02-19 RX ADMIN — PHENYLEPHRINE HYDROCHLORIDE 100 MCG: 10 INJECTION INTRAVENOUS at 13:48

## 2021-02-19 RX ADMIN — PHENYLEPHRINE HYDROCHLORIDE 50 MCG: 10 INJECTION INTRAVENOUS at 10:22

## 2021-02-19 RX ADMIN — SODIUM CHLORIDE, POTASSIUM CHLORIDE, SODIUM LACTATE AND CALCIUM CHLORIDE: 600; 310; 30; 20 INJECTION, SOLUTION INTRAVENOUS at 12:15

## 2021-02-19 RX ADMIN — Medication 1 G: at 10:42

## 2021-02-19 RX ADMIN — ACETAMINOPHEN 975 MG: 325 TABLET, FILM COATED ORAL at 06:31

## 2021-02-19 RX ADMIN — HYDROMORPHONE HYDROCHLORIDE 0.3 MG: 1 INJECTION, SOLUTION INTRAMUSCULAR; INTRAVENOUS; SUBCUTANEOUS at 16:06

## 2021-02-19 RX ADMIN — Medication 10 MG: at 09:03

## 2021-02-19 RX ADMIN — DIAZEPAM 2 MG: 5 SOLUTION ORAL at 17:59

## 2021-02-19 RX ADMIN — Medication 1 G: at 12:40

## 2021-02-19 RX ADMIN — Medication 10 MG: at 10:33

## 2021-02-19 RX ADMIN — HYDROMORPHONE HYDROCHLORIDE 0.5 MG: 1 INJECTION, SOLUTION INTRAMUSCULAR; INTRAVENOUS; SUBCUTANEOUS at 16:36

## 2021-02-19 RX ADMIN — PHENYLEPHRINE HYDROCHLORIDE 50 MCG: 10 INJECTION INTRAVENOUS at 11:56

## 2021-02-19 RX ADMIN — MAGNESIUM SULFATE 2 G: 2 INJECTION INTRAVENOUS at 08:26

## 2021-02-19 RX ADMIN — GABAPENTIN 300 MG: 300 CAPSULE ORAL at 06:31

## 2021-02-19 RX ADMIN — HYDROMORPHONE HYDROCHLORIDE 0.25 MG: 1 INJECTION, SOLUTION INTRAMUSCULAR; INTRAVENOUS; SUBCUTANEOUS at 10:40

## 2021-02-19 RX ADMIN — FENTANYL CITRATE 100 MCG: 50 INJECTION, SOLUTION INTRAMUSCULAR; INTRAVENOUS at 07:56

## 2021-02-19 RX ADMIN — ONDANSETRON 4 MG: 2 INJECTION INTRAMUSCULAR; INTRAVENOUS at 12:42

## 2021-02-19 RX ADMIN — PROPOFOL 150 MG: 10 INJECTION, EMULSION INTRAVENOUS at 07:55

## 2021-02-19 RX ADMIN — Medication 5 MG: at 09:22

## 2021-02-19 RX ADMIN — Medication 10 MG: at 08:24

## 2021-02-19 RX ADMIN — DEXAMETHASONE SODIUM PHOSPHATE 4 MG: 4 INJECTION, SOLUTION INTRAMUSCULAR; INTRAVENOUS at 08:34

## 2021-02-19 RX ADMIN — PROPOFOL 100 MCG/KG/MIN: 10 INJECTION, EMULSION INTRAVENOUS at 08:26

## 2021-02-19 RX ADMIN — Medication 10 MG: at 11:30

## 2021-02-19 RX ADMIN — SUGAMMADEX 200 MG: 100 INJECTION, SOLUTION INTRAVENOUS at 13:50

## 2021-02-19 ASSESSMENT — MIFFLIN-ST. JEOR: SCORE: 1489.88

## 2021-02-19 NOTE — ANESTHESIA PREPROCEDURE EVALUATION
Anesthesia Pre-Procedure Evaluation    Patient: Layla Starks   MRN: [id number] : 1960        Preoperative Diagnosis: Cervical radiculopathy [M54.12]  Cervical spondylosis [M47.812]  S/P cervical spinal fusion [Z98.1]   Procedure : Procedure(s):  Posterior Instrumented Spinal Fusion Cervical 4 to Thoracic 1; Revision Foraminotomies/Factectomies Right Cervical 4-6 (2 levels);  Removal of Laminoplasty Plates Cervical 4-7;  Right or Left Posterior Iliac Crest Bone Graft [place]     Past Medical History:   Diagnosis Date     Cervical disc disorder with radiculopathy of cervical region 2017     Cervical spinal stenosis      DDD (degenerative disc disease), cervical      Depression      SHAHIDA (generalized anxiety disorder)      OA (osteoarthritis)      Obesity      Personal history of covid-19      PONV (postoperative nausea and vomiting)       Past Surgical History:   Procedure Laterality Date     AS HYSTEROSCOPY, SURGICAL; W/ ENDOMETRIAL ABLATION, ANY METHOD N/A      C5-7 laminoplasty Right 2016     COLONOSCOPY  2016     DECOMPRESSION, FUSION CERVICAL ANTERIOR THREE+ LEVELS, COMBINED N/A 2019    C4-5, C6-7, C5-6 with hardware removal     Excision anal skin tags N/A 2010     FORAMINOTOMY CERVICAL POSTERIOR MINIMALLY INVASIVE ONE LEVEL Right 2017    Procedure: FORAMINOTOMY CERVICAL POSTERIOR MINIMALLY INVASIVE ONE LEVEL;  Right Minimally Invasive Posterior Cervical 4-5 Foraminotomy;  Surgeon: Byron Barclay MD;  Location: [facility] OR     FUSION CERVICAL ANTERIOR ONE LEVEL N/A 2018    unspecified cervical fusion     MAMMOPLASTY REDUCTION       MAMMOPLASTY REDUCTION BILATERAL Bilateral      Partial meniscectomy Left 2016     CT KNEE SCOPE,MED/LAT MENISCUS REPAIR Left       Allergies   Allergen Reactions     Dilaudid [Hydromorphone] GI Disturbance     Per patient, tolerates IV version well.     Morphine Nausea and Vomiting     Oxycodone Nausea and Vomiting      Tramadol Nausea and Vomiting      Social History     Tobacco Use     Smoking status: Former Smoker     Packs/day: 0.50     Years: 1.00     Pack years: 0.50     Types: Cigarettes     Quit date:      Years since quittin.1     Smokeless tobacco: Never Used     Tobacco comment: Quit 30 years ago   Substance Use Topics     Alcohol use: Yes     Alcohol/week: 1.0 - 2.0 standard drinks     Types: 1 - 2 Standard drinks or equivalent per week     Comment: rare      Wt Readings from Last 1 Encounters:   21 91.4 kg (201 lb 8 oz)        Anesthesia Evaluation            ROS/MED HX  ENT/Pulmonary:       Neurologic:       Cardiovascular:       METS/Exercise Tolerance:     Hematologic:       Musculoskeletal:       GI/Hepatic:       Renal/Genitourinary:       Endo:     (+) Obesity,     Psychiatric/Substance Use:       Infectious Disease:       Malignancy:       Other:            Physical Exam    Airway        Mallampati: II   TM distance: > 3 FB   Neck ROM: full   Mouth opening: > 3 cm    Respiratory Devices and Support         Dental  no notable dental history         Cardiovascular          Rhythm and rate: regular     Pulmonary   pulmonary exam normal                OUTSIDE LABS:  CBC:   Lab Results   Component Value Date    WBC 4.2 2021    HGB 14.7 2021    HGB 14.7 2017    HCT 45.2 2021     2021     BMP:   Lab Results   Component Value Date     2021    POTASSIUM 4.0 2021    POTASSIUM 4.3 2017    CHLORIDE 109 2021    CO2 28 2021    BUN 16 2021    CR 0.82 2021    CR 0.70 2017    GLC 92 2021    GLC 89 2017     COAGS: No results found for: PTT, INR, FIBR  POC:   Lab Results   Component Value Date     (H) 2021    HCG Negative 2017     HEPATIC: No results found for: ALBUMIN, PROTTOTAL, ALT, AST, GGT, ALKPHOS, BILITOTAL, BILIDIRECT, MITCHELL  OTHER:   Lab Results   Component Value Date    CONCEPCION 9.2  01/25/2021       Anesthesia Plan    ASA Status:  2      Anesthesia Type: General.     - Airway: ETT   Induction: Intravenous, Propofol.   Maintenance: Balanced.   Techniques and Equipment:     - Airway: Video-Laryngoscope     - Lines/Monitors: 2nd IV     Consents    Anesthesia Plan(s) and associated risks, benefits, and realistic alternatives discussed. Questions answered and patient/representative(s) expressed understanding.     - Discussed with:  Patient      - Extended Intubation/Ventilatory Support Discussed: no Extended Intubation.      - Patient is DNR/DNI Status: No    Use of blood products discussed: No .     Postoperative Care       PONV prophylaxis: Ondansetron (or other 5HT-3), Dexamethasone or Solumedrol     Comments:    Patients has PONV with ORAL opiods. IV drugs are acceptable and will plan to use intraop and post op            Kaya Mcpherson MD

## 2021-02-19 NOTE — OR NURSING
Dr. Ambrocio notified that pt is very sedated. Pt blood pressures since arriving to pacu at 93\57, 92/54 and 90/51. Per Dr. Castillo administer 500 ml of albumin. Will continue to monitor.

## 2021-02-19 NOTE — CONSULTS
HOSPITALIST INITIAL CONSULT NOTE    Referring Provider:  Stevo Waite MD      Reason for Consult         History of Present Illness     Layla Starks is 60 year old year old female with hx of COVID 19,Depression, Anxiety, OA, Neck pain is being admitted s/p  generalized anxiety disorder, depression and ongoing neck pain is being admitted s/p Posterior Instrumented Spinal Fusion Cervical 4 to Thoracic 1; Revision Foraminotomies/Factectomies Right Cervical 4-5  Removal of Laminoplasty Plates Cervical 4-7 Right Posterior Iliac Crest Bone Graft [place] on 02/19/2021      Currently reports pain on the neck. Denies any nausea, vomiting, chest pain, shortness of breath,lightheadedness or dizziness.             Past Medical History     Past Medical History:   Diagnosis Date     Cervical disc disorder with radiculopathy of cervical region 11/27/2017     Cervical spinal stenosis      DDD (degenerative disc disease), cervical      Depression      SHAHIDA (generalized anxiety disorder)      OA (osteoarthritis)      Obesity      Personal history of covid-19      PONV (postoperative nausea and vomiting)           Past Surgical History     Past Surgical History:   Procedure Laterality Date     AS HYSTEROSCOPY, SURGICAL; W/ ENDOMETRIAL ABLATION, ANY METHOD N/A      C5-7 laminoplasty Right 09/09/2016     COLONOSCOPY  2016     DECOMPRESSION, FUSION CERVICAL ANTERIOR THREE+ LEVELS, COMBINED N/A 03/13/2019    C4-5, C6-7, C5-6 with hardware removal     Excision anal skin tags N/A 01/18/2010     FORAMINOTOMY CERVICAL POSTERIOR MINIMALLY INVASIVE ONE LEVEL Right 12/13/2017    Procedure: FORAMINOTOMY CERVICAL POSTERIOR MINIMALLY INVASIVE ONE LEVEL;  Right Minimally Invasive Posterior Cervical 4-5 Foraminotomy;  Surgeon: Byron Barclay MD;  Location: [facility] OR     FUSION CERVICAL ANTERIOR ONE LEVEL N/A 08/06/2018    unspecified cervical fusion     MAMMOPLASTY REDUCTION  1980     MAMMOPLASTY REDUCTION BILATERAL Bilateral 1980      Partial meniscectomy Left 05/17/2016     AL KNEE SCOPE,MED/LAT MENISCUS REPAIR Left           Medications     All his current medications are reviewed in current medication section of [facility].  Home medications are reviewed.  Current Facility-Administered Medications   Medication     [START ON 2/22/2021] acetaminophen (TYLENOL) tablet 650 mg     acetaminophen (TYLENOL) tablet 975 mg     bupivacaine 0.25 % - EPINEPHrine 1:200,000 injection     diazepam (VALIUM) solution 2 mg     fentaNYL (PF) (SUBLIMAZE) injection 25-50 mcg     gabapentin (NEURONTIN) capsule 300 mg     hemostatic matrix with thrombin (SURGIFLO) kit     HYDROmorphone (DILAUDID) tablet 2-4 mg     HYDROmorphone (PF) (DILAUDID) injection 0.3-0.5 mg     HYDROmorphone (PF) (DILAUDID) injection 0.3-0.5 mg     hydrOXYzine (ATARAX) tablet 25 mg     lactated ringers infusion     [START ON 2/20/2021] Lidocaine (LIDOCARE) 4 % Patch 1 patch     lidocaine patch in PLACE     methocarbamol (ROBAXIN) tablet 750 mg     naloxone (NARCAN) injection 0.2 mg     naloxone (NARCAN) injection 0.2 mg     naloxone (NARCAN) injection 0.4 mg     naloxone (NARCAN) injection 0.4 mg     ondansetron (ZOFRAN-ODT) ODT tab 4 mg    Or     ondansetron (ZOFRAN) injection 4 mg     ondansetron (ZOFRAN-ODT) ODT tab 4 mg    Or     ondansetron (ZOFRAN) injection 4 mg     prochlorperazine (COMPAZINE) injection 10 mg     prochlorperazine (COMPAZINE) injection 10 mg    Or     prochlorperazine (COMPAZINE) tablet 10 mg     scopolamine (TRANSDERM) 72 hr patch 1 patch     scopolamine (TRANSDERM-SCOP) Patch in Place     [START ON 2/20/2021] scopolamine (TRANSDERM-SCOP) patch REMOVAL     sodium chloride 0.9% (bottle) irrigation     tranexamic acid (CYKLOKAPRON) 5 g in sodium chloride 0.9 % 250 mL infusion        Allergies        Allergies   Allergen Reactions     Dilaudid [Hydromorphone] GI Disturbance     Per patient, tolerates IV version well.     Morphine Nausea and Vomiting     Oxycodone Nausea and  Vomiting     Tramadol Nausea and Vomiting        Family History     Family History   Problem Relation Age of Onset     Arthritis Mother      Macular Degeneration Mother      Hearing Loss Father      Multiple Sclerosis Sister           Social History     Social History     Socioeconomic History     Marital status:      Spouse name: Not on file     Number of children: Not on file     Years of education: Not on file     Highest education level: Not on file   Occupational History     Not on file   Social Needs     Financial resource strain: Not on file     Food insecurity     Worry: Not on file     Inability: Not on file     Transportation needs     Medical: Not on file     Non-medical: Not on file   Tobacco Use     Smoking status: Former Smoker     Packs/day: 0.50     Years: 1.00     Pack years: 0.50     Types: Cigarettes     Quit date:      Years since quittin.1     Smokeless tobacco: Never Used     Tobacco comment: Quit 30 years ago   Substance and Sexual Activity     Alcohol use: Yes     Alcohol/week: 1.0 - 2.0 standard drinks     Types: 1 - 2 Standard drinks or equivalent per week     Comment: rare     Drug use: No     Sexual activity: Yes     Partners: Male   Lifestyle     Physical activity     Days per week: Not on file     Minutes per session: Not on file     Stress: Not on file   Relationships     Social connections     Talks on phone: Not on file     Gets together: Not on file     Attends Protestant service: Not on file     Active member of club or organization: Not on file     Attends meetings of clubs or organizations: Not on file     Relationship status: Not on file     Intimate partner violence     Fear of current or ex partner: Not on file     Emotionally abused: Not on file     Physically abused: Not on file     Forced sexual activity: Not on file   Other Topics Concern     Not on file   Social History Narrative     Not on file        Review of Systems   A 10 point review of systems was  "taken and largely negative except for that which was stated above.      Physical Exam       Vital signs:    Blood pressure 102/66, pulse 87, temperature 98.2  F (36.8  C), temperature source Axillary, resp. rate 11, height 1.651 m (5' 5\"), weight 91.4 kg (201 lb 8 oz), last menstrual period 02/01/2017, SpO2 96 %, not currently breastfeeding.  Estimated body mass index is 33.53 kg/m  as calculated from the following:    Height as of this encounter: 1.651 m (5' 5\").    Weight as of this encounter: 91.4 kg (201 lb 8 oz).      Intake/Output Summary (Last 24 hours) at 2/19/2021 1717  Last data filed at 2/19/2021 1714  Gross per 24 hour   Intake 3420 ml   Output 1260 ml   Net 2160 ml      Constitutional: Laying in bed in no acute distress  Eye: No icterus, no pallor  Mouth/ENT: Normal oral mucosa. Neck collar in place  Cardiovascular: S1, S2 normal  Respiratory: B/L CTA  GI: Soft, NT, BS+  : No foleys' catheter  Neurology: Alert, awake, and oriented. No tremors  Psych:   MSK:   Integumentary:   Heme/Lymph/Imm:        Laboratory and Imaging Studies     Laboratory and Imaging studies reviewed in the results review section of Epic. Pertinent studies are as below:    CMPNo lab results found in last 7 days.  CBC  Recent Labs   Lab 02/19/21  0934   HGB 13.4   HCT 39.7     INRNo lab results found in last 7 days.  Arterial Blood GasNo lab results found in last 7 days.       Impression/Recommendations     60 year old year old female with hx of COVID 19,Depression, Anxiety, OA, Neck pain is being admitted s/p  generalized anxiety disorder, depression and ongoing neck pain is being admitted s/p Posterior Instrumented Spinal Fusion Cervical 4 to Thoracic 1; Revision Foraminotomies/Factectomies Right Cervical 4-5  Removal of Laminoplasty Plates Cervical 4-7 Right Posterior Iliac Crest Bone Graft [place] on 02/19/2021        # S/p cervical spine surgery as above :   ml  ml Voided 450 ml  On Analgesics, and dressing in " place  - Wound cares, Dressings, Surgical pain management, DVT Prophylaxis  per primary team.   - Monitor anemia, hemodynamics, Input/Output  - Monitor Capnogrphy  - Encourage Incentive spirometry  - Laxatives for constipation prophylaxis    # Former smoker: 0.5 pack year history and quit in 1979    #  Psych: Hx of SHAHIDA, Depression - not on medications.     # COVID-19 positive on 11/29/20: She reports head congestion and feeling neck pain. These are resolved except her ongoing neck issues. She will be within the 90 day time period for her surgery so doesn't need repeat COVID-19 test for surgery.     # Nutritional supplements:   Okay to hold tonight, restart in next few days                James Subramanian  Internal Medicine/Hospitalist  [facility]  [phone]

## 2021-02-19 NOTE — ANESTHESIA PROCEDURE NOTES
Airway   Date/Time: 2/19/2021 7:57 AM   Patient location during procedure: OR  Staff -   CRNA: Armando Peters APRN CRNA  Performed By: CRNA    Consent for Airway   Urgency: elective    Indications and Patient Condition  Indications for airway management: soren-procedural  Induction type:intravenousMask difficulty assessment: 1 - vent by mask    Final Airway Details  Final airway type: endotracheal airway  Successful airway:ETT - single  Endotracheal Airway Details   ETT size (mm): 7.0  Cuffed: yes  Successful intubation technique: video laryngoscopy  Grade View of Cords: 1  Adjucts: stylet  Measured from: lips  Secured at (cm): 22  Secured with: silk tape  Bite block used: Soft    Post intubation assessment   Placement verified by: capnometry, equal breath sounds and chest rise   Number of attempts at approach: 1  Number of other approaches attempted: 0  Secured with:silk tape  Ease of procedure: easy  Dentition: Intact and Unchanged

## 2021-02-19 NOTE — BRIEF OP NOTE
Brief Operative Note    Preop Dx:   Cervical radiculopathy [M54.12]  Cervical spondylosis [M47.812]  S/P cervical spinal fusion [Z98.1]  Post op Dx:   Same  Procedure:    Procedure(s):  Posterior Instrumented Spinal Fusion Cervical 4 to Thoracic 1; Revision Foraminotomies/Factectomies Right Cervical 4-5  Removal of Laminoplasty Plates Cervical 4-7;  Right Posterior Iliac Crest Bone Graft [place]  Surgeon:     Stevo Waite MD   Assistants:    KYAW Herr MD PGY4      Jamaica Castillo MD PGY1   Anesthesia:   General  EBL:    700mL  Total IV Fluids:  See Anesthesia Record  Specimens:   None  Findings:   See Operative Dictation      Assessment and Plan: Layla Starks is a 60 year old female with PMH including PONV, obesity, anxiety, depression, cervical radiculopathy, hx multiple cervical spine surgeries (laminoplasty C5-7, foraminotomy C4-5, ACDF C4-7)  now s/p above procedure on 2/19/21 with Dr. Waite.     Ravindra Primary  Activity: Up with assist until independent. No excessive bending or twisting. No lifting >10 lbs x 6 weeks. No Doug lift for transfers.   Weight bearing status: WBAT.  Pain management: Transition from IV to PO narcotics as tolerated. No NSAIDs   Antibiotics: Ancef x 24 hours.  Diet: Begin with clear fluids and progress diet as tolerated.   DVT prophylaxis: SCDs only. No chemical DVT ppx needed.  Imaging: XR Upright cervical spine PTDC - ordered.  Labs: Hgb POD#1.  Bracing/Splinting: Thurston J at all times. May remove brace for hygiene. may loosen front of collar for meals..  Dressings: Keep Aquacel c/d/i x 7 days. Tegaderm dressing on right posterior iliac crest graft site keep c/d/i x 48 hours  Drains: deep HV. Document output per shift, will be discontinued at Orthopedic Surgery discretion.  Echeverria catheter: Remove POD#11.   Physical Therapy/Occupational Therapy: Eval and treat.  Cultures: none.    Consults: Hospitalist.  Follow-up: Clinic with Dr. Waite in 6  weeks with repeat x-rays.   Disposition: Pending progress with therapies, pain control on orals, and medical stability, anticipate discharge to home on POD #2-5.        I assisted with positioning, prepping and draping, soft tissue retraction, graft preparation and placement, and closure.    Jenny Kenney PA-C  Pager #[phone]    Please page me with any questions/concerns during regular weekday hours before 4pm. If there is no response, if it is a weekend, or if it is during evening hours then please page the orthopaedic surgery resident on call.    Implants:   Implant Name Type Inv. Item Serial No.  Lot No. LRB No. Used Action   Cervical spine explants    SYNTHES  N/A 12 Explanted   GRAFT BONE CRUSH CANC 15ML [id number] Bone/Tissue/Biologic GRAFT BONE CRUSH CANC 15ML [id number] MUSCULOSKELETAL MCELROY  N/A 1 Implanted   GRAFT BONE CRUSH CANC 15ML [id number] Bone/Tissue/Biologic GRAFT BONE CRUSH CANC 15ML [id number] MUSCULOSKELETAL MCELROY  N/A 1 Implanted   set screws    MEDTRONIC  N/A 6 Implanted   3.5 x 14 screw    MEDTRONIC  N/A 1 Implanted   3.54 x 20 screw    MEDTRONIC  N/A 1 Implanted   3.5 x 22 scrw    MEDTRONIC  N/A 1 Implanted   4.5 x 24 screw    MEDTRONIC  N/A 1 Implanted   4.5 x 26 screw    MEDTRONIC  N/A 1 Implanted   3.5 x 16 screw    MEDTRONIC  N/A 1 Wasted   3.5 x 16 screw    MEDTRONIC  N/A 1 Implanted   70mm rods    MEDTRONIC  N/A 2 Implanted

## 2021-02-19 NOTE — ANESTHESIA CARE TRANSFER NOTE
Patient: Layla Starks    Procedure(s):  Posterior Instrumented Spinal Fusion Cervical 4 to Thoracic 1; Revision Foraminotomies/Factectomies Right Cervical 4-5  Removal of Laminoplasty Plates Cervical 4-7;  Right Posterior Iliac Crest Bone Graft [place]    Diagnosis: Cervical radiculopathy [M54.12]  Cervical spondylosis [M47.812]  S/P cervical spinal fusion [Z98.1]  Diagnosis Additional Information: No value filed.    Anesthesia Type:   General     Note:    Oropharynx: oropharynx clear of all foreign objects  Level of Consciousness: drowsy  Oxygen Supplementation: nasal cannula  Level of Supplemental Oxygen (L/min / FiO2): 2  Independent Airway: airway patency satisfactory and stable  Dentition: dentition unchanged  Vital Signs Stable: post-procedure vital signs reviewed and stable  Report to RN Given: handoff report given  Patient transferred to: PACU    Handoff Report: Identifed the Patient, Identified the Reponsible Provider, Reviewed the pertinent medical history, Discussed the surgical course, Reviewed Intra-OP anesthesia mangement and issues during anesthesia, Set expectations for post-procedure period and Allowed opportunity for questions and acknowledgement of understanding      Vitals: (Last set prior to Anesthesia Care Transfer)  CRNA VITALS  2/19/2021 1334 - 2/19/2021 1412      2/19/2021             NIBP:  93/57    Pulse:  91    NIBP Mean:  67    Temp:  37  C (98.6  F)    SpO2:  96 %    Resp Rate (observed):  12        Electronically Signed By: SIA Gaytan CRNA  February 19, 2021  2:12 PM

## 2021-02-19 NOTE — PROGRESS NOTES
PACU to Inpatient Nursing Handoff    Patient Layla Starks is a 60 year old female who speaks English.   Procedure Procedure(s):  Posterior Instrumented Spinal Fusion Cervical 4 to Thoracic 1; Revision Foraminotomies/Factectomies Right Cervical 4-5  Removal of Laminoplasty Plates Cervical 4-7;  Right Posterior Iliac Crest Bone Graft [place]   Surgeon(s) Primary: Stevo Waite MD  Assisting: Jenny Kenney PA-C  Resident - Assisting: Chiara Mahoney MD     Allergies   Allergen Reactions     Dilaudid [Hydromorphone] GI Disturbance     Per patient, tolerates IV version well.     Morphine Nausea and Vomiting     Oxycodone Nausea and Vomiting     Tramadol Nausea and Vomiting       Isolation  No active isolations     Past Medical History   has a past medical history of Cervical disc disorder with radiculopathy of cervical region (11/27/2017), Cervical spinal stenosis, DDD (degenerative disc disease), cervical, Depression, SHAHIDA (generalized anxiety disorder), OA (osteoarthritis), Obesity, Personal history of covid-19, and PONV (postoperative nausea and vomiting).    Anesthesia General   Dermatome Level     Preop Meds acetaminophen (Tylenol) - time given: 0631  gabapentin (Neurontin) - time given: 0631  scopolamine patch   Nerve block Not applicable   Intraop Meds dexamethasone (Decadron)  fentanyl (Sublimaze): 100 mcg total  hydromorphone (Dilaudid): 0.5 mg total  ketamine (Ketalar): 100 mg given  ondansetron (Zofran): last given at 1242  TXA,Mg,versed,ephedrine,phenylephrine   Local Meds No   Antibiotics cefazolin (Ancef) - last given at 1043     Pain Patient Currently in Pain: yes   PACU meds  acetaminophen (Tylenol): 975 mg (total dose) last given at 1615   fentanyl (Sublimaze): 50 mcg (total dose) last given at 1535   500 ml albumin   PCA / epidural No   Capnography Respiratory Monitoring (EtCO2): 35 mmHg  Integrated Pulmonary Index (IPI): 10   Telemetry ECG Rhythm: Normal sinus rhythm   Inpatient  Telemetry Monitor Ordered? No        Labs Glucose Lab Results   Component Value Date    GLC 92 01/25/2021       Hgb Lab Results   Component Value Date    HGB 13.4 02/19/2021       INR No results found for: INR   PACU Imaging Not applicable     Wound/Incision Incision/Surgical Site 12/13/17 Midline Neck (Active)   Number of days: 1164       Incision/Surgical Site 12/13/17 Bilateral Neck (Active)   Number of days: 1164       Incision/Surgical Site 02/19/21 Back (Active)   Incision Assessment [facility] 02/19/21 1415   Closure Approximated;Liquid bandage 02/19/21 1305   Incision Drainage Amount None 02/19/21 1415   Dressing Intervention Clean, dry, intact 02/19/21 1415   Number of days: 0       Incision/Surgical Site 02/19/21 Right Hip (Active)   Incision Assessment [facility] 02/19/21 1415   Closure Approximated;Liquid bandage 02/19/21 1305   Incision Drainage Amount None 02/19/21 1415   Dressing Intervention Clean, dry, intact 02/19/21 1415   Number of days: 0      CMS        Equipment ice pack and capnography   Other LDA       IV Access Peripheral IV Right Hand (Active)   Site Assessment [facility] 02/19/21 1515   Line Status Infusing;Checked every 1 hour 02/19/21 1515   Phlebitis Scale 0-->no symptoms 02/19/21 1515   Number of days:        Peripheral IV 02/19/21 Left Hand (Active)   Site Assessment [facility] 02/19/21 1515   Line Status Infusing;Checked every 1 hour 02/19/21 1515   Phlebitis Scale 0-->no symptoms 02/19/21 1515   Number of days: 0      Blood Products Not applicable  mL   Intake/Output Date 02/19/21 0700 - 02/20/21 0659   Shift 9001-0606 1640-3394 3153-4739 24 Hour Total   INTAKE   I.V. 3300   3300   Shift Total(mL/kg) 3300(36.11)   3300(36.11)   OUTPUT   Urine 550   550   Drains 10   10   Blood 700   700   Shift Total(mL/kg) 1260(13.79)   1260(13.79)   Weight (kg) 91.4 91.4 91.4 91.4      Drains / Echeverria Closed/Suction Drain Back Accordion 10 [place] (Active)   Site Description [place] 02/19/21 1515   Dressing Status Normal:  Clean, Dry & Intact 02/19/21 1515   Drainage Appearance Bloody/Bright Red 02/19/21 1515   Output (ml) 10 ml 02/19/21 1447   Number of days: 0       Urethral Catheter Double-lumen;Latex;Straight-tip 16 fr (Active)   Collection Container Standard 02/19/21 1515   Securement Method Securing device (Describe) 02/19/21 1515   Urine Output 100 mL 02/19/21 1447   Number of days: 0      Time of void PreOp Void Prior to Procedure: 0600 (02/19/21 0628)    PostOp      Diapered? Yes   Bladder Scan     PO    water and ice chips     Vitals    B/P: 97/51  T: 98.3  F (36.8  C)    Temp src: Axillary  P:  Pulse: 85 (02/19/21 1545)          R: 11  O2:  SpO2: 97 %    O2 Device: Nasal cannula (02/19/21 1545)    Oxygen Delivery: 3 LPM (02/19/21 1545)         Family/support present per chart   Patient belongings     Patient transported on bed   DC meds/scripts (obs/outpt) Not applicable   Inpatient Pain Meds Released? Yes       Special needs/considerations None   Tasks needing completion None       RADHA WILSON RN  [facility]COM [id number]

## 2021-02-19 NOTE — OP NOTE
Date of Service:  2/19/2021      Surgeon:  Stevo Waite MD   Assistant:  Jenny Kenney PA-C.  No resident available.  MsAline Kenney assisted in all parts of the procedure, including positioning, exposure, performing the surgical procedure, and closure.      Preoperative Diagnosis:     1.  Residual right foraminal stenosis C4-5 and C5-6, with positive response (50% relief) to R C5 SNRB (1/5/2021, Dr. Au).  2.  Subjacent level spondylosis C7-T1.  3.  Chronic right-sided neck and shoulder pain.  4.  Multiple cervical spine surgeries as above, including ACDF C4-C7, laminoplasties C5-C7, R foraminotomy C4-5.      Postoperative Diagnosis:     Same      Procedures:   1.  Posterior spinal fusion C4-T1 (fusion augmentation at C4-C7), using iliac crest autograft and crushed cancellous allograft.  2.  Segmental bilateral posterior instrumentation C4-T1, using cervical lateral mass screws (bilateral C4), cervical and thoracic pedicle screws (left C6, right C7, bilateral T1).  3.  Revision foraminotomy with coomplete facetectomy, right C4-5, with decompression of right C5 nerve root.  4.  Right posterior iliac crest bone graft harvest.  5.  Removal of laminoplasty plates C5, C6, C7 (3 levels).  6.  Exploration of fusion C4-C7.  7.  Application of Cope-Wells cranial tongs for operative positioning.  8.  Computer navigation using O-arm and Stealth.  9.  Use of operating microscope.   10.  Intraoperative neuro-monitoring using SSEPs, tcMEPs and bilateral upper extremity EMGs, with direct nerve stimulation (Nuvasive).      Anesthesia:  General endotracheal.   Local anesthetic:  0.25% marcaine + epinephrine = 50 mL.  EBL:  700 mL.  Complications:  Inadvertent lateral mass screw placement - left C3 (was intending to place it at left C4).  This was noted on intraop O-arm images taken right after screw placement.  The screw was promptly removed, and not included in the final construct.  Implants removed:  Synthes maxillofacial  titanium mini-plates x 3.  Implants / Equipment used:   1.  Medtronic Inifinity system:  C4 = 3.5 x 14mm right, 3.5 x 16mm left; C5 = none; C6 = none on right, 3.5 x 20mm left; C7 = 3.5 x 22 mm right, none on left; T1 = 3. x 24mm right, 3.5 x 26mm left.  3.5 x 70 mm titanium rods x 2.  2.  Crushed cancellous allograft 30 mL (15 mL used for pelvic reconstruction; 15 mL for posterior c-spine).  3.  TXA high dose (30/10).  4.  Vancomycin powder 1 gm.      Indications:  60 year old female with chronic right-sided neck and shoulder pain and headaches.  Four previous cervical spine surgeries, culminating in 3- level anterior fusion C4-C7.  Imaging revealed likely solid arthrodesis C4-C7; healed laminoplasty osteotomies C5-C7; persistent/recurrent right C4-5 foraminal stenosis, milder at right C5-6; subjacent level spondylosis C7-T1.  Tried nonoperative treatment, continues to have significant disabling symptoms.  I thus offered surgery in form of posterior instrumented spinal fusion C4-T1; revision foraminotomy with complete facetectomy C4-5, possible C5-6, and removal of laminoplasty plates.  Consented after thorough discussion of the rationale, risks, benefits and alternatives.  Discussed bone graft options; agreed to iliac crest bone graft harvest.      Details:  Properly identified in preop area, site marked, informed consent signed.  Wheeled to OR.  Brief earlier performed.  General anesthesia administered.  Echeverria inserted.  Antibiotic given: Ancef 2gm IV.  TXA started.  Neuro-monitoring electrodes applied by [place] technician.  Cope-Lumetric Lighting cranial tongs applied, tightened to 40 inch pounds.  Positioned prone on Trios table with combo pads and large hip pads to support arms.  Arms wrapped in purple arm wraps, tucked to sides.  Shoulders taped down.  Bivector head traction utilized, with two traction ropes.  Placed 10 lbs in-line traction weight; no face mask used.  Posterior hairline clipped.  Posterior cervical  and right posterior pelvic regions squared off, prepped with chlorhexidine, alcohol and ChloraPrep, and draped in sterile fashion. Surgical timeout performed.  Baseline spinal cord signals obtained.  Good signals on right side; weak signals from left deltoid and biceps.    Revision posterior midline skin incision made.  Revision subperiosteal dissection and exposure performed bilateral C4-T1.  Utilized presence of T1 spinous process for provisional level localization (cervical spinous processes had been previously removed).  Subsequent exposure of the laminoplasty plates C5-C7, used to confirm levels as well.  Thus, radiologist confirmation not necessary.  Confirmatory timeout performed.  Exposed to the lateral aspects of lateral masses and facet joints.  We removed the laminoplasty plates C5, C6 and C7.  These were Synthes maxillofacial titanium mini plates.  We first removed all the screws, then pried the plate of the bone.  There was no motion of the lamina, indicating that the previous laminoplasty osteotomies are all fully healed.  We then explored the fusion mass.  There seems to be good bridging bone across the facet joints.  Furthermore, no motion noted across the levels; thus confirming solid fusion C4-C7.    Spinous process clamp with rober frame attached to T1.  O-arm 3D scan obtained for navigation purposes.  We then placed screws C4-T1 we first placed bilateral pedicle screws at T1 and C7..  Starting points identified using rober probe.   holes created using ivory.  Bony track created using Squabblerf drill with 20 mm stop.  Stealth software used to select screw sizes.  Undertapped by 1 mm to depth of screw.  Screws inserted using Schultz .  At C4 and C5, we elected to use lateral mass screws.   holes created using ivory.  Bony track created using Rober drill with 14 mm stop.  We undertapped to depth of screw. we inserted the screws on the left side, but refrain from doing so on the right, and waited  until after decompression is completed.  AP, lateral and 3D scan images obtained.  Shows good placement of the pedicle screws.  However it showed that our lateral mass screws on the left were off by 1 level.  They were at C3 and C4, instead of at C4 and C5.  The pedicle screw was likewise at C6 instead of C7.  I simply removed the C3 screw, as we did not intend to extend the fusion proximally to this level.  We maintained the C4 and C6 screws.    Operating microscope brought in.  I performed revision foraminotomy with complete facetectomy right C4-5.  I used a navigated probe to identify the location of the foramen and the edges of the pedicles.  Foraminotomy was performed mainly using ivory, completed with 2 mm Kerrisons.  We performed complete foraminal decompression from the pedicle above to the pedicle below.  I identified the exiting right C5 nerve, and followed it all the way out.  We ensured complete nerve root decompression, confirmed with use of nerve hook.  Epidural bleeders controlled using bipolar cautery, Surgi-Edgar and cottonoids.  I again studied the CT scan, and noted that the right C5-6 foraminal stenosis is very mild, and likely asymptomatic.  I elected not to perform a foraminotomy at this level anymore.  Operating microscope removed.    Attention turned to the right posterior pelvis.  3 cm vertical incision made over the right PSIS.  Incision carried down to bone.  Rectangular corticotomy created using osteotomes.  We removed a cortical window and saved this to be replaced back later.  I harvested cancellous bone from the cortical defect using a gouge.  Approximately 10 mL of good quality cancellous bone harvested.  Defect was irrigated, packed with 15 cc crushed cancellous bone.  The cortical cover was tamped back in place.    Attention turned back to the neck.  I used a bur to decorticate the posterior elements C4-T1, including bilateral facet joints.  At this point, we asked our anesthetist to  switch the 10 pound weight onto the other more vertical rope thus creating a more vertical pull on the head, increasing lordosis or neck extension.  Kirill lengths measured, we elected to use 7 cm titanium rods bilaterally, manually contoured using Jamaican slater.  Rods seated; set plugs applied.  No further compression applied.  Construct final tightened.  Final AP and lateral O arm images showed good alignment and good placement of implants.    Iliac crest cancellous autograft applied over the decorticated posterior elements, preferentially over the C7-T1 level.  This was augmented with 15 cc of crushed cancellous allograft.    Deep medium Hemovac drain applied.  Vancomycin powder 1 g applied deep.  Wound closed in layers.  Deep fascia closed with #1 Vicryl pop offs, deep subcutaneous with running 0 Vicryl, superficial subcutaneous with 2-0 Vicryl, skin with 3-0 Monocryl.  Iliac crest wound was closed with 0 Vicryl, 2-0 Vicryl and 3-0 Monocryl.  Skin glue and sterile dressings applied.  Patient turned supine; Cope-Wells tongs removed.  Soft collar applied.  Taken off general anesthetic.      Postop:  Admit to surgical floor.  Antibiotics x 24 hours.  Mechanical DVT prophylaxis.  PT and OT consult.  Advance diet as tolerated.  No lifting more than 10 pounds, no excessive bending or twisting.  Hospitalist comanagement for medical issues.  [place]-J collar to be worn full time for next 6 wks.  Cervical Ct and cervical AP-lat prior to discharge.  Anticipate discharge in 2-3 days.  RTC 6 weeks with EOS cervical AP-lat x-rays.

## 2021-02-19 NOTE — ANESTHESIA POSTPROCEDURE EVALUATION
Patient: Layla Starks    Procedure(s):  Posterior Instrumented Spinal Fusion Cervical 4 to Thoracic 1; Revision Foraminotomies/Factectomies Right Cervical 4-5  Removal of Laminoplasty Plates Cervical 4-7;  Right Posterior Iliac Crest Bone Graft [place]oy    Diagnosis:Cervical radiculopathy [M54.12]  Cervical spondylosis [M47.812]  S/P cervical spinal fusion [Z98.1]  Diagnosis Additional Information: No value filed.    Anesthesia Type:  General    Note:  Disposition: Inpatient   Postop Pain Control: Uneventful            Sign Out: Well controlled pain   PONV:    Neuro/Psych: Uneventful            Sign Out: Acceptable/Baseline neuro status   Airway/Respiratory: Uneventful            Sign Out: Acceptable/Baseline resp. status   CV/Hemodynamics: Uneventful            Sign Out: Acceptable CV status   Other NRE:    DID A NON-ROUTINE EVENT OCCUR?          Last vitals:  Vitals:    02/19/21 1515 02/19/21 1530 02/19/21 1545   BP: 102/55 94/54 97/51   Pulse: 84 96 85   Resp: 16 19 11   Temp: 36.8  C (98.3  F)     SpO2: 97% 97% 97%       Last vitals prior to Anesthesia Care Transfer:  CRNA VITALS  2/19/2021 1334 - 2/19/2021 1434      2/19/2021             NIBP:  93/57    Pulse:  91    NIBP Mean:  67    Temp:  37  C (98.6  F)    SpO2:  96 %    Resp Rate (observed):  12          Electronically Signed By: Kaya Mcpherson MD  February 19, 2021  3:51 PM

## 2021-02-20 ENCOUNTER — APPOINTMENT (OUTPATIENT)
Dept: PHYSICAL THERAPY | Facility: CLINIC | Age: 61
End: 2021-02-20
Attending: ORTHOPAEDIC SURGERY
Payer: COMMERCIAL

## 2021-02-20 LAB
GLUCOSE BLDC GLUCOMTR-MCNC: 83 MG/DL (ref 70–99)
HGB BLD-MCNC: 11.5 G/DL (ref 11.7–15.7)

## 2021-02-20 PROCEDURE — 250N000013 HC RX MED GY IP 250 OP 250 PS 637: Performed by: ORTHOPAEDIC SURGERY

## 2021-02-20 PROCEDURE — 99231 SBSQ HOSP IP/OBS SF/LOW 25: CPT | Performed by: INTERNAL MEDICINE

## 2021-02-20 PROCEDURE — 99207 PR CDG-MDM COMPONENT: MEETS LOW - DOWN CODED: CPT | Performed by: INTERNAL MEDICINE

## 2021-02-20 PROCEDURE — 250N000011 HC RX IP 250 OP 636: Performed by: PHYSICIAN ASSISTANT

## 2021-02-20 PROCEDURE — 250N000013 HC RX MED GY IP 250 OP 250 PS 637: Performed by: PHYSICIAN ASSISTANT

## 2021-02-20 PROCEDURE — 85018 HEMOGLOBIN: CPT | Performed by: PHYSICIAN ASSISTANT

## 2021-02-20 PROCEDURE — 36415 COLL VENOUS BLD VENIPUNCTURE: CPT | Performed by: PHYSICIAN ASSISTANT

## 2021-02-20 PROCEDURE — 97161 PT EVAL LOW COMPLEX 20 MIN: CPT | Mod: GP

## 2021-02-20 PROCEDURE — 97530 THERAPEUTIC ACTIVITIES: CPT | Mod: GP

## 2021-02-20 PROCEDURE — 120N000002 HC R&B MED SURG/OB UMMC

## 2021-02-20 PROCEDURE — 999N001017 HC STATISTIC GLUCOSE BY METER IP

## 2021-02-20 PROCEDURE — 250N000011 HC RX IP 250 OP 636: Performed by: ORTHOPAEDIC SURGERY

## 2021-02-20 PROCEDURE — 250N000009 HC RX 250: Performed by: STUDENT IN AN ORGANIZED HEALTH CARE EDUCATION/TRAINING PROGRAM

## 2021-02-20 RX ORDER — METHOCARBAMOL 750 MG/1
750 TABLET, FILM COATED ORAL EVERY 6 HOURS PRN
Qty: 40 TABLET | Refills: 0 | Status: SHIPPED | OUTPATIENT
Start: 2021-02-20 | End: 2021-02-24

## 2021-02-20 RX ORDER — SCOLOPAMINE TRANSDERMAL SYSTEM 1 MG/1
1 PATCH, EXTENDED RELEASE TRANSDERMAL
Status: DISCONTINUED | OUTPATIENT
Start: 2021-02-20 | End: 2021-02-24 | Stop reason: HOSPADM

## 2021-02-20 RX ORDER — HYDROCODONE BITARTRATE AND ACETAMINOPHEN 5; 325 MG/1; MG/1
1-2 TABLET ORAL EVERY 6 HOURS PRN
Qty: 40 TABLET | Refills: 0 | Status: SHIPPED | OUTPATIENT
Start: 2021-02-20 | End: 2021-03-02

## 2021-02-20 RX ORDER — ACETAMINOPHEN 325 MG/1
650 TABLET ORAL EVERY 8 HOURS PRN
Qty: 60 TABLET | Refills: 0 | Status: SHIPPED | OUTPATIENT
Start: 2021-02-20 | End: 2021-03-09

## 2021-02-20 RX ORDER — AMOXICILLIN 250 MG
2 CAPSULE ORAL 2 TIMES DAILY
Qty: 40 TABLET | Refills: 0 | Status: SHIPPED | OUTPATIENT
Start: 2021-02-20 | End: 2021-03-09

## 2021-02-20 RX ADMIN — CEFAZOLIN 1 G: 1 INJECTION, POWDER, FOR SOLUTION INTRAMUSCULAR; INTRAVENOUS at 04:41

## 2021-02-20 RX ADMIN — FAMOTIDINE 20 MG: 20 TABLET, FILM COATED ORAL at 21:35

## 2021-02-20 RX ADMIN — METHOCARBAMOL 750 MG: 750 TABLET ORAL at 23:03

## 2021-02-20 RX ADMIN — GABAPENTIN 300 MG: 300 CAPSULE ORAL at 08:21

## 2021-02-20 RX ADMIN — DOCUSATE SODIUM AND SENNOSIDES 2 TABLET: 8.6; 5 TABLET ORAL at 21:35

## 2021-02-20 RX ADMIN — METHOCARBAMOL 750 MG: 750 TABLET ORAL at 17:07

## 2021-02-20 RX ADMIN — ACETAMINOPHEN 650 MG: 325 TABLET, FILM COATED ORAL at 08:20

## 2021-02-20 RX ADMIN — HYDROCODONE BITARTRATE AND ACETAMINOPHEN 2 TABLET: 5; 325 TABLET ORAL at 17:57

## 2021-02-20 RX ADMIN — FAMOTIDINE 20 MG: 20 TABLET, FILM COATED ORAL at 08:20

## 2021-02-20 RX ADMIN — POLYETHYLENE GLYCOL 3350 17 G: 17 POWDER, FOR SOLUTION ORAL at 08:19

## 2021-02-20 RX ADMIN — SCOPALAMINE 1 PATCH: 1 PATCH, EXTENDED RELEASE TRANSDERMAL at 08:19

## 2021-02-20 RX ADMIN — HYDROMORPHONE HYDROCHLORIDE 0.5 MG: 1 INJECTION, SOLUTION INTRAMUSCULAR; INTRAVENOUS; SUBCUTANEOUS at 20:08

## 2021-02-20 RX ADMIN — HYDROCODONE BITARTRATE AND ACETAMINOPHEN 2 TABLET: 5; 325 TABLET ORAL at 12:13

## 2021-02-20 RX ADMIN — METHOCARBAMOL 750 MG: 750 TABLET ORAL at 10:22

## 2021-02-20 RX ADMIN — LIDOCAINE 1 PATCH: 560 PATCH PERCUTANEOUS; TOPICAL; TRANSDERMAL at 08:20

## 2021-02-20 RX ADMIN — ACETAMINOPHEN 650 MG: 325 TABLET, FILM COATED ORAL at 23:03

## 2021-02-20 RX ADMIN — HYDROMORPHONE HYDROCHLORIDE 0.5 MG: 1 INJECTION, SOLUTION INTRAMUSCULAR; INTRAVENOUS; SUBCUTANEOUS at 09:45

## 2021-02-20 RX ADMIN — HYDROCODONE BITARTRATE AND ACETAMINOPHEN 2 TABLET: 5; 325 TABLET ORAL at 05:44

## 2021-02-20 RX ADMIN — GABAPENTIN 300 MG: 300 CAPSULE ORAL at 21:35

## 2021-02-20 RX ADMIN — ONDANSETRON 4 MG: 2 INJECTION INTRAMUSCULAR; INTRAVENOUS at 09:29

## 2021-02-20 RX ADMIN — ONDANSETRON 4 MG: 4 TABLET, ORALLY DISINTEGRATING ORAL at 17:07

## 2021-02-20 RX ADMIN — CEFAZOLIN 1 G: 1 INJECTION, POWDER, FOR SOLUTION INTRAMUSCULAR; INTRAVENOUS at 12:14

## 2021-02-20 RX ADMIN — HYDROCODONE BITARTRATE AND ACETAMINOPHEN 2 TABLET: 5; 325 TABLET ORAL at 00:03

## 2021-02-20 RX ADMIN — HYDROMORPHONE HYDROCHLORIDE 0.5 MG: 1 INJECTION, SOLUTION INTRAMUSCULAR; INTRAVENOUS; SUBCUTANEOUS at 14:57

## 2021-02-20 NOTE — PROGRESS NOTES
"Orthopaedic Surgery Progress Note:  02/20/2021     Subjective:   No acute events overnight. Pain controlled in neck. Denies weakness of upper extremities, numbness, or tingling. +Intermittent nausea. Soft collar in place.    Objective:   BP 94/53 (BP Location: Right arm)   Pulse 92   Temp 98.9  F (37.2  C) (Oral)   Resp 12   Ht 1.651 m (5' 5\")   Wt 91.4 kg (201 lb 8 oz)   LMP 02/01/2017   SpO2 92%   BMI 33.53 kg/m    No intake/output data recorded.  Gen: NAD. Resting comfortably in bed  Resp: Breathing comfortably on RA  Drain:   Drain output 30 (last shift) /75 (last 24 hr).  Musculoskeletal: dressing c/d/I.      Motor Strength Right Left   Deltoids: C5 5/5 5/5   Biceps: C5 5/5 5/5   Brachialis: C6 5/5 5/5   Wrist extension: C6 5/5 5/5   Triceps: C7  5/5 5/5   Wrist flexion: C7 5/5 5/5    strength: C8 5/5 5/5   Hand intrinsics: T1 5/5 5/5     Sensation from C4-L1 is preserved.    Labs:  Lab Results   Component Value Date    WBC 4.2 01/25/2021    HGB 11.5 (L) 02/20/2021     01/25/2021        Assessment & Plan:   Layla Starks is a 60 year old female with PMH including PONV, obesity, anxiety, depression, cervical radiculopathy, hx multiple cervical spine surgeries (laminoplasty C5-7, foraminotomy C4-5, ACDF C4-7)  now s/p above procedure on 2/19/21 with Dr. Waite.      Ravindra Primary  Activity: Up with assist until independent. No excessive bending or twisting. No lifting >10 lbs x 6 weeks. No Doug lift for transfers.   Weight bearing status: WBAT.  Pain management: Transition from IV to PO narcotics as tolerated. No NSAIDs   Antibiotics: Ancef x 24 hours.  Diet: Begin with clear fluids and progress diet as tolerated.   DVT prophylaxis: SCDs only. No chemical DVT ppx needed.  Imaging: XR Upright cervical spine and cervical spine CT PTDC - ordered.  Labs: Hgb POD#1.  Bracing/Splinting: Omar J at all times. May remove brace for hygiene. may loosen front of collar for meals..  Dressings: " Keep Aquacel c/d/i x 7 days. Tegaderm dressing on right posterior iliac crest graft site keep c/d/i x 48 hours  Drains: deep HV. Document output per shift, will be discontinued at Orthopedic Surgery discretion.  Echeverria catheter: Remove POD#1.   Physical Therapy/Occupational Therapy: Eval and treat.  Cultures: none.    Consults: Hospitalist.  Follow-up: Clinic with Dr. Waite in 6 weeks with repeat x-rays.   Disposition: Pending progress with therapies, pain control on orals, and medical stability, anticipate discharge to home on POD #2-5.      ------------------------------------------------------------------------------------------    [   ] Drains removed.  [   ] Post xrays done.  [ x ] Discharge orders done.  [ x ] Discharge summary started.    Chiara Mahoney MD  Orthopaedic Surgery, PGY4  [phone]     Please page me directly with any questions/concerns during regular weekday hours before 5pm.     If there is no response, if it is a weekend, or if it is during evening hours then please page the orthopaedic surgery resident on call as listed on [facility] call schedule under the orthopaedics tab.        FOLLOWUP:    Future Appointments   Date Time Provider Department Center   2/20/2021  8:45 AM Mara Osuna, PT [facility] [place]   2/20/2021  1:30 PM Navya Bautista, OT [facility] [place]   2/20/2021  7:00 PM UR PT OVERFLOW [facility] [place]   4/1/2021  8:40 AM Stevo Waite MD [facility]   5/13/2021  8:40 AM Stevo Waite MD [facility]

## 2021-02-20 NOTE — PLAN OF CARE
"  VS:   BP 94/53 (BP Location: Right arm)   Pulse 92   Temp 98.9  F (37.2  C) (Oral)   Resp 12   Ht 1.651 m (5' 5\")   Wt 91.4 kg (201 lb 8 oz)   LMP 02/01/2017   SpO2 92%   BMI 33.53 kg/m     Alert,orientated x4.LS clear,tapered down from 3lpm via NC to 2lpm,tolerating with capno readings WNL.Incentive spirometer used.   Output:   Echeverria with good amts.  HV 35ml emptied last night,30ml this morning,bright reddish.   Activity:   Not OOB yet,assist of 2 for repositioning.   Skin: Blanchable redness on face,L shoulder area.  Upper back incision UTV,covered.  R posteior hip incision UTV,covered.   Pain:   Upper back pain described as achy,rating it at 6-8.  Managed with norco 2 tabs q6hrs,IV dilaudid given x1 for breakthrough pain.Ice pack applied.   Neuro/CMS:   Intact.Denies any numbness/tingling sensation.   Dressing(s):   Aquacel to upper back,CDI.  Gauze drsg to graft site R posterior hip,CDI.     Diet:   Regular.  BS audible,not passing gas yet.LBM 2/18/21.  Compazine given at 8pm for nausea,effective.  Nausea has been better as the night go by and did not require any nausea med this morning.  Scope patch taken off at 0630 per order.     LDA:   PIV line R hand with IVF TKO.PIV line L hand,saline locked.  HV  Echeverria   Equipment:   Capno machine.  Incentive spirometer  PCDs on.  Soft brace on.   Plan:   Hgb recheck this morning  Continue to keep pain in control with consideration to her sedation level.  Echeverria likely be removed today?   Additional Info:   Dried blood on hair(back side) from OR.When pt more awake this morning,pt to have hair washed.   Saad stated yesterday he'll come visit pt today.      "

## 2021-02-20 NOTE — PROGRESS NOTES
02/20/21 0911   Quick Adds   Type of Visit Initial PT Evaluation       Present no   Language English   Living Environment   People in home spouse;child(brigida), adult   Current Living Arrangements house   Home Accessibility stairs to enter home   Number of Stairs, Main Entrance 2   Transportation Anticipated family or friend will provide   Self-Care   Usual Activity Tolerance good   Current Activity Tolerance fair   Regular Exercise No   Equipment Currently Used at Home   (owns cane)   Disability/Function   Hearing Difficulty or Deaf no   Wear Glasses or Blind no   Concentrating, Remembering or Making Decisions Difficulty no   Difficulty Communicating no   Difficulty Eating/Swallowing no   Walking or Climbing Stairs Difficulty no   Dressing/Bathing Difficulty no   Toileting issues no   Doing Errands Independently Difficulty (such as shopping) no   Fall history within last six months no   Change in Functional Status Since Onset of Current Illness/Injury no   General Information   Onset of Illness/Injury or Date of Surgery 02/19/21   Referring Physician Jenny Kenney, PACandaceC   Patient/Family Therapy Goals Statement (PT) Did not endorse   Pertinent History of Current Problem (include personal factors and/or comorbidities that impact the POC) 60 year old female with PMH including PONV, obesity, anxiety, depression, cervical radiculopathy, hx multiple cervical spine surgeries (laminoplasty C5-7, foraminotomy C4-5, ACDF C4-7)  now s/p above procedure on 2/19/21 with Dr. Waite   Existing Precautions/Restrictions fall;spinal   Weight-Bearing Status - LUE weight-bearing as tolerated   Weight-Bearing Status - RUE weight-bearing as tolerated   Weight-Bearing Status - LLE full weight-bearing   Weight-Bearing Status - RLE full weight-bearing   General Observations Supine in bed upon arrival, agreeable to PT   Cognition   Orientation Status (Cognition) oriented x 4   Affect/Mental Status (Cognition) WNL    Follows Commands (Cognition) WNL   Pain Assessment   Patient Currently in Pain Yes, see Vital Sign flowsheet   Integumentary/Edema   Integumentary/Edema no deficits were identifed   Posture    Posture Forward head position;Protracted shoulders;Kyphosis   Posture Comments Mild to moderate sitting EOB    Range of Motion (ROM)   ROM Comment Did not formally assess, demonstrates functional ROM with mobility   Strength   Strength Comments Did not formally assess, patient generally decoditioned with decreased activity tolerance   Bed Mobility   Comment (Bed Mobility) PT: Completes supine<>sit transfer with use of log roll technique and heavy modA from therapist, very poor upright tolerance   Transfers   Transfer Safety Comments Unable to assess   Gait/Stairs (Locomotion)   Comment (Gait/Stairs) Unable to assess   Sensory Examination   Sensory Perception WNL   Clinical Impression   Criteria for Skilled Therapeutic Intervention yes, treatment indicated   PT Diagnosis (PT) impaired functional mobility   Influenced by the following impairments increased post-op pain, precautions, decreased activity tolerance   Functional limitations due to impairments impaired bed mobility, transfers and ambulation   Clinical Presentation Stable/Uncomplicated   Clinical Presentation Rationale 60 year old female with PMH including PONV, obesity, anxiety, depression, cervical radiculopathy, hx multiple cervical spine surgeries (laminoplasty C5-7, foraminotomy C4-5, ACDF C4-7)  now s/p above procedure on 2/19/21 with Dr. Waite. Mobilizing appropriately   Clinical Decision Making (Complexity) low complexity   Therapy Frequency (PT) 2x/day   Predicted Duration of Therapy Intervention (days/wks) 3 days   Planned Therapy Interventions (PT) balance training;bed mobility training;gait training;home exercise program;stair training;strengthening;transfer training   Risk & Benefits of therapy have been explained evaluation/treatment results  reviewed;care plan/treatment goals reviewed;risks/benefits reviewed;current/potential barriers reviewed   PT Discharge Planning    PT Discharge Recommendation (DC Rec) home with assist;home with home care physical therapy;Transitional Care Facility   PT Rationale for DC Rec PT: At this time dual plan based on todays session recommend rehab but pending LOS may be able to progress to home with assist and home PT   PT Brief overview of current status  PT: Patient heavy modA for rolling and bed mobility, unable to assess standing/ambulation at this time   Total Evaluation Time   Total Evaluation Time (Minutes) 8

## 2021-02-20 NOTE — PROGRESS NOTES
Pt arrived to unit on bed from PACU. On capno, all values WNL on 3L NC. Pt is A&Ox4 and accompanied by  Saad. Denies any CP/SOB, LS clear. BS+/flatus-. Echeverria draining clear yellow urine. Intermittent nausea, no emesis. Cms+. Aquacel on back c/d/i. Soft collar brace on and hemovac with minimal serosanguinous drainage. Pt has blood in her hair from the surgery, but after thorough examination, it appears to all be dried and from surgery as there are no other apparent wounds. IV dilaudid admin x1 and PO meds changed to [place] per pt request. Pt has call light and has been oriented to the unit.

## 2021-02-20 NOTE — PLAN OF CARE
OT order received.  Per PT, pt not tolerating activity enough for 2 therapies today.  Will hold OT evaluation today.

## 2021-02-20 NOTE — PROGRESS NOTES
[facility]    Medicine Progress Note - Hospitalist Service       Date of Admission:  2/19/2021  Assessment & Plan       60 year old year old female with hx of COVID 19,Depression, Anxiety, OA, Neck pain is being admitted s/p  generalized anxiety disorder, depression and ongoing neck pain is being admitted s/p Posterior Instrumented Spinal Fusion Cervical 4 to Thoracic 1; Revision Foraminotomies/Factectomies Right Cervical 4-5  Removal of Laminoplasty Plates Cervical 4-7 Right Posterior Iliac Crest Bone Graft [place] on 02/19/2021     # S/p cervical spine surgery as above :   ml  ml Voided 450 ml  On Analgesics, and dressing in place  - Wound cares, Dressings, Surgical pain management, DVT Prophylaxis  per primary team.   - Monitor anemia (HGB 11.5), hemodynamics, Input/Output  - Encourage Incentive spirometry  - Bowel regimen.      # Psych: Hx of SHAHIDA, Depression - not on medications.      # COVID-19 positive on 11/29/20  -Respiratory status appears stable, continue monitoring.        Diet: Advance Diet as Tolerated: Regular Diet Adult  Diet    DVT Prophylaxis: Defer to primary service  Echeverria Catheter: in place, indication: Anesthesia  Code Status: Full Code             The patient's care was discussed with the Patient.    Gerardo Cook MD  Hospitalist Service  [facility]  Contact information available via [facility] Paging/Directory    ______________________________________________________________________    Interval History   No acute events overnight.   Family at bedside.   No chest pain, palpitations, shortness of breath, nausea, vomit, fever or chills .   Other ROS negative.   + Back pain.     Data reviewed today: I reviewed all medications, new labs and imaging results over the last 24 hours. I personally reviewed no images or EKG's today.    Physical Exam   Vital Signs: Temp: 98.4  F (36.9  C) Temp src:  Oral BP: 93/53 Pulse: 90   Resp: 19 SpO2: 94 % O2 Device: Nasal cannula Oxygen Delivery: 2 LPM  Weight: 201 lbs 8.01 oz  Constitutional: Alert, no acute distress, on O 2 NC.   Eye: No icterus, no pallor  Mouth/ENT: Normal oral mucosa. Neck collar in place  Cardiovascular: S1, S2 normal  Respiratory: B/L CTA  GI: Soft, NT, BS+  : No foleys' catheter  Neurology: Alert, awake, and oriented. No tremors, no focal deficits    Data   Recent Labs   Lab 02/20/21  0628 02/19/21  0934   HGB 11.5* 13.4     Recent Results (from the past 24 hour(s))   XR Surgery DRAKE L/T 5 Min Fluoro    Narrative    This exam was marked as non-reportable because it will not be read by a   radiologist or a [place] non-radiologist provider.           Medications       acetaminophen  650 mg Oral Q8H     calcium citrate  950 mg Oral QAM     ceFAZolin  1 g Intravenous Q8H     cholecalciferol  125 mcg Oral QAM     famotidine  20 mg Oral BID    Or     famotidine  20 mg Intravenous BID     gabapentin  300 mg Oral BID     lidocaine  1 patch Transdermal Q24H     lidocaine   Transdermal Q8H     polyethylene glycol  17 g Oral Daily     scopolamine  1 patch Transdermal Q72H    And     scopolamine   Transdermal Q8H     scopolamine   Transdermal Q8H     scopolamine   Transdermal Once     senna-docusate  1 tablet Oral BID    Or     senna-docusate  2 tablet Oral BID

## 2021-02-20 NOTE — PLAN OF CARE
"      VS:   BP 93/53 (BP Location: Right arm)   Pulse 90   Temp 98.4  F (36.9  C) (Oral)   Resp 19   Ht 1.651 m (5' 5\")   Wt 91.4 kg (201 lb 8 oz)   LMP 02/01/2017   SpO2 94%   BMI 33.53 kg/m    Denies chest pain and SOB. LS clear. On 1 L NC, CAPNO on.    Output:   Echeverria catheter patent and draining, asked pt a couple times to have it taken out and pt would like to wait to this afternoon. Passing gas.    Activity:   Pt sat at EOB with therapy, has not stood at EOB yet due to nausea. Moving in bed independently    Skin: Incision and some blanchable redness on face and L arm, improving.    Pain:   Pt using Norco Q6H, Robaxin and IV Dilaudid for breakthrough pain. Pt pain controlled at times but still peaking to 8/10.    Neuro/CMS:   Intact, good strength and denies numbness/tingling.    Dressing(s):   Intact (aquacel) and gauze and Tegaderm on graft site   Diet:   Regular diet, tolerating fair.    LDA:   PIV SL x2   Equipment:   [place] J collar.    Plan:   Continue to monitor. Pt is able to make needs known, call light is within reach.    Additional Info:          "

## 2021-02-21 ENCOUNTER — APPOINTMENT (OUTPATIENT)
Dept: PHYSICAL THERAPY | Facility: CLINIC | Age: 61
End: 2021-02-21
Attending: ORTHOPAEDIC SURGERY
Payer: COMMERCIAL

## 2021-02-21 ENCOUNTER — APPOINTMENT (OUTPATIENT)
Dept: OCCUPATIONAL THERAPY | Facility: CLINIC | Age: 61
End: 2021-02-21
Attending: ORTHOPAEDIC SURGERY
Payer: COMMERCIAL

## 2021-02-21 LAB — HGB BLD-MCNC: 11.2 G/DL (ref 11.7–15.7)

## 2021-02-21 PROCEDURE — 250N000011 HC RX IP 250 OP 636: Performed by: PHYSICIAN ASSISTANT

## 2021-02-21 PROCEDURE — 97535 SELF CARE MNGMENT TRAINING: CPT | Mod: GO

## 2021-02-21 PROCEDURE — 250N000013 HC RX MED GY IP 250 OP 250 PS 637: Performed by: PHYSICIAN ASSISTANT

## 2021-02-21 PROCEDURE — 99207 PR CDG-MDM COMPONENT: MEETS LOW - DOWN CODED: CPT | Performed by: INTERNAL MEDICINE

## 2021-02-21 PROCEDURE — 120N000002 HC R&B MED SURG/OB UMMC

## 2021-02-21 PROCEDURE — 85018 HEMOGLOBIN: CPT | Performed by: PHYSICIAN ASSISTANT

## 2021-02-21 PROCEDURE — 97530 THERAPEUTIC ACTIVITIES: CPT | Mod: GP

## 2021-02-21 PROCEDURE — 36415 COLL VENOUS BLD VENIPUNCTURE: CPT | Performed by: PHYSICIAN ASSISTANT

## 2021-02-21 PROCEDURE — 250N000011 HC RX IP 250 OP 636: Performed by: INTERNAL MEDICINE

## 2021-02-21 PROCEDURE — 97165 OT EVAL LOW COMPLEX 30 MIN: CPT | Mod: GO

## 2021-02-21 PROCEDURE — 250N000013 HC RX MED GY IP 250 OP 250 PS 637: Performed by: ORTHOPAEDIC SURGERY

## 2021-02-21 PROCEDURE — 99231 SBSQ HOSP IP/OBS SF/LOW 25: CPT | Performed by: INTERNAL MEDICINE

## 2021-02-21 RX ORDER — METOCLOPRAMIDE HYDROCHLORIDE 5 MG/ML
10 INJECTION INTRAMUSCULAR; INTRAVENOUS EVERY 6 HOURS
Status: DISCONTINUED | OUTPATIENT
Start: 2021-02-21 | End: 2021-02-22

## 2021-02-21 RX ADMIN — POLYETHYLENE GLYCOL 3350 17 G: 17 POWDER, FOR SOLUTION ORAL at 11:11

## 2021-02-21 RX ADMIN — HYDROCODONE BITARTRATE AND ACETAMINOPHEN 2 TABLET: 5; 325 TABLET ORAL at 06:56

## 2021-02-21 RX ADMIN — ACETAMINOPHEN 650 MG: 325 TABLET, FILM COATED ORAL at 23:00

## 2021-02-21 RX ADMIN — HYDROCODONE BITARTRATE AND ACETAMINOPHEN 2 TABLET: 5; 325 TABLET ORAL at 12:41

## 2021-02-21 RX ADMIN — FAMOTIDINE 20 MG: 20 TABLET, FILM COATED ORAL at 11:12

## 2021-02-21 RX ADMIN — ACETAMINOPHEN 650 MG: 325 TABLET, FILM COATED ORAL at 14:23

## 2021-02-21 RX ADMIN — METHOCARBAMOL 750 MG: 750 TABLET ORAL at 05:06

## 2021-02-21 RX ADMIN — DOCUSATE SODIUM AND SENNOSIDES 2 TABLET: 8.6; 5 TABLET ORAL at 11:11

## 2021-02-21 RX ADMIN — GABAPENTIN 300 MG: 300 CAPSULE ORAL at 11:12

## 2021-02-21 RX ADMIN — HYDROCODONE BITARTRATE AND ACETAMINOPHEN 2 TABLET: 5; 325 TABLET ORAL at 18:30

## 2021-02-21 RX ADMIN — CALCIUM CITRATE 200 MG (950 MG) TABLET 950 MG: at 11:11

## 2021-02-21 RX ADMIN — FAMOTIDINE 20 MG: 20 TABLET, FILM COATED ORAL at 20:58

## 2021-02-21 RX ADMIN — METOCLOPRAMIDE HYDROCHLORIDE 10 MG: 5 INJECTION INTRAMUSCULAR; INTRAVENOUS at 16:55

## 2021-02-21 RX ADMIN — LIDOCAINE 1 PATCH: 560 PATCH PERCUTANEOUS; TOPICAL; TRANSDERMAL at 11:12

## 2021-02-21 RX ADMIN — DOCUSATE SODIUM AND SENNOSIDES 2 TABLET: 8.6; 5 TABLET ORAL at 20:58

## 2021-02-21 RX ADMIN — ONDANSETRON 4 MG: 4 TABLET, ORALLY DISINTEGRATING ORAL at 09:06

## 2021-02-21 RX ADMIN — GABAPENTIN 300 MG: 300 CAPSULE ORAL at 20:58

## 2021-02-21 RX ADMIN — HYDROCODONE BITARTRATE AND ACETAMINOPHEN 2 TABLET: 5; 325 TABLET ORAL at 00:53

## 2021-02-21 RX ADMIN — Medication 125 MCG: at 11:12

## 2021-02-21 RX ADMIN — METHOCARBAMOL 750 MG: 750 TABLET ORAL at 21:02

## 2021-02-21 RX ADMIN — METHOCARBAMOL 750 MG: 750 TABLET ORAL at 14:23

## 2021-02-21 ASSESSMENT — ACTIVITIES OF DAILY LIVING (ADL): PREVIOUS_RESPONSIBILITIES: HOUSEKEEPING;MEAL PREP;LAUNDRY

## 2021-02-21 NOTE — PROGRESS NOTES
"Orthopaedic Surgery Progress Note:  02/21/2021     Subjective:   No acute events overnight. Echeverria removed and now voiding spontaneously. Pain controlled in neck. [place] J fitting well. Denies weakness of upper extremities, numbness, or tingling. Tolerating diet. Up to bedside with PT. Has not mobilized much yet. Passing flatus; no BM since OR.    Objective:   /67 (BP Location: Right arm)   Pulse 89   Temp 98.2  F (36.8  C) (Oral)   Resp 25   Ht 1.651 m (5' 5\")   Wt 91.4 kg (201 lb 8 oz)   LMP 02/01/2017   SpO2 92%   BMI 33.53 kg/m    I/O this shift:  In: -   Out: 970 [Urine:950; Drains:20]  Gen: NAD. Resting comfortably in bed  Resp: Breathing comfortably on RA  Drain:   Drain output 20 / 90 in 8 / 24 hours respectively   Musculoskeletal: dressing c/d/I.      Motor Strength Right Left   Deltoids: C5 5/5 5/5   Biceps: C5 5/5 5/5   Brachialis: C6 5/5 5/5   Wrist extension: C6 5/5 5/5   Triceps: C7  5/5 5/5   Wrist flexion: C7 5/5 5/5    strength: C8 5/5 5/5   Hand intrinsics: T1 5/5 5/5     Sensation from C4-L1 is preserved.    Labs:  Lab Results   Component Value Date    WBC 4.2 01/25/2021    HGB 11.2 (L) 02/21/2021     01/25/2021        Assessment & Plan:   Layla Starks is a 60 year old female with PMH including PONV, obesity, anxiety, depression, cervical radiculopathy, hx multiple cervical spine surgeries (laminoplasty C5-7, foraminotomy C4-5, ACDF C4-7)  now s/p below procedure on 2/19/21 with Dr. Waite.      1.  Posterior spinal fusion C4-T1 (fusion augmentation at C4-C7), using iliac crest autograft and crushed cancellous allograft.  2.  Segmental bilateral posterior instrumentation C4-T1, using cervical lateral mass screws (bilateral C4), cervical and thoracic pedicle screws (left C6, right C7, bilateral T1).  3.  Revision foraminotomy with coomplete facetectomy, right C4-5, with decompression of right C5 nerve root.  4.  Right posterior iliac crest bone graft harvest.  5.  " Removal of laminoplasty plates C5, C6, C7 (3 levels).  6.  Exploration of fusion C4-C7.    Ravindra Primary  Activity: Up with assist until independent. No excessive bending or twisting. No lifting >10 lbs x 6 weeks. No Doug lift for transfers.   Weight bearing status: WBAT.  Pain management: Transition from IV to PO narcotics as tolerated. No NSAIDs   Antibiotics: Ancef x 24 hours complete  Diet: Begin with clear fluids and progress diet as tolerated.   DVT prophylaxis: SCDs only. No chemical DVT ppx needed.  Imaging: XR Upright cervical spine and cervical spine CT PTDC - ordered.  Labs: Hgb 11.2 2/21.  Bracing/Splinting: [place] J at all times. May remove brace for hygiene. may loosen front of collar for meals..  Dressings: Keep Aquacel c/d/i x 7 days. Tegaderm dressing on right posterior iliac crest graft site keep c/d/i x 48 hours  Drains: deep HV. Document output per shift, will be discontinued at Orthopedic Surgery discretion.  Echeverria catheter: Remove POD#1.   Physical Therapy/Occupational Therapy: Eval and treat.  Cultures: none.    Consults: Hospitalist.  Follow-up: Clinic with Dr. Waite in 6 weeks with repeat x-rays.   Disposition: Pending progress with therapies, pain control on orals, and medical stability, anticipate discharge to home on POD #2-5.      ------------------------------------------------------------------------------------------    [   ] Drains removed.  [   ] Post xrays done.  [ x ] Discharge orders done.  [ x ] Discharge summary started.    Catrina Bowie MD   Orthopedic Surgery, PGY4  [phone]    FOLLOWUP:    Future Appointments   Date Time Provider Department Center   2/20/2021  8:45 AM Mara Osuna, PT URPT [facility]   2/20/2021  1:30 PM Navya Bautista, OT UROT [facility]   2/20/2021  7:00 PM UR PT OVERFLOW URPT [facility]   4/1/2021  8:40 AM Stevo Waite MD [facility]   5/13/2021  8:40 AM Stevo Waite MD [facility]

## 2021-02-21 NOTE — PROGRESS NOTES
[facility]    Medicine Progress Note - Hospitalist Service       Date of Admission:  2/19/2021  Assessment & Plan       60 year old year old female with hx of COVID 19,Depression, Anxiety, OA, Neck pain is being admitted s/p  generalized anxiety disorder, depression and ongoing neck pain is being admitted s/p Posterior Instrumented Spinal Fusion Cervical 4 to Thoracic 1; Revision Foraminotomies/Factectomies Right Cervical 4-5  Removal of Laminoplasty Plates Cervical 4-7 Right Posterior Iliac Crest Bone Graft [place] on 02/19/2021     # S/p cervical spine surgery as above :   ml  ml Voided 450 ml  On Analgesics, and dressing in place  - Wound cares, Dressings, Surgical pain management, DVT Prophylaxis  per primary team.   - Monitor anemia (HGB 11.2), hemodynamics, Input/Output  - Encourage Incentive spirometry  - Bowel regimen.   - PT / OT     # Psych: Hx of SHAHIDA, Depression - not on medications.      # COVID-19 positive on 11/29/20  -Respiratory status appears stable, continue monitoring.        Diet: Advance Diet as Tolerated: Regular Diet Adult  Diet    DVT Prophylaxis: Defer to primary service  Echeverria Catheter: not present  Code Status: Full Code             The patient's care was discussed with the Patient.    Gerardo Cook MD  Hospitalist Service  [facility]  Contact information available via [facility] Paging/Directory    ______________________________________________________________________    Interval History   No acute events overnight.   No chest pain, palpitations, shortness of breath, nausea, vomit, fever or chills .   + No bowel movement yet, she is passing gas.   Other ROS negative.   + Back pain.     Data reviewed today: I reviewed all medications, new labs and imaging results over the last 24 hours. I personally reviewed no images or EKG's today.    Physical Exam   Vital Signs: Temp: 98.9  F (37.2   C) Temp src: Oral BP: 90/44 Pulse: 90   Resp: 22 SpO2: 90 % O2 Device: None (Room air) Oxygen Delivery: 1 LPM  Weight: 201 lbs 8.01 oz  Constitutional: Alert, no acute distress, on room air.   Eye: No icterus, no pallor  Mouth/ENT: Normal oral mucosa. Neck collar in place  Cardiovascular: S1, S2 normal  Respiratory: Normal respiratory effort, no crackles or wheezing.   GI: Soft, NT, BS+  : Deferred  Neurology: Alert, awake, and oriented. No tremors, no focal deficits    Data   Recent Labs   Lab 02/21/21  0545 02/20/21  0628 02/19/21  0934   HGB 11.2* 11.5* 13.4     No results found for this or any previous visit (from the past 24 hour(s)).  Medications       acetaminophen  650 mg Oral Q8H     calcium citrate  950 mg Oral QAM     cholecalciferol  125 mcg Oral QAM     famotidine  20 mg Oral BID    Or     famotidine  20 mg Intravenous BID     gabapentin  300 mg Oral BID     lidocaine  1 patch Transdermal Q24H     lidocaine   Transdermal Q8H     polyethylene glycol  17 g Oral Daily     scopolamine  1 patch Transdermal Q72H    And     scopolamine   Transdermal Q8H     scopolamine   Transdermal Once     senna-docusate  1 tablet Oral BID    Or     senna-docusate  2 tablet Oral BID

## 2021-02-21 NOTE — PLAN OF CARE
"    VS:   /67 (BP Location: Right arm)   Pulse 89   Temp 98.2  F (36.8  C) (Oral)   Resp 25   Ht 1.651 m (5' 5\")   Wt 91.4 kg (201 lb 8 oz)   LMP 02/01/2017   SpO2 91%   BMI 33.53 kg/m      Output:   Voiding good amts to commode.  Passing gas,no BM tonight.Lst BM 3 days ago.   Activity:   Up CGA1-2 to commode x2 tonight.   Skin: Blanchable redness on chest area  Upper back incision and R posterior hip incision,UTV,covered     Pain:   Upper back pain managed  with norco q6hrs,robaxin x1,ice pack applied on both incisions.   Neuro/CMS:   intact   Dressing(s):   Aquacel to upper  back and R posterior hip gauze drsg,CDI.   Diet:   Regular.Denies nausea.   LDA:   PIV lines SL.   Equipment:   PCDs  Incentive spirometer  commode   Plan:   Plan for  CT and x-ray today.     Additional Info:   Needs hair washed today from old dried blood accumulated to hair posterior area acquired when in OR.      "

## 2021-02-21 NOTE — PLAN OF CARE
"      VS:   /60 (BP Location: Right arm)   Pulse 92   Temp 99.4  F (37.4  C) (Oral)   Resp 18   Ht 1.651 m (5' 5\")   Wt 91.4 kg (201 lb 8 oz)   LMP 02/01/2017   SpO2 95%   BMI 33.53 kg/m    VSS. LS clear. O2 sats mid 90's on 1L NC. Denies any SOB/CP   Output:   AUOP clear/yellow urine via pinon cath. Pinon removed 1800. BS+/flatus+. LBM 2/18   Activity:   Assist x2. Pt sat at EOB with therapy today. [place] J collar on.    Skin:    Pain:   Pain well managed with norco (admin @ 1800) and robaxin (admin @ 1700). Pt has IV dilaudid as well but did not utilize.    Neuro/CMS:   A&Ox4. Cms+   Dressing(s):   Aquacel on posterior neck midline and R hip, both c/d/i   Diet:   Reg diet, fair appetite. Pt has been very nauseous since surgery but it has been improving and she was able to eat 30% of dinner   LDA:   PIV's in bilat hands, both SL   Equipment:   Walker, commode   Plan:   Needs CT and X-rays tomorrow   Additional Info:          "

## 2021-02-21 NOTE — PROGRESS NOTES
02/21/21 0700   Quick Adds   Type of Visit Initial Occupational Therapy Evaluation   Living Environment   People in home spouse   Current Living Arrangements house   Transportation Anticipated family or friend will provide   Living Environment Comments walk in shower with built in bench, have hospital bed and BSC, does also have cane. spouse can A at d/c.    Self-Care   Usual Activity Tolerance good   Current Activity Tolerance fair   Regular Exercise No   Equipment Currently Used at Home none   Activity/Exercise/Self-Care Comment no AE at baseline.    Disability/Function   Hearing Difficulty or Deaf no   Wear Glasses or Blind yes   Vision Management readers   Concentrating, Remembering or Making Decisions Difficulty no   Difficulty Communicating no   Difficulty Eating/Swallowing no   Walking or Climbing Stairs Difficulty no   Dressing/Bathing Difficulty no   Toileting issues no   Doing Errands Independently Difficulty (such as shopping) no   Fall history within last six months no   Change in Functional Status Since Onset of Current Illness/Injury no   General Information   Onset of Illness/Injury or Date of Surgery 02/19/21   Referring Physician Ravindra   Patient/Family Therapy Goal Statement (OT) return home wtih family A    Additional Occupational Profile Info/Pertinent History of Current Problem Layla Starks is a 60 year old female with PMH including PONV, obesity, anxiety, depression, cervical radiculopathy, hx multiple cervical spine surgeries (laminoplasty C5-7, foraminotomy C4-5, ACDF C4-7)  now s/p above procedure on 2/19/21 with Dr. Waite.    Existing Precautions/Restrictions fall;spinal  (c-collar on at all times)   Left Upper Extremity (Weight-bearing Status)   (10 # restriction)   Right Upper Extremity (Weight-bearing Status)   (10 # restriction)   General Observations and Info c-collar on at all times.    Cognitive Status Examination   Orientation Status orientation to person, place and  time   Affect/Mental Status (Cognitive) WNL   Follows Commands WNL   Visual Perception   Visual Impairment/Limitations WNL   Sensory   Sensory Quick Adds No deficits were identified   Pain Assessment   Patient Currently in Pain Yes, see Vital Sign flowsheet   Integumentary/Edema   Integumentary/Edema no deficits were identifed   Posture   Posture not impaired   Range of Motion Comprehensive   Comment, General Range of Motion not tested due to pain.    Strength Comprehensive (MMT)   Comment, General Manual Muscle Testing (MMT) Assessment not tested due to pain.    Muscle Tone Assessment   Muscle Tone Quick Adds No deficits were identified   Coordination   Upper Extremity Coordination No deficits were identified   Instrumental Activities of Daily Living (IADL)   Previous Responsibilities housekeeping;meal prep;laundry   IADL Comments family to A at d/c.    Clinical Impression   Criteria for Skilled Therapeutic Interventions Met (OT) yes   OT Diagnosis dec IND with ADl's and transfers   OT Problem List-Impairments impacting ADL activity tolerance impaired;pain;post-surgical precautions   Assessment of Occupational Performance 3-5 Performance Deficits   Identified Performance Deficits LE dressing, shower, toilet transfer, g/h    Planned Therapy Interventions (OT) ADL retraining;transfer training   Clinical Decision Making Complexity (OT) low complexity   Therapy Frequency (OT) 6x/week   Predicted Duration of Therapy 5 days   Risk & Benefits of therapy have been explained evaluation/treatment results reviewed;care plan/treatment goals reviewed;current/potential barriers reviewed;risks/benefits reviewed;patient   OT Discharge Planning    OT Discharge Recommendation (DC Rec) Home with assist;home with home care occupational therapy   OT Rationale for DC Rec anticipate pt will be safe to return home with family A once pain more controlled. Pt has hospital bed, BSC, shower chair at home. Supportive spouse and daughter to A  as needed. HHOT to progress IND with ADL/IADL in home environment.    OT Brief overview of current status  Min A for supine <> sit, CGA for SPT bed <> BSC.    Total Evaluation Time (Minutes)   Total Evaluation Time (Minutes) 8

## 2021-02-21 NOTE — PLAN OF CARE
Vitals: VSS. On 1 LPM O2 NC.  Lung sounds CTA. Denies CP, SOB.   Output: Echeverria removed 1800. PVRs 16 and 26. Up to bedside commode Ax1.   Last BM: 2/18 prior to surgery   Activity: Encouraged. Up to bedside commode x2.   Skin: Intact ex incsions and drain.   Pain: Pain managed with around the clock dosaging ty, norco, and robaxin. Pt also used ice packs.    CMS/Neuro: Intact. A&O x 4.   Dressing: CDI spine, drain, and hip   Diet: Reg, thin. Intermittent nausea. Improving throughout day.   LDA: PIV L and R hand- SL. Hemovac.    Equipment: IV pole, capno, personal belongings.    Plan/Add'l info: Able to make needs known. Call light within reach. Continue with POC.  Discharge plan: TBD

## 2021-02-22 ENCOUNTER — APPOINTMENT (OUTPATIENT)
Dept: GENERAL RADIOLOGY | Facility: CLINIC | Age: 61
End: 2021-02-22
Attending: PHYSICIAN ASSISTANT
Payer: COMMERCIAL

## 2021-02-22 ENCOUNTER — APPOINTMENT (OUTPATIENT)
Dept: PHYSICAL THERAPY | Facility: CLINIC | Age: 61
End: 2021-02-22
Attending: ORTHOPAEDIC SURGERY
Payer: COMMERCIAL

## 2021-02-22 ENCOUNTER — APPOINTMENT (OUTPATIENT)
Dept: CT IMAGING | Facility: CLINIC | Age: 61
End: 2021-02-22
Attending: ORTHOPAEDIC SURGERY
Payer: COMMERCIAL

## 2021-02-22 PROCEDURE — 72040 X-RAY EXAM NECK SPINE 2-3 VW: CPT

## 2021-02-22 PROCEDURE — 250N000011 HC RX IP 250 OP 636: Performed by: PHYSICIAN ASSISTANT

## 2021-02-22 PROCEDURE — 250N000013 HC RX MED GY IP 250 OP 250 PS 637: Performed by: INTERNAL MEDICINE

## 2021-02-22 PROCEDURE — 97530 THERAPEUTIC ACTIVITIES: CPT | Mod: GP | Performed by: PHYSICAL THERAPIST

## 2021-02-22 PROCEDURE — 72125 CT NECK SPINE W/O DYE: CPT | Mod: 26 | Performed by: RADIOLOGY

## 2021-02-22 PROCEDURE — 250N000011 HC RX IP 250 OP 636: Performed by: INTERNAL MEDICINE

## 2021-02-22 PROCEDURE — 250N000013 HC RX MED GY IP 250 OP 250 PS 637: Performed by: PHYSICIAN ASSISTANT

## 2021-02-22 PROCEDURE — 99231 SBSQ HOSP IP/OBS SF/LOW 25: CPT | Performed by: INTERNAL MEDICINE

## 2021-02-22 PROCEDURE — 72125 CT NECK SPINE W/O DYE: CPT

## 2021-02-22 PROCEDURE — 250N000013 HC RX MED GY IP 250 OP 250 PS 637: Performed by: ORTHOPAEDIC SURGERY

## 2021-02-22 PROCEDURE — 97116 GAIT TRAINING THERAPY: CPT | Mod: GP | Performed by: PHYSICAL THERAPIST

## 2021-02-22 PROCEDURE — 250N000013 HC RX MED GY IP 250 OP 250 PS 637: Performed by: CLINICAL NURSE SPECIALIST

## 2021-02-22 PROCEDURE — 99207 PR CDG-MDM COMPONENT: MEETS LOW - DOWN CODED: CPT | Performed by: INTERNAL MEDICINE

## 2021-02-22 PROCEDURE — 72040 X-RAY EXAM NECK SPINE 2-3 VW: CPT | Mod: 26 | Performed by: RADIOLOGY

## 2021-02-22 PROCEDURE — 250N000013 HC RX MED GY IP 250 OP 250 PS 637: Performed by: STUDENT IN AN ORGANIZED HEALTH CARE EDUCATION/TRAINING PROGRAM

## 2021-02-22 PROCEDURE — 120N000002 HC R&B MED SURG/OB UMMC

## 2021-02-22 RX ORDER — NALOXONE HYDROCHLORIDE 0.4 MG/ML
0.4 INJECTION, SOLUTION INTRAMUSCULAR; INTRAVENOUS; SUBCUTANEOUS
Status: DISCONTINUED | OUTPATIENT
Start: 2021-02-22 | End: 2021-02-24 | Stop reason: HOSPADM

## 2021-02-22 RX ORDER — MAGNESIUM OXIDE 400 MG/1
400 TABLET ORAL ONCE
Status: COMPLETED | OUTPATIENT
Start: 2021-02-22 | End: 2021-02-22

## 2021-02-22 RX ORDER — BISACODYL 10 MG
10 SUPPOSITORY, RECTAL RECTAL DAILY PRN
Status: DISCONTINUED | OUTPATIENT
Start: 2021-02-22 | End: 2021-02-24 | Stop reason: HOSPADM

## 2021-02-22 RX ORDER — HYDROCODONE BITARTRATE AND ACETAMINOPHEN 7.5; 325 MG/1; MG/1
1-2 TABLET ORAL EVERY 6 HOURS PRN
Status: DISCONTINUED | OUTPATIENT
Start: 2021-02-22 | End: 2021-02-24 | Stop reason: HOSPADM

## 2021-02-22 RX ORDER — METOCLOPRAMIDE HYDROCHLORIDE 5 MG/ML
10 INJECTION INTRAMUSCULAR; INTRAVENOUS EVERY 6 HOURS PRN
Status: DISCONTINUED | OUTPATIENT
Start: 2021-02-22 | End: 2021-02-24 | Stop reason: HOSPADM

## 2021-02-22 RX ORDER — NALOXONE HYDROCHLORIDE 0.4 MG/ML
0.2 INJECTION, SOLUTION INTRAMUSCULAR; INTRAVENOUS; SUBCUTANEOUS
Status: DISCONTINUED | OUTPATIENT
Start: 2021-02-22 | End: 2021-02-24 | Stop reason: HOSPADM

## 2021-02-22 RX ORDER — BISACODYL 5 MG
5 TABLET, DELAYED RELEASE (ENTERIC COATED) ORAL DAILY PRN
Status: DISCONTINUED | OUTPATIENT
Start: 2021-02-22 | End: 2021-02-24 | Stop reason: HOSPADM

## 2021-02-22 RX ORDER — HYDROCODONE BITARTRATE AND ACETAMINOPHEN 5; 325 MG/1; MG/1
1 TABLET ORAL ONCE
Status: COMPLETED | OUTPATIENT
Start: 2021-02-22 | End: 2021-02-22

## 2021-02-22 RX ADMIN — POLYETHYLENE GLYCOL 3350 17 G: 17 POWDER, FOR SOLUTION ORAL at 10:20

## 2021-02-22 RX ADMIN — METOCLOPRAMIDE HYDROCHLORIDE 10 MG: 5 INJECTION INTRAMUSCULAR; INTRAVENOUS at 06:20

## 2021-02-22 RX ADMIN — DOCUSATE SODIUM AND SENNOSIDES 2 TABLET: 8.6; 5 TABLET ORAL at 21:32

## 2021-02-22 RX ADMIN — DOCUSATE SODIUM AND SENNOSIDES 2 TABLET: 8.6; 5 TABLET ORAL at 10:18

## 2021-02-22 RX ADMIN — HYDROCODONE BITARTRATE AND ACETAMINOPHEN 2 TABLET: 5; 325 TABLET ORAL at 19:02

## 2021-02-22 RX ADMIN — GABAPENTIN 300 MG: 300 CAPSULE ORAL at 10:20

## 2021-02-22 RX ADMIN — ONDANSETRON 4 MG: 4 TABLET, ORALLY DISINTEGRATING ORAL at 12:55

## 2021-02-22 RX ADMIN — HYDROCODONE BITARTRATE AND ACETAMINOPHEN 2 TABLET: 5; 325 TABLET ORAL at 06:28

## 2021-02-22 RX ADMIN — Medication 400 MG: at 21:31

## 2021-02-22 RX ADMIN — Medication 125 MCG: at 10:20

## 2021-02-22 RX ADMIN — BISACODYL 10 MG: 10 SUPPOSITORY RECTAL at 11:26

## 2021-02-22 RX ADMIN — METOCLOPRAMIDE HYDROCHLORIDE 10 MG: 5 INJECTION INTRAMUSCULAR; INTRAVENOUS at 00:23

## 2021-02-22 RX ADMIN — FAMOTIDINE 20 MG: 20 TABLET, FILM COATED ORAL at 10:19

## 2021-02-22 RX ADMIN — HYDROCODONE BITARTRATE AND ACETAMINOPHEN 2 TABLET: 5; 325 TABLET ORAL at 00:23

## 2021-02-22 RX ADMIN — HYDROCODONE BITARTRATE AND ACETAMINOPHEN 2 TABLET: 5; 325 TABLET ORAL at 12:55

## 2021-02-22 RX ADMIN — METHOCARBAMOL 750 MG: 750 TABLET ORAL at 16:36

## 2021-02-22 RX ADMIN — FAMOTIDINE 20 MG: 20 TABLET, FILM COATED ORAL at 21:31

## 2021-02-22 RX ADMIN — HYDROCODONE BITARTRATE AND ACETAMINOPHEN 1 TABLET: 5; 325 TABLET ORAL at 21:42

## 2021-02-22 RX ADMIN — CALCIUM CITRATE 200 MG (950 MG) TABLET 950 MG: at 10:19

## 2021-02-22 NOTE — PROGRESS NOTES
"Orthopaedic Surgery Progress Note:  02/22/2021     Subjective:   No acute events overnight. Ongoing headaches that have been limiting mobility. Tolerating diet. Denies numbness/tingling in upper extremities. No BM since OR.    Objective:   /64 (BP Location: Right arm)   Pulse 94   Temp 98.9  F (37.2  C) (Oral)   Resp 16   Ht 1.651 m (5' 5\")   Wt 91.4 kg (201 lb 8 oz)   LMP 02/01/2017   SpO2 91%   BMI 33.53 kg/m    No intake/output data recorded.  Gen: NAD. Resting comfortably in bed  Resp: Breathing comfortably on RA  Drain:   Drain output 25 / 45 in 8 / 24 hours respectively   Musculoskeletal: dressing c/d/I.      Motor Strength Right Left   Deltoids: C5 5/5 5/5   Biceps: C5 5/5 5/5   Brachialis: C6 5/5 5/5   Wrist extension: C6 5/5 5/5   Triceps: C7  5/5 5/5   Wrist flexion: C7 5/5 5/5    strength: C8 5/5 5/5   Hand intrinsics: T1 5/5 5/5     Sensation from C4-L1 is preserved.    Labs:  Lab Results   Component Value Date    WBC 4.2 01/25/2021    HGB 11.2 (L) 02/21/2021     01/25/2021        Assessment & Plan:   Layla Starks is a 60 year old female with PMH including PONV, obesity, anxiety, depression, cervical radiculopathy, hx multiple cervical spine surgeries (laminoplasty C5-7, foraminotomy C4-5, ACDF C4-7)  now s/p below procedure on 2/19/21 with Dr. Waite.      1.  Posterior spinal fusion C4-T1 (fusion augmentation at C4-C7), using iliac crest autograft and crushed cancellous allograft.  2.  Segmental bilateral posterior instrumentation C4-T1, using cervical lateral mass screws (bilateral C4), cervical and thoracic pedicle screws (left C6, right C7, bilateral T1).  3.  Revision foraminotomy with coomplete facetectomy, right C4-5, with decompression of right C5 nerve root.  4.  Right posterior iliac crest bone graft harvest.  5.  Removal of laminoplasty plates C5, C6, C7 (3 levels).  6.  Exploration of fusion C4-C7.    Ravindra Primary  Activity: Up with assist until " independent. No excessive bending or twisting. No lifting >10 lbs x 6 weeks.   Weight bearing status: WBAT.  Pain management: Transition from IV to PO narcotics as tolerated. No NSAIDs   Antibiotics: Ancef x 24 hours complete  Diet: Begin with clear fluids and progress diet as tolerated.   DVT prophylaxis: SCDs only. No chemical DVT ppx needed.  Imaging: XR Upright cervical spine and cervical spine CT PTDC - will try and obtain today.  Labs: Hgb 11.2 2/21.  Bracing/Splinting: [place] J at all times. May remove brace for hygiene. May loosen front of collar for meals.  Dressings: Keep Aquacel c/d/i x 7 days. Tegaderm dressing on right posterior iliac crest graft site keep c/d/i x 48 hours  Drains: deep HV. Document output per shift, will be discontinued at Orthopedic Surgery discretion.  Echeverria catheter: Remove POD#1.   Physical Therapy/Occupational Therapy: Eval and treat.  Cultures: none.    Consults: Hospitalist.  Follow-up: Clinic with Dr. Waite in 6 weeks with repeat x-rays.   Disposition: Pending progress with therapies, pain control on orals, and medical stability, anticipate discharge to home - likely tomorrow.      ------------------------------------------------------------------------------------------    [   ] Drains removed.  [   ] Post xrays done.  [ x ] Discharge orders done.  [ x ] Discharge summary started.    Chiara Mahoney MD   Orthopedic Surgery, PGY4  [phone]    FOLLOWUP:    Future Appointments   Date Time Provider Department Center   2/20/2021  8:45 AM Mara Osuna, PT URPT [place]   2/20/2021  1:30 PM Navya Bautista, OT UROT [place]   2/20/2021  7:00 PM UR PT OVERFLOW URPT [place]   4/1/2021  8:40 AM Stevo Waite MD [facility]   5/13/2021  8:40 AM Stevo Waite MD [facility]

## 2021-02-22 NOTE — PROGRESS NOTES
[facility]    Medicine Progress Note - Hospitalist Service       Date of Admission:  2/19/2021  Assessment & Plan       60 year old year old female with hx of COVID 19,Depression, Anxiety, OA, Neck pain is being admitted s/p  generalized anxiety disorder, depression and ongoing neck pain is being admitted s/p Posterior Instrumented Spinal Fusion Cervical 4 to Thoracic 1; Revision Foraminotomies/Factectomies Right Cervical 4-5  Removal of Laminoplasty Plates Cervical 4-7 Right Posterior Iliac Crest Bone Graft [place] on 02/19/2021     # S/p cervical spine surgery as above  On Analgesics, and dressing in place  - Wound cares, Dressings, Surgical pain management, DVT Prophylaxis  per primary team.   - HGB stable.   - Encourage Incentive spirometry  - Bowel regimen.   - PT / OT  - Persistent headache, this is chronic, no hx of migraine. Headache is improved today, continue monitoring.   - No BM yet, increase bowel regimen.   - Hemodynamically stable.      # Psych: Hx of SHAHIDA, Depression - not on medications.      # COVID-19 positive on 11/29/20  -Respiratory status appears stable, continue monitoring.        Diet: Advance Diet as Tolerated: Regular Diet Adult  Diet  Snacks/Supplements Adult: Boost Plus; Between Meals    DVT Prophylaxis: Defer to primary service  Echeverria Catheter: not present  Code Status: Full Code             The patient's care was discussed with the Patient.    Gerardo Cook MD  Hospitalist Service  [facility]  Contact information available via [facility] Paging/Directory    ______________________________________________________________________    Interval History   No acute events overnight.   No chest pain or palpitations   No shortness of breath or cough  No abdominal pain, nausea or vomit  No fever or chills   + No bowel movement yet, she is passing gas.   + headache    Data reviewed today: I reviewed all  medications, new labs and imaging results over the last 24 hours. I personally reviewed no images or EKG's today.    Physical Exam   Vital Signs: Temp: 98.9  F (37.2  C) Temp src: Oral BP: 106/64 Pulse: 94   Resp: 16 SpO2: 91 % O2 Device: None (Room air)    Weight: 201 lbs 8.01 oz  Constitutional: Alert, no acute distress, on room air.   Eye: No icterus, no pallor  Mouth/ENT: Normal oral mucosa. Neck collar in place  Cardiovascular: S1, S2 normal  Respiratory: Normal respiratory effort, no crackles or wheezing.   GI: Soft, NT, BS+  : Deferred  Neurology: Alert, awake, and oriented x 3. No tremors, no focal deficits    Data   Recent Labs   Lab 02/21/21  0545 02/20/21  0628 02/19/21  0934   HGB 11.2* 11.5* 13.4     No results found for this or any previous visit (from the past 24 hour(s)).  Medications       acetaminophen  650 mg Oral Q8H     calcium citrate  950 mg Oral QAM     cholecalciferol  125 mcg Oral QAM     famotidine  20 mg Oral BID    Or     famotidine  20 mg Intravenous BID     gabapentin  300 mg Oral BID     lidocaine  1 patch Transdermal Q24H     metoclopramide  10 mg Intravenous Q6H     polyethylene glycol  17 g Oral Daily     scopolamine  1 patch Transdermal Q72H    And     scopolamine   Transdermal Q8H     scopolamine   Transdermal Once     senna-docusate  1 tablet Oral BID    Or     senna-docusate  2 tablet Oral BID

## 2021-02-22 NOTE — PLAN OF CARE
PT attempted for am session at scheduled time. Pt was receiving an medical procedure at the time of attempt - PT deferred to pm as schedule allows.

## 2021-02-22 NOTE — PLAN OF CARE
"      VS:   /61 (BP Location: Right arm)   Pulse 75   Temp 98.9  F (37.2  C) (Oral)   Resp 16   Ht 1.651 m (5' 5\")   Wt 91.4 kg (201 lb 8 oz)   LMP 02/01/2017   SpO2 94%   BMI 33.53 kg/m    VSS. RA.   Output:   No BM since 2/18. Voiding spontaneously.   Activity:   Standby assistance with walker.   Skin: Ex incision on spine.   Pain:   PRN robaxin and Norco given for pain.    Neuro/CMS:   Alert and oriented x4. CMS intact.    Dressing(s):   CDI.    Diet:   Regular, intermittent nausea. Scop patch in place behind left ear.   LDA:   PIV SL   Equipment:   IV pole, call light.    Plan:   Continue to monitor. Plans to discharge home tomorrow.   Additional Info:   Tap water enema given. No stool.   Hemovac output: 15ml       "

## 2021-02-22 NOTE — PROGRESS NOTES
S: Patient was seen today at   for an eval for a cervical collar as ordered.  O/G: The objective/goal of the collar is to help reduce motion/give cervical stability.    A: Pt was fit with a [place] J cervical collar, size 250 FRONT  REAR.  Starting with the anterior section, the correct size and height was chosen that supported the patient in the desired position.  Attention was given to the chin support being sure that the correct height was selected to support the chin.   The posterior section was centered on the patients head .  With the side closure straps extending equally on both sides, the Velcro was secured.  An extra padding set was also provided.  Patient instructed on donning, doffing, use and care    P: Patient has been instructed to contact our facility with any questions and/or concerns.   SHELDON Noel.

## 2021-02-22 NOTE — CONSULTS
Care Management Initial Consult    General Information  Assessment completed with: Layla       Primary Care Provider verified and updated as needed:   Verified PCP nimo samayoa seen 2 weeks ago  Readmission within the last 30 days:   No        Advance Care Planning:     No       Communication Assessment  Patient's communication style: spoken language (English or Bilingual)    Hearing Difficulty or Deaf: no   Wear Glasses or Blind: yes    Cognitive  Cognitive/Neuro/Behavioral: WDL  Level of Consciousness: alert  Arousal Level: opens eyes spontaneously  Orientation: oriented x 4  Mood/Behavior: calm          Living Environment:   People in home: spouse     Current living Arrangements: house      Able to return to prior arrangements:  Yes       Family/Social Support:  Care provided by:  Self  Provides care for:  No one else                Description of Support System:    Family, good support system       Current Resources:   Patient receiving home care services:  No     Community Resources:    Equipment currently used at home: none  Supplies currently used at home:  None    Employment/Financial:  Employment Status:     Employed by state     Financial Concerns:   No          Lifestyle & Psychosocial Needs:        Socioeconomic History     Marital status:      Spouse name: Not on file     Number of children: Not on file     Years of education: Not on file     Highest education level: Not on file     Tobacco Use     Smoking status: Former Smoker     Packs/day: 0.50     Years: 1.00     Pack years: 0.50     Types: Cigarettes     Quit date:      Years since quittin.1     Smokeless tobacco: Never Used     Tobacco comment: Quit 30 years ago   Substance and Sexual Activity     Alcohol use: Yes     Alcohol/week: 1.0 - 2.0 standard drinks     Types: 1 - 2 Standard drinks or equivalent per week     Comment: rare     Drug use: No     Sexual activity: Yes     Partners: Male       Functional Status:  Prior to admission  patient needed assistance: No             Mental Health Status:        WNL    Chemical Dependency Status:            Not assesed    Values/Beliefs:  Spiritual, Cultural Beliefs, Yazdanism Practices, Values that affect care: no               Additional Information:  This RNCC spoke with patient to discuss discharge planning, patient doesn't appear to have any complex needs during admission or at discharge and is progressing with mobility. Plan is for patient to return to previous living situation and would benefit from further skilled home physical therapy for strengthening and increase independence with all functional mobility/gait and follow up RN visit. Patient has no preference with home care providers, okay to proceed with referral to [facility]/ home care. Patient states spouse will provide transport home, plan is for DC tomorrow.    Referral for Home PT/SNV placed in [facility] and forwarded to [facility].    Wanda MATUTEN RN CCM  RN Care Coordinator [address] [place] [address]. [place] [address]  [email or web address]   Office: [phone]) [phone]   Pager: [phone]    To contact Weekend RNCC, dial [phone] and enter job code [phone] at prompt. This pager can not be contacted by text page or outside line.

## 2021-02-22 NOTE — PLAN OF CARE
VS: VSS.    O2: Denied SOB, denied difficulty breathing.    Output: Voiding spontaneously.    Last BM: LBM 2/18/2021. Pt feeling constipated. Bowel sounds present and normoactive in all 4 quadrants. Scheduled stool softeners and bowel medications given. PRN suppository given with small amount result, unsuccessful. Received order from MD for tap water enema. Ordered supplies - waiting.    Activity: Pt up to [facility] and transferred to stretcher prior to CT scan.   Up for meals? Encouraged. Pt ate in bed.    Skin: Intact ex incision, scabbing.    Pain: Pt reported generalized aching neck pain. Pt requested PRN norco, 2 tablets, given x1 to assist with pain. Neck collar on.    CMS: Intact.    Dressing: CDI.   Diet: Regular, thin.    LDA: R side head scabbing, incisions. Accordion drain.    Equipment: Neck collar.    Plan: Enema to relieve constipation.    Additional Info: CT scan completed per orders start of shift.

## 2021-02-22 NOTE — PLAN OF CARE
VS:   VSS, except low grade temp which has been trending down. LS clear. O2 sats mid 90's on RA. No CP/SOB   Output:   Voiding spontaneously w/out difficulty. BS+/flatus+. No BM since surgery. Fruits and fluids encouraged, bowel meds admin and also 1x admin of IV reglan for headache and possible constipation.    Activity:   Assist x1 with walker   Skin: +   Pain:   Pt has had ongoing worsening headache. No relief with tylenol, aromatherapy, ice packs, removal of brace. Pt reported that she has ongoing headaches at baseline and has been since for migraines and no migraine medications help for her. 1X dose of IV reglan and acupressure treatment, but headache continues.   Pt also has aching pain in incisional areas (midline posterior neck and graft site- R hip), both managed with PO norco and methocarbamol q 6 hrs.    Neuro/CMS:   A&Ox4. Cms+   Dressing(s):   Aquacel on posterior neck and gauze with tegaderm on R hip on back, both c/d/i   Diet:   Reg diet, appetite still poor. Nausea continues intermittently, but improved with PO zofran admin   LDA:   L PIV SL   Equipment:   Commode, walker   Plan:   Pt needs CT and x-ray tomorrow   Additional Info:

## 2021-02-22 NOTE — PLAN OF CARE
VS: VSS, pt denies CP or SOB.   O2: Room air sat. > 90%.    Output: Voiding on BSC without difficulty.    Last BM: 02/18/21 passing gas.    Activity: Up to BSC this evening.    Skin: Intact except surgical incision.    Pain: Comfortably manageable with PRN medication.    CMS: CMS and neuro intact.    Dressing: CDI.    Diet: Regular.    LDA: PIV SL, HV drain.    Equipment: Walker, commode, and personal belongings.    Plan: TBD.    Additional Info:

## 2021-02-22 NOTE — PLAN OF CARE
Alert and oriented X 4. Able to make needs known. Call light in reach. VSS.  C/O headache, neck,  back and Right  hip pain which has been managed with Norco 2 tabs X 2. Neck and right hip dressings CDI. CMS/Neuros WNLs. Cervical collar on. Up to BR with SBA, gait belt and walker. Continent of bladder. No BM yet, requesting suppository later this morning. C/O dizziness and nausea when getting up. No SOB or chest pain. PIV patent, saline locked. Appears to be sleeping during rounds. Continue with plan of care.

## 2021-02-22 NOTE — PLAN OF CARE
OT:  Attempted OT but patient having nursing cares/waiting to eat as food present and has been at x-ray.  OT unable to check back due to schedule.  Patient/spouse report has had previous surgeries and will have all AE needs already met at home.

## 2021-02-23 ENCOUNTER — APPOINTMENT (OUTPATIENT)
Dept: PHYSICAL THERAPY | Facility: CLINIC | Age: 61
End: 2021-02-23
Attending: ORTHOPAEDIC SURGERY
Payer: COMMERCIAL

## 2021-02-23 ENCOUNTER — RECORDS - HEALTHEAST (OUTPATIENT)
Dept: ADMINISTRATIVE | Facility: OTHER | Age: 61
End: 2021-02-23

## 2021-02-23 PROCEDURE — 250N000013 HC RX MED GY IP 250 OP 250 PS 637: Performed by: PHYSICIAN ASSISTANT

## 2021-02-23 PROCEDURE — 250N000013 HC RX MED GY IP 250 OP 250 PS 637: Performed by: STUDENT IN AN ORGANIZED HEALTH CARE EDUCATION/TRAINING PROGRAM

## 2021-02-23 PROCEDURE — 250N000011 HC RX IP 250 OP 636: Performed by: PHYSICIAN ASSISTANT

## 2021-02-23 PROCEDURE — 99231 SBSQ HOSP IP/OBS SF/LOW 25: CPT | Performed by: INTERNAL MEDICINE

## 2021-02-23 PROCEDURE — 250N000013 HC RX MED GY IP 250 OP 250 PS 637: Performed by: INTERNAL MEDICINE

## 2021-02-23 PROCEDURE — 97116 GAIT TRAINING THERAPY: CPT | Mod: GP | Performed by: PHYSICAL THERAPIST

## 2021-02-23 PROCEDURE — 120N000002 HC R&B MED SURG/OB UMMC

## 2021-02-23 PROCEDURE — 97530 THERAPEUTIC ACTIVITIES: CPT | Mod: GP | Performed by: PHYSICAL THERAPIST

## 2021-02-23 PROCEDURE — 250N000009 HC RX 250: Performed by: STUDENT IN AN ORGANIZED HEALTH CARE EDUCATION/TRAINING PROGRAM

## 2021-02-23 RX ORDER — BUTALBITAL, ACETAMINOPHEN AND CAFFEINE 50; 325; 40 MG/1; MG/1; MG/1
1 TABLET ORAL EVERY 6 HOURS PRN
Qty: 14 TABLET | Refills: 0 | Status: SHIPPED | OUTPATIENT
Start: 2021-02-23 | End: 2021-07-15

## 2021-02-23 RX ORDER — HYDROXYZINE HYDROCHLORIDE 25 MG/1
25 TABLET, FILM COATED ORAL EVERY 6 HOURS
Status: DISCONTINUED | OUTPATIENT
Start: 2021-02-23 | End: 2021-02-24 | Stop reason: HOSPADM

## 2021-02-23 RX ORDER — BUTALBITAL, ACETAMINOPHEN AND CAFFEINE 50; 325; 40 MG/1; MG/1; MG/1
1 TABLET ORAL 4 TIMES DAILY PRN
Status: DISCONTINUED | OUTPATIENT
Start: 2021-02-23 | End: 2021-02-24 | Stop reason: HOSPADM

## 2021-02-23 RX ORDER — AMOXICILLIN 250 MG
1 CAPSULE ORAL 3 TIMES DAILY
Status: DISCONTINUED | OUTPATIENT
Start: 2021-02-23 | End: 2021-02-23

## 2021-02-23 RX ORDER — AMOXICILLIN 250 MG
2 CAPSULE ORAL 3 TIMES DAILY
Status: DISCONTINUED | OUTPATIENT
Start: 2021-02-23 | End: 2021-02-24 | Stop reason: HOSPADM

## 2021-02-23 RX ORDER — GABAPENTIN 300 MG/1
300 CAPSULE ORAL 3 TIMES DAILY
Qty: 90 CAPSULE | Refills: 0 | Status: SHIPPED | OUTPATIENT
Start: 2021-02-23 | End: 2021-03-18

## 2021-02-23 RX ORDER — POLYETHYLENE GLYCOL 3350 17 G/17G
17 POWDER, FOR SOLUTION ORAL 2 TIMES DAILY
Status: DISCONTINUED | OUTPATIENT
Start: 2021-02-23 | End: 2021-02-24 | Stop reason: HOSPADM

## 2021-02-23 RX ORDER — BUTALBITAL, ACETAMINOPHEN AND CAFFEINE 50; 325; 40 MG/1; MG/1; MG/1
1 TABLET ORAL ONCE
Status: COMPLETED | OUTPATIENT
Start: 2021-02-23 | End: 2021-02-23

## 2021-02-23 RX ORDER — GABAPENTIN 300 MG/1
300 CAPSULE ORAL 3 TIMES DAILY
Status: DISCONTINUED | OUTPATIENT
Start: 2021-02-23 | End: 2021-02-24 | Stop reason: HOSPADM

## 2021-02-23 RX ORDER — ACETAMINOPHEN 325 MG/1
975 TABLET ORAL 3 TIMES DAILY
Status: DISCONTINUED | OUTPATIENT
Start: 2021-02-23 | End: 2021-02-23

## 2021-02-23 RX ORDER — METHOCARBAMOL 500 MG/1
1000 TABLET, FILM COATED ORAL EVERY 6 HOURS PRN
Status: DISCONTINUED | OUTPATIENT
Start: 2021-02-23 | End: 2021-02-24 | Stop reason: HOSPADM

## 2021-02-23 RX ORDER — METHOCARBAMOL 500 MG/1
1000 TABLET, FILM COATED ORAL EVERY 6 HOURS PRN
Qty: 40 TABLET | Refills: 0 | Status: SHIPPED | OUTPATIENT
Start: 2021-02-23 | End: 2021-03-02

## 2021-02-23 RX ADMIN — BUTALBITAL, ACETAMINOPHEN AND CAFFEINE 1 TABLET: 50; 325; 40 TABLET ORAL at 10:30

## 2021-02-23 RX ADMIN — DOCUSATE SODIUM AND SENNOSIDES 2 TABLET: 8.6; 5 TABLET ORAL at 10:09

## 2021-02-23 RX ADMIN — HYDROCODONE BITARTRATE AND ACETAMINOPHEN 1 TABLET: 7.5; 325 TABLET ORAL at 05:10

## 2021-02-23 RX ADMIN — GABAPENTIN 300 MG: 300 CAPSULE ORAL at 19:41

## 2021-02-23 RX ADMIN — HYDROCODONE BITARTRATE AND ACETAMINOPHEN 1 TABLET: 7.5; 325 TABLET ORAL at 11:38

## 2021-02-23 RX ADMIN — FAMOTIDINE 20 MG: 20 TABLET, FILM COATED ORAL at 10:07

## 2021-02-23 RX ADMIN — Medication 125 MCG: at 10:09

## 2021-02-23 RX ADMIN — POLYETHYLENE GLYCOL 3350 17 G: 17 POWDER, FOR SOLUTION ORAL at 19:41

## 2021-02-23 RX ADMIN — MAGNESIUM HYDROXIDE 30 ML: 400 SUSPENSION ORAL at 19:41

## 2021-02-23 RX ADMIN — METHOCARBAMOL 750 MG: 750 TABLET ORAL at 00:02

## 2021-02-23 RX ADMIN — FAMOTIDINE 20 MG: 20 TABLET, FILM COATED ORAL at 19:41

## 2021-02-23 RX ADMIN — ONDANSETRON 4 MG: 4 TABLET, ORALLY DISINTEGRATING ORAL at 05:31

## 2021-02-23 RX ADMIN — DOCUSATE SODIUM AND SENNOSIDES 2 TABLET: 8.6; 5 TABLET ORAL at 13:45

## 2021-02-23 RX ADMIN — POLYETHYLENE GLYCOL 3350 17 G: 17 POWDER, FOR SOLUTION ORAL at 10:09

## 2021-02-23 RX ADMIN — GABAPENTIN 300 MG: 300 CAPSULE ORAL at 13:45

## 2021-02-23 RX ADMIN — METHOCARBAMOL 1000 MG: 500 TABLET ORAL at 17:14

## 2021-02-23 RX ADMIN — DOCUSATE SODIUM AND SENNOSIDES 2 TABLET: 8.6; 5 TABLET ORAL at 19:41

## 2021-02-23 RX ADMIN — SCOPALAMINE 1 PATCH: 1 PATCH, EXTENDED RELEASE TRANSDERMAL at 08:49

## 2021-02-23 RX ADMIN — BUTALBITAL, ACETAMINOPHEN AND CAFFEINE 1 TABLET: 50; 325; 40 TABLET ORAL at 19:42

## 2021-02-23 RX ADMIN — HYDROCODONE BITARTRATE AND ACETAMINOPHEN 2 TABLET: 7.5; 325 TABLET ORAL at 18:42

## 2021-02-23 RX ADMIN — GABAPENTIN 300 MG: 300 CAPSULE ORAL at 10:08

## 2021-02-23 NOTE — PLAN OF CARE
"      VS: BP (!) 124/93 (BP Location: Right arm)   Pulse 84   Temp 99.1  F (37.3  C) (Oral)   Resp 15   Ht 1.651 m (5' 5\")   Wt 91.4 kg (201 lb 8 oz)   LMP 02/01/2017   SpO2 94%   BMI 33.53 kg/m    RA   Output: Voids w/o difficulty  No BM since 2/18 but passing gas (evening shift gave tap water enema). In the AM pt reported feeling like she will have a BM but when she sat at the commode, she only passed gas  hemovac draining ok   Activity: SBA w/ walker   Skin: Intact x incisions   Pain:   Pain decreased with PRN robaxin and Norco  Pt reports HA   Neuro/CMS:   A&Ox4, denies N/T   Dressing(s):   CDI   Diet:   Regular, pt has intermittent nausea when OOB. Pt reported nausea around 0500, given zofran at 0531 per pt request   LDA:   PIV SL   Equipment:   IV pole, commode, walker   Plan:   Able to make needs known, discharge home 2/23   Additional Info:   Pt was awake most of the night, fell asleep ~0600      "

## 2021-02-23 NOTE — PROGRESS NOTES
Patient was seen, course reviewed with nursing staff and orthopedics.    Patient continues to complain of a bifrontal headache which has been persistent since surgery.  Prior to surgery she noted chronic headaches which were more bitemporal and occipital and location.    She denies excessive caffeine intake, though she does use drink energy daily    She denies nausea vomiting, vision changes, abdominal pain.    Vital signs stable  Lying in bed, no distress, wearing neck brace  Lungs clear  CV regular rhythm  Abdomen soft      Assessment    Status post cervical spine surgery.  Patient is doing well though she has persistent headaches, likely muscular.  No history of migraine headaches.    Plan  Trial of Fioricet.  If helpful could discourage on a small supply.  No medical contraindication discharge otherwise.

## 2021-02-23 NOTE — PROGRESS NOTES
Patient is A&O x4. Denied CP, lightheadedness, dizziness, numbness, tingling and SOB. Complained of headache at the beginning of the shift and a one time dose of Esgic given with relief and Dr. Linn placed this medication as a PRN order. Pt is also taking PRN Norco for her surgical pain. Incisional dressing is CDI. Pt has intermittent nausea, scop patch placed behind right ear. Drinking well and voiding spontaneously without difficulties, bowel sound active, pt received her scheduled BM medication but no BM yet. Pt ordered lunch around 1330 but wasn't delivered till until 3 o'clock, when pt called the kitchen to check on her meal tray, they told the pt that RN called to held the meal but this RN and other staff RN didn't call the kitchen to held pt meal tray ( no reason to held meal tray). Another meal order placed as a priority meal try per this writer. An enema scheduled for 1500 but pt wanted to eat first, RN on duty aware. Pt is SBA to the BSC. Self repositioned and turned in bed, able to use call light appropriately and make needs known, will continue to monitor patient as POC.

## 2021-02-23 NOTE — PLAN OF CARE
Physical Therapy Discharge Summary    Reason for therapy discharge:    Discharged to home with home therapy.    Progress towards therapy goal(s). See goals on Care Plan in [facility] electronic health record for goal details.  Goals partially met.  Barriers to achieving goals:   HEP not initiated.    Therapy recommendation(s):    Continued therapy is recommended.  Rationale/Recommendations:  ZOEY PT.

## 2021-02-23 NOTE — PLAN OF CARE
OT:  Patient declined as fatigued from PT; patient planning to discharge home today.    Attempted again; patient again declined.  Does not feel she has further OT needs/AE needs met.  OT orders completed.    Occupational Therapy Discharge Summary    Reason for therapy discharge:    Discharged to home.    Progress towards therapy goal(s). See goals on Care Plan in [facility] electronic health record for goal details.  Goals partially met.  Barriers to achieving goals:   discharge from facility.    Therapy recommendation(s):    No further therapy is recommended.  Per chart/patient, discharging home today.  Patient declined OT several times, spouse/patient report no further OT needs, no AE needs with hx of cervical surgeries.

## 2021-02-23 NOTE — PROGRESS NOTES
"Orthopaedic Surgery Progress Note:  02/23/2021     Subjective:   No acute events overnight. Ongoing headaches. Tolerating diet. Denies numbness/tingling in upper extremities. No BM since OR.    Objective:   /59 (BP Location: Right arm)   Pulse 82   Temp 98.8  F (37.1  C) (Oral)   Resp 18   Ht 1.651 m (5' 5\")   Wt 91.4 kg (201 lb 8 oz)   LMP 02/01/2017   SpO2 98%   BMI 33.53 kg/m    I/O this shift:  In: -   Out: 1050 [Urine:1050]  Gen: NAD. Resting comfortably in bed  Resp: Breathing comfortably on RA  Drain:   Drain output 35 / 45 in 8 / 24 hours respectively   Musculoskeletal: dressing c/d/I.      Motor Strength Right Left   Deltoids: C5 5/5 5/5   Biceps: C5 5/5 5/5   Brachialis: C6 5/5 5/5   Wrist extension: C6 5/5 5/5   Triceps: C7  5/5 5/5   Wrist flexion: C7 5/5 5/5    strength: C8 5/5 5/5   Hand intrinsics: T1 5/5 5/5     Sensation from C4-L1 is preserved.    Labs:  Lab Results   Component Value Date    WBC 4.2 01/25/2021    HGB 11.2 (L) 02/21/2021     01/25/2021     Cervical spine CT and XRs 2/22 with anterior plate/screw fixation at C4-7 and posterior hank/screw fixation at C4-7. No evidence for hardware complication.     Assessment & Plan:   Layla Starks is a 60 year old female with PMH including PONV, obesity, anxiety, depression, cervical radiculopathy, hx multiple cervical spine surgeries (laminoplasty C5-7, foraminotomy C4-5, ACDF C4-7)  now s/p below procedure on 2/19/21 with Dr. Waite.      1.  Posterior spinal fusion C4-T1 (fusion augmentation at C4-C7), using iliac crest autograft and crushed cancellous allograft.  2.  Segmental bilateral posterior instrumentation C4-T1, using cervical lateral mass screws (bilateral C4), cervical and thoracic pedicle screws (left C6, right C7, bilateral T1).  3.  Revision foraminotomy with coomplete facetectomy, right C4-5, with decompression of right C5 nerve root.  4.  Right posterior iliac crest bone graft harvest.  5.  Removal of " laminoplasty plates C5, C6, C7 (3 levels).  6.  Exploration of fusion C4-C7.    Ravindra Primary  Activity: Up with assist until independent. No excessive bending or twisting. No lifting >10 lbs x 6 weeks.   Weight bearing status: WBAT.  Pain management: Transition from IV to PO narcotics as tolerated. No NSAIDs   Antibiotics: Ancef x 24 hours complete  Diet: Begin with clear fluids and progress diet as tolerated.   DVT prophylaxis: SCDs only. No chemical DVT ppx needed.  Imaging: Obtained 2/22  Labs: PRN  Bracing/Splinting: [place] J at all times. May remove brace for hygiene. May loosen front of collar for meals.  Dressings: Keep Aquacel c/d/i x 7 days. Tegaderm dressing on right posterior iliac crest graft site keep c/d/i x 48 hours  Drains: deep HV. Document output per shift, will be discontinued at Orthopedic Surgery discretion.  Echeverria catheter: Remove POD#1.   Physical Therapy/Occupational Therapy: Eval and treat.  Cultures: none.    Consults: Hospitalist.  Follow-up: Clinic with Dr. Waite in 6 weeks with repeat x-rays.   Disposition: Pending progress with therapies, pain control on orals, and medical stability, anticipate discharge to home today      ------------------------------------------------------------------------------------------    [ x ] Drains removed.  [ x ] Post xrays done.  [ x ] Discharge orders done.  [ x ] Discharge summary started.    Chiara Mahoney MD   Orthopedic Surgery, PGY4  [phone]    FOLLOWUP:    Future Appointments   Date Time Provider Department Center   2/20/2021  8:45 AM Mara Osuna, PT [facility]   2/20/2021  1:30 PM Navya Bautista, OT [facility]   2/20/2021  7:00 PM UR PT OVERFLOW [facility]   4/1/2021  8:40 AM Stevo Waite MD [facility]   5/13/2021  8:40 AM Stevo Waite MD [facility]

## 2021-02-24 ENCOUNTER — PATIENT OUTREACH (OUTPATIENT)
Dept: CARE COORDINATION | Facility: CLINIC | Age: 61
End: 2021-02-24

## 2021-02-24 VITALS
BODY MASS INDEX: 33.57 KG/M2 | SYSTOLIC BLOOD PRESSURE: 105 MMHG | WEIGHT: 201.5 LBS | TEMPERATURE: 98.5 F | HEART RATE: 71 BPM | DIASTOLIC BLOOD PRESSURE: 64 MMHG | HEIGHT: 65 IN | OXYGEN SATURATION: 92 % | RESPIRATION RATE: 16 BRPM

## 2021-02-24 PROCEDURE — 250N000013 HC RX MED GY IP 250 OP 250 PS 637: Performed by: INTERNAL MEDICINE

## 2021-02-24 PROCEDURE — 250N000013 HC RX MED GY IP 250 OP 250 PS 637: Performed by: STUDENT IN AN ORGANIZED HEALTH CARE EDUCATION/TRAINING PROGRAM

## 2021-02-24 PROCEDURE — 99231 SBSQ HOSP IP/OBS SF/LOW 25: CPT | Performed by: INTERNAL MEDICINE

## 2021-02-24 PROCEDURE — 250N000013 HC RX MED GY IP 250 OP 250 PS 637: Performed by: PHYSICIAN ASSISTANT

## 2021-02-24 RX ADMIN — Medication 125 MCG: at 09:08

## 2021-02-24 RX ADMIN — SODIUM PHOSPHATE, DIBASIC AND SODIUM PHOSPHATE, MONOBASIC 1 ENEMA: 7; 19 ENEMA RECTAL at 06:31

## 2021-02-24 RX ADMIN — POLYETHYLENE GLYCOL 3350 17 G: 17 POWDER, FOR SOLUTION ORAL at 09:08

## 2021-02-24 RX ADMIN — HYDROCODONE BITARTRATE AND ACETAMINOPHEN 2 TABLET: 7.5; 325 TABLET ORAL at 06:31

## 2021-02-24 RX ADMIN — DOCUSATE SODIUM AND SENNOSIDES 2 TABLET: 8.6; 5 TABLET ORAL at 09:08

## 2021-02-24 RX ADMIN — HYDROXYZINE HYDROCHLORIDE 25 MG: 25 TABLET, FILM COATED ORAL at 06:31

## 2021-02-24 RX ADMIN — FAMOTIDINE 20 MG: 20 TABLET, FILM COATED ORAL at 09:08

## 2021-02-24 RX ADMIN — GABAPENTIN 300 MG: 300 CAPSULE ORAL at 09:08

## 2021-02-24 RX ADMIN — CALCIUM CITRATE 200 MG (950 MG) TABLET 950 MG: at 09:08

## 2021-02-24 RX ADMIN — HYDROXYZINE HYDROCHLORIDE 25 MG: 25 TABLET, FILM COATED ORAL at 00:40

## 2021-02-24 NOTE — PROGRESS NOTES
Pt feels improved, feels ready to discharge  HA improved with fioricet  + small BM yesterday  Denies abd pain, nausea    VSS  Alert, fully oriented, appears comfortable  Lungs clear  Cv rrr  Abd soft, non-distended      Assessment    Status post cervical spine surgery, doing well    HA, likely muscle contraction, improved    Constipation improving    Plan  Ok medically for discharge  Short course prn Fioricet  Continue bowel regimen on discharge.

## 2021-02-24 NOTE — PLAN OF CARE
VSS. LS clear, on RA. Aox4. CMS intact. No n/t. +flatus. LBM this AM. Voiding spontaneously. Reg diet, no nausea, had breakfast just prior to leaving. PIV removed prior to discharge. Up SBA. [place] J collar on when OOB, soft collar for comfort. Skin wnl ex incisions. Midline posterior neck incision cdi. R posterior hip site cdi. Drsgs gauze and tegaderm. Pt noted pain was tolerable with hydrocodone q 6hrs. Next due at 1230. SO came to provide transport home at 1015. Writer provided discharge education regarding incisional care, activity tolerance, return visits and pain medication management. Instruction provided on recommended maximum dosages of tylenol, as pt is being discharged with hydrocodone, tylenol, and esgic. Pt and her SO verbalized understanding. Pt left with belongings and [place] J collar. Pt left unit at 1057.

## 2021-02-24 NOTE — PROGRESS NOTES
"Orthopaedic Surgery Progress Note:  02/24/2021     Subjective:   No acute events overnight. Headaches improving. Plan on discharging home today.    Objective:   /58   Pulse 71   Temp 99  F (37.2  C) (Oral)   Resp 16   Ht 1.651 m (5' 5\")   Wt 91.4 kg (201 lb 8 oz)   LMP 02/01/2017   SpO2 92%   BMI 33.53 kg/m    I/O this shift:  In: -   Out: 300 [Urine:300]  Gen: NAD. Resting comfortably in bed  Resp: Breathing comfortably on RA  Drain:   Drain output 35 / 45 in 8 / 24 hours respectively   Musculoskeletal: dressing c/d/I.      Motor Strength Right Left   Deltoids: C5 5/5 5/5   Biceps: C5 5/5 5/5   Brachialis: C6 5/5 5/5   Wrist extension: C6 5/5 5/5   Triceps: C7  5/5 5/5   Wrist flexion: C7 5/5 5/5    strength: C8 5/5 5/5   Hand intrinsics: T1 5/5 5/5     Sensation from C4-L1 is preserved.    Labs:  Lab Results   Component Value Date    WBC 4.2 01/25/2021    HGB 11.2 (L) 02/21/2021     01/25/2021     Cervical spine CT and XRs 2/22 with anterior plate/screw fixation at C4-7 and posterior hank/screw fixation at C4-7. No evidence for hardware complication.     Assessment & Plan:   Layla Starks is a 60 year old female with PMH including PONV, obesity, anxiety, depression, cervical radiculopathy, hx multiple cervical spine surgeries (laminoplasty C5-7, foraminotomy C4-5, ACDF C4-7)  now s/p below procedure on 2/19/21 with Dr. Waite.      1.  Posterior spinal fusion C4-T1 (fusion augmentation at C4-C7), using iliac crest autograft and crushed cancellous allograft.  2.  Segmental bilateral posterior instrumentation C4-T1, using cervical lateral mass screws (bilateral C4), cervical and thoracic pedicle screws (left C6, right C7, bilateral T1).  3.  Revision foraminotomy with coomplete facetectomy, right C4-5, with decompression of right C5 nerve root.  4.  Right posterior iliac crest bone graft harvest.  5.  Removal of laminoplasty plates C5, C6, C7 (3 levels).  6.  Exploration of fusion " C4-C7.    Ravindra Primary  Activity: Up with assist until independent. No excessive bending or twisting. No lifting >10 lbs x 6 weeks.   Weight bearing status: WBAT.  Pain management: Transition from IV to PO narcotics as tolerated. No NSAIDs.   Antibiotics: Ancef x 24 hours complete  Diet: Begin with clear fluids and progress diet as tolerated.   DVT prophylaxis: SCDs only. No chemical DVT ppx needed.  Imaging: Obtained 2/22.  Labs: PRN  Bracing/Splinting: [place] J at all times. May remove brace for hygiene. May loosen front of collar for meals.  Dressings: Keep Aquacel c/d/i x 7 days. Tegaderm dressing on right posterior iliac crest graft site keep c/d/i x 48 hours  Drains: deep HV. Document output per shift, will be discontinued at Orthopedic Surgery discretion.  Echeverria catheter: Removed POD#1.   Physical Therapy/Occupational Therapy: Eval and treat.  Cultures: none.    Consults: Hospitalist.  Follow-up: Clinic with Dr. Waite in 6 weeks with repeat x-rays.   Disposition: Pending progress with therapies, pain control on orals, and medical stability, anticipate discharge to home today.      ------------------------------------------------------------------------------------------    [ x ] Drains removed.  [ x ] Post xrays done.  [ x ] Discharge orders done.  [ x ] Discharge summary started.    Chiara Mahoney MD   Orthopedic Surgery, PGY4  [phone]    FOLLOWUP:    Future Appointments   Date Time Provider Department Center   2/20/2021  8:45 AM Mara Osuna, PT [facility]   2/20/2021  1:30 PM Navya Bautista, OT [facility]   2/20/2021  7:00 PM UR PT OVERFLOW [facility]   4/1/2021  8:40 AM Stevo Waite MD [facility]   5/13/2021  8:40 AM Stevo Waite MD [facility]

## 2021-02-24 NOTE — PLAN OF CARE
"      VS: /58   Pulse 71   Temp 99  F (37.2  C) (Oral)   Resp 16   Ht 1.651 m (5' 5\")   Wt 91.4 kg (201 lb 8 oz)   LMP 02/01/2017   SpO2 92%   BMI 33.53 kg/m      Output: Voiding spontaneously. Last  BM 18th audible bowel sounds. Gave pt fleet. Pt able to have a BM 2/24.   Lungs: Lung sounds clear   Activity: Standby assistance in the room   Skin: Intact Ex: incision   Pain:   Pt slept most of the night. SAVI.    Neuro/CMS:   A&Ox4 CMS intact   Dressing(s):   CDI   Diet:   Regular    LDA:   PIV SL   Plan:   Contiune plan of care. Pt able to make needs known with call light.  Pt plans to discharge home when possible.    Additional Info:   Possibly needs bigger brace.      "

## 2021-02-25 NOTE — PROGRESS NOTES
[facility]: Post-Discharge Note  SITUATION                                                      Admission:    Admission Date: 02/19/21   Reason for Admission: Cervical radiculopathy  Discharge:   Discharge Date: 02/24/21  Discharge Diagnosis: Cervical radiculopathy    BACKGROUND                                                      This is a 60-year-old female with the above-mentioned diagnoses who was admitted for the above-mentioned surgery as well as postsurgical monitoring and therapies.    ASSESSMENT      Discharge Assessment  Patient reports symptoms are: Improved  Does the patient have all of their medications?: Yes  Does patient know what their new medications are for?: Yes  Does patient have a follow-up appointment scheduled?: Yes  Does patient have any other questions or concerns?: No    Post-op  Did the patient have surgery or a procedure: Yes  Incision: healing  Drainage: No  Bleeding: none  Fever: No  Chills: No  Redness: No  Warmth: No  Swelling: No  Incision site pain: No  Eating & Drinking: eating and drinking without complaints/concerns  PO Intake: regular diet  Bowel Function: normal  Urinary Status: voiding without complaint/concerns        PLAN                                                      Outpatient Plan:    Please follow-up at scheduled visit with Dr. Waite.    Future Appointments   Date Time Provider Department Center   4/1/2021  8:40 AM Stevo Waite MD [facility]   5/13/2021  8:40 AM Stevo Waite MD [facility]           Chasidy Allen [facility]

## 2021-02-26 ENCOUNTER — TELEPHONE (OUTPATIENT)
Dept: ORTHOPEDICS | Facility: CLINIC | Age: 61
End: 2021-02-26

## 2021-02-26 ENCOUNTER — COMMUNICATION - HEALTHEAST (OUTPATIENT)
Dept: HOME HEALTH SERVICES | Facility: HOME HEALTH | Age: 61
End: 2021-02-26

## 2021-02-26 NOTE — TELEPHONE ENCOUNTER
[facility] Call Center    Phone Message    May a detailed message be left on voicemail: no     Reason for Call: Symptoms or Concerns     If patient has red-flag symptoms, warm transfer to triage line-Patient declined being transferred to red-flag triage    Current symptom or concern: post op swelling near where bone was removed by the spine    Symptoms have been present for:   day(s)    Has patient previously been seen for this? No    By : Dr. Waite          Action Taken: Message routed to:  [facility]    Travel Screening: Not Applicable

## 2021-02-26 NOTE — TELEPHONE ENCOUNTER
2/19/21 PSF C4-T1, revision foraminotomies/facetectomies R C4-5 and removal laminoplasty plate C4-7, Right ICAG w Dr Waite  Discharged from hospital 2/24/21.    Swelling across right low back from the R ICAG site towards the left side; noticed increased swelling yesterday after she was home from surgery. Denies increased redness, increased pain, increased heat, or drainage from the R ICAG site. Denies fever/chills, ill feeling, nausea/vomitting. Reassured patient that this sounds like normal post-op inflammation. Recommend she ice the area to help with the swelling. Educated patient about concerning signs that may indicate infection (drainage, increased pain, heat, fever/chills, etc), and instructed her to call in or go to local ED if these occur.     She states her neck pain is doing ok. She is taking 2 tablets of norco [id number] and pain usually starts increasing around the 5 hour gunnar. States she has not been taking tylenol because she is worried about overdose. I talked through this with her. If she is taking 2 norco Q6H, that is 2,600mg of acetaminophen per day. She says she does not have liver/kidney issues. Ok for her to take 1 325mg tablet Q6H  (1300mg a day) of regular tylenol  And still be under the recommended maximum dose of 4000mg a day of tylenol. Recommended she also continue with robaxin as prescribed to help with pain.    Answered a few other general post-operative questions for patient to her satisfaction.     Jenny Kenney PA-C

## 2021-03-01 ENCOUNTER — COMMUNICATION - HEALTHEAST (OUTPATIENT)
Dept: HOME HEALTH SERVICES | Facility: HOME HEALTH | Age: 61
End: 2021-03-01

## 2021-03-01 ENCOUNTER — TELEPHONE (OUTPATIENT)
Dept: ORTHOPEDICS | Facility: CLINIC | Age: 61
End: 2021-03-01

## 2021-03-01 DIAGNOSIS — M54.12 CERVICAL RADICULOPATHY: ICD-10-CM

## 2021-03-01 NOTE — TELEPHONE ENCOUNTER
[facility] Call Center    Phone Message    May a detailed message be left on voicemail: yes     Reason for Call: Medication Refill Request    Has the patient contacted the pharmacy for the refill? Yes   Name of medication being requested: Methocarbamol 500MG, Hydrocodone-Acetaminophen 5-325MG  Provider who prescribed the medication: Stevo Waite  Pharmacy: [facility] [phone]  Date medication is needed: ASAP      Action Taken: Message routed to:  [facility] Orthopedics    Travel Screening: Not Applicable

## 2021-03-02 RX ORDER — HYDROCODONE BITARTRATE AND ACETAMINOPHEN 5; 325 MG/1; MG/1
1-2 TABLET ORAL EVERY 6 HOURS PRN
Qty: 40 TABLET | Refills: 0 | Status: SHIPPED | OUTPATIENT
Start: 2021-03-02 | End: 2021-03-09

## 2021-03-02 RX ORDER — METHOCARBAMOL 500 MG/1
1000 TABLET, FILM COATED ORAL EVERY 6 HOURS PRN
Qty: 40 TABLET | Refills: 0 | Status: SHIPPED | OUTPATIENT
Start: 2021-03-02 | End: 2021-03-09

## 2021-03-02 NOTE — TELEPHONE ENCOUNTER
PDMP Review       Value Time User    State PDMP site checked  Yes 3/2/2021 10:52 AM Jenny Kenney PA-C        Sent refills for norco and methocarbamol as requested.     Jenny Kenney PA-C

## 2021-03-08 DIAGNOSIS — M54.12 CERVICAL RADICULOPATHY: ICD-10-CM

## 2021-03-09 ENCOUNTER — TELEPHONE (OUTPATIENT)
Dept: ORTHOPEDICS | Facility: CLINIC | Age: 61
End: 2021-03-09

## 2021-03-09 DIAGNOSIS — M54.12 CERVICAL RADICULOPATHY: ICD-10-CM

## 2021-03-09 RX ORDER — ACETAMINOPHEN 325 MG/1
650 TABLET ORAL EVERY 8 HOURS PRN
Qty: 60 TABLET | Refills: 2 | Status: SHIPPED | OUTPATIENT
Start: 2021-03-09 | End: 2021-05-05

## 2021-03-09 RX ORDER — AMOXICILLIN 250 MG
2 CAPSULE ORAL 2 TIMES DAILY
Qty: 40 TABLET | Refills: 3 | Status: SHIPPED | OUTPATIENT
Start: 2021-03-09 | End: 2021-05-24

## 2021-03-09 RX ORDER — HYDROCODONE BITARTRATE AND ACETAMINOPHEN 5; 325 MG/1; MG/1
1-2 TABLET ORAL EVERY 6 HOURS PRN
Qty: 40 TABLET | Refills: 0 | Status: SHIPPED | OUTPATIENT
Start: 2021-03-09 | End: 2021-03-18

## 2021-03-09 RX ORDER — METHOCARBAMOL 500 MG/1
TABLET, FILM COATED ORAL
Qty: 120 TABLET | Refills: 2 | Status: SHIPPED | OUTPATIENT
Start: 2021-03-09 | End: 2021-09-09

## 2021-03-09 NOTE — TELEPHONE ENCOUNTER
See phone message.  Postop. See my previous Triage refill encounters of today.    I called pt back.  She states she has leftover Lidocaine patches so I said yes she can use them & can also try OTC Salon pas patches if they help.  Yes can use heat if it helps but also continue ice for the swelling.  She has  the ortho fix bone stimulator on & I told her not to worry about overgrowth at this time during 6 weeks postop recovery & discuss it with  at her postop appt after XR to eval Fusion.    She can walk as tolerated & not start PT until after first appt.  Call back prn. Pt agreed.    Kellie Castillo RN.

## 2021-03-09 NOTE — TELEPHONE ENCOUNTER
See separate encounter where I refilled Robaxin.  DOS 2-19-21 Cervical Fusion.  See previous Triage note from Jenny GREENBERG.   I called pt for assessment.  states doing well.   Rates pain 8 & only taking Norco 1 TID because she doesn't like to take pain meds.    Taking Tylenol 1 Q8H & using Robaxin occas.   Having BMs & taking Senna.  Using ice & states swelling at Graft site is improving.    Still has headaches that she had before surgery but they are different now & better.    I advised since she is rating pain an 8 , that she can increase Norco & Tylenol to as ordered as long as she keeps track of total amount of Tylenol from Norco & Tylenol to be not more than 4,000mg Daily & she understood & agreed.  Advised push fluids for headache & continue ice.  Refilled Norco, Robaxin, Tylenol & Senna.  Call back prn. Pt agreed.  S.O. /Kellie Castillo RN.

## 2021-03-09 NOTE — TELEPHONE ENCOUNTER
[facility] Call Center    Phone Message    May a detailed message be left on voicemail: yes     Reason for Call: Other: Pt is calling back, she has additional questions for Ravindra's teams.    1. Can the pt use lidocaine patches (prescription)?  2. 20 minutes icing every hour, but when should she start heat therapy?  3. Orthofix, concerned that it may stimulate the overgrowth of bone, ex osteophyte  4. How much walking should she be doing? How active should she be?  5. When can pt start PT?    Answers can be sent via Hands also.    Action Taken: Message routed to:  [facility] ortho    Travel Screening: Not Applicable

## 2021-03-12 ENCOUNTER — TELEPHONE (OUTPATIENT)
Dept: ORTHOPEDICS | Facility: CLINIC | Age: 61
End: 2021-03-12

## 2021-03-12 DIAGNOSIS — Z98.1 S/P CERVICAL SPINAL FUSION: ICD-10-CM

## 2021-03-12 DIAGNOSIS — M54.12 CERVICAL RADICULOPATHY: Primary | ICD-10-CM

## 2021-03-12 DIAGNOSIS — M47.812 CERVICAL SPONDYLOSIS: ICD-10-CM

## 2021-03-12 NOTE — TELEPHONE ENCOUNTER
RN called patient on behalf of Kellie HOPKINS.  Advised patient that the current dosage is very appropriate and remind patient to continue taking these meds as directed as to avoid patient taking too much too soon and the pharmacy won't fill her meds if she happen to needs more refill down the road. Also advised patient to reach out to Dr. Waite's team is pain is not controlled or worsened. Patient did ask to see if there is an alternative to Robaxin. RN suggested she should call back next week and see if Dr. Waite is willing to switch it to perhaps Flexeril which is the other alternative. Patient expressed understanding and will continue this current plan and will call Dr. Waite's team next week as needed.    Tran Burns RN        [facility] Call Center    Phone Message    May a detailed message be left on voicemail: yes     Reason for Call: Other: Patient called to ask if she could increase her muscle relaxer  Methocarbamol 500 mg 2 tabs q hrs/ or her pain medication Vicodine 1 tab q 6 hours to help relieve the continued pain she is having.  Please call her back at [phone]      Action Taken: Message routed to:  [facility] Ortho    Travel Screening: Not Applicable

## 2021-03-21 ENCOUNTER — MYC REFILL (OUTPATIENT)
Dept: FAMILY MEDICINE | Facility: CLINIC | Age: 61
End: 2021-03-21

## 2021-03-21 ENCOUNTER — COMMUNICATION - HEALTHEAST (OUTPATIENT)
Dept: FAMILY MEDICINE | Facility: CLINIC | Age: 61
End: 2021-03-21

## 2021-03-21 DIAGNOSIS — R51.9 NONINTRACTABLE HEADACHE, UNSPECIFIED CHRONICITY PATTERN, UNSPECIFIED HEADACHE TYPE: ICD-10-CM

## 2021-03-21 DIAGNOSIS — M54.12 CERVICAL RADICULOPATHY: ICD-10-CM

## 2021-03-21 DIAGNOSIS — G44.209 TENSION HEADACHE: ICD-10-CM

## 2021-03-22 ENCOUNTER — RECORDS - HEALTHEAST (OUTPATIENT)
Dept: ADMINISTRATIVE | Facility: OTHER | Age: 61
End: 2021-03-22

## 2021-03-23 RX ORDER — BUTALBITAL, ACETAMINOPHEN AND CAFFEINE 50; 325; 40 MG/1; MG/1; MG/1
1 TABLET ORAL EVERY 6 HOURS PRN
Qty: 14 TABLET | Refills: 0 | OUTPATIENT
Start: 2021-03-23

## 2021-03-31 NOTE — PROGRESS NOTES
Spine Surgical Hx:  09/09/2016 - Laminoplasty C5-C7 (3 levels); foraminotomies R C4-5, Bilateral C5-6 and C6-7 ([facility]).  [Implants: Synthes maxillofacial titanium plate].  12/13/2017 - MIS R foraminotomy C4-5 ([facility], [facility]).  08/06/2018 - ACDF with ant plate fixation C5-6; use of Jasper DBM putty ([facility], [facility]).  [Implants: Spinal USA (Ascend) plate; Titan spine titanium cage].  03/13/2019 - ACDF C4-5 and C6-7, with ant plate fixation C4-C7; removal of plate C4-5; use of Progenix Plus DMB putty ([facility]). [Implants: Savage aviator plate; tritanium cages].  02/19/2021 - PISF C4-T1; Redo foraminotomy with complete facetectomy R C4-5; Removal of laminoplasty plates C5-C7; Right posterior ICBG harvest ([facility]) for residual foraminal stenosis R C4-5, with R C5 radiculopathy; subjacent level spondylosis C7-T1.  [Implants: get2play Infinity screw system, 3.5 mm titanium rods x 2].      In-Person Visit    Reason for Visit:  Chief Complaint   Patient presents with     Surgical Followup     2/19/21 S/P Posterior Instrumented Spinal Fusion Cervical 4 to Thoracic 1; Revision Foraminotomies/Factectomies Right Cervical 4-5       S>  60/f, RHD, 6 wks postop; 1st postop visit.    Unaccompanied.  Unfortunately, feels that her preoperative pain is still there.  Localized mainly on the right paraspinal region of her neck.  There is also tenderness in this area.  See admits to still having some postsurgical pain, but by enlarge, her pain is similar to preoperative.    Hydrocodone 5/325 mg/tab, ~3 tabs/day (15 mg/day).  Gabapentin 300 mg TID (900 mg/day).  Methocarbamol 1,000 mg QID.  Preoperatively, was not on pain medication.        Neck Disability Index (NDI) Questionnaire    Neck Disability Index (NDI) 3/26/2021   Neck Disability Index: Count 10   NDI: Total Score = SUM (points for all 10 findings) 39   Neck Disability in Percent = (Total Score) / 50 * 100 78 (%)   Some recent data  might be hidden      Preop NDI 80%  6 wks  78%    Visual Analog Pain Scale  Neck Pain Scale 0-10: 7  Right arm pain: 5  Left arm pain: 5    PROMIS-10 Scores  Global Mental Health Score: (P) 13  Global Physical Health Score: (P) 10  PROMIS TOTAL - SUBSCORES: (P) 23    O>   Alert, oriented x 3, cooperative.  Not in CP distress.  LMP 02/01/2017   Surgical incision well-healed, no sign of infection.  Ambulates independently.   Grossly neurologically intact.    Imaging:   Cervical AP lateral x-rays today show stable posterior instrumentation C4-T1.  No evidence of fixation loosening or failure.  Overall, very reassuring radiographs.    A>   6 weeks postoperative, with persistent preoperative right cervical paraspinal posterior neck pain.    P>    Reassured patient regarding my findings.  She is still early in her postoperative course; thus, further improvement could still be anticipated.  Given that she has had multiple cervical spine surgeries, and that this was a revision approach, and revision decompression of the right C5 nerve root, it is not completely surprising that things are not yet fully better.  At this point, I recommend initiating outpatient physical therapy.  A Medrol Dosepak may also be considered, to help reduce the inflammation in the area.    We reviewed her preoperative imaging.  We also reviewed her postoperative CT that was taken while she was in the hospital.  I explained to her the rationale for complete facetectomy right C4-5, in order to fully decompress the exiting right C5 nerve root.  I also explained to her why I elected not to perform a similar procedure at right C5-6.  Review of imaging studies showed that the foraminal stenosis at this level was mild and I did not think that the right C6 nerve root was getting impinged.  She asked about the report of her prior CT myelogram where the radiologist stated residual osteophytes posterior C5-6 intervertebral space.  I showed these to her.  I  believe they are very mild and unlikely to be compressing the spinal cord or causing symptoms.  Furthermore, they cannot be taken down from posterior, and would need a complete takedown of the already solid ACDF anteriorly.  I do not think this is worthwhile.    I tried to reassure her as best I can.     - Medrol dosepak  - Internal PT order placed.  - May increase gabapentin to 600 mg TID (1.8 gm/day) from current 900 mg/day.    Continue weaning down the pain meds.    RTC at 3 mos, with rpt cervical lat x-ray.  If still with significant sxs, may consider cervical MRI.  Would also consider referral to medical spine Dr. Ackerman if still symptomatic.      Stevo Waite MD    Orthopaedic Spine Surgery  Dept Orthopaedic Surgery, [facility]  [phone] office, [phone] pager  [email or web address]    Addendum 4/8/2021:  Patient had sent a long ipsy message yesterday and today.  Essentially, she is still experiencing significant symptoms: Right-sided neck pain and headaches.  She is wondering if these may be due to either: (1) C5-6, or (2) C3-4.  She made references to radiologist report of previous imaging studies.    I called the patient.  We went over the images and reports one by one.  Regarding C5-6, I do not think that the residual osteophytes noted in the MRI or CT scan are still likely to be symptomatic.  They are very small, and not compressing the spinal cord.  On her MRI, it was noted by the radiologist that there was no residual central canal stenosis, but that there was some final cord deformation.  I reviewed the images; this was just on 1 axial image, and I do not think this truly represents significant deformation of the cord.  There was no cord signal change or myelomalacia.  I do not think this is symptomatic.  Her postoperative CT done recently shows complete decompression of the right C4-5 foramen.    Regarding C3-4, the degenerative changes or spondylosis is very  mild.  The mild osteophytic endplate spurring noted by the radiologist on her postoperative x-rays 2/22/2021 are unlikely to be causing any spinal cord or nerve root irritation.  That said, I note on the CT scans that she has facet arthropathy at C2-3 and C3-4, left greater than right.  However, her symptoms are primarily right-sided.  Thus, I do not know if these are symptomatic or not.    Although it might be tempting to consider extending the fusion up to C2, I am not certain that this would help her in any way and to exhaust other options first.  We could perhaps try evaluating if C3-4 is contributing to her pain, either via facet blocks or selective nerve root block.    I recommend sending her to our medical spine specialist Dr. Ackerman, for comprehensive evaluation and comanagement of this very challenging problem.    She also asked about medications.  She tries to limit her intake of hydrocodone as this makes her feel sick.  She is taking gabapentin, methocarbamol and Tylenol.  She just finished a course of Medrol Dosepak yesterday.  She thinks this helped some, but she was again having significant pains today.  I asked her if previous intake of NSAIDs had helped.  If so, then I think it may be reasonable to restart these, even if she is only 7 weeks post fusion.  Patient said she had been on Celebrex before, and that seemed to help.    - PM&R referral to Dr. Ackerman for neck pain eval and treatment.  May consider facet block, SNRB's, greater occipital nerve blocks etc.  - Celebrex 100 mg PO BID prn for pain.    - In future, may consider referral to Headache clinic (Neurology).    RTC as scheduled at 3 mos postop.

## 2021-04-01 ENCOUNTER — ANCILLARY PROCEDURE (OUTPATIENT)
Dept: GENERAL RADIOLOGY | Facility: CLINIC | Age: 61
End: 2021-04-01
Attending: ORTHOPAEDIC SURGERY
Payer: COMMERCIAL

## 2021-04-01 ENCOUNTER — RECORDS - HEALTHEAST (OUTPATIENT)
Dept: ADMINISTRATIVE | Facility: OTHER | Age: 61
End: 2021-04-01

## 2021-04-01 ENCOUNTER — OFFICE VISIT (OUTPATIENT)
Dept: ORTHOPEDICS | Facility: CLINIC | Age: 61
End: 2021-04-01
Payer: COMMERCIAL

## 2021-04-01 DIAGNOSIS — M54.12 CERVICAL RADICULOPATHY: ICD-10-CM

## 2021-04-01 DIAGNOSIS — Z98.1 S/P CERVICAL SPINAL FUSION: Primary | ICD-10-CM

## 2021-04-01 DIAGNOSIS — M47.812 CERVICAL SPONDYLOSIS: ICD-10-CM

## 2021-04-01 DIAGNOSIS — Z98.1 S/P CERVICAL SPINAL FUSION: ICD-10-CM

## 2021-04-01 PROCEDURE — 72040 X-RAY EXAM NECK SPINE 2-3 VW: CPT | Mod: GC | Performed by: STUDENT IN AN ORGANIZED HEALTH CARE EDUCATION/TRAINING PROGRAM

## 2021-04-01 PROCEDURE — 99024 POSTOP FOLLOW-UP VISIT: CPT | Performed by: ORTHOPAEDIC SURGERY

## 2021-04-01 RX ORDER — METHYLPREDNISOLONE 4 MG
TABLET, DOSE PACK ORAL
Qty: 21 TABLET | Refills: 0 | Status: SHIPPED | OUTPATIENT
Start: 2021-04-01 | End: 2021-05-24

## 2021-04-01 RX ORDER — GABAPENTIN 300 MG/1
CAPSULE ORAL
Qty: 180 CAPSULE | Refills: 2 | Status: SHIPPED | OUTPATIENT
Start: 2021-04-01 | End: 2021-07-15

## 2021-04-01 NOTE — LETTER
4/1/2021         RE: Layla Starks  [address] [facility] [address]        Dear Colleague,    Thank you for referring your patient, Layla Starks, to the [facility] [place]. Please see a copy of my visit note below.    Spine Surgical Hx:  09/09/2016 - Laminoplasty C5-C7 (3 levels); foraminotomies R C4-5, Bilateral C5-6 and C6-7 ([facility]).  [Implants: Synthes maxillofacial titanium plate].  12/13/2017 - MIS R foraminotomy C4-5 ([facility]y, [facility]).  08/06/2018 - ACDF with ant plate fixation C5-6; use of [place] DBM putty (John, [place]).  [Implants: Spinal USA (Ascend) plate; Titan spine titanium cage].  03/13/2019 - ACDF C4-5 and C6-7, with ant plate fixation C4-C7; removal of plate C4-5; use of Progenix Plus DMB putty ([facility]). [Implants: [place] aviator plate; tritanium cages].  02/19/2021 - PISF C4-T1; Redo foraminotomy with complete facetectomy R C4-5; Removal of laminoplasty plates C5-C7; Right posterior ICBG harvest (Sembrano) for residual foraminal stenosis R C4-5, with R C5 radiculopathy; subjacent level spondylosis C7-T1.  [Implants: [place] Infinity screw system, 3.5 mm titanium rods x 2].      In-Person Visit    Reason for Visit:  Chief Complaint   Patient presents with     Surgical Followup     2/19/21 S/P Posterior Instrumented Spinal Fusion Cervical 4 to Thoracic 1; Revision Foraminotomies/Factectomies Right Cervical 4-5       S>  60/f, RHD, 6 wks postop; 1st postop visit.    Unaccompanied.  Unfortunately, feels that her preoperative pain is still there.  Localized mainly on the right paraspinal region of her neck.  There is also tenderness in this area.  See admits to still having some postsurgical pain, but by enlarge, her pain is similar to preoperative.    Hydrocodone 5/325 mg/tab, ~3 tabs/day (15 mg/day).  Gabapentin 300 mg TID (900 mg/day).  Methocarbamol 1,000 mg QID.  Preoperatively, was not on pain  medication.        Neck Disability Index (NDI) Questionnaire    Neck Disability Index (NDI) 3/26/2021   Neck Disability Index: Count 10   NDI: Total Score = SUM (points for all 10 findings) 39   Neck Disability in Percent = (Total Score) / 50 * 100 78 (%)   Some recent data might be hidden      Preop NDI 80%  6 wks  78%    Visual Analog Pain Scale  Neck Pain Scale 0-10: 7  Right arm pain: 5  Left arm pain: 5    PROMIS-10 Scores  Global Mental Health Score: (P) 13  Global Physical Health Score: (P) 10  PROMIS TOTAL - SUBSCORES: (P) 23    O>   Alert, oriented x 3, cooperative.  Not in CP distress.  LMP 02/01/2017   Surgical incision well-healed, no sign of infection.  Ambulates independently.   Grossly neurologically intact.    Imaging:   Cervical AP lateral x-rays today show stable posterior instrumentation C4-T1.  No evidence of fixation loosening or failure.  Overall, very reassuring radiographs.    A>   6 weeks postoperative, with persistent preoperative right cervical paraspinal posterior neck pain.    P>    Reassured patient regarding my findings.  She is still early in her postoperative course; thus, further improvement could still be anticipated.  Given that she has had multiple cervical spine surgeries, and that this was a revision approach, and revision decompression of the right C5 nerve root, it is not completely surprising that things are not yet fully better.  At this point, I recommend initiating outpatient physical therapy.  A Medrol Dosepak may also be considered, to help reduce the inflammation in the area.    We reviewed her preoperative imaging.  We also reviewed her postoperative CT that was taken while she was in the hospital.  I explained to her the rationale for complete facetectomy right C4-5, in order to fully decompress the exiting right C5 nerve root.  I also explained to her why I elected not to perform a similar procedure at right C5-6.  Review of imaging studies showed that the foraminal  stenosis at this level was mild and I did not think that the right C6 nerve root was getting impinged.  She asked about the report of her prior CT myelogram where the radiologist stated residual osteophytes posterior C5-6 intervertebral space.  I showed these to her.  I believe they are very mild and unlikely to be compressing the spinal cord or causing symptoms.  Furthermore, they cannot be taken down from posterior, and would need a complete takedown of the already solid ACDF anteriorly.  I do not think this is worthwhile.    I tried to reassure her as best I can.     - Medrol dosepak  - Internal PT order placed.  - May increase gabapentin to 600 mg TID (1.8 gm/day) from current 900 mg/day.    Continue weaning down the pain meds.    RTC at 3 mos, with rpt cervical lat x-ray.  If still with significant sxs, may consider cervical MRI.  Would also consider referral to medical spine Dr. Ackerman if still symptomatic.      Stevo Waite MD    Orthopaedic Spine Surgery  Dept Orthopaedic Surgery, [facility]  [phone] office, [phone] pager  [email or web address]

## 2021-04-01 NOTE — NURSING NOTE
Reason For Visit:   Chief Complaint   Patient presents with     Surgical Followup     2/19/21 S/P Posterior Instrumented Spinal Fusion Cervical 4 to Thoracic 1; Revision Foraminotomies/Factectomies Right Cervical 4-5       Primary MD: Alicia Bettencourt    Pain Assessment  Patient Currently in Pain: Yes  0-10 Pain Scale: 7  Primary Pain Location: Neck  Pain Descriptors: Discomfort          Neck Disability Index (NDI) Questionnaire    Neck Disability Index (NDI) 3/26/2021   Neck Disability Index: Count 10   NDI: Total Score = SUM (points for all 10 findings) 39   Neck Disability in Percent = (Total Score) / 50 * 100 78 (%)   Some recent data might be hidden          Visual Analog Pain Scale  Neck Pain Scale 0-10: 7  Right arm pain: 5  Left arm pain: 5    Promis 10 Assessment    PROMIS 10 3/26/2021   In general, would you say your health is: Good   In general, would you say your quality of life is: Good   In general, how would you rate your physical health? Good   In general, how would you rate your mental health, including your mood and your ability to think? Good   In general, how would you rate your satisfaction with your social activities and relationships? Good   In general, please rate how well you carry out your usual social activities and roles Good   To what extent are you able to carry out your everyday physical activities such as walking, climbing stairs, carrying groceries, or moving a chair? Moderately   How often have you been bothered by emotional problems such as feeling anxious, depressed or irritable? Rarely   How would you rate your fatigue on average? Severe   How would you rate your pain on average?   0 = No Pain  to  10 = Worst Imaginable Pain 7   Global Physical Health Score : Raw Score -   Global Mental Health Score : Raw Score -   Total (Physical + Mental Health Score) -   In general, would you say your health is: 3   In general, would you say your quality of life is: 3   In general, how  would you rate your physical health? 3   In general, how would you rate your mental health, including your mood and your ability to think? 3   In general, how would you rate your satisfaction with your social activities and relationships? 3   In general, please rate how well you carry out your usual social activities and roles. (This includes activities at home, at work and in your community, and responsibilities as a parent, child, spouse, employee, friend, etc.) 3   To what extent are you able to carry out your everyday physical activities such as walking, climbing stairs, carrying groceries, or moving a chair? 3   In the past 7 days, how often have you been bothered by emotional problems such as feeling anxious, depressed, or irritable? 2   In the past 7 days, how would you rate your fatigue on average? 4   In the past 7 days, how would you rate your pain on average, where 0 means no pain, and 10 means worst imaginable pain? 7   Global Mental Health Score 13   Global Physical Health Score 10   PROMIS TOTAL - SUBSCORES 23   Some recent data might be hidden                Elizabeth Baron LPN

## 2021-04-07 ENCOUNTER — MYC MEDICAL ADVICE (OUTPATIENT)
Dept: ORTHOPEDICS | Facility: CLINIC | Age: 61
End: 2021-04-07

## 2021-04-08 ENCOUNTER — TELEPHONE (OUTPATIENT)
Dept: ORTHOPEDICS | Facility: CLINIC | Age: 61
End: 2021-04-08

## 2021-04-08 ENCOUNTER — AMBULATORY - HEALTHEAST (OUTPATIENT)
Dept: ADMINISTRATIVE | Facility: REHABILITATION | Age: 61
End: 2021-04-08

## 2021-04-08 DIAGNOSIS — Z98.1 S/P CERVICAL SPINAL FUSION: ICD-10-CM

## 2021-04-08 DIAGNOSIS — M54.12 CERVICAL RADICULOPATHY: ICD-10-CM

## 2021-04-08 RX ORDER — CELECOXIB 100 MG/1
100 CAPSULE ORAL 2 TIMES DAILY PRN
Qty: 60 CAPSULE | Refills: 1 | Status: SHIPPED | OUTPATIENT
Start: 2021-04-08 | End: 2021-05-24

## 2021-04-08 NOTE — TELEPHONE ENCOUNTER
See 2 My Chart messages 4-7 & 4-8-21.  See attachments.     called pt after clinic tonight 4-8-21 & spent a long time on the phone with her-see addendum to 4-1-21 dictation.  He ordered Celebrex & PMR consult with .    I called pt back & asked her to call [phone] to schedule with  & she agreed.  Call back prn. Pt agreed.  W.O./Kellie Castillo RN.

## 2021-04-13 ENCOUNTER — COMMUNICATION - HEALTHEAST (OUTPATIENT)
Dept: FAMILY MEDICINE | Facility: CLINIC | Age: 61
End: 2021-04-13

## 2021-04-13 DIAGNOSIS — Z00.00 HEALTH CARE MAINTENANCE: ICD-10-CM

## 2021-04-14 DIAGNOSIS — Z98.1 S/P CERVICAL SPINAL FUSION: ICD-10-CM

## 2021-04-14 DIAGNOSIS — M54.12 CERVICAL RADICULOPATHY: Primary | ICD-10-CM

## 2021-04-16 ENCOUNTER — TELEPHONE (OUTPATIENT)
Dept: PHYSICAL MEDICINE AND REHAB | Facility: CLINIC | Age: 61
End: 2021-04-16

## 2021-04-16 ENCOUNTER — MYC MEDICAL ADVICE (OUTPATIENT)
Dept: ORTHOPEDICS | Facility: CLINIC | Age: 61
End: 2021-04-16

## 2021-04-16 NOTE — TELEPHONE ENCOUNTER
Health Call Center    Phone Message    May a detailed message be left on voicemail: no     Reason for Call: Other: Kellie valdovinos RN in the Orthopedic clinic calling to help patient reschedule her appointment to 04/30/2021 with Dr. Ackerman. Kellie stated that she got the ok to book at 1:00pm. Please call to discuss.     Action Taken: Other: [facility] [place]     Travel Screening: Not Applicable

## 2021-04-16 NOTE — TELEPHONE ENCOUNTER
[facility] Call [facility]    Phone Message    May a detailed message be left on voicemail: yes     Reason for Call: Other: pt reporting: that she received a call today from Melida regarding Dr. Ackerman not available today. Writer scheduled first available with Dr. Ackerman and that is May 19. Pt took the appt, but is very frustrated due to her pain level. Pt reporting that she cannot wait til that date. Pt is asking Tristan for a call back please. Pt is asking to be seen by Chiqui Ackerman or Terri bonner. Thank you. Writer put pt on wait list with Dr. Ackerman.    Action Taken: Message routed to:  [facility] ([facility]    Travel Screening: Not Applicable

## 2021-04-16 NOTE — TELEPHONE ENCOUNTER
VM left asking for a return call.     The patient has an appointment scheduled with Dr. Ackerman 4/16/21. Unfortunately, Dr. Ackerman had an emergency and the appt needs to be rescheduled.

## 2021-04-19 NOTE — TELEPHONE ENCOUNTER
See My chart message from pt. 4-16-21.  I called pt back on Fri 4-16-21 about 1800 to let her know I called PMR & they sent message to staff to see if they could move her appt sooner.    They did notify pt they move her appt up to 5-7-21.  Call back prn.  Pt agreed.    Kellie Castillo RN.

## 2021-04-20 NOTE — TELEPHONE ENCOUNTER
Provider is out of the office until 4/26/21. As of now, the provider already has a patient scheduled for 1pm on 4/30/21. This would need to be verified with provider before double booking.     The appt will remain as scheduled.

## 2021-04-22 ENCOUNTER — AMBULATORY - HEALTHEAST (OUTPATIENT)
Dept: NURSING | Facility: CLINIC | Age: 61
End: 2021-04-22

## 2021-04-23 NOTE — TELEPHONE ENCOUNTER
Reviewed the scheduled for [facility] and [facility] locations. Neither of them had a sooner appt. Discussed this with the patient. Caller will monitor the schedule and call her if any sooner appts become available.

## 2021-04-23 NOTE — TELEPHONE ENCOUNTER
Health Call Center    Phone Message    May a detailed message be left on voicemail: yes     Reason for Call: Other: Layla calling to request a call back to discuss if there is a sooner appointment than 5/7/21 at [facility]. She stated that she was scheduled for 4/16/21 and Dr. Ackerman was not available. Please call Layla at your earliest convenience to discuss.     Action Taken: Message routed to:  [facility] [place] [facility]    Travel Screening: Not Applicable

## 2021-04-28 ENCOUNTER — OFFICE VISIT - HEALTHEAST (OUTPATIENT)
Dept: PHYSICAL THERAPY | Facility: REHABILITATION | Age: 61
End: 2021-04-28

## 2021-04-28 DIAGNOSIS — M54.12 CERVICAL RADICULOPATHY: ICD-10-CM

## 2021-04-28 DIAGNOSIS — Z98.1 S/P CERVICAL SPINAL FUSION: ICD-10-CM

## 2021-04-29 NOTE — TELEPHONE ENCOUNTER
[facility]    Phone Message    May a detailed message be left on voicemail: yes     Reason for Call: Other: Pt is continuing to experience a lot of pain post-op, DOS: 2/19/2021. Pt is currently on vicotin, acetaminophen, gabapentin and robaxin. The medications make the patient sleepy. Please call back to discuss pain management and next steps.      Action Taken: Message routed to:  [facility]): ortho    Travel Screening: Not Applicable                                                                      
See 2 My Chart messages 4-7 & 4-8-21.  See attachments.     called pt after clinic tonight 4-8-21 & spent a long time on the phone with her-see addendum to 4-1-21 dictation.  He ordered Celebrex & PMR consult with .    I called pt back & asked her to call [phone] to schedule with  & she agreed.  Call back prn. Pt agreed.  W.O./Kellie Castillo RN.    
Never

## 2021-05-03 ENCOUNTER — OFFICE VISIT - HEALTHEAST (OUTPATIENT)
Dept: PHYSICAL THERAPY | Facility: REHABILITATION | Age: 61
End: 2021-05-03

## 2021-05-03 DIAGNOSIS — M54.12 CERVICAL RADICULOPATHY: ICD-10-CM

## 2021-05-03 DIAGNOSIS — M54.2 NECK PAIN: ICD-10-CM

## 2021-05-03 DIAGNOSIS — M47.812 OSTEOARTHRITIS OF CERVICAL SPINE, UNSPECIFIED SPINAL OSTEOARTHRITIS COMPLICATION STATUS: ICD-10-CM

## 2021-05-03 DIAGNOSIS — R51.9 INTRACTABLE HEADACHE, UNSPECIFIED CHRONICITY PATTERN, UNSPECIFIED HEADACHE TYPE: ICD-10-CM

## 2021-05-03 DIAGNOSIS — G95.20 CORD COMPRESSION MYELOPATHY (H): ICD-10-CM

## 2021-05-03 DIAGNOSIS — G95.29 OTHER CORD COMPRESSION (H): ICD-10-CM

## 2021-05-03 DIAGNOSIS — M50.30 DEGENERATION OF INTERVERTEBRAL DISC OF CERVICAL REGION: ICD-10-CM

## 2021-05-05 ENCOUNTER — OFFICE VISIT - HEALTHEAST (OUTPATIENT)
Dept: PHYSICAL THERAPY | Facility: REHABILITATION | Age: 61
End: 2021-05-05

## 2021-05-05 DIAGNOSIS — M50.30 DEGENERATION OF INTERVERTEBRAL DISC OF CERVICAL REGION: ICD-10-CM

## 2021-05-05 DIAGNOSIS — R51.9 INTRACTABLE HEADACHE, UNSPECIFIED CHRONICITY PATTERN, UNSPECIFIED HEADACHE TYPE: ICD-10-CM

## 2021-05-05 DIAGNOSIS — M54.12 CERVICAL RADICULOPATHY: ICD-10-CM

## 2021-05-05 DIAGNOSIS — G95.20 CORD COMPRESSION MYELOPATHY (H): ICD-10-CM

## 2021-05-05 RX ORDER — ACETAMINOPHEN 325 MG/1
650 TABLET ORAL EVERY 8 HOURS PRN
Qty: 100 TABLET | Refills: 2 | Status: SHIPPED | OUTPATIENT
Start: 2021-05-05

## 2021-05-07 ENCOUNTER — OFFICE VISIT (OUTPATIENT)
Dept: NEUROSURGERY | Facility: CLINIC | Age: 61
End: 2021-05-07
Payer: COMMERCIAL

## 2021-05-07 ENCOUNTER — RECORDS - HEALTHEAST (OUTPATIENT)
Dept: ADMINISTRATIVE | Facility: OTHER | Age: 61
End: 2021-05-07

## 2021-05-07 VITALS
BODY MASS INDEX: 34.78 KG/M2 | WEIGHT: 209 LBS | HEART RATE: 76 BPM | DIASTOLIC BLOOD PRESSURE: 69 MMHG | SYSTOLIC BLOOD PRESSURE: 113 MMHG

## 2021-05-07 DIAGNOSIS — M54.81 CERVICO-OCCIPITAL NEURALGIA: ICD-10-CM

## 2021-05-07 DIAGNOSIS — Z98.1 S/P CERVICAL SPINAL FUSION: ICD-10-CM

## 2021-05-07 DIAGNOSIS — M54.2 NECK PAIN: Primary | ICD-10-CM

## 2021-05-07 DIAGNOSIS — M47.812 CERVICAL SPONDYLOSIS: ICD-10-CM

## 2021-05-07 DIAGNOSIS — R25.2 SPASM: ICD-10-CM

## 2021-05-07 DIAGNOSIS — G24.8 FOCAL DYSTONIA: ICD-10-CM

## 2021-05-07 PROCEDURE — 99205 OFFICE O/P NEW HI 60 MIN: CPT | Performed by: PHYSICAL MEDICINE & REHABILITATION

## 2021-05-07 ASSESSMENT — PAIN SCALES - GENERAL: PAINLEVEL: EXTREME PAIN (9)

## 2021-05-07 NOTE — LETTER
5/7/2021         RE: Layla Starks  [address] [facility] [address]        Dear Colleague,    Thank you for referring your patient, Layla Starks, to the [facility]UROS[facility] [place]. Please see a copy of my visit note below.    Patient seen at the request of Dr. Stevo Waite for an opinion and evaluation of neck pain.      HISTORY OF PRESENT ILLNESS:  Layla Starks is a 60 year old female who presents with a chief complaint of right-sided head and neck pain.     Patient has a long and complicated history of neck pain followed by 6 cervical spine surgeries.  She is essentially fused from C4 to the T1 level.  Her most recent surgery was performed February 19, 2021 status post posterior instrumented spinal fusion from C4-T1 with revision foraminotomies/facetectomy at the right C4-5 level.  She continues to have ongoing, chronic head neck and upper shoulder pain.  She has had incomplete/inadequate symptomatic relief from multiple pharmacologic trials, repeated courses of physical therapy, multiple prior interventional pain procedures, and prior surgeries.    Currently, she localizes symptoms to the head and neck region, as well as, the right upper shoulder/trapezius and levator musculature.  The upper trap and levator area tends to be the worst/most bothersome area of discomfort.  She endorses significant muscle tightness and spasming.  She also has pain and symptoms affecting the right cervical occipital region extending to the occiput.  She does not have significant complaints of radicular symptoms affecting the upper limbs.  She currently takes Tylenol, Robaxin, Celebrex for pain.  She was recently started on a Medrol Dosepak which has been helpful for her pain and symptoms and typically finds relief with oral steroid use; she has stopped NSAIDs during this time.  She reports constant pain 9/10 at rest today which may vary in intensity.  She has minimal alleviating  factors.  She states that her symptoms are quite functionally limiting resulting in reduced QoL.    PRIOR INJURIES/TREATMENT:   Ice/Heat: tried  Physical Therapy:   Multiple courses of prior PT without significant relief.    Current PT at [facility]     - Current Pain Medications -   Tylenol 325mg x2 TID prn for pain  Celebrex 100mg BID  Gabapentin   Robaxin     Prior Procedures:  Cervical MBB/RFA and TFESI some pre-post surgeries.       Prior Related Surgery:   09/09/2016 - Laminoplasty C5-C7 (3 levels); foraminotomies R C4-5, Bilateral C5-6 and C6-7 ([facility]).  [Implants: Synthes maxillofacial titanium plate].  12/13/2017 - MIS R foraminotomy C4-5 (Tamara Barclay).  08/06/2018 - ACDF with ant plate fixation C5-6; use of [place] DBM putty (John, SARABJIT).  [Implants: Spinal USA (Ascend) plate; Titan spine titanium cage].  03/13/2019 - ACDF C4-5 and C6-7, with ant plate fixation C4-C7; removal of plate C4-5; use of Progenix Plus DMB putty ([facility]). [Implants: [place] aviator plate; tritanium cages].  2/19/21 - S/P Posterior Instrumented Spinal Fusion Cervical 4 to Thoracic 1; Revision Foraminotomies/Factectomies Right Cervical 4-5     Specialists Seen - (with most recent, available notes and clinic visits reviewed)   1. Orthopedic surgery - Dr Waite  2. Pain Clinic      IMAGING - reviewed   02/22/2021 C Spine   Impression:   1. Extensive postoperative changes detailed above with anterior plate  and screw fixation C4-C7 and posterior hank and screw fixation C4-T1.  No evidence for immediate hardware complication. Expected  postoperative edema in the operative bed with no definite loculated  fluid collection. There is a surgical drain in place with tip at C5-6.  2. Revision right C4-5 foraminotomy with expected postoperative air.  3. No high-grade postoperative spinal canal or neural foraminal  narrowing. Mild degenerative changes as above.       Review Of  Systems:  I am responding to those symptoms which are directly relevant to the specific indication for my consultation. I recommend that the patient follow up with their primary or referring provider to pursue any other symptoms which may be of concern.       Medical History:  She  has a past medical history of Cervical disc disorder with radiculopathy of cervical region (11/27/2017), Cervical spinal stenosis, DDD (degenerative disc disease), cervical, Depression, SHAHIDA (generalized anxiety disorder), OA (osteoarthritis), Obesity, Personal history of covid-19, and PONV (postoperative nausea and vomiting).     She  has a past surgical history that includes C5-7 laminoplasty (Right, 09/09/2016); Mammoplasty reduction (1980); Partial meniscectomy (Left, 05/17/2016); Excision anal skin tags (N/A, 01/18/2010); colonoscopy (2016); Foraminotomy cervical posterior minimally invasive one level (Right, 12/13/2017); Fusion cervical anterior one level (N/A, 08/06/2018); HYSTEROSCOPY, SURGICAL; W/ ENDOMETRIAL ABLATION, ANY METHOD (N/A); Mammoplasty reduction bilateral (Bilateral, 1980); KNEE SCOPE,MED OR LAT MENIS REPAIR (Left); Decompression, fusion cervical anterior three+ levels, combined (N/A, 03/13/2019); Optical Tracking System Fusion Posterior Cervical Three + Levels (N/A, 2/19/2021); Explore spine, remove hardware, combined (N/A, 2/19/2021); and Graft bone from iliac crest (Right, 2/19/2021).      Family History  Her family history includes Arthritis in her mother; Hearing Loss in her father; Macular Degeneration in her mother; Multiple Sclerosis in her sister.     Social History:  She  reports that she quit smoking about 42 years ago. Her smoking use included cigarettes. She has a 0.50 pack-year smoking history. She has never used smokeless tobacco. She reports current alcohol use of about 1.0 - 2.0 standard drinks of alcohol per week. She reports that she does not use drugs.        Current Medications:   She has a  current medication list which includes the following prescription(s): acetaminophen, biotin, butalbital-acetaminophen-caffeine, calcium citrate, celecoxib, flax oil-fish oil-borage oil, gabapentin, hydrocodone-acetaminophen, ibuprofen, magnesium glycinate, medi-patch-lidocaine, methocarbamol, methylprednisolone, misc natural products, multiple vitamins-minerals, probiotic product, senna-docusate, and vitamin d.       Allergies:    -- Dilaudid [Hydromorphone] -- GI Disturbance    --  Per patient, tolerates IV version well.   -- Morphine -- Nausea and Vomiting   -- Oxycodone -- Nausea and Vomiting   -- Tramadol -- Nausea and Vomiting    PHYSICAL EXAMINATION:  /69 (BP Location: Right arm, Patient Position: Sitting, Cuff Size: Adult Regular)   Pulse 76   Wt 94.8 kg (209 lb)   LMP 02/01/2017   BMI 34.78 kg/m     General: Pleasant, straightforward, WDWN individual.  Mental Status: Pleasant, direct, appropriate mood and affect  Resp: breathing is unlabored without audible wheeze  Vascular: Palpable radial pulses, no cyanosis   Heme: no visible ecchymosis or erythema on extremities  Skin: No notable rash    Neurologic:  Strength: All major muscle groups of the bilateral upper extremities have essentially normal (5/5) and symmetric muscle strength    Sensation: SILT in upper extremities bilaterally C5-T1 except some subjective dec to LT at D3 on the right     DTRs: bilateral upper extremity stretch reflexes are equal and symmetric     Musculoskeletal:  Cervical Spine: ROM moderately reduced balbina with side-bending and lateral rotation, Posture kyphotic, Tender over BELIA and cervical PSP, Spurling deferred. She has significant focal tone at the right upper trapezius and levator muscle with associated tenderness to palpation.        ASSESSMENT:  Layla Starks is a pleasant 60 year old female who presents with:    #. Neck pain    #. S/P cervical spinal fusion (C4-T1)  #. Cervical spondylosis  #. Spasm  #. Focal  dystonia    #. Cervico-occipital neuralgia      PLAN:  - Refer for Right C2-3 (TON) block.  - Order placed for upper trapezius botulinum toxin injection  - Referral placed for [facility] per patient preference for additional comprehensive pain management strategies including medications (eg partial mu-agonist v NMDA-antagonist therapy) and/or SCS trial, as well as, evaluation for pain psychology needs.   - RTC for procedure.     Ready to learn, no apparent learning barriers.  Education provided on treatment plan according to patient's preferred learning style.  Patient verbalizes understanding.   __________________________________  Dyan Ackerman MD  Physical Medicine & Rehabilitation        60 minutes spent on the date of the encounter doing chart review, review of outside records, review of test results, patient visit and documentation           Again, thank you for allowing me to participate in the care of your patient.        Sincerely,        Dyan Ackerman MD

## 2021-05-07 NOTE — PROGRESS NOTES
Patient seen at the request of Dr. Stevo Waite for an opinion and evaluation of neck pain.      HISTORY OF PRESENT ILLNESS:  Layla Starks is a 60 year old female who presents with a chief complaint of right-sided head and neck pain.     Patient has a long and complicated history of neck pain followed by 6 cervical spine surgeries.  She is essentially fused from C4 to the T1 level.  Her most recent surgery was performed February 19, 2021 status post posterior instrumented spinal fusion from C4-T1 with revision foraminotomies/facetectomy at the right C4-5 level.  She continues to have ongoing, chronic head neck and upper shoulder pain.  She has had incomplete/inadequate symptomatic relief from multiple pharmacologic trials, repeated courses of physical therapy, multiple prior interventional pain procedures, and prior surgeries.    Currently, she localizes symptoms to the head and neck region, as well as, the right upper shoulder/trapezius and levator musculature.  The upper trap and levator area tends to be the worst/most bothersome area of discomfort.  She endorses significant muscle tightness and spasming.  She also has pain and symptoms affecting the right cervical occipital region extending to the occiput.  She does not have significant complaints of radicular symptoms affecting the upper limbs.  She currently takes Tylenol, Robaxin, Celebrex for pain.  She was recently started on a Medrol Dosepak which has been helpful for her pain and symptoms and typically finds relief with oral steroid use; she has stopped NSAIDs during this time.  She reports constant pain 9/10 at rest today which may vary in intensity.  She has minimal alleviating factors.  She states that her symptoms are quite functionally limiting resulting in reduced QoL.    PRIOR INJURIES/TREATMENT:   Ice/Heat: tried  Physical Therapy:   Multiple courses of prior PT without significant relief.    Current PT at [facility]     -  Current Pain Medications -   Tylenol 325mg x2 TID prn for pain  Celebrex 100mg BID  Gabapentin   Robaxin     Prior Procedures:  Cervical MBB/RFA and TFESI some pre-post surgeries.       Prior Related Surgery:   09/09/2016 - Laminoplasty C5-C7 (3 levels); foraminotomies R C4-5, Bilateral C5-6 and C6-7 ([facility]).  [Implants: Synthes maxillofacial titanium plate].  12/13/2017 - MIS R foraminotomy C4-5 ([facility]).  08/06/2018 - ACDF with ant plate fixation C5-6; use of Warner DBM putty (John, [place]).  [Implants: Spinal USA (Ascend) plate; Titan spine titanium cage].  03/13/2019 - ACDF C4-5 and C6-7, with ant plate fixation C4-C7; removal of plate C4-5; use of Progenix Plus DMB putty ([facility]). [Implants: [place] aviator plate; tritanium cages].  2/19/21 - S/P Posterior Instrumented Spinal Fusion Cervical 4 to Thoracic 1; Revision Foraminotomies/Factectomies Right Cervical 4-5     Specialists Seen - (with most recent, available notes and clinic visits reviewed)   1. Orthopedic surgery - Dr Waite  2. Pain Clinic      IMAGING - reviewed   02/22/2021 C Spine   Impression:   1. Extensive postoperative changes detailed above with anterior plate  and screw fixation C4-C7 and posterior hank and screw fixation C4-T1.  No evidence for immediate hardware complication. Expected  postoperative edema in the operative bed with no definite loculated  fluid collection. There is a surgical drain in place with tip at C5-6.  2. Revision right C4-5 foraminotomy with expected postoperative air.  3. No high-grade postoperative spinal canal or neural foraminal  narrowing. Mild degenerative changes as above.       Review Of Systems:  I am responding to those symptoms which are directly relevant to the specific indication for my consultation. I recommend that the patient follow up with their primary or referring provider to pursue any other symptoms which may be of concern.       Medical  History:  She  has a past medical history of Cervical disc disorder with radiculopathy of cervical region (11/27/2017), Cervical spinal stenosis, DDD (degenerative disc disease), cervical, Depression, SHAHIDA (generalized anxiety disorder), OA (osteoarthritis), Obesity, Personal history of covid-19, and PONV (postoperative nausea and vomiting).     She  has a past surgical history that includes C5-7 laminoplasty (Right, 09/09/2016); Mammoplasty reduction (1980); Partial meniscectomy (Left, 05/17/2016); Excision anal skin tags (N/A, 01/18/2010); colonoscopy (2016); Foraminotomy cervical posterior minimally invasive one level (Right, 12/13/2017); Fusion cervical anterior one level (N/A, 08/06/2018); HYSTEROSCOPY, SURGICAL; W/ ENDOMETRIAL ABLATION, ANY METHOD (N/A); Mammoplasty reduction bilateral (Bilateral, 1980); KNEE SCOPE,MED OR LAT MENIS REPAIR (Left); Decompression, fusion cervical anterior three+ levels, combined (N/A, 03/13/2019); Optical Tracking System Fusion Posterior Cervical Three + Levels (N/A, 2/19/2021); Explore spine, remove hardware, combined (N/A, 2/19/2021); and Graft bone from iliac crest (Right, 2/19/2021).      Family History  Her family history includes Arthritis in her mother; Hearing Loss in her father; Macular Degeneration in her mother; Multiple Sclerosis in her sister.     Social History:  She  reports that she quit smoking about 42 years ago. Her smoking use included cigarettes. She has a 0.50 pack-year smoking history. She has never used smokeless tobacco. She reports current alcohol use of about 1.0 - 2.0 standard drinks of alcohol per week. She reports that she does not use drugs.        Current Medications:   She has a current medication list which includes the following prescription(s): acetaminophen, biotin, butalbital-acetaminophen-caffeine, calcium citrate, celecoxib, flax oil-fish oil-borage oil, gabapentin, hydrocodone-acetaminophen, ibuprofen, magnesium glycinate,  medi-patch-lidocaine, methocarbamol, methylprednisolone, misc natural products, multiple vitamins-minerals, probiotic product, senna-docusate, and vitamin d.       Allergies:    -- Dilaudid [Hydromorphone] -- GI Disturbance    --  Per patient, tolerates IV version well.   -- Morphine -- Nausea and Vomiting   -- Oxycodone -- Nausea and Vomiting   -- Tramadol -- Nausea and Vomiting    PHYSICAL EXAMINATION:  /69 (BP Location: Right arm, Patient Position: Sitting, Cuff Size: Adult Regular)   Pulse 76   Wt 94.8 kg (209 lb)   LMP 02/01/2017   BMI 34.78 kg/m     General: Pleasant, straightforward, WDWN individual.  Mental Status: Pleasant, direct, appropriate mood and affect  Resp: breathing is unlabored without audible wheeze  Vascular: Palpable radial pulses, no cyanosis   Heme: no visible ecchymosis or erythema on extremities  Skin: No notable rash    Neurologic:  Strength: All major muscle groups of the bilateral upper extremities have essentially normal (5/5) and symmetric muscle strength    Sensation: SILT in upper extremities bilaterally C5-T1 except some subjective dec to LT at D3 on the right     DTRs: bilateral upper extremity stretch reflexes are equal and symmetric     Musculoskeletal:  Cervical Spine: ROM moderately reduced balbina with side-bending and lateral rotation, Posture kyphotic, Tender over BELIA and cervical PSP, Spurling deferred. She has significant focal tone at the right upper trapezius and levator muscle with associated tenderness to palpation.        ASSESSMENT:  Layla Starks is a pleasant 60 year old female who presents with:    #. Neck pain    #. S/P cervical spinal fusion (C4-T1)  #. Cervical spondylosis  #. Spasm  #. Focal dystonia    #. Cervico-occipital neuralgia      PLAN:  - Refer for Right C2-3 (TON) block.  - Order placed for upper trapezius botulinum toxin injection  - Referral placed for [facility] per patient preference for additional comprehensive pain management  strategies including medications (eg partial mu-agonist v NMDA-antagonist therapy) and/or SCS trial, as well as, evaluation for pain psychology needs.   - RTC for procedure.     Ready to learn, no apparent learning barriers.  Education provided on treatment plan according to patient's preferred learning style.  Patient verbalizes understanding.   __________________________________  Dyan Ackerman MD  Physical Medicine & Rehabilitation        60 minutes spent on the date of the encounter doing chart review, review of outside records, review of test results, patient visit and documentation

## 2021-05-07 NOTE — NURSING NOTE
Instructed patient that a  will be needed to take the patient home.   Yes    Sedation, if offered, is conscious sedation and the patient will not be 'put out.'  Further instructions for sedation given to the patient. NPO solids 8 hour prior ot injections (including gum and mints), clear fluids 2 hours prior to injection (no dairy product). Patient should take daily medications with small sips of water.  No    Patient reminder given for Medial Branch Block. Pain level should be at least 5/10 or we will not be able to perform the diagnostic test. Patient should avoid or limit taking pain medications.   Not applicable    Patient asked if they are on any blood thinners.  Yes, patient denied being on blood thinners    Patient asked if they have had illness/infections/or been on any antibiotics in the last 7 days (GI disturbance, UTI, colds, dental abscess, anything infectious, fever, or 'feeling under the weather')? Patient knows to inform clinic if they experience any of the above prior to the procedure  Yes, patient acknowledges    Patient understands that they must also have a negative COVID-19 test within four days of their procedure. (Patient will be notified on how to test for this)  Yes, she acknowledges    If able, patient should avoid scheduling steroid injections within 2-weeks (before, between, or after) COVID-19 vaccination. Patient understands:  Yes, she acknowledges    Patient scheduled for 5/24/21 at 8am. Patient is aware she must arrive 1 hour early

## 2021-05-10 ENCOUNTER — OFFICE VISIT - HEALTHEAST (OUTPATIENT)
Dept: PHYSICAL THERAPY | Facility: REHABILITATION | Age: 61
End: 2021-05-10

## 2021-05-10 ENCOUNTER — TELEPHONE (OUTPATIENT)
Dept: NEUROLOGY | Facility: CLINIC | Age: 61
End: 2021-05-10

## 2021-05-10 DIAGNOSIS — Z11.59 ENCOUNTER FOR SCREENING FOR OTHER VIRAL DISEASES: ICD-10-CM

## 2021-05-10 DIAGNOSIS — R51.9 INTRACTABLE HEADACHE, UNSPECIFIED CHRONICITY PATTERN, UNSPECIFIED HEADACHE TYPE: ICD-10-CM

## 2021-05-10 DIAGNOSIS — M54.2 NECK PAIN: ICD-10-CM

## 2021-05-10 DIAGNOSIS — M47.812 OSTEOARTHRITIS OF CERVICAL SPINE, UNSPECIFIED SPINAL OSTEOARTHRITIS COMPLICATION STATUS: ICD-10-CM

## 2021-05-10 DIAGNOSIS — M50.30 DEGENERATION OF INTERVERTEBRAL DISC OF CERVICAL REGION: ICD-10-CM

## 2021-05-10 DIAGNOSIS — G95.29 OTHER CORD COMPRESSION (H): ICD-10-CM

## 2021-05-10 DIAGNOSIS — M54.12 CERVICAL RADICULOPATHY: ICD-10-CM

## 2021-05-10 DIAGNOSIS — G95.20 CORD COMPRESSION MYELOPATHY (H): ICD-10-CM

## 2021-05-10 DIAGNOSIS — Z98.1 S/P CERVICAL SPINAL FUSION: ICD-10-CM

## 2021-05-10 NOTE — TELEPHONE ENCOUNTER
Faxed referral to [facility] including demographics, office note, and referral.

## 2021-05-10 NOTE — TELEPHONE ENCOUNTER
Tristan Manning CMA Piche, Lindsey, FAWAD Rudd,     Would you mind faxing this please? I just want you to have an idea of how he sends messages!     Thanks   Tristan    Previous Messages    ----- Message -----   From: Dyan Ackerman MD   Sent: 5/7/2021   4:18 PM CDT   To: Tristan Manning CMA     Can you please fax [facility] Referral for [place]? Thx,

## 2021-05-12 NOTE — PROGRESS NOTES
Spine Surgical Hx:  09/09/2016 - Laminoplasty C5-C7 (3 levels); foraminotomies R C4-5, Bilateral C5-6 and C6-7 ([facility]).  [Implants: Synthes maxillofacial titanium plate].  12/13/2017 - MIS R foraminotomy C4-5 ([facility], [facility]).  08/06/2018 - ACDF with ant plate fixation C5-6; use of [place] DBM putty (John, SARABJIT).  [Implants: Spinal USA (Ascend) plate; Titan spine titanium cage].  03/13/2019 - ACDF C4-5 and C6-7, with ant plate fixation C4-C7; removal of plate C4-5; use of Progenix Plus DMB putty ([facility]). [Implants: [place] aviator plate; tritanium cages].  02/19/2021 - PISF C4-T1; Redo foraminotomy with complete facetectomy R C4-5; Removal of laminoplasty plates C5-C7; Right posterior ICBG harvest ([facility]) for residual foraminal stenosis R C4-5, with R C5 radiculopathy; subjacent level spondylosis C7-T1.  [Implants: [employer] Infinity screw system, 3.5 mm titanium rods x 2].      In-Person Visit    Reason for Visit:  Chief Complaint   Patient presents with     RECHECK     PMR consult      Surgical Followup     DOS 2/19/21 PISF C4-T1, Revision foraminotomies/ faceatomies C4-5 and removal of laminoplasty       S>  60/f RHD 3 mos postop, last seen at 6 wks.  Still reported unimproved preop pain (R paraspinal region of neck).  Still taking: hydrocodone 5/325 mg/tab, ~3 tabs/day (15 mg/day); gabapentin 900 mg/day; methocarbamol 1,000 mg QID.  P> Medrol; Internal PT; may increase gabapentin to 600 mg TID (1.8 gm/day).  Later (see addendum 4/8/21), plan: Refer to Dr. Ackerman ([facility] spine); celebrex 100 mg PO bid.    5/7/21 -saw Dr. Ackerman.  Plan: Right C2 3 injection; Botox injections; referred to [facility] pain clinic for possible spinal cord stimulator treatment.    Unaccompanied.  Continues to have the same preoperative right neck pain, which she still considers to be very disabling.  Does not report any improvement brought about by surgery.  Still very desperate and  miserable.    She has a lot of questions regarding further options.  She is fixated on the residual osteophytes around the C5-6 disc where she had previous ACDF.  She strongly suspects that this is causing her residual symptoms.  She is also wondering if the right C6 nerve root could be mediating her pain.  Preoperatively, we considered doing a foraminotomy right C5-6.  However, I ended up only performing foraminotomy right C4-5 as I did not think C5-6 was symptomatic.  She is also wondering if C2-3 may be contributing to her pain, and if so what can be done about it.  Her right-sided neck pain also radiates somewhat lower to the trapezial region.  She is wondering if the upper thoracic levels may be contributing to her pain.    Lastly, she reports that her symptoms really are aggravated by standing.  Thus, she does not think that advanced imaging taken with her laying supine would be able to show what is going on.  She strongly requests to have a stand-up MRI done.  She also brought this up with Dr. Ackerman.      Neck Disability Index (NDI) Questionnaire    Neck Disability Index (NDI) 5/10/2021   Neck Disability Index: Count 10   NDI: Total Score = SUM (points for all 10 findings) 36   Neck Disability in Percent = (Total Score) / 50 * 100 72 (%)   Some recent data might be hidden      Preop NDI 80%  6 wks  78%  3 mos  72%      Visual Analog Pain Scale  Neck Pain Scale 0-10: 4  Right arm pain: 4  Left arm pain: 0    PROMIS-10 Scores  Global Mental Health Score: (P) 10  Global Physical Health Score: (P) 10  PROMIS TOTAL - SUBSCORES: (P) 20    O>   Alert, oriented x 3, cooperative.  Not in CP distress.  LMP 02/01/2017   Surgical incision well-healed, no sign of infection.  Ambulates independently.   Grossly neurologically intact.    Imaging:   Cervical lateral x-ray taken today shows stable posterior instrumentation C4-T1.  Also stable previously placed anterior instrumentation C4-C7.  Overall, very reassuring  x-rays.    A>   60/f RHD:  1.  3 months status post posterior instrumented spinal fusion C4-T1, with revision right complete foraminotomy C4-5, with minimal benefit, and continued severe right-sided neck pain.    P>    Had a good long discussion with patient.  I share her disappointment that her preoperative symptoms are not any better.  I tried to answer all her different questions today to the best of my ability.    I told her that my professional opinion is that the residual small osteophytes adjacent to the C5 disc is not causing any symptoms, and is definitely not worth going after.  Of course, she may consider getting opinions from other surgeons, although I suspect that most if not all of them would share the same opinion.    My recommendation is to proceed with Dr. Ackerman's plan of a right C2 3 injection.  If this helps, then he may have other nonoperative options to offer.  Otherwise, we may also offer C2-3 fusion, in which case it might be better to just extend the current fusion all the way up to C2, as I do not see any value in leaving a sandwich C3-4 level floating in between fusions above and below.    If the C2 3 injection does not help, I recommend proceeding with a right C6 nerve root block.  On the images, I do not think the foraminal stenosis is significant enough to be causing symptoms.  Furthermore, the level is already stabilized both anteriorly and posteriorly; thus, no motion.  However, I understand that she is really desperate and we could not identify any other more obvious source of pain.  If a C6 nerve root block helped significantly, then we may consider a revision right foraminotomy, even though not technically easy.    I do not see anything in the upper thoracic levels below T1 that would likely be causing her symptoms, based on previous imaging.    Regarding her questions on the spinal cord stimulator, we would defer to the pain physicians on this, as I do not place these myself.   Given the presence of C4-T1 fusion, I admittedly do not know if it would be feasible to place a stimulator above.    Regarding her request to have a stand-up cervical MRI, I do not think it is likely that it would add significant value.  My experience is that a stand-up MRI does not really add much more compared to a conventional MRI.  However, I understand that her symptoms are brought about by standing.  We do not have a stand-up MRI in our institution, so it would have to be done elsewhere.  Furthermore, with presence of anterior and posterior metallic instrumentation, I do not know if the amount of artifact would be significant enough for us to be able to see anything at the operative levels.  At any rate, I understand that she is desperate.  Finally I agreed to place a stand-up MRI order.  I will review the images as they become available, and we will send the patient a note or give her a call.    Return to clinic at the 1 year postop gunnar (February 2022) with cervical CT for fusion status evaluation.    TT 25 mins.  The level of discussion exceeded that of routine postop visit.    Stevo Waite MD    Orthopaedic Spine Surgery  Dept Orthopaedic Surgery, [facility]  [phone] office, [phone] pager  [email or web address]    Addendum 6/3/2021:  I called patient on the phone today.  On 5/24/2021, underwent right C2-3 medial branch block care of Dr. Ackerman.  Per report, pain improved from 8/10 down to 5.  Per patient, no relief at all.  She did not think it hit the right spot.    Reviewed cervical MRI (upright MRI c/o CDI) done 5/26/2021.  Shows postsurgical changes C4-T1.  Per report, there is equivocal right foraminal stenosis C3-4.    I discussed the MRI results with the patient.  Her neck pain is indeed more right-sided, thus, I cannot rule out the likelihood that this MRI finding is contributing to her symptoms.    I sent an in basket message to Dr. Ackerman asking if he is  able to do a right C4 nerve root block with steroid.  If he does not do this, we will place a radiology order for it.    Ultimately, if the nerve root block gives her good relief, may consider proximal extension of posterior instrumented spinal fusion up to C2.  If it does not give her any relief, then I am truly sorry but I do not think any further surgery would be of benefit, and that her best course of action may be to continue nonoperative treatment with either medical spine (Dr. Ackerman) or a pain clinic.    Addendum 6/16/2021:  I received reply from Dr. Ackerman.  While he performs cervical nerve root blocks under ultrasound guidance, and this particular patient, with presence of metallic hardware, ultrasound guidance would be difficult.  Thus, he recommended that the procedure be performed by radiology under fluoroscopy guidance.  Order placed for right C4 nerve root block with steroid care of radiology.

## 2021-05-13 ENCOUNTER — ANCILLARY PROCEDURE (OUTPATIENT)
Dept: GENERAL RADIOLOGY | Facility: CLINIC | Age: 61
End: 2021-05-13
Attending: ORTHOPAEDIC SURGERY
Payer: COMMERCIAL

## 2021-05-13 ENCOUNTER — RECORDS - HEALTHEAST (OUTPATIENT)
Dept: ADMINISTRATIVE | Facility: OTHER | Age: 61
End: 2021-05-13

## 2021-05-13 ENCOUNTER — AMBULATORY - HEALTHEAST (OUTPATIENT)
Dept: NURSING | Facility: CLINIC | Age: 61
End: 2021-05-13

## 2021-05-13 ENCOUNTER — OFFICE VISIT - HEALTHEAST (OUTPATIENT)
Dept: PHYSICAL THERAPY | Facility: REHABILITATION | Age: 61
End: 2021-05-13

## 2021-05-13 ENCOUNTER — OFFICE VISIT (OUTPATIENT)
Dept: ORTHOPEDICS | Facility: CLINIC | Age: 61
End: 2021-05-13
Payer: COMMERCIAL

## 2021-05-13 DIAGNOSIS — Z98.1 S/P CERVICAL SPINAL FUSION: ICD-10-CM

## 2021-05-13 DIAGNOSIS — G95.20 CORD COMPRESSION MYELOPATHY (H): ICD-10-CM

## 2021-05-13 DIAGNOSIS — M54.12 CERVICAL RADICULOPATHY: Primary | ICD-10-CM

## 2021-05-13 DIAGNOSIS — M54.12 CERVICAL RADICULOPATHY: ICD-10-CM

## 2021-05-13 DIAGNOSIS — M50.30 DEGENERATION OF INTERVERTEBRAL DISC OF CERVICAL REGION: ICD-10-CM

## 2021-05-13 DIAGNOSIS — R51.9 INTRACTABLE HEADACHE, UNSPECIFIED CHRONICITY PATTERN, UNSPECIFIED HEADACHE TYPE: ICD-10-CM

## 2021-05-13 PROCEDURE — 72040 X-RAY EXAM NECK SPINE 2-3 VW: CPT | Mod: GC | Performed by: STUDENT IN AN ORGANIZED HEALTH CARE EDUCATION/TRAINING PROGRAM

## 2021-05-13 PROCEDURE — 99214 OFFICE O/P EST MOD 30 MIN: CPT | Mod: 24 | Performed by: ORTHOPAEDIC SURGERY

## 2021-05-13 NOTE — LETTER
5/13/2021         RE: Layla Starks  [id number] [facility] [address]        Dear Colleague,    Thank you for referring your patient, Layla Starks, to the [facility] [place]. Please see a copy of my visit note below.    Spine Surgical Hx:  09/09/2016 - Laminoplasty C5-C7 (3 levels); foraminotomies R C4-5, Bilateral C5-6 and C6-7 ([facility]).  [Implants: Synthes maxillofacial titanium plate].  12/13/2017 - MIS R foraminotomy C4-5 (Tamara Barclay).  08/06/2018 - ACDF with ant plate fixation C5-6; use of [place] DBM putty (SARABJIT Lopez).  [Implants: Spinal USA (Ascend) plate; Titan spine titanium cage].  03/13/2019 - ACDF C4-5 and C6-7, with ant plate fixation C4-C7; removal of plate C4-5; use of Progenix Plus DMB putty ([facility]). [Implants: Cordova aviator plate; tritanium cages].  02/19/2021 - PISF C4-T1; Redo foraminotomy with complete facetectomy R C4-5; Removal of laminoplasty plates C5-C7; Right posterior ICBG harvest (Sembrano) for residual foraminal stenosis R C4-5, with R C5 radiculopathy; subjacent level spondylosis C7-T1.  [Implants: [employer] Infinity screw system, 3.5 mm titanium rods x 2].      In-Person Visit    Reason for Visit:  Chief Complaint   Patient presents with     RECHECK     PMR consult      Surgical Followup     DOS 2/19/21 PISF C4-T1, Revision foraminotomies/ faceatomies C4-5 and removal of laminoplasty       S>  60/f RHD 3 mos postop, last seen at 6 wks.  Still reported unimproved preop pain (R paraspinal region of neck).  Still taking: hydrocodone 5/325 mg/tab, ~3 tabs/day (15 mg/day); gabapentin 900 mg/day; methocarbamol 1,000 mg QID.  P> Medrol; Internal PT; may increase gabapentin to 600 mg TID (1.8 gm/day).  Later (see addendum 4/8/21), plan: Refer to Dr. Ackerman (Med spine); celebrex 100 mg PO bid.    5/7/21 -saw Dr. Ackerman.  Plan: Right C2 3 injection; Botox injections; referred to [facility]  clinic for possible spinal cord stimulator treatment.    Unaccompanied.  Continues to have the same preoperative right neck pain, which she still considers to be very disabling.  Does not report any improvement brought about by surgery.  Still very desperate and miserable.    She has a lot of questions regarding further options.  She is fixated on the residual osteophytes around the C5-6 disc where she had previous ACDF.  She strongly suspects that this is causing her residual symptoms.  She is also wondering if the right C6 nerve root could be mediating her pain.  Preoperatively, we considered doing a foraminotomy right C5-6.  However, I ended up only performing foraminotomy right C4-5 as I did not think C5-6 was symptomatic.  She is also wondering if C2-3 may be contributing to her pain, and if so what can be done about it.  Her right-sided neck pain also radiates somewhat lower to the trapezial region.  She is wondering if the upper thoracic levels may be contributing to her pain.    Lastly, she reports that her symptoms really are aggravated by standing.  Thus, she does not think that advanced imaging taken with her laying supine would be able to show what is going on.  She strongly requests to have a stand-up MRI done.  She also brought this up with Dr. Ackerman.      Neck Disability Index (NDI) Questionnaire    Neck Disability Index (NDI) 5/10/2021   Neck Disability Index: Count 10   NDI: Total Score = SUM (points for all 10 findings) 36   Neck Disability in Percent = (Total Score) / 50 * 100 72 (%)   Some recent data might be hidden      Preop NDI 80%  6 wks  78%  3 mos  72%      Visual Analog Pain Scale  Neck Pain Scale 0-10: 4  Right arm pain: 4  Left arm pain: 0    PROMIS-10 Scores  Global Mental Health Score: (P) 10  Global Physical Health Score: (P) 10  PROMIS TOTAL - SUBSCORES: (P) 20    O>   Alert, oriented x 3, cooperative.  Not in CP distress.  LMP 02/01/2017   Surgical incision well-healed, no sign of  infection.  Ambulates independently.   Grossly neurologically intact.    Imaging:   Cervical lateral x-ray taken today shows stable posterior instrumentation C4-T1.  Also stable previously placed anterior instrumentation C4-C7.  Overall, very reassuring x-rays.    A>   60/f RHD:  1.  3 months status post posterior instrumented spinal fusion C4-T1, with revision right complete foraminotomy C4-5, with minimal benefit, and continued severe right-sided neck pain.    P>    Had a good long discussion with patient.  I share her disappointment that her preoperative symptoms are not any better.  I tried to answer all her different questions today to the best of my ability.    I told her that my professional opinion is that the residual small osteophytes adjacent to the C5 disc is not causing any symptoms, and is definitely not worth going after.  Of course, she may consider getting opinions from other surgeons, although I suspect that most if not all of them would share the same opinion.    My recommendation is to proceed with Dr. Ackerman's plan of a right C2 3 injection.  If this helps, then he may have other nonoperative options to offer.  Otherwise, we may also offer C2-3 fusion, in which case it might be better to just extend the current fusion all the way up to C2, as I do not see any value in leaving a sandwich C3-4 level floating in between fusions above and below.    If the C2 3 injection does not help, I recommend proceeding with a right C6 nerve root block.  On the images, I do not think the foraminal stenosis is significant enough to be causing symptoms.  Furthermore, the level is already stabilized both anteriorly and posteriorly; thus, no motion.  However, I understand that she is really desperate and we could not identify any other more obvious source of pain.  If a C6 nerve root block helped significantly, then we may consider a revision right foraminotomy, even though not technically easy.    I do not see  anything in the upper thoracic levels below T1 that would likely be causing her symptoms, based on previous imaging.    Regarding her questions on the spinal cord stimulator, we would defer to the pain physicians on this, as I do not place these myself.  Given the presence of C4-T1 fusion, I admittedly do not know if it would be feasible to place a stimulator above.    Regarding her request to have a stand-up cervical MRI, I do not think it is likely that it would add significant value.  My experience is that a stand-up MRI does not really add much more compared to a conventional MRI.  However, I understand that her symptoms are brought about by standing.  We do not have a stand-up MRI in our institution, so it would have to be done elsewhere.  Furthermore, with presence of anterior and posterior metallic instrumentation, I do not know if the amount of artifact would be significant enough for us to be able to see anything at the operative levels.  At any rate, I understand that she is desperate.  Finally I agreed to place a stand-up MRI order.  I will review the images as they become available, and we will send the patient a note or give her a call.    Return to clinic at the 1 year postop gunnar (February 2022) with cervical CT for fusion status evaluation.    TT 25 mins.  The level of discussion exceeded that of routine postop visit.    Stevo Waite MD    Orthopaedic Spine Surgery  Dept Orthopaedic Surgery, [facility]  [phone] office, [phone] pager  [email or web address]

## 2021-05-17 ENCOUNTER — TRANSFERRED RECORDS (OUTPATIENT)
Dept: HEALTH INFORMATION MANAGEMENT | Facility: CLINIC | Age: 61
End: 2021-05-17

## 2021-05-18 ENCOUNTER — AMBULATORY - HEALTHEAST (OUTPATIENT)
Dept: LAB | Facility: CLINIC | Age: 61
End: 2021-05-18

## 2021-05-18 ENCOUNTER — RECORDS - HEALTHEAST (OUTPATIENT)
Dept: ADMINISTRATIVE | Facility: OTHER | Age: 61
End: 2021-05-18

## 2021-05-18 DIAGNOSIS — Z11.59 SCREENING FOR VIRAL DISEASE: ICD-10-CM

## 2021-05-19 ENCOUNTER — OFFICE VISIT - HEALTHEAST (OUTPATIENT)
Dept: PHYSICAL THERAPY | Facility: REHABILITATION | Age: 61
End: 2021-05-19

## 2021-05-19 ENCOUNTER — TELEPHONE (OUTPATIENT)
Dept: PHYSICAL MEDICINE AND REHAB | Facility: CLINIC | Age: 61
End: 2021-05-19

## 2021-05-19 ENCOUNTER — MYC MEDICAL ADVICE (OUTPATIENT)
Dept: ORTHOPEDICS | Facility: CLINIC | Age: 61
End: 2021-05-19

## 2021-05-19 DIAGNOSIS — M54.12 CERVICAL RADICULOPATHY: ICD-10-CM

## 2021-05-19 DIAGNOSIS — R51.9 INTRACTABLE HEADACHE, UNSPECIFIED CHRONICITY PATTERN, UNSPECIFIED HEADACHE TYPE: ICD-10-CM

## 2021-05-19 DIAGNOSIS — G95.29 OTHER CORD COMPRESSION (H): ICD-10-CM

## 2021-05-19 DIAGNOSIS — M54.2 NECK PAIN: ICD-10-CM

## 2021-05-19 DIAGNOSIS — Z98.1 S/P CERVICAL SPINAL FUSION: ICD-10-CM

## 2021-05-19 DIAGNOSIS — M50.30 DEGENERATION OF INTERVERTEBRAL DISC OF CERVICAL REGION: ICD-10-CM

## 2021-05-19 DIAGNOSIS — M54.12 CERVICAL RADICULOPATHY: Primary | ICD-10-CM

## 2021-05-19 DIAGNOSIS — G95.20 CORD COMPRESSION MYELOPATHY (H): ICD-10-CM

## 2021-05-19 DIAGNOSIS — M47.812 OSTEOARTHRITIS OF CERVICAL SPINE, UNSPECIFIED SPINAL OSTEOARTHRITIS COMPLICATION STATUS: ICD-10-CM

## 2021-05-19 NOTE — TELEPHONE ENCOUNTER
[facility] Call Center    Phone Message    May a detailed message be left on voicemail: no     Reason for Call: Other: Layla calling back due to a missed call from Tristan. Please call her back at your earliest convenience to discuss.     Action Taken: Message routed to:  [facility] ([facility]): [facility]    Travel Screening: Not Applicable

## 2021-05-19 NOTE — TELEPHONE ENCOUNTER
Received a message regarding patient. She would like to know if she is able to get the MBB at the C5-6 level. Per provider, she is fused at that level. Therefore, she is unable to have the MBB at C5-6 level.     Message left asking for return call to discuss.

## 2021-05-20 ENCOUNTER — AMBULATORY - HEALTHEAST (OUTPATIENT)
Dept: LAB | Facility: CLINIC | Age: 61
End: 2021-05-20

## 2021-05-20 DIAGNOSIS — Z11.59 SCREENING FOR VIRAL DISEASE: ICD-10-CM

## 2021-05-20 NOTE — TELEPHONE ENCOUNTER
See multiple My Chart messages back & forth.    See last dictation ordered MRI Cervical.  I reviewed her request with  who stated he does not feel she needs a Thoracic MRI & that he does not think it will show anything but he will order it if she insists.    I called pt & told her above & she stated she is worried about whether a problem lower in middle of her back could be causing her symptoms.  Ordered MRI Thoracic & told pt we will not be ordering with contrast.  contrast is not good for her liver or kidneys & we only order that if checking for tumor & she understood.   Faxed order to [facility] & she will call to schedule.    Call back prn. Pt agreed.  V.O.R.B./Kellie Castillo RN.

## 2021-05-21 ENCOUNTER — TELEPHONE (OUTPATIENT)
Dept: NEUROLOGY | Facility: CLINIC | Age: 61
End: 2021-05-21

## 2021-05-21 DIAGNOSIS — M54.12 CERVICAL RADICULOPATHY: ICD-10-CM

## 2021-05-21 LAB
SARS-COV-2 PCR COMMENT: NORMAL
SARS-COV-2 RNA SPEC QL NAA+PROBE: NEGATIVE
SARS-COV-2 VIRUS SPECIMEN SOURCE: NORMAL

## 2021-05-21 NOTE — TELEPHONE ENCOUNTER
I called the patient and spoke with her regarding the fact that the plan is to do C2-3, and we will not be doing this C5-6 at this time. Patient understood.      ----- Message from Dyan Ackerman MD sent at 5/21/2021  8:43 AM CDT -----  Regarding: RE: 5/24 procedure  Blaire, this is the patient we just discussed. Thank you!  ----- Message -----  From: Neelima Coker RN  Sent: 5/20/2021   2:16 PM CDT  To: Dyan Ackerman MD  Subject: RE: 5/24 procedure                               I think she wants you to follow up with her about this! -Neelima  ----- Message -----  From: Dyan Ackerman MD  Sent: 5/19/2021   9:48 AM CDT  To: Neelima Coker RN, #  Subject: RE: 5/24 procedure                               Hi Neelima - No, she is fused at C5-6 and can't do a medial branch block there. Did she want us to follow up on this or do you think she was just asking? Thanks! -MG  ----- Message -----  From: Neelima Coker RN  Sent: 5/19/2021   8:38 AM CDT  To: Dyan Ackerman MD  Subject: 5/24 procedure                                   Hi Dr. Ackerman,    I spoke with this pt today and she was wondering if you would be able to do level C5-6 for her procedure Monday instead of C2-3? I told her I would pass along this information but also assume you can just talk to her on Monday about this, just wanted to give you a heads up.    Thanks,  Neelima  Pro[facility] RN

## 2021-05-22 ENCOUNTER — COMMUNICATION - HEALTHEAST (OUTPATIENT)
Dept: SCHEDULING | Facility: CLINIC | Age: 61
End: 2021-05-22

## 2021-05-24 ENCOUNTER — OFFICE VISIT - HEALTHEAST (OUTPATIENT)
Dept: FAMILY MEDICINE | Facility: CLINIC | Age: 61
End: 2021-05-24

## 2021-05-24 ENCOUNTER — HOSPITAL ENCOUNTER (OUTPATIENT)
Facility: AMBULATORY SURGERY CENTER | Age: 61
Discharge: HOME | End: 2021-05-24
Attending: PHYSICAL MEDICINE & REHABILITATION | Admitting: PHYSICAL MEDICINE & REHABILITATION
Payer: COMMERCIAL

## 2021-05-24 ENCOUNTER — ANCILLARY PROCEDURE (OUTPATIENT)
Dept: RADIOLOGY | Facility: AMBULATORY SURGERY CENTER | Age: 61
End: 2021-05-24
Attending: PHYSICAL MEDICINE & REHABILITATION
Payer: COMMERCIAL

## 2021-05-24 VITALS
OXYGEN SATURATION: 96 % | WEIGHT: 195 LBS | BODY MASS INDEX: 32.49 KG/M2 | RESPIRATION RATE: 16 BRPM | HEART RATE: 71 BPM | SYSTOLIC BLOOD PRESSURE: 121 MMHG | DIASTOLIC BLOOD PRESSURE: 68 MMHG | TEMPERATURE: 98.1 F | HEIGHT: 65 IN

## 2021-05-24 DIAGNOSIS — M54.2 NECK PAIN: ICD-10-CM

## 2021-05-24 DIAGNOSIS — Z98.1 S/P CERVICAL SPINAL FUSION: ICD-10-CM

## 2021-05-24 DIAGNOSIS — M47.812 CERVICAL SPONDYLOSIS: ICD-10-CM

## 2021-05-24 DIAGNOSIS — G89.29 CHRONIC NECK PAIN: ICD-10-CM

## 2021-05-24 DIAGNOSIS — M54.2 CHRONIC NECK PAIN: ICD-10-CM

## 2021-05-24 PROCEDURE — 64490 INJ PARAVERT F JNT C/T 1 LEV: CPT | Mod: RT

## 2021-05-24 RX ORDER — OMEGA-3 FATTY ACIDS/FISH OIL 300-1000MG
CAPSULE ORAL
COMMUNITY
Start: 2020-01-01 | End: 2021-07-15

## 2021-05-24 RX ORDER — TIZANIDINE HYDROCHLORIDE 2 MG/1
CAPSULE, GELATIN COATED ORAL
COMMUNITY
Start: 2021-05-17 | End: 2021-06-15

## 2021-05-24 RX ORDER — METHOCARBAMOL 750 MG/1
TABLET, FILM COATED ORAL
COMMUNITY
Start: 2021-02-20 | End: 2021-05-24

## 2021-05-24 RX ORDER — LIDOCAINE HYDROCHLORIDE 20 MG/ML
INJECTION, SOLUTION INFILTRATION; PERINEURAL PRN
Status: DISCONTINUED | OUTPATIENT
Start: 2021-05-24 | End: 2021-05-24 | Stop reason: HOSPADM

## 2021-05-24 RX ORDER — ERGOCALCIFEROL 1.25 MG/1
CAPSULE, LIQUID FILLED ORAL
COMMUNITY
Start: 2020-09-04 | End: 2021-07-15

## 2021-05-24 RX ORDER — ERGOCALCIFEROL 1.25 MG/1
CAPSULE ORAL
COMMUNITY
End: 2021-07-15

## 2021-05-24 RX ORDER — LIDOCAINE 50 MG/G
PATCH TOPICAL
COMMUNITY

## 2021-05-24 RX ORDER — IOPAMIDOL 408 MG/ML
INJECTION, SOLUTION INTRATHECAL PRN
Status: DISCONTINUED | OUTPATIENT
Start: 2021-05-24 | End: 2021-05-24 | Stop reason: HOSPADM

## 2021-05-24 ASSESSMENT — MIFFLIN-ST. JEOR: SCORE: 1460.39

## 2021-05-24 NOTE — DISCHARGE INSTRUCTIONS
"Home Care Instructions after a Medial Branch Block      In a medial branch block, a local anesthetic (numbing medicine) is injected near the medial branch nerve. This stops the transmission of pain signals from the facet joint. If this reduces your pain and improves your mobility, it may tell the doctor which facet joint is causing the pain. This procedure is a diagnostic procedure and is typically short lasting. With this injection, a steroid to increase the longevity of the blocks effect may or may not be used.    Activity  -You may resume most normal activity levels with the exception of strenuous activity. It is important for us to know if your pain with normal activity is relieved after this injection.  -DO NOT shower for 24 hours  -DO NOT remove bandaid for 24 hours    Pain  -You may experience soreness at the injection site for one or two days  -You may use an ice pack for 20 minutes every 2 hours for the first 24 hours  -You may use a heating pad after the first 24 hours  -You may use Tylenol (acetaminophen) every 4 hours or other pain medicines as     directed by your physician    You may experience numbness radiating into your legs or arms (depending on the procedure location). This numbness may last several hours. Until sensation returns to normal; please use caution in walking, climbing stairs, and stepping out of your vehicle, etc.    DID YOU RECEIVE SEDATION TODAY?  {YES / NO:700959::\"Yes\"}    If you received sedation please follow these additional safety measures.  Sedation medicine, if given, may remain active for many hours. It is important for the next 24 hours that you do not:  -Drive a car  -Operate machines or power tools  -Consume alcohol, including beer  -Sign any important papers or legal documents    DID YOU RECEIVE STEROIDS TODAY?  {YES / NO:656056::\"Yes\"}    Common side effects of steroids:  Not everyone will experience corticosteroid side effects. If side effects are experienced, they " will gradually subside in the 7-10 day period following an injection. Most common side effects include:  -Flushed face and/or chest  -Feeling of warmth, particularly in the face but could be an overall feeling of warmth  -Increased blood sugar in diabetic patients  -Menstrual irregularities my occur. If taking hormone-based birth control an alternate method of birth control is recommended  -Sleep disturbances and/or mood swings are possible  -Leg cramps      PLEASE KEEP TRACK OF YOUR SYMPTOMS AND NOTE YOUR IMPROVEMENT FOR YOUR DOCTOR.       Fill out the pain diary and fax ([phone]) it back to us. This cues us to call the patient to follow up with scheduling the next procedure. They do not need to call us they have faxed the Pain Diary.     If they do not have access to a fax machine, they can attach it to [employer] and we will follow up with them. They do not need to call us.     If they cannot fax or [employer], then call our call center and request to speak with a nurse for follow up after a procedure [phone].    Please contact us if you have:  -Severe pain  -Fever more than 101.5 degrees Fahrenheit  -Signs of infection at the injection site (redness, swelling, or drainage)    If you have questions, please contact our office at [phone] between the hours of 7:00 am and 3:00 pm Monday through Friday. After office hours you can contact the on call provider by dialing [phone]. If you need immediate attention, we recommend that you go to a hospital emergency room or dial [phone].

## 2021-05-24 NOTE — PROCEDURES
PROCEDURE NOTE:  1ST Diagnostic Cervical Medial Branch Block    PROCEDURE DATE: 5/24/2021    PATIENT NAME: Layla Starks  YOB: 1960    ATTENDING PHYSICIAN: Dyan Ackerman MD    PREOPERATIVE DIAGNOSIS:   Neck pain  S/P cervical spinal fusion  Cervical spondylosis  POSTOPERATIVE DIAGNOSIS: Same    PROCEDURE PERFORMED: 1ST diagnostic medial branch blocks at RIGHT C2-3 facet (Third Occipital Nerve), with a posterior approach     FLUOROSCOPY WAS USED.    INDICATIONS FOR PROCEDURE:   Layla Starks is a 60 year old female with a clinical picture consistent with cervicogenic head/neck pain for diagnostic medial branch blocks at the levels noted above to assess if there is facetogenic component to pain.    PROCEDURE AND FINDINGS:    Layla was greeted in the pre-procedure holding area. The risk, benefits and alternatives to the procedure were again reviewed with the patient and written informed consent was placed in the chart. An IV line was not placed.  A 500 mL bag of NS was not connected to the patient. She was taken to the procedure room and positioned prone on the fluoroscopy table.  Routine monitors were applied including blood pressure cuff, and pulse oximetry. Prior to the procedure a time out was completed, verifying correct patient, procedure, site, positioning, and implants and/or special equipment.     The skin was prepped and draped in the usual sterile fashion. Then a  film was taken to identify the dens, the C2-3 vertebral bodies, and the waists of the articular pillars at the aforementioned levels.    The skin overlying the waists of the articular pillars/center of the centroid was anesthetized using a 27-gauge 1-1/4-inch needle with 1% preservative free lidocaine for a total volume of 0.5 mls per level. Then, a 25- gauge 3-1/2 inch needle was advanced under fluoroscopic guidance using AP and lateral fluoroscopic views to the lateral C2-3 junctionon the right side. After  negative aspiration, 0.3 mls of Isovue-M 200 contrast was injected at each site under live fluoroscopy; no vascular run off was identified and the contrast spread was adequate.    At this point, after negative aspiration, 0.5mL of 2% Lidocaine was injected. The needle was then re-styletted removed. The needle insertion site was dressed appropriately.     Layla was taken to the recovery room where she was monitored for a brief period of time. She tolerated the procedure well and was discharged home in stable condition with post procedural instructions.    Before the procedure, she reported a pain score of 8/10. After the procedure, she reported a pain score of 5/10.    The patient was given a 24-hour diagnostic block sheet to return.     Follow-up will be Determined after block sheet reviewed.    COMPLICATIONS:  none    Comment(s):  None

## 2021-05-25 ENCOUNTER — RECORDS - HEALTHEAST (OUTPATIENT)
Dept: ADMINISTRATIVE | Facility: CLINIC | Age: 61
End: 2021-05-25

## 2021-05-27 ENCOUNTER — RECORDS - HEALTHEAST (OUTPATIENT)
Dept: ADMINISTRATIVE | Facility: CLINIC | Age: 61
End: 2021-05-27

## 2021-05-27 ENCOUNTER — OFFICE VISIT - HEALTHEAST (OUTPATIENT)
Dept: PHYSICAL THERAPY | Facility: REHABILITATION | Age: 61
End: 2021-05-27

## 2021-05-27 ENCOUNTER — TELEPHONE (OUTPATIENT)
Dept: PHYSICAL MEDICINE AND REHAB | Facility: CLINIC | Age: 61
End: 2021-05-27

## 2021-05-27 DIAGNOSIS — R51.9 INTRACTABLE HEADACHE, UNSPECIFIED CHRONICITY PATTERN, UNSPECIFIED HEADACHE TYPE: ICD-10-CM

## 2021-05-27 DIAGNOSIS — M47.812 OSTEOARTHRITIS OF CERVICAL SPINE, UNSPECIFIED SPINAL OSTEOARTHRITIS COMPLICATION STATUS: ICD-10-CM

## 2021-05-27 DIAGNOSIS — Z98.1 S/P CERVICAL SPINAL FUSION: ICD-10-CM

## 2021-05-27 DIAGNOSIS — M50.30 DEGENERATION OF INTERVERTEBRAL DISC OF CERVICAL REGION: ICD-10-CM

## 2021-05-27 DIAGNOSIS — G95.20 CORD COMPRESSION MYELOPATHY (H): ICD-10-CM

## 2021-05-27 DIAGNOSIS — G95.29 OTHER CORD COMPRESSION (H): ICD-10-CM

## 2021-05-27 DIAGNOSIS — M54.2 NECK PAIN: ICD-10-CM

## 2021-05-27 DIAGNOSIS — M54.12 CERVICAL RADICULOPATHY: ICD-10-CM

## 2021-05-27 NOTE — TELEPHONE ENCOUNTER
[facility] Call Center    Phone Message    May a detailed message be left on voicemail: yes     Reason for Call: Other: Botox appt with Dr. Ackerman   Patient called to scheduled cervical botox appt with Dr. Ackerman  Action Taken: Message routed to:  [facility] ([facility]): PM&R    Travel Screening: Not Applicable

## 2021-05-27 NOTE — TELEPHONE ENCOUNTER
VM left asking for a return call.    The patient can be scheduled as a [facility] RETURN SPINE with Dr. Ackerman. The notes should say UPPER TRAP AND LEVATOR MUSCLE at first available in person appointment.

## 2021-06-01 VITALS — HEIGHT: 65 IN | BODY MASS INDEX: 31.65 KG/M2 | WEIGHT: 190 LBS

## 2021-06-02 ENCOUNTER — MYC MEDICAL ADVICE (OUTPATIENT)
Dept: ORTHOPEDICS | Facility: CLINIC | Age: 61
End: 2021-06-02

## 2021-06-02 VITALS — BODY MASS INDEX: 32.95 KG/M2 | WEIGHT: 198 LBS

## 2021-06-02 VITALS — BODY MASS INDEX: 32.45 KG/M2 | WEIGHT: 195 LBS

## 2021-06-02 VITALS — WEIGHT: 195.2 LBS | BODY MASS INDEX: 32.52 KG/M2 | HEIGHT: 65 IN

## 2021-06-02 RX ORDER — METHOCARBAMOL 500 MG/1
TABLET, FILM COATED ORAL
Qty: 120 TABLET | Refills: 2 | OUTPATIENT
Start: 2021-06-02

## 2021-06-02 NOTE — TELEPHONE ENCOUNTER
Patient received 180 methocarbamol 5/24 by Alicia Bettencourt. Will decline and close this encounter.

## 2021-06-03 ENCOUNTER — OFFICE VISIT - HEALTHEAST (OUTPATIENT)
Dept: PHYSICAL THERAPY | Facility: REHABILITATION | Age: 61
End: 2021-06-03

## 2021-06-03 DIAGNOSIS — M54.2 NECK PAIN: ICD-10-CM

## 2021-06-03 DIAGNOSIS — M54.12 CERVICAL RADICULOPATHY: ICD-10-CM

## 2021-06-03 DIAGNOSIS — G95.29 OTHER CORD COMPRESSION (H): ICD-10-CM

## 2021-06-03 DIAGNOSIS — R51.9 INTRACTABLE HEADACHE, UNSPECIFIED CHRONICITY PATTERN, UNSPECIFIED HEADACHE TYPE: ICD-10-CM

## 2021-06-03 DIAGNOSIS — M54.2 CHRONIC NECK PAIN: ICD-10-CM

## 2021-06-03 DIAGNOSIS — M47.812 OSTEOARTHRITIS OF CERVICAL SPINE, UNSPECIFIED SPINAL OSTEOARTHRITIS COMPLICATION STATUS: ICD-10-CM

## 2021-06-03 DIAGNOSIS — M50.30 DEGENERATION OF INTERVERTEBRAL DISC OF CERVICAL REGION: ICD-10-CM

## 2021-06-03 DIAGNOSIS — G89.29 CHRONIC NECK PAIN: ICD-10-CM

## 2021-06-03 DIAGNOSIS — G95.20 CORD COMPRESSION MYELOPATHY (H): ICD-10-CM

## 2021-06-03 NOTE — TELEPHONE ENCOUNTER
See My Chart message from pt.  Dr. Waiet reviewed MRI & called pt-see his addendum to dictation 5-13-21 for plan.  Kellie Castillo RN.

## 2021-06-04 VITALS
HEART RATE: 69 BPM | BODY MASS INDEX: 32.99 KG/M2 | DIASTOLIC BLOOD PRESSURE: 79 MMHG | SYSTOLIC BLOOD PRESSURE: 130 MMHG | HEIGHT: 65 IN | OXYGEN SATURATION: 98 % | WEIGHT: 198 LBS

## 2021-06-05 VITALS
HEIGHT: 65 IN | DIASTOLIC BLOOD PRESSURE: 78 MMHG | OXYGEN SATURATION: 97 % | SYSTOLIC BLOOD PRESSURE: 112 MMHG | HEART RATE: 66 BPM | BODY MASS INDEX: 34.32 KG/M2 | WEIGHT: 206 LBS

## 2021-06-05 VITALS
HEART RATE: 71 BPM | BODY MASS INDEX: 33.12 KG/M2 | DIASTOLIC BLOOD PRESSURE: 68 MMHG | WEIGHT: 199 LBS | SYSTOLIC BLOOD PRESSURE: 118 MMHG | OXYGEN SATURATION: 98 %

## 2021-06-09 ENCOUNTER — OFFICE VISIT - HEALTHEAST (OUTPATIENT)
Dept: PHYSICAL THERAPY | Facility: REHABILITATION | Age: 61
End: 2021-06-09

## 2021-06-09 DIAGNOSIS — M54.2 NECK PAIN: ICD-10-CM

## 2021-06-09 DIAGNOSIS — G95.20 CORD COMPRESSION MYELOPATHY (H): ICD-10-CM

## 2021-06-09 DIAGNOSIS — M50.30 DEGENERATION OF INTERVERTEBRAL DISC OF CERVICAL REGION: ICD-10-CM

## 2021-06-09 DIAGNOSIS — R51.9 INTRACTABLE HEADACHE, UNSPECIFIED CHRONICITY PATTERN, UNSPECIFIED HEADACHE TYPE: ICD-10-CM

## 2021-06-09 DIAGNOSIS — M47.812 OSTEOARTHRITIS OF CERVICAL SPINE, UNSPECIFIED SPINAL OSTEOARTHRITIS COMPLICATION STATUS: ICD-10-CM

## 2021-06-09 DIAGNOSIS — M54.12 CERVICAL RADICULOPATHY: ICD-10-CM

## 2021-06-09 NOTE — PROGRESS NOTES
"Layla Starks is status post cervical laminoplasty C5-C7 for cervical myelopathy on 9/9/2016.  Admitted with positional headaches and a small CSF leak on 12/6/2016 )CT/Myelogram was negative for pseudomeningocele).  Was following up with neurology, Dr. Cowan.  Today she presents with a chief complaint of frontal not positional headaches associated with photophobia. States her neck feels \"sore\". Denies radicular pain, sensory or motor issues in UE.  NDI score is 70%  Samaria Gandhi RN, CNRN    "

## 2021-06-10 NOTE — PROGRESS NOTES
Neurosurgery consultation was requested by: Former Dr. Valdez patient. Pt's  was Dr. Granger surgeon.   Pain: neck pain    Radicular Pain is present: Right shoulder   Lhermitte sign: no   Motor complaints:Denies weakness  Sensory complaints: Occasional numbness in left hand   Gait and balance issues: sometimes   Bowel or bladder issues:Denies   Duration of SX is few years   The symptoms are worse with: being up right   The symptoms are better with: laying down   Injury: denies   Severity is: mild- moderate  Patient has tried the following conservative measures: yes   NDI score is :   BEAN Chavez

## 2021-06-10 NOTE — PATIENT INSTRUCTIONS - HE
Multiyear history of ongoing severe R lower neck pain.   Concern for sagittal imbalance with developing spinal deformity s/p multiple neck surgeries.    1. Follow up with Dr Loera at the [facility]  2. Proceed with botox injection tomorrow as planned    Thank you for coming in and have a wonderful day.    Jenny Floyd FNP-C  [facility] Neurosurgery  O. [phone]

## 2021-06-12 NOTE — PROGRESS NOTES
Assessment/Plan:     Problem List Items Addressed This Visit     Chronic pain of both knees      Other Visit Diagnoses     Opacity of lung on imaging study    -  Primary    Relevant Orders    XR Chest 2 Views (Completed)    Chronic neck pain        Relevant Medications    celecoxib (CELEBREX) 200 MG capsule    Other Relevant Orders    Sedimentation Rate (Completed)    C-Reactive Protein(CRP) (Completed)    Screening for thyroid disorder        Relevant Orders    Thyroid Cascade (Completed)    Medication management        Relevant Orders    Renal Function Profile (Completed)    Encounter for vitamin deficiency screening        Relevant Orders    Vitamin D, Total (25-Hydroxy) (Completed)        Layla is a 59-year-old female who is new to clinic today here to establish care.  I reviewed her medical history with her today and she will continue to follow with her pain specialist as well as orthopedics.  I suggested we could give a trial to Celebrex and see if that provides any better relief than ibuprofen.  I check some inflammatory markers today as well as a vitamin D level, thyroid and renal panel.  I was able to pull up the x-ray reading from her x-ray of the spine which suggested opacities in the lung and recommended a follow-up chest x-ray today.  That was performed and I will asked the radiologist to compare it in order to determine if any further evaluation is needed.    Subjective:       59 y.o. female presents today to establish care as well as to follow-up regarding an abnormal x-ray performed at the end of August through the [employer] system.  The patient medical history is most significant for a history of cord compression which led to multiple spine surgeries over the past few years.  Unfortunately, since those surgeries she has had intractable and severe headaches.  The patient is currently being seen at a pain clinic and is anticipating a Botox injection into her neck with the hopes that this will  "help relieve some of the discomfort.  She describes the pain is moderate to severe and very debilitating.  She also has some chronic pain in both of her knees and has had knee surgeries in the past.  The patient has been treated in the past with various pain medications including nonsteroidal anti-inflammatories and opioids.  However, she has found that she does not like the way she feels on most pain medication and has a fairly simple medication regimen.  She does use the Lidoderm patches.  In addition, she takes ibuprofen as needed.  She would be open to any ideas as it relates to having to manage the pain.  The patient wonders if an inflammatory condition could be a contributing factor to her ongoing pain.      Reviewed: The following portions of the patient's history were reviewed and updated as appropriate: allergies, current medications, past family history, past medical history, past social history, past surgical history and problem list.    Review of Systems  Pertinent items are noted in HPI.        Objective:     /78 (Patient Site: Left Arm, Patient Position: Sitting, Cuff Size: Adult Large)   Pulse 66   Ht 5' 4.5\" (1.638 m)   Wt 206 lb (93.4 kg)   SpO2 97%   BMI 34.81 kg/m    General appearance: alert, appears stated age and cooperative  Neck: long scar posterior neck with atrophy of muscle associated with the scar.   Lungs: clear to auscultation bilaterally  Heart: regular rate and rhythm, S1, S2 normal, no murmur, click, rub or gallop     Xray chest: clear      This note has been dictated using voice recognition software. Any grammatical or context distortions are unintentional and inherent to the software  "

## 2021-06-13 NOTE — TELEPHONE ENCOUNTER
RN cannot approve Refill Request    RN can NOT refill this medication med is not covered by policy/route to provider. Last office visit: 10/7/2020 Alicia Bettencourt MD Last Physical: Visit date not found Last MTM visit: Visit date not found Last visit same specialty: 10/7/2020 Alicia Bettencourt MD.  Next visit within 3 mo: Visit date not found  Next physical within 3 mo: Visit date not found      Jody Bradley, [facility] Triage/Med Refill 11/10/2020    Requested Prescriptions   Pending Prescriptions Disp Refills     celecoxib (CELEBREX) 200 MG capsule 60 capsule 0     Sig: Take 1 capsule (200 mg total) by mouth 2 (two) times a day.       There is no refill protocol information for this order

## 2021-06-13 NOTE — TELEPHONE ENCOUNTER
covid symptoms started 11/28/2020    Has a runny nose but not as bad as the sinus congestion.    Is able to breath somewhat through the nose    Has a headache - the headache is constant. OTC helps slightly and for a little while only.  The headache is moderate to severe. The headache is limiting her in what she is able to do around the house and has some fatigue. But the fatigue is playing a role in her symptoms. Fatigue is not overwhelming but it is present.    Temp has been running about 99.0.    Has a cough - slight cough - not super bad. Only bad when having bad post nasal drip. But no longer having post nasal drip as it all seems stuck up in her head.    Is getting some green discharge when she gets some discharge.     Has had a sinus infection before but it was years ago.    No breathing issues    Per protocol patient should be seen either today or tomorrow     COVID 19 Nurse Triage Plan/Patient Instructions    Please be aware that novel coronavirus (COVID-19) may be circulating in the community. If you develop symptoms such as fever, cough, or SOB or if you have concerns about the presence of another infection including coronavirus (COVID-19), please contact your health care provider or visit [email or web address].     Disposition/Instructions    Virtual Visit with provider recommended. Reference Visit Selection Guide.    Thank you for taking steps to prevent the spread of this virus.  o Limit your contact with others.  o Wear a simple mask to cover your cough.  o Wash your hands well and often.    Resources    [facility]: About COVID-19: [email or web address]    CDC: What to Do If You're Sick: www.cdc.gov/coronavirus/2019-ncov/about/steps-when-sick.html    CDC: Ending Home Isolation: www.cdc.gov/coronavirus/2019-ncov/hcp/disposition-in-home-patients.html     CDC: Caring for Someone: www.cdc.gov/coronavirus/2019-ncov/if-you-are-sick/care-for-someone.html     DIMITRY: Interim Guidance for [facility]  Discharge to Home: www.health.[facility].[place]/diseases/coronavirus/hcp/hospdischarge.pdf    [facility] clinical trials (COVID-19 research studies): [email or web address]     Below are the COVID-19 hotlines at the [facility] of Health ([facility]). Interpreters are available.   o For health questions: Call 673-003-4214 or 1-228.451.7562 (7 a.m. to 7 p.m.)  o For questions about schools and childcare: Call 889-656-5538 or 1-992.171.7528 (7 a.m. to 7 p.m.)       Alicia Duenas RN   [facility] Medication Refill and Triage Nurse  2:20 PM  12/4/2020      Reason for Disposition    Patient wants to be seen    Additional Information    Negative: Sounds like a life-threatening emergency to the triager    Negative: Difficulty breathing, and not from stuffy nose (e.g., not relieved by cleaning out the nose)    Negative: SEVERE headache and has fever    Negative: Patient sounds very sick or weak to the triager    Negative: SEVERE sinus pain    Negative: Severe headache    Negative: Redness or swelling on the cheek, forehead, or around the eye    Negative: Fever > 103 F (39.4 C)    Negative: Fever > 101 F (38.3 C) and over 60 years of age    Negative: Fever > 100.0 F (37.8 C) and has diabetes mellitus or a weak immune system (e.g., HIV positive, cancer chemotherapy, organ transplant, splenectomy, chronic steroids)    Negative: Fever > 100.0 F (37.8 C) and bedridden (e.g., nursing home patient, stroke, chronic illness, recovering from surgery)    Negative: Fever present > 3 days (72 hours)    Negative: Fever returns after gone for over 24 hours and symptoms worse or not improved    Negative: Sinus pain (not just congestion) and fever    Negative: Earache    Negative: Sinus congestion (pressure, fullness) present > 10 days    Negative: Nasal discharge present > 10 days    Negative: Using nasal washes and pain medicine > 24 hours and sinus pain (lower forehead, cheekbone, or eye)  persists    Negative: Lots of coughing    Protocols used: SINUS PAIN AND CONGESTION-A-[place]

## 2021-06-13 NOTE — PROGRESS NOTES
"Layla Starks is a 60 y.o. female who is being evaluated via a billable video visit.      The patient has been notified of following:     \"This video visit will be conducted via a call between you and your physician/provider. We have found that certain health care needs can be provided without the need for an in-person physical exam.  This service lets us provide the care you need with a video conversation.  If a prescription is necessary we can send it directly to your pharmacy.  If lab work is needed we can place an order for that and you can then stop by our lab to have the test done at a later time.    Video visits are billed at different rates depending on your insurance coverage. Please reach out to your insurance provider with any questions.    If during the course of the call the physician/provider feels a video visit is not appropriate, you will not be charged for this service.\"    Patient has given verbal consent to a Video visit? Yes  How would you like to obtain your AVS? AVS Preference: MyChart.  If dropped by the video visit, the video invitation should be sent to: Text to cell phone: [phone]  Will anyone else be joining your video visit? No       Video Start Time: 4:20pm    Additional provider notes:    Subjective     Layla Starks is a 60 y.o. female who presents to clinic today for the following health issues:    HPI   Patient reports that she was diagnosed with COVID-19 infection 5 days ago, with symptoms beginning on 11/28/20.  Presenting symptoms included runny nose, fatigue, elevated temperature, and mild cough.  Since symptom onset she has noted progressive frontal headache and facial pressure similar to what she has experienced with episodes of sinusitis in the past. She notes that she has only been able to blow out scant amounts of greening discharge. She has facial pressure when leaning forward. She has had some bilateral ear pressure.  She has been trying to use " over-the-counter acetaminophen & mucinex, with limited benefit.   Past history is significant for infrequent episodes of sinusitis. Patient is non-smoker.    Review of Systems   Negative except as noted above in HPI.       Objective    Physical Exam   GENERAL: appears tired, but alert/oriented to person/place.  able to provide succinct history.   EYES: Eyes grossly normal to inspection. No discharge or erythema, or obvious scleral/conjunctival abnormalities.  HENT: normal cephalic/atraumatic and sinuses: pt reports tenderness over frontal sinuses.  RESP: intermittent coughing during visit. No labored breathing noted. Able to speak in complete sentences without difficulty..    PSYCH: Mentation appears normal, affect normal/bright, judgement and insight intact, normal speech and appearance well-groomed    Assessment & Plan     Layla was seen today for facial pain.    Diagnoses and all orders for this visit:    Infection due to 2019 novel coronavirus    Acute non-recurrent frontal sinusitis  -     amoxicillin-clavulanate (AUGMENTIN) 875-125 mg per tablet; Take 1 tablet by mouth 2 (two) times a day for 10 days.    Patient Instructions   I would recommend that initially you try using flonase - 2 sprays in each nostril once daily to help relieve the severe sinus congestion that you are continuing to notice.  You could also continue use of mucinex.  Additionally, you might consider trying nasal saline rinses with either a Neti Pot or SimplySaline spray (this could be used a few times a day, even).      If your symptoms are not improving over the next few days, then I would call your [facility]'s pharmacy and have them fill the prescription for Augmentin that I sent over.  I would expect improvement to begin within 5 days of starting the antibiotic.      If you have any questions or new concerns, please don't hesitate to let us know.    Return if symptoms worsen or fail to improve.    Video-Visit Details    Type of  service:  Video Visit    Video End Time (time video stopped): 4:41 PM  Originating Location (pt. Location): Home    Distant Location (provider location):  [facility]     Platform used for Video Visit: Ludivina Fang MD   [facility]

## 2021-06-13 NOTE — PATIENT INSTRUCTIONS - HE
I would recommend that initially you try using flonase - 2 sprays in each nostril once daily to help relieve the severe sinus congestion that you are continuing to notice.  You could also continue use of mucinex.  Additionally, you might consider trying nasal saline rinses with either a Neti Pot or SimplySaline spray (this could be used a few times a day, even).      If your symptoms are not improving over the next few days, then I would call your [facility]'s pharmacy and have them fill the prescription for Augmentin that I sent over.  I would expect improvement to begin within 5 days of starting the antibiotic.      If you have any questions or new concerns, please don't hesitate to let us know.

## 2021-06-14 ENCOUNTER — OFFICE VISIT - HEALTHEAST (OUTPATIENT)
Dept: PHYSICAL THERAPY | Facility: REHABILITATION | Age: 61
End: 2021-06-14

## 2021-06-14 DIAGNOSIS — M54.2 NECK PAIN: ICD-10-CM

## 2021-06-14 DIAGNOSIS — Z98.1 S/P CERVICAL SPINAL FUSION: ICD-10-CM

## 2021-06-14 DIAGNOSIS — M54.2 CHRONIC NECK PAIN: ICD-10-CM

## 2021-06-14 DIAGNOSIS — M47.812 OSTEOARTHRITIS OF CERVICAL SPINE, UNSPECIFIED SPINAL OSTEOARTHRITIS COMPLICATION STATUS: ICD-10-CM

## 2021-06-14 DIAGNOSIS — G89.29 CHRONIC NECK PAIN: ICD-10-CM

## 2021-06-14 DIAGNOSIS — M50.30 DEGENERATION OF INTERVERTEBRAL DISC OF CERVICAL REGION: ICD-10-CM

## 2021-06-14 DIAGNOSIS — G95.29 OTHER CORD COMPRESSION (H): ICD-10-CM

## 2021-06-14 DIAGNOSIS — G95.20 CORD COMPRESSION MYELOPATHY (H): ICD-10-CM

## 2021-06-14 DIAGNOSIS — R51.9 INTRACTABLE HEADACHE, UNSPECIFIED CHRONICITY PATTERN, UNSPECIFIED HEADACHE TYPE: ICD-10-CM

## 2021-06-14 DIAGNOSIS — M54.12 CERVICAL RADICULOPATHY: ICD-10-CM

## 2021-06-14 NOTE — PROGRESS NOTES
Assessment/Plan:     Problem List Items Addressed This Visit     Neck pain - Primary     The patient wanted to provide me with an update on her plans today to proceed with surgery as was recommended by the surgeon at the [facility] to relieve her neck pain.  I expressed that I did not have enough knowledge to provide her with an opinion regarding the surgery or its potential outcome but that I wish her well.  They have recommended a preop physical to be performed at the PAC at the [facility].  All questions answered today.                 Subjective:       60 y.o. female presents today primarily to touch base and provide me with an update on her plans to proceed with recommended surgery to relieve her chronic neck pain.  The patient brings in some paperwork as well as pictures of the surgical intervention that is recommended.  She is going to be having hardware from her previous neck surgery removed and have a stabilizing hank structure placed on the posterior cervical spine.  The patient mentions that she did try the Celebrex when I prescribed it but did not found that it provided much relief.  She wanted to review the previous labs that were done at her visit in October.       Objective:     /68 (Patient Site: Left Arm, Patient Position: Sitting, Cuff Size: Adult Large)   Pulse 71   Wt 199 lb (90.3 kg)   SpO2 98%   BMI 33.63 kg/m    General appearance: alert, appears stated age and cooperative      This note has been dictated using voice recognition software. Any grammatical or context distortions are unintentional and inherent to the software

## 2021-06-15 NOTE — TELEPHONE ENCOUNTER
Requesting new verbal order for start of care assessment to be completed on 2/27/21     This is outside of the original 48 hour referral, requiring a new verbal order.     Disciplines ordered: SN/PT    Thank you

## 2021-06-15 NOTE — TELEPHONE ENCOUNTER
Request for Orders    Who s Requesting: Home Care Registered Nurse    Orders being requested:     SNV 1x4wks for assess incision for s/sx of infection, pain mgmt, care plan assess/teach    PT comprehensive eval for strengthening and exercises    Where to send Orders: Epic

## 2021-06-17 ENCOUNTER — OFFICE VISIT - HEALTHEAST (OUTPATIENT)
Dept: PHYSICAL THERAPY | Facility: REHABILITATION | Age: 61
End: 2021-06-17

## 2021-06-17 DIAGNOSIS — M54.12 CERVICAL RADICULOPATHY: ICD-10-CM

## 2021-06-17 DIAGNOSIS — G95.20 CORD COMPRESSION MYELOPATHY (H): ICD-10-CM

## 2021-06-17 DIAGNOSIS — G89.29 CHRONIC NECK PAIN: ICD-10-CM

## 2021-06-17 DIAGNOSIS — M50.30 DEGENERATION OF INTERVERTEBRAL DISC OF CERVICAL REGION: ICD-10-CM

## 2021-06-17 DIAGNOSIS — M54.2 CHRONIC NECK PAIN: ICD-10-CM

## 2021-06-17 DIAGNOSIS — M54.2 NECK PAIN: ICD-10-CM

## 2021-06-17 DIAGNOSIS — M47.812 OSTEOARTHRITIS OF CERVICAL SPINE, UNSPECIFIED SPINAL OSTEOARTHRITIS COMPLICATION STATUS: ICD-10-CM

## 2021-06-17 DIAGNOSIS — G95.29 OTHER CORD COMPRESSION (H): ICD-10-CM

## 2021-06-17 DIAGNOSIS — R51.9 INTRACTABLE HEADACHE, UNSPECIFIED CHRONICITY PATTERN, UNSPECIFIED HEADACHE TYPE: ICD-10-CM

## 2021-06-17 NOTE — PROGRESS NOTES
[facility] Daily Progress     Patient Name: Layla Starks  Date: 5/3/2021  Visit #:2  PTA visit #: 0  Referral Diagnosis: Cervical Pain  Referring provider: Stevo Waite MD  Visit Diagnosis:     ICD-10-CM    1. Cervical radiculopathy  M54.12          Assessment:     Results of Treatment   Neck Pain and HA reducing following treatment   Lymphatic flow to the entire body normalizing.  Pt able to perform Cervical rotation and extension more comfortably    Patient is benefitting from skilled physical therapy and is making steady progress toward functional goals.  Patient is appropriate to continue with skilled physical therapy intervention, as indicated by initial plan of care.    Goal Status:  Pt. will demonstrate/verbalize independence in self-management of condition in : 12 weeks  Pt. will be independent with home exercise program in : 12 weeks    Pt will: improve neck ROM by 10 degrees or greater in all directions for increased function in 12 weeks  Pt will: improve headache frequency and intensity by 50% in 12 weeks      Plan / Patient Education:     Continue with initial plan of care.    Subjective:     Pain Ratin  Neck Pain R>L      Objective:      3 sec,    Lymphatic restrictions  Neck Head  3,4 sec / cycle,   T duct 4 sec    Treatment Today     TREATMENT MINUTES COMMENTS   Evaluation     Self-care/ Home management     Manual therapy 61 MFR with OA, L5-SI and SI joint releases, Dural Tube Pull  Cranial Releases to Parietals, Sphenoid bones   Cervical Post Epidurals C1  Cervical Spinal   Cervical Epidural Veins C1,   Cervical Spinal Medullary Subarachnoid Veins C1,2    Cervial spinal veins Extension C1   Neuromuscular Re-education     Therapeutic Activity     Therapeutic Exercises     Gait training     Modality__________________                Total 61    Blank areas are intentional and mean the treatment did not include these items.       Johnny Verma  5/3/2021

## 2021-06-17 NOTE — PROGRESS NOTES
"[facility] Daily Progress     Patient Name: Layla Starks  Date: 5/19/2021  Visit #:6  PTA visit #: 0  Referral Diagnosis: Cervical Pain  Referring provider: Stevo Waite MD  Visit Diagnosis:     ICD-10-CM    1. Cervical radiculopathy  M54.12    2. Cord compression myelopathy (H)  G95.20    3. Degeneration of intervertebral disc of cervical region  M50.30    4. Intractable headache, unspecified chronicity pattern, unspecified headache type  R51.9    5. Neck pain  M54.2    6. Osteoarthritis of cervical spine, unspecified spinal osteoarthritis complication status  M47.812    7. Other cord compression (H)  G95.29    8. S/P cervical spinal fusion  Z98.1          Assessment:     Results of Treatment: Patient is verbalizing temporary improvements in pain and right neck tightness following PT sessions of manual therapy and kinesiotape application. Patient states she is reaching out to surgeon and pain clinic physicians regarding C2-3 and/or C5-6 injections and spinal cord stimulators.   In PT we are focusing on adding UE strengthening exercises, manual therapy techniques, pain education and kinesiotape application.   Patient agrees with plan.     Goal Status:  Pt. will demonstrate/verbalize independence in self-management of condition in : 12 weeks  Pt. will be independent with home exercise program in : 12 weeks    Pt will: improve neck ROM by 10 degrees or greater in all directions for increased function in 12 weeks  Pt will: improve headache frequency and intensity by 50% in 12 weeks      Plan / Patient Education:     Continue with initial plan of care.   Patient agrees with POC.    Patient looking into book \"why do I hurt.\"   Continue with manual therapy.   Continue with UE exercises in clinic, started them on 5/19/2021    Subjective:     Pain: at least an 8-9/10 right sided neck pain at C5-6 level and referral up to head.   Continues to have right neck pain.     Objective:     Patient unable " "to use weights with shoulder flexion and abduction exercises.     Exercises:  Exercise #1: UBE warm up 3 minutes backward  Comment #1: lat pull downs 7 pound on each stack 10x  Exercise #2: shoulder flexion to 80 degrees 8x no weights  Comment #2: shoulder abduction to 80 degrees 8x no weights  Exercise #3: ER with scap set yellow band 8x  Comment #3: bent over tricep extension 3# 8x bilat    Treatment Today     TREATMENT MINUTES COMMENTS   Evaluation     Self-care/ Home management 10 Discussion of how pain works, suggestion of \"Why do I hurt\"    (done during manual therapy)   Manual therapy 40 MFR right scalenes,UT, levator, rhomboids in supine   Neuromuscular Re-education 2 K tape applied to right UT/levator to inhibit the muscles.   Therapeutic Activity     Therapeutic Exercises 15 See ex above   Gait training     Modality__________________                Total 57 Total treatment time   Blank areas are intentional and mean the treatment did not include these items.       Shiela Menjivar  5/19/2021  "

## 2021-06-17 NOTE — PROGRESS NOTES
Assessment:   Layla Starks is a 57 y.o. y.o. female who is previously healthy who presents today for follow-up regarding right-sided neck pain with radiation into the right arm.  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  The patient is status post a C5-7 laminoplasty.  Her pain seems to have a large myofascial pain component.   She does have a palpable trigger point in the right upper trapezius muscle exactly reproduced the radiation of pain going into the right deltoid area.  She may have an element of mild cervical dystonia.  She had absolutely no relief following a right C4-5 translaminal epidural steroid injection on April 4, 2018. She is neurologically intact and without any red flag/myelopathic signs/symptoms.       Plan:     A shared decision making plan was used.  The patient's values and choices were respected.  The following represents what was discussed and decided upon by the physician and the patient.      1.  DIAGNOSTIC TESTS: The patient's MRI of the cervical spine spine was personally reviewed today.  The images were shown to the patient and the findings were discussed.  No further diagnostic imaging studies are necessary at this time.  2.  PHYSICAL THERAPY: The patient should complete physical therapy at [facility] as previously discussed.  She is encouraged to continue doing her home exercise program.  3.  MEDICATIONS: The patient should continue to increase her gabapentin.  She is currently taking gabapentin 100 mg in the morning with 200 mg in the afternoon and 200 mg at bedtime.  She should titrate up to a maximum dose of 300 mg 3 times a day.  This dose can continue to be titrated if she does not have side effects.  -Cyclobenzaprine left over from her previous surgery.  She is wondering if she should take this.  Discussed that she can try taking cyclobenzaprine as it can be very helpful for myofascial pain.  Because  that cyclobenzaprine can cause drowsiness, so she should not take it during the day if she has to work until she knows how it affects her.  It may be beneficial for her to just take it at night, depending on how it makes her feel.  -Patient can take over-the-counter analgesics.  Recommended an NSAID over Tylenol.  4.  INTERVENTIONS:   -The patient that since she did not have any relief whatsoever with the right C4-5 transforaminal epidural steroid injection, it seems less likely that her pain is radicular.  Given her physical exam findings it seems like her pain is more fascia pain in nature.  Discussed that the trigger point injection would be beneficial for myofascial pain.  -After the risks and benefits of a trigger point injection were discussed, the right upper trapezius trigger point injection was performed today.  Please see below for the procedure note section.  No steroid was used for this.  The patient tolerated the procedure well, but she did report significant soreness afterwards as well as increased pain in the mid to lower thoracic spine afterwards.  5.  PATIENT EDUCATION:    -Patient was shown the right upper trapezius stretch.  She is asked to stretch the muscle as often as possible.  This should be done no less than 3 times a day, holding for 60 seconds.  If she can stretch every hour on the hour that she is awake this will be ideal.  -She was asked to monitor the injection sites for any redness, drainage, swelling.  Any the signs or symptoms including fevers or chills could be a possible infection.  She is asked to call the clinic immediately or go to the nearest emergency room.  -She was encouraged to ice the injection sites.  She should avoid using heat, such as a heating pad, over the injection sites for 48 hours.  This includes taking a hot shower.  She can take a lukewarm shower but not a hot shower in the next 48 hours.  -Discussed that if she has a positive but short-term response to the  trigger point injection, that she would be a candidate for botulinum toxin injections.  -The patient asked if a chiropractor would be beneficial.  Dr. Chilel does not recommend a chiropractor at this point.  -The patient has had the Graston Technique in the past.  It caused her to vasovagal.  Discussed that she could return for repeat trial of this if she would like, otherwise other soft tissue techniques may be beneficial for her.  -The patient asked if she would be able to return to lifting weights.  Discussed that she will need to go through a period of stretching first before she lifts weights.  Depending on how she does, she could begin lifting weights in 4-6 weeks if her body responds well to stretching.  We will monitor her progress to determine when she could resume weightlifting.  6.  FOLLOW-UP: Patient is asked to follow-up in approximately 1 week.  If she has any questions or concerns prior to that time she is encouraged to call the clinic.    A total of 40 minutes was spent in the care of this patient.  Greater than 50% of the time was spent counseling, teaching, answering the patient's questions.    Subjective:     Layla Starks is a 57 y.o. female who presents today for follow-up regarding right sided neck pain.  The patient is status post a right C4-5 transforaminal epidural steroid injection.  She reports that she had absolutely no relief following this injection.  She continues to have significant right-sided neck pain with radiation into the right deltoid and lateral arm stopping MCC to the elbow.  She does have some numbness and tingling in the neck area.  She denies any weakness in the right arm.  She rates her pain at an 8 out of 10 today.  At best it is an 8 out of 10.  At worst it is a 10 out of 10.  The patient continues to go to physical therapy and does her physical therapy exercises on a regular basis.  She has been titrating up on the gabapentin 100 mg in the morning, 200 mg in  the afternoon and 200 mg at bedtime.  She is not experiencing any side effects to the gabapentin, though she does not feel that it is providing any relief yet.  Her neck pain is worse with working more than 5 hours.  She does feel better with laying down.  Denies any new pain symptoms.    Past medical history is reviewed and is unchanged for any new medical diagnoses in the interim.      Family history is reviewed and is unchanged in the interim.        Review of Systems:  Positive for numbness and tingling in the neck/arm area.  Positive for chronic headaches, dizziness, nausea, blurred vision, balance changes.  These have all been evaluated previously and are not changed from her typical baseline in the interim.  Negative for any bowel or bladder incontinence, footdrop, weakness.     Objective:   CONSTITUTIONAL:  Vital signs as above.  No acute distress.  The patient is well nourished and well groomed.    PSYCHIATRIC:  The patient is awake, alert, oriented to person, place and time.  The patient is answering questions appropriately with clear speech.  Normal affect.  SKIN:  Skin over the face, neck bilateral upper extremities is clean, dry, intact without rashes.  She has a well-healed surgical incision over the posterior neck and upper thoracic spine.  MUSCULOSKELETAL: The patient has 5/5 strength for the bilateral shoulder abductors, elbow flexors/extensors, wrist extensors, finger flexors/abductors.  The patient has difficult hypertonicity of both upper trapezius muscles as well as over the cervical paraspinal muscles.  The patient has a palpable trigger point in the right upper trapezius muscle approximately 2 fingerbreadths medial to the midclavicular line it does reproduce the right arm symptoms.  NEUROLOGICAL: 2/4 biceps, brachioradialis, triceps reflexes bilaterally.  Negative Mantilla's bilaterally.  Sensation to touch is intact in all the digits of both upper extremities.    RESULTS: Patient's MRI of the  cervical spine was personally reviewed today.  The patient does have cervical foraminal stenosis at the C4-5 and C5-6 level with previous foraminotomy changes at the C4-5 level and with laminoplasty from C5-7.  Please see the report for details.    PROCEDURE NOTE:    Pre-procedure diagnosis:  Right upper trapezius trigger points, myofascial pain.  Post-procedure diagnosis:  Same    Procedure:  Right upper trapezius trigger point injections (no steroid).    The risks and benefits of the procedure were discussed with the patient which included infection, bleeding, pneumothorax, damage to surrounding structures/tissues.  The patient gave both verbal and written consent to proceed.    The trigger points were palpated and marked with indelible marking pen.  The areas to be injected today are:  1.  Right upper trapezius trigger point at 3 locations, approximately 3 finger breaths medial to the midclavicular line and then about 1/2 finger breath posterior to the initial point at 3 locations.    The area over the first gunnar was cleansed with an alcohol swab.  Using a 27 guage 1.25 inch needle, the trigger point was dry needled.  Several muscle twitches were felt  and the patient reported reproduction of pain.  After aspiration was negative 0.5 mL of 0.25% Bupivicaine was injected.  This was repeated at the other two locations.  There was no bleeding afterwards but a bandaid was placed to prevent oozing of fluid onto the clothes.      The patient's right upper trapezius muscle was stretched.  The patient was shown how to stretch this muscle. Stretching should be performed at least 3 times a day, holding the stretch for at least 60 seconds.   She was given an ice pack.  After a short period of observation the patient was discharged of their own power.

## 2021-06-17 NOTE — PROGRESS NOTES
Assessment/Plan:     Problem List Items Addressed This Visit     Chronic neck pain - Primary     The patient has not had improvement since her surgery although seems to be recovering well from that.  She was referred to the [facility] pain clinic but is not feeling confident she would like to continue to go there for reasons including it is quite a distance from her home.  We discussed medication adjustments today and the patient is going to resume Cymbalta which she had been on in the past.  I advised to start with 30 mg daily and after 2 weeks increase to 60 mg daily.  In addition, I increased the patient's methocarbamol dose to up to 1500 mg 3 times daily.  Follow-up in 4 to 6 weeks.         Relevant Medications    DULoxetine (CYMBALTA) 30 MG capsule    methocarbamoL (ROBAXIN) 750 MG tablet            Subjective:       60 y.o. female presents for a discussion regarding her medication and ongoing issues with neck pain.  The patient underwent cervical spine surgery in February and is disappointed at the results.  She had severe chronic neck pain that was not altered by the surgery.  She is recovering from the surgery without any new or additional issues.  The patient continues to seek relief from her chronic pain.  She was referred to [facility] pain clinic but does not feel confident that she would like to continue to go there because it is on the other side of [facility] and she did not feel it was also a good fit.  The patient does have a consultation coming up with a provider to discuss intervention options for managing the pain such as a stimulator.  However, today she would like to go over her medications and discuss resuming Cymbalta which she had been on in the past as well as requesting a refill on her methocarbamol.  She denies any radicular symptoms associated with her neck pain.  She describes a focal sharp shooting pain in her mid spine area.  She finds that she can only sit up at a desk for a couple of  hours without feeling a need to lay down due to the discomfort.  She is a bit worried about returning to work.      Reviewed: The following portions of the patient's history were reviewed and updated as appropriate: allergies, current medications, past family history, past medical history, past social history, past surgical history and problem list.    Review of Systems  Pertinent items are noted in HPI.        Objective:     /72 (Patient Site: Left Arm, Patient Position: Sitting, Cuff Size: Adult Large)   Pulse 68   SpO2 97%   General appearance: alert, appears stated age and cooperative      This note has been dictated using voice recognition software. Any grammatical or context distortions are unintentional and inherent to the software

## 2021-06-17 NOTE — PROGRESS NOTES
ASSESSMENT: Layla Starks is a 57 y.o. female  with a BMI of Body mass index is 31.62 kg/(m^2).  Who is previously healthy who presents today for new patient evaluation of right greater than left neck pain with what is thought to be right radicular pain in the C5 distribution.  Her MRI shows cervical foraminal stenosis at both the C4-5 and C5-6 levels.  She is status post a minimally invasive right C4-5 foraminotomy in December 2017 and a C5-7 laminoplasty in September 2016.  The patient may have an element of myofascial pain and even a mild form of cervical dystonia.  Is difficult to tell how much of her pain is coming from the foraminal stenosis versus a myofascial pain.  She is neurologically intact and without any red flag/myelopathic symptoms.  No upper motor neuron signs on exam.    NDI:  74%  WHO-5: 18 (The patient is not interested in behavioral health services)    PLAN:  A shared decision making model was used.  The patient's values and choices were respected.  The following represents what was discussed and decided upon by the physician and the patient.      1.  DIAGNOSTIC TESTS: The patient MRI scan of the cervical spine was personally reviewed today in the [employer] system, though performed through [employer] on December 18, 2017).  No further diagnostic testing necessary at this time.    2.  PHYSICAL THERAPY: Patient is currently undergoing physical therapy at [facility].  She is encouraged to complete the course as ordered.  She should continue doing her home exercise program.  3.  MEDICATIONS: Patient has tried gabapentin in the past but stated that she had significant side effects to it.  Will try gabapentin 100 mg and titrate up to a max of 300 mg three times a day to start.  Can potentially titrate up higher if she does not have any side effects.    4.  INTERVENTIONS: The patient was interested in a repeat transforaminal epidural steroid injection.  She previously had right C5-6 and C6-7 followed by  left C5-6 and C6-7 transforaminal epidural steroid injections in 2004 which provided significant relief.  Discussed that 2 level injections are not performed by this provider.  If she wanted to have 2 levels performed simultaneously, she could return to Dr. Lugo.  The patient states that she would rather just proceed with the right C4-5 epidural steroid injection.  She asked for IV sedation, however this is not able to be provided today.  Offered to have her take oral sedation, however she would need to return at a different day for the procedure.  The patient opted to go without any sedation so she can have the procedure performed this afternoon.  The risks and benefits of this procedure were discussed with the patient.  She signed informed consent.  Discussed that in 2 weeks she could have a right C5-6 translaminal epidural steroid injection if needed.  However cervical medial branch blocks could be considered as well.      Addendum on 4-4-18 at 16:15 - the patient requested oral valium for her next procedure (a right C5-6 TFESI was ordered in case she does not have relief with today's injection).  Valium 2 mg, #2 tablets was prescribed today.  Take 1 tablet po q 2 hours prior to the procedure and take the second tablet only if needed po q 1 hour prior to the procedure.  The patient signed informed consent for the right C5-6 TFESI today, discussing risks and benefits.  End addendum on 4-4-18 at 16:20.    -The patient may benefit from botulinum toxin injections that she may have an element of mild cervical dystonia.  5.  PATIENT EDUCATION:   -Discussed with the patient that it is very difficult to know how much of her pain is coming from the cervical foraminal stenosis versus how much may be myofascial pain.  -All of her questions were answered to her satisfaction today.  She was in agreement with the treatment plan.  6.  FOLLOW-UP:   Patient is asked to follow-up with Dr. Chilel or Pricilla Frazier in approximately 2  weeks.  If she has any questions or concerns before that time she is encouraged to call the clinic.        SUBJECTIVE:  Layla Starks  Is a 57 y.o. female who presents today for new patient evaluation of neck pain with radiation into the right deltoid area.  The patient reports that she has had this pain since October 2017.  She had a history of a C5-7 laminoplasty in September 2016.  She reports that this surgery did not give her relief.  In December 2017 she underwent a minimally invasive right C4-5 foraminotomy.  She reports that after the surgery she began to get headaches.  It did not do anything to alleviate any of her pain.  Was her pain is in the posterior neck radiating into the upper trapezius muscles.  In the right side she does get an aching going into the right deltoid area.  It does not go distal to this.  She does have some achiness in the left upper trapezius area, but no achiness going into the left deltoid area.  She denies any pain distally.  She denies any numbness or tingling in the bilateral arms or upper extremities.  She denies any weakness.  Her pain seems to be worse when she wakes up in the morning and with any activity.  She does feel better with rest.  Laying down feels the best, she is able to take pressure off of her neck and fully relax her muscles.  She is not currently taking any prescription medications, just Excedrin or Advil.  She tried gabapentin after surgery, but she had side effects so she discontinued it.  She is undergoing physical therapy for [facility].  She is doing her home exercise program but states that she has not had any relief.    The patient brings in reports from cervical epidural injections performed in 2004.  She had a right C5-6 and C6-7 transforaminal epidural steroid injection and then a left C5-6 and C6-7 transforaminal epidural steroid injection and then each side repeated one time.  She reports that after these injections she did not have any pain for 10  years.    Medications: Reviewed and correct in the chart.    Allergies: Reviewed and tramadol, oxycodone, morphine makes her nauseous.  She has no true medication allergies.    PMH: Reviewed and the patient denies any medical problems.    PSH: Reviewed and significant for the cervical laminoplasty, cervical foraminotomy, breast reduction surgery, left knee arthroscopic surgery.    Family History: Only history is reviewed and is significant for spinal stenosis.  Otherwise she denies any other medical problems in the family.    Social History: The patient is .  She drinks alcohol about once a month.  She denies any tobacco or illicit drug use.    ROS: Positive for neck pain, joint pain, headaches.  Specifically negative for dysphagia, imbalance, fine motor skill difficulties, bowel/bladder dysfunction, fevers,chills, appetite changes, unexplained weight loss.   Otherwise 13 systems reviewed are negative.  Please see the patient's intake questionnaire from today for details.      OBJECTIVE:  PHYSICAL EXAMINATION:  CONSTITUTIONAL:  Vital signs as above.  No acute distress.  The patient is well nourished and well groomed.  PSYCHIATRIC:  The patient is awake, alert, oriented to person, place, time and answering questions appropriately with clear speech.    HEENT:  Sclera are non-injected.  Extraocular muscles are intact. .  Moist oral mucosa.  SKIN:  Skin over the face, bilateral upper extremities, and posterior torso is clean, dry, intact without rashes.  LYMPH NODES:  No palpable or tender anterior/posterior cervical, submandibular, or supraclavicular lymph nodes.    MUSCLE STRENGTH:  5/5 strength for the bilateral shoulder abductors, elbow flexors/extensors, wrist extensors, finger flexors/abductors.  NEURO:  2/4 symmetric biceps, brachioradialis, triceps reflexes bilaterally.  Sensation to pinprick is intact in all of the digits of both upper extremities.  Negative Mantilla's bilaterally.    VASCULAR:  2/4  radial pulses bilaterally.  Warm upper limbs bilaterally.  Capillary refill in the upper extremities is less than 1 second.  MUSCULOSKELETAL: Patient has restricted range of motion of the cervical spine with flexion, extension, side bending, rotation.  She does report pain with these motions, though nothing significantly worse than baseline.  She does have tenderness over the cervical paraspinal muscles but increased tenderness over the bilateral upper trapezius muscles with severe hypertonicity bilaterally.  The patient does have cervical posturing with her head tilted to the right side and her chin rotated to the left.    RESULTS: Patient MRI scan of the cervical spine was personally reviewed today.  Patient does have foraminal stenosis at the right C4-5 and C5-6 levels.  Please see the report for details.

## 2021-06-17 NOTE — PROGRESS NOTES
[facility] Daily Progress     Patient Name: Layla Starks  Date: 5/5/2021  Visit #:2  PTA visit #: 0  Referral Diagnosis: Cervical Pain  Referring provider: Stevo Waite MD  Visit Diagnosis:     ICD-10-CM    1. Cervical radiculopathy  M54.12    2. Cord compression myelopathy (H)  G95.20    3. Degeneration of intervertebral disc of cervical region  M50.30    4. Intractable headache, unspecified chronicity pattern, unspecified headache type  R51.9          Assessment:     Results of Treatment   Neck Pain and HA reducing following treatment. Patient verbalizes understanding of how to use arm bike at [employer].     Patient is benefitting from skilled physical therapy and is making steady progress toward functional goals.  Patient is appropriate to continue with skilled physical therapy intervention, as indicated by initial plan of care.    Goal Status:  Pt. will demonstrate/verbalize independence in self-management of condition in : 12 weeks  Pt. will be independent with home exercise program in : 12 weeks    Pt will: improve neck ROM by 10 degrees or greater in all directions for increased function in 12 weeks  Pt will: improve headache frequency and intensity by 50% in 12 weeks      Plan / Patient Education:     Continue with initial plan of care.    Subjective:     Pain Rating: significant pain, had a lot of work to do today for her part time job, so everything feels tight.     Objective:     Significant myofascial restrictions R>L levator, rhomboids, scalenes    Treatment Today     TREATMENT MINUTES COMMENTS   Evaluation     Self-care/ Home management     Manual therapy 45 MFR right scalenes,UT, levator, rhomboids in supine   Neuromuscular Re-education     Therapeutic Activity     Therapeutic Exercises     Gait training     Modality__________________                Total 45    Blank areas are intentional and mean the treatment did not include these items.       Shiela BROWN  [place]  5/5/2021

## 2021-06-17 NOTE — PROGRESS NOTES
[facility]  Cervical Initial Evaluation  Patient Name: Layla Starks  Date of evaluation: 4/28/2021  Today's Date: 4/28/2021  Visit Number: 1 of 20 per PT POC  Referring provider: Dr. Waite  Referral Diagnosis:   S/P cervical spinal fusion [Z98.1]  - Primary       Cervical radiculopathy [M54.12]       Visit Diagnosis:     ICD-10-CM    1. S/P cervical spinal fusion  Z98.1    2. Cervical radiculopathy  M54.12        Assessment:        Layla is a 60 y.o. female who presents to physical therapy today with complaints of right sided neck pain that began after her first neck surgery in 2016 and has continued to worsen over the years. She comes to PT now s/p C4-T1 fusion Feb 2/19/2021. Clinical exam today reveals significant decrease in ROM in cervical spine, mild limitation in shoulder ROM, generally good strength . Patient would benefit from further PT in order to improve function.     Patient's expectations/goals are: Realistic  Barriers to Learning or Achieving Goals: chronicity, multiple neck surgeries    Goals:  Pt. will demonstrate/verbalize independence in self-management of condition in : 12 weeks  Pt. will be independent with home exercise program in : 12 weeks    Pt will: improve neck ROM by 10 degrees or greater in all directions for increased function in 12 weeks  Pt will: improve headache frequency and intensity by 50% in 12 weeks         Plan:        Plan for next visit: continue with manual therapy. Start exercises for upper body    Plan of Care:   Authorization / Certification Start Date: 04/28/21  Authorization / Certification End Date: 07/19/21  Authorization / Certification Number of Visits: 20  Communication with: Referral Source  Patient Related Instruction: Nature of Condition;Treatment plan and rationale;Self Care instruction;Basis of treatment;Body mechanics;Posture  Times per Week: 1-2  Number of Weeks: 12  Number of Visits: 20  Discharge Planning: to self care  strategies  Precautions / Restrictions : fusion C4-T1  Therapeutic Exercise: ROM;Stretching;Strengthening  Neuromuscular Reeducation: core;posture;kinesio tape;TNE;postural restoration  Manual Therapy: soft tissue mobilization;myofascial release;joint mobilization;muscle energy;strain counterstrain;craniosacral therapy;visceral manipulation  Modalities: electrical stimulation;ultrasound;TENS  Equipment: TENS unit;theraband    Patient is in agreement with PT plan of care and goals.        Subjective:         History Of Present Illness:  Layla is a 60 y.o. female who presents to physical therapy today with complaints of right sided neck pain that started after her surgery in 2016 when she had a laminoplasty d/t spinal cord compression.   She currently has significant right sided neck pain and Headaches.  Her most recent surgery was a posterior fusion from C4-T1 Feb 2021.   Will see pain specialist on May 7th. Discussing injections C3-4.    Patient Has done significant amounts of PT in the past. She doesn't get any lasting relief, but it also doesn't make it feel worse.    Social information:                  Occupation: - desk work                  Work Status: full time     Past Medical History:  Patient Active Problem List   Diagnosis     Degenerative disc disease     Foot pain     Fatigue     Cord compression myelopathy (H)     Other cord compression (H)     Myelomalacia of cervical cord (H)     Obesity (BMI 30-39.9)     Intractable headache     Osteoarthritis of cervical spine, unspecified spinal osteoarthritis complication status     Chronic pain of both knees     Neck pain     From chart review:   Spine Surgical Hx:  09/09/2016 - Laminoplasty C5-C7 (3 levels); foraminotomies R C4-5, [facility]6 and [facility]  [Implants: Synthes maxillofacial titanium plate].  12/13/2017 - MIS R foraminotomy C4-5 (Tamara Barclay).  08/06/2018 - ACDF with ant plate fixation  C5-6; use of [place]ell DBM putty (John, SARABJIT).  [Implants: Spinal USA (Ascend) plate; Titan spine titanium cage].  03/13/2019 - ACDF C4-5 and C6-7, with ant plate fixation C4-C7; removal of plate C4-5; use of Progenix Plus DMB putty ([employer], [facility]). [Implants: Savage aviator plate; tritanium cages].  02/19/2021 - PISF C4-T1; Redo foraminotomy with complete facetectomy R C4-5; Removal of laminoplasty plates C5-C7; Right posterior ICBG harvest (Sembrano) for residual foraminal stenosis R C4-5, with R C5 radiculopathy; subjacent level spondylosis C7-T1. [Implants: BYTEGRID Infinity screw system, 3.5 mm titanium rods x 2].     Severity:  Pain Rating Today:9/10  Pain rating at best: 7/10  Pain rating at worst: 10+     Associated symptoms:                         Numbness: no                        Weakness: no                        Bladder or Bowel loss of control: no                        Fever and/or Chills: no     Functional limitations:   Sitting upright, turning head, constant headache  Activities previously enjoyed, but limited: working out         Objective:      Note: Items left blank indicates the item was not performed or not indicated at the time of the evaluation.    Cervical Thoracic Examination  1. S/P cervical spinal fusion     2. Cervical radiculopathy         Precautions/Restrictions: fused C4-T1    Involved side: Right    Posture Observation: fair, guarded    Flexibility: limited tissue flexibility in upper body globally    Palpation:tender and significant myofascial restrictions throughout neck, right UT, scalenes, levator, rhomboids. Palpable trigger points, incisions healed well and only mild scar tissue restriction.    Passive Mobility-Joint Integrity: not assessed d/t fusion    UE Screen: mild limit ROM flexion, not significant    Patient Outcome Measures :    Neck Disability Score in %: 70     Scores range from 0-100%, where a score of 0% represents minimal pain and maximal  function. The minmal clinically important difference is a score reduction of 10%.    Cervical ROM:    Flexion: 15  Extension: 10  Sidebending: 10R, 5L  Rotation: 23R, 30L       Upper Extremity Strength (/5)  Globally UE strength intact.    Reflex Testing:   NT today    Treatment Today     TREATMENT MINUTES COMMENTS   Evaluation 30 Neck evaluation   Self-care/ Home management     Manual therapy 25 Patient supine: right UT, levator, scalenes, rhomboid MFR   Neuromuscular Re-education     Therapeutic Activity     Therapeutic Exercises     Gait training     Modality__________________                Total 55    Blank areas are intentional and mean the treatment did not include these items.     PT Evaluation Code: (Please list factors)  Patient History/Comorbidities:   Patient Active Problem List   Diagnosis     Degenerative disc disease     Foot pain     Fatigue     Cord compression myelopathy (H)     Other cord compression (H)     Myelomalacia of cervical cord (H)     Obesity (BMI 30-39.9)     Intractable headache     Osteoarthritis of cervical spine, unspecified spinal osteoarthritis complication status     Chronic pain of both knees     Neck pain   Examination: neck pain  Clinical Presentation: stable  Clinical Decision Making: low    Patient History/  Comorbidities Examination  (body structures and functions, activity limitations, and/or participation restrictions) Clinical Presentation Clinical Decision Making (Complexity)   No documented Comorbidities or personal factors 1-2 Elements Stable and/or uncomplicated Low   1-2 documented comorbidities or personal factor 3 Elements Evolving clinical presentation with changing characteristics Moderate   3-4 documented comorbidities or personal factors 4 or more Unstable and unpredictable High                  Shiela Menjivar, PT  4/29/2021  9:03 AM

## 2021-06-17 NOTE — PROGRESS NOTES
[facility] Daily Progress     Patient Name: Layla Starks  Date: 5/10/2021  Visit #:3  PTA visit #: 0  Referral Diagnosis: Cervical Pain  Referring provider: Stevo Waite MD  Visit Diagnosis:     ICD-10-CM    1. Cervical radiculopathy  M54.12    2. Cord compression myelopathy (H)  G95.20    3. Degeneration of intervertebral disc of cervical region  M50.30    4. Intractable headache, unspecified chronicity pattern, unspecified headache type  R51.9    5. Neck pain  M54.2    6. Osteoarthritis of cervical spine, unspecified spinal osteoarthritis complication status  M47.812    7. Other cord compression (H)  G95.29    8. S/P cervical spinal fusion  Z98.1          Assessment:     Results of Treatment   Right neck still tight following manual therapy.     Patient is benefitting from skilled physical therapy and is making steady progress toward functional goals.  Patient is appropriate to continue with skilled physical therapy intervention, as indicated by initial plan of care.    Goal Status:  Pt. will demonstrate/verbalize independence in self-management of condition in : 12 weeks  Pt. will be independent with home exercise program in : 12 weeks    Pt will: improve neck ROM by 10 degrees or greater in all directions for increased function in 12 weeks  Pt will: improve headache frequency and intensity by 50% in 12 weeks      Plan / Patient Education:     Continue with initial plan of care.    Subjective:     Continues to have right neck pain. Last session didn't make her pain worse, but not any better.    Objective:     Significant myofascial restrictions R>L levator, rhomboids, scalenes    Treatment Today     TREATMENT MINUTES COMMENTS   Evaluation     Self-care/ Home management     Manual therapy 30 MFR right scalenes,UT, levator, rhomboids in supine   Neuromuscular Re-education     Therapeutic Activity     Therapeutic Exercises     Gait training     Modality__________________                 Total 30    Blank areas are intentional and mean the treatment did not include these items.       Shiela Menjivar  5/10/2021

## 2021-06-17 NOTE — PROGRESS NOTES
CHART NOTE     DATE OF SERVICE:  4/3/2018     : 1960   57 y.o.     ASSESSMENT :   Persistent HA, worsening neck and RUE pain s/p surgery.  Foraminal stenosis on imaging, level c/w symptoms.      PLAN: F/E film of neck.  R C 45 TFESI.  Referral to OPS for consult. RTC PRN.  Plan D/W Dr Valdez.     HPI:  Layla Starks is status post cervical laminoplasty C5-C7 on 2016 by Dr. Valdez. Patient initially presented  with episodic neck and radicular pain.  Last seen in clinic on 2017 for c/o CROSS.  Cross had migrainous components-photophobia, provoked by sound, vertigo.   MRI brain and CT myelo of neck without acute contributory findings. Sees Dr. Rosales, Neurologist at [facility].  Further work up deferred to him.      Patient is now calling with c/o  neck pain and headaches daily since surgery. She said they seem to be getting worse. Pt will hand carry MRI and report of cervical spine from [facility] (2017).     TODAY, Layla Starks has had workup with Dr. Rosales, Neurology. Has had Occipital NR injection, trigger point and Botox injections.  Also had another surgery at [facility] on2017- Right Minimally Invasive Posterior C4/5 Foraminotomy by Dr. Byron Barclay 2017.  That relieved some of her HA pain but not neck pain. Had what sounds like a C5 NR stretch injury post op which has responded well to PT-can lift her RUE straight overhead.  HA still persists. Still with pain in back of neck, eccentric to R and down into lat bicep, no N/T.  Occasional L hand aching, transient., sporadic. No N/T of fingers.  Review of most recent MRI shows mod R C45 foraminal narrowing. Suggested patient return to [facility] for follow up.  She stated she did not want to go back to them, not confident in their care. Wants to cont with us.      PAST MEDICAL HISTORY, SURGICAL HISTORY, REVIEW OF SYMPTOMS, MEDICATIONS AND ALLERGIES:  Past medical history, surgical history, ROS, medications and allergies  reviewed with patient and remain unchanged from previous visit.    Past Medical History:   Diagnosis Date     Cervical spinal stenosis      Constipation      Degenerative disc disease      Fatigue      Foot pain      Medial meniscus tear     knee     Obesity      Perimenopause      PONV (postoperative nausea and vomiting)      PHYSICAL EXAM:    /82  Pulse 67  Temp 97.7  F (36.5  C)  Resp 16  SpO2 98%    Neurological exam reveals:  Respirations easy, non-labored.   Skin: W/D/I. No rashes, lesions or breaks in integrity.   Recent and remote memory intact, fund of knowledge wnl.    Alert and oriented x3, speech fluent and appropriate.   PERRL, EOMI, No nystagmus,   Face symmetric, tongue midline, Uvula midline,  palate rises with phonation   Shoulder shrug equal  Arm strength bilateral grasp, biceps, triceps, and deltoids 5/5   Full ROM both UE, Non tender to palp over R shoulder muscles   No extremity edema noted.   Muscle Bulk and tone wnl.   Reflexes: No pathological reflexes   Gait and station:Normal, can tandem gait.   NDI/KIRK: 70%     RADIOGRAPHIC IMAGING: [facility] post op ([facility] 12/3/2017) some mod R neural foraminal narrowing C45.     Films personally reviewed and interpreted.  Also reviewed and discussed with Dr. Landers.   Reviewed imaging with patient and family.        I spent 45 min in this appt, examining patient, reviewing scans, reviewing chart notes, discussing treatment options and benefits. >50% clinic time spent in face to face counseling and coordinating care.

## 2021-06-17 NOTE — PROGRESS NOTES
"[facility] Daily Progress     Patient Name: Layla Starks  Date: 5/13/2021  Visit #:5  PTA visit #: 0  Referral Diagnosis: Cervical Pain  Referring provider: Stevo Waite MD  Visit Diagnosis:     ICD-10-CM    1. Cervical radiculopathy  M54.12    2. Cord compression myelopathy (H)  G95.20    3. Degeneration of intervertebral disc of cervical region  M50.30    4. Intractable headache, unspecified chronicity pattern, unspecified headache type  R51.9          Assessment:     Results of Treatment :  Right neck still tight following manual therapy, but less pain. Stating manual therapy does seem to help a little so far. She continues to have significant pain in right side of neck and headaches by the end of the day when she is upright all day.   Laying down continues to give her some relief.   Patient has many good questions about anatomy. We did discuss the nervous system today and started conversation about how pain works to add education on pain processing vs. Anatomy focus only.    Goal Status:  Pt. will demonstrate/verbalize independence in self-management of condition in : 12 weeks  Pt. will be independent with home exercise program in : 12 weeks    Pt will: improve neck ROM by 10 degrees or greater in all directions for increased function in 12 weeks  Pt will: improve headache frequency and intensity by 50% in 12 weeks      Plan / Patient Education:     Continue with initial plan of care.   Patient agrees with POC.    Patient looking into book \"why do I hurt.\"   Continue with manual therapy.   Patient working on incorporating UE exercises into her [employer] routine.    Subjective:     Pain: at least a 9/10 right sided neck pain at C5-6 level and referral up to head.   Continues to have right neck pain.     Objective:     Guarded and painful today.     Treatment Today     TREATMENT MINUTES COMMENTS   Evaluation     Self-care/ Home management 15 Discussion of how pain works, suggestion " "of \"Why do I hurt\"    Discussion of starting UE workouts more and more at [employer], to do light weights and can do exercises like: lat pull down, seated rows.   Manual therapy 25 MFR right scalenes,UT, levator, rhomboids in supine   Neuromuscular Re-education 2 K tape applied to right UT/levator to inhibit the muscles.   Therapeutic Activity     Therapeutic Exercises     Gait training     Modality__________________                Total 42    Blank areas are intentional and mean the treatment did not include these items.       Shiela Menjivar  5/13/2021  "

## 2021-06-17 NOTE — PROGRESS NOTES
Assessment:   Layla Starks is a 57 y.o. y.o. female who is previously healthy who presents today for follow-up regarding acute neck pain.  Patient's pain has a large myofascial pain component.  There is thought to be a trigger point element, but she failed to have relief with trigger point injections.  At this point is not that she has a mild form of cervical dystonia.  She is status post a C5-7 laminoplasty.  She continues to have persistent headaches at this point.  He is without significant radicular symptoms.       Plan:     A shared decision making plan was used.  The patient's values and choices were respected.  The following represents what was discussed and decided upon by the physician and the patient.      1.  DIAGNOSTIC TESTS/REFERRALS: The patient was requesting an updated MRI.  Her last MRI was in December 2017.  At this time given that she does not have any significant radicular symptoms, it is unlikely that a repeat MRI would provide any sort of beneficial information at this time.  Discussed that her findings on the MRI do not seem to correlate with her symptoms.  The foraminal stenosis would likely cause pain going into the arm and she denies this type of pain.  Her pain is neck pain with radiation into the head causing headaches.  Offered to have the patient return to see neurology, who has treated her in the past for headaches.  She would like to hold off on this for right now.  Offered to have the patient return to see Dr. Valdez.  Again she would like to hold off on this for right now.  -Patient signed a release of information for the [facility] to determine what type of neck injection she has done previously.  2.  PHYSICAL THERAPY: The patient is currently in physical therapy.  She is encouraged to complete the course and continue to do her home exercise program on a regular basis.  3.  MEDICATIONS: Patient was offered baclofen.  She declined at this time.  She has  tried several muscle relaxants in the past and nothing has provided relief.  She should continue on the gabapentin 100 mg capsules titrating up to a maximum dose of 300 mg 3 times a day.  She is sensitive to this medication which is why the 100 mg dose was chosen.  4.  INTERVENTIONS: Discussed botulinum toxin injections with the patient. It is a process to end up titrating up the dose to therapeutic level.  Initial injection started a very low dose so as not to cause weakness.  The Botox injections can only be performed once every 3 months  Initial dose did not divide relief and did not significantly aggravate pain, then it can be slowly increase.  The patient is interested in pursuing the Botox injections.  Prior authorization will be received.  Emphasized to the patient at length that the Botox injections could worsen her pain instead of relieve it.  The patient is willing to take this risk would like to move forward.  Really these injections would be performed under EMG guidance.  We will try to schedule at a time that Dr. Hermosillo is not using the EMG machine.  5.  PATIENT EDUCATION:    -The patient googled something online and thought that may be disc degeneration in her neck could be impairing blood flow to her brain and causing memory issues and difficulty concentrating.  Discussed that this is not correct.  Encouraged her to discuss her headache, memory issues and difficulty concentrating with the neurologist.  Again at this time she declined seeing the neurologist.  6.  FOLLOW-UP: Patient will be scheduled for the botulinum toxin injections once prior authorization has been received.  She is welcome to call if she has any questions or concerns in the interim.    Total of 25 minutes was spent in the care of this patient.  Greater than 50% of the time was spent counseling, teaching, answering the patient's questions.    Subjective:     Layla Starks is a 57 y.o. female who presents today for follow-up  regarding neck pain thought to be from myofascial pain and trigger point pain.  The patient is status post a trigger point injection performed on April 18 of 2018.  The patient reports that she did not notice any significant change at all with the trigger point injections.  She needs to have a significant amount of neck pain on the right side and she is very frustrated at this point.  She denies any radiation of pain going down into the arm.  No numbness tingling or weakness in the arm.  She is having persistent headaches as well as dizziness and nausea.  She has some impaired vision as well as balance changes.  She is worried that something in her neck is causing the symptoms.  She read something online and said that degenerative disc disease can impair blood flow to the brain and cause the symptoms that she is having.  The patient reports that she is in severe pain all of the time.  At best her pain is an 8 out of 10, and she is currently an 8 out of 10 today.  At worst it is a 10 out of 10.  She only feels good if she can lay down.  She is worse anytime she is up right.  The patient relates that she has had extensive treatment through neurology.  They tried occipital nerve injections, she reports that she had neck injections.  She reports that she had Botox injections using the migraine paradigm.  She reports that none of these have provided relief.  She is feeling very frustrated and sad that nothing has been able to be done about her pain.    Past medical history is reviewed and is unchanged for any new medical diagnoses in the interim.      Family history is reviewed and is unchanged in the interim.        Review of Systems:  Positive for headache, dizziness, nausea, blurred vision, balance changes.  Negative for any numbness or tingling, bowel or bladder dysfunction, footdrop, weakness.     Objective:   CONSTITUTIONAL:  Vital signs as above.  No acute distress.  The patient is well nourished and well groomed.     PSYCHIATRIC:  The patient is awake, alert, oriented to person, place and time.  The patient is answering questions appropriately with clear speech.  Normal affect.  SKIN:  Skin over the face, neck bilateral upper extremities is clean, dry, intact without rashes.  MUSCULOSKELETAL: The patient has 5/5 strength for the bilateral shoulder abductors, elbow flexors/extensors, wrist extensors, finger flexors/abductors.   NEUROLOGICAL: 2/4 biceps, brachioradialis, triceps reflexes bilaterally.  Negative Mantilla's bilaterally.  Sensation to touch is intact in all of the digits of both upper extremities.    RESULTS: The patient's MRI of the cervical spine was personally reviewed today.  The patient does have surgical changes consistent with a laminoplasty.  The central canal is decompressed.  There may be a bone spur at the C6-7 level that protrudes off of the right facet, but it does not cause significant central canal stenosis or look like it impinges on the foramen/nerve.  Discussed with the patient that she does have foraminal stenosis seen at the right C4-5 and C5-6 level.  Please see the report for details.

## 2021-06-17 NOTE — PROGRESS NOTES
"[facility] Daily Progress     Patient Name: Layla Starks  Date: 5/19/2021  Visit #:6  PTA visit #: 0  Referral Diagnosis: Cervical Pain  Referring provider: Alicia Bettencourt MD  Visit Diagnosis:     ICD-10-CM    1. Cervical radiculopathy  M54.12    2. Cord compression myelopathy (H)  G95.20    3. Degeneration of intervertebral disc of cervical region  M50.30    4. Intractable headache, unspecified chronicity pattern, unspecified headache type  R51.9    5. Neck pain  M54.2          Assessment:     She did have a very good release of fascial tensions today. She did understand how fascial tensions can add to her current symptoms.  There was a good decrease in cervical mm tone in her paraspinals.   Patient agrees with plan.     Goal Status:  Pt. will demonstrate/verbalize independence in self-management of condition in : 12 weeks  Pt. will be independent with home exercise program in : 12 weeks    Pt will: improve neck ROM by 10 degrees or greater in all directions for increased function in 12 weeks  Pt will: improve headache frequency and intensity by 50% in 12 weeks      Plan / Patient Education:     Continue with initial plan of care.   Patient agrees with POC.    Patient looking into book \"why do I hurt.\"   Continue with manual therapy.   Continue with UE exercises in clinic, started them on 5/19/2021    Subjective:     Pain: at least an 8-9/10  She did have some help with the 1 manual treatment she had in the past. She does like the tape.  She does get HA's every day in the last 5 years in the front of her head.      Objective:     LV fascial tensions.     Treatment Today     TREATMENT MINUTES COMMENTS   Evaluation     Self-care/ Home management     Manual therapy 40 Fascial release using Strain-Counterstrain of B cervical SMED-LV, B cranial/cervical/sternal-LV   Neuromuscular Re-education 15 Recip inhib of LV fascia   Therapeutic Activity     Therapeutic Exercises     Gait training   "   Modality__________________                Total 55 Total treatment time   Blank areas are intentional and mean the treatment did not include these items.       Dev Ambrosio  5/19/2021

## 2021-06-20 NOTE — PROGRESS NOTES
Assessment:   Layla Starks is a 57 y.o. y.o. female who is previously healthy who presents today for follow-up regarding right-sided neck pain thought to be myofascial pain in etiology with her having a mild form of cervical dystonia.  The patient has underwent a cervical fusion surgery at the C5-6 level (performed on August 13, 2018) in the interim since she was last seen on April 23 of 2018.  The surgery relieved her right arm weakness, but she has continued to have this neck pain and stiffness in the right side of her neck.  She is neurologically intact without any red flag symptoms.       Plan:     A shared decision making plan was used.  The patient's values and choices were respected.  The following represents what was discussed and decided upon by the physician and the patient.      1.  DIAGNOSTIC TESTS: The patient's MRI of the cervical spine 2017 was personally reviewed today by the physician with the physician performing her own interpretation.  These images were performed prior to her surgery so they are not significantly helpful at this time.  2.  PHYSICAL THERAPY: The patient reports that she is not cleared to start physical therapy until November.  Will await clearance from her surgeon before recommending physical therapy, or will defer to her surgeon for physical therapy orders.  3.  MEDICATIONS: The patient has stopped taking the gabapentin.  -Patient is taking Cymbalta 60 mg once a day.  This is typically the maximum dose for musculoskeletal pain.  For mental health issues, the dose can be as high as 120 mg per day.  She is asked to remain on the 60 mg once a day for 6 weeks.  If she does not feel that it is as effective, she could ask the prescribing doctor to increase to 90 mg once a day.  -The patient if she would like a refill of the baclofen.  She would like to try the baclofen again after surgery.  Baclofen 10 mg 1 tablet every 8 hours as needed for pain is prescribed today.  If she does  not have any side effects with taking 10 mg, she could increase to taking 20 mg every 8 hours as needed for pain.  When she first takes the 20 mg, she is asked to take this at bedtime in case it causes drowsiness.  If it does not cause significant drowsiness, then she could try to take the 20 mg up to every 8 hours as needed.  4.  INTERVENTIONS: Patient is very interested in the Botox injections that were discussed with her last spring.  Given that she is less than 6 weeks out from her cervical fusion, recommended that she wait 8 weeks from her fusion before having the Botox injections.  She can schedule these the week of October 15.  If she feels that her pain is manageable, she can call and cancel these.  At this point there is no right or wrong with proceeding with the Botox injections.  If patient can tolerate her pain, may be best to forego the injections.  However she has had this pain for quite some time and she feels that it is limiting her activities and her ability to focus, then it would be reasonable to move forward with him.  - The patient states she has had botox injections previously and tolerated the injection well.  If she moves forward with Botox, would recommend 2 mL dilution of a 100 unit vial (0.1 mL = 5 units).  Injecting 10 units at each of the following locations: the left upper trapezius at the level of approximately T2, the left upper trapezius at the base of the neck and then the left upper trapezius at approximately the level of C5.  If she tolerates the dose well but does not have complete relief, could consider increasing the dose by 10% for the next injection.  5.  PATIENT EDUCATION:    -All of the patient's questions were answered to her satisfaction today.  She was in agreement with the treatment plan.  6.  FOLLOW-UP: She will follow-up the week of October 15 for the botulinum toxin injections.  If she has any questions or concerns before that time she is encouraged to contact the  clinic.    Subjective:     Layla Starks is a 57 y.o. female who presents today for follow-up regarding her neck pain/stiffness.  The patient reports that she underwent a C5-6 anterior cervical fusion by [facility] orthopedics on August 13 of this year.  Patient reports that the surgery went well and she has increased function of her right arm following the surgery.  However the surgery did not change her pain, for better or for worse, in the right side of her neck.  She continues to have significant pain and stiffness in the right neck.  She denies any radiation of pain going down into the arms.  She denies any numbness tingling or weakness in the arms.  She continues to get some persistent headaches from the neck pain.  She rates her pain today at a 7 out of 10.  At best it is a 6 out of 10.  At worst it is a 7 out of 10.  Her pain is worse with sitting up.  She does feel better with laying down.  Patient has stopped taking gabapentin.  She has been started on Cymbalta 60 mg once a day.  She is previously taking this medication with good results.  She taking the baclofen just prior to her surgery.  She has not resumed taking it, but did feel that it provided some relief.    Past medical history is reviewed and is unchanged for any new medical diagnoses in the interim.      Family history is reviewed and is unchanged in the interim.        Review of Systems:  Positive for headaches.  Negative for numbness/tingling, weakness, bowel/bladder dysfunction, foot drop, dizziness, nausea/vomiting, blurred vision, balance changes.     Objective:   CONSTITUTIONAL:  Vital signs as above.  No acute distress.  The patient is well nourished and well groomed.    PSYCHIATRIC:  The patient is awake, alert, oriented to person, place and time.  The patient is answering questions appropriately with clear speech.  Normal affect.  SKIN:  Skin over the face, neck bilateral upper extremities is clean, dry, intact without rashes.   Patient has a well-healed surgical incision over the left anterior neck/throat.  MUSCULOSKELETAL: The patient has 5/5 strength for the bilateral shoulder abductors, elbow flexors/extensors, wrist extensors, finger flexors/abductors.  The patient has restricted range of motion of the cervical spine with flexion, extension, side bending, rotation.  Patient does report aggravation of the right neck pain/tightness with all of these motions.  She does have tenderness to palpation over the very medial aspect of the upper trapezius muscle and then going along the right cervical paraspinal muscles up to the occiput.  No significant tenderness over the left upper trapezius muscles or over the left cervical paraspinal muscles.  NEUROLOGICAL: 2/4 biceps, brachioradialis, triceps reflexes bilaterally.  Negative Mantilla's bilaterally.  Sensation to touch is intact in all of the digits of both upper extremities.    RESULTS: Patient's MRI of the cervical spine from 2017 was personally reviewed today.  The patient have loss of normal cervical lordosis.  There laminoplasty changes on the right side.  There is no significant central canal stenosis.  Please refer to the report for details.    There is no imaging available after her surgery on August 13 of 2018..

## 2021-06-21 NOTE — PROGRESS NOTES
Assessment:   Layla Starks is a 57 y.o. y.o. female who is previously healthy who presents today for follow-up regarding right-sided neck pain thought to be myofascial pain in etiology secondary to mild form of cervical dystonia.  The patient also experiences persistent headaches.  She is status post botulinum toxin injections on October 17, 2018, which she says provided absolutely no relief.  The patient did have an anterior cervical fusion at the C5-6 level on August 13, 2018.  She also has a history of a previous cervical laminoplasty.   At this time she may continue to have the myofascial pain the primary source of pain, or it may be a secondary source of pain potentially with the primary probably being cervical facet joint syndrome.  Her may be from cervical facet arthropathy at the C4-5 or C5-6 levels.  At this time she is without radicular symptoms or red flag symptoms.       Plan:     A shared decision making plan was used.  The patient's values and choices were respected.  The following represents what was discussed and decided upon by the physician and the patient.      1.  DIAGNOSTIC TESTS:   -Patient's x-rays of the cervical spine performed at her chiropractor's office were personally reviewed today by the physician with the physician attempting to perform her own interpretation.  These were fairly low quality x-rays and it was difficult to see anything secondary to the images and secondary to the formatting on the disc.  Patient had some mild degenerative changes seen.  Her postsurgical changes were seen.  - Patient CT scan of the cervical spine was personally reviewed today.  This was done prior to her surgery.  This shows degenerative changes seen throughout the cervical spine with laminoplasty changes from C5-7.  -Patient will see her surgeon tomorrow.  Will defer to her surgeon on whether further imaging is necessary.  -Patient's outside records from Dr. Laboy reviewed today and are summarized  as follows: Patient underwent right C2, C3, C4 medial branch block injections by Dr. campbell on 3-21-17.  -The patient's outside records from [facility] were reviewed today.  They are summarized as follows:  July 17, 2018:  Bilateral C7-T1 facet joint injections  June 18th, 2018:  Right C6 nerve block  October 24th, 2017:  Right C5 nerve block  October 11, 2017:  Right C7 nerve block  2.  PHYSICAL THERAPY: Deferred to the patient's surgeon on clearance for physical therapy.  3.  MEDICATIONS: She can continue to take baclofen 10 mg every 8 hours as needed for pain/spasms.  -Patient has tried gabapentin in the past but was very sensitive to the medication and is no longer taking it.  -Indomethacin challenge has not been tried with this patient.  This potentially could be tried in the future once her surgeon has cleared her for physical therapy (to ensure that the fusionis taking).  4.  INTERVENTIONS: Discussed a right C4-5 and/or C5-6 facet joint injection with the patient.  She is potentially interested, though she states that she has had facet joint injections in the past through [facility] and with Dr. Moran.  At this point we will hold off on this injection until she can see her surgeon and until her records can be reviewed.  5.  PATIENT EDUCATION:    -Patient questions whether the Chris chiropractic technique might be beneficial for her.  Dr. Chilel explained that she is not familiar with this technique.  Nor does she know of any patients have undergone this type of treatment.  She is encouraged to ask her surgeon if he knows about this technique and whether this technique would be safe for her this far out from her surgery.  -The patient question if she could possibly have thoracic outlet syndrome.  Dr. Chilel discussed the typical symptoms with thoracic outlet syndrome (typically extremity symptoms).  Given that she does not have any that her pain is caused by thoracic outlet syndrome.  Typically this is a vascular  cause or can be neurogenic, but then with a neurogenic cause there is typically a cervical rib (which she does not appear to have).  6.  FOLLOW-UP: Nurse navigation is asked to call the patient tomorrow afternoon to check her status after she is seen her surgeon.  Further recommendations can be given at that time.    Subjective:     Layla Starks is a 57 y.o. female who presents today for follow-up regarding right-sided neck pain.  The patient is status post botulinum toxin injections on October 19, 2018.  She reports that she had no change in her pain following these injections.  She continues to have right-sided neck pain without radiation of pain into the arm.  No numbness tingling or weakness in the arm.  She continues to have headaches as well.  There is some associated dizziness and blurred vision with the headaches.  She rates her pain today at a 7 out of 10.  Her pain is worse with being upright.  She does feel better laying down.  She denies any new symptoms at this time.  She did go to see a chiropractor who is recommended the bladder technique and she has questions about whether or not this technique might be beneficial for her.    Past medical history is reviewed and is unchanged for any new medical diagnoses in the interim.      Family history is reviewed and is unchanged in the interim.        Review of Systems:  Positive for headache, dizziness, blurred vision.  Negative for any numbness or tingling, bowel or bladder dysfunction, footdrop, weakness, nausea/vomiting.     Objective:   CONSTITUTIONAL:  Vital signs as above.  No acute distress.  The patient is well nourished and well groomed.    PSYCHIATRIC:  The patient is awake, alert, oriented to person, place and time.  The patient is answering questions appropriately with clear speech.  Normal affect.  SKIN:  Skin over the face, neck bilateral upper extremities is clean, dry, intact without rashes.  MUSCULOSKELETAL: The patient has 5/5 strength  for the bilateral shoulder abductors, elbow flexors/extensors, wrist extensors, finger flexors/abductors.  Patient has tenderness over the right cervical paraspinal muscles and over the right upper trapezius muscle.  Also hypertonicity over these muscles compared to the left side.  NEUROLOGICAL: 2/4 biceps, brachioradialis, triceps reflexes bilaterally.  Negative Mantilla's bilaterally.  Sensation to touch is intact in all of the digits of both upper extremities.    RESULTS: CT scan of the lumbar spine is personally reviewed today.  The patient does have cervical laminoplasty changes from C5-7 as well as an anterior fusion at C5-6.  We see the report for details.

## 2021-06-21 NOTE — PROGRESS NOTES
Assessment:   Layla Starks is a 58 y.o. y.o. female who is previously healthy who presents today for follow-up regarding sided neck pain that P myofascial pain in etiology secondary to mild form of cervical dystonia.  She also has persistent headaches with dizziness.  The patient has not had any relief with botulinum toxin injections on October 17, 2018.  Right C4-5 and C5-6 facet joint injections were attempted on November 19 of 2018, but were unable to be completed as the joint space was not able to be accessed.  She continues to have pain and is concerned that potentially the pain is coming from the anterior scalene muscles, as her massage therapist told her that this could be a source of her pain.  Is here today to discuss other options.  It is still thought that there could be an end of cervical facet joint syndrome causing her pain given her cervical facet arthropathy at the C4-5 and C5-6 levels.  She is without radicular or red flag symptoms at this time.       Plan:     A shared decision making plan was used.  The patient's values and choices were respected.  The following represents what was discussed and decided upon by the physician and the patient.      1.  DIAGNOSTIC TESTS: The patient's CT scan of the cervical spine was personally reviewed today by the physician with the physician performing her own interpretation.  He has a laminoplasty changes from C5-7 but the anterior fusion is not seen as this CT scan was done prior to that surgery.  She does have facet arthropathy seen at the C4-5 and C5-6 levels.  -No further diagnostic testing necessary at this time.  2.  PHYSICAL THERAPY: The patient would benefit from physical therapy when she is cleared by her surgeon.  We will clarify this with the patient at her next visit, as her most recent postop visit sounded like her surgeon cleared her to return to any activities.  3.  MEDICATIONS:   -We will double check with the patient that her surgeon has  cleared her for all types of treatment.  She would potentially benefit from an indomethacin challenge.  - The patient can continue to take baclofen 10 mg every 8 hours as needed for pain.  -Patient has tried and failed gabapentin secondary to side effects.  4.  INTERVENTIONS: Discussed the option of trialing a right C4, C5, and C6 medial branch block to see if the cervical facet joints are the cause of her pain.  Discussed that if she does have relief with the medial branch blocks, then a second attempt could be performed to try to access the C4-5 and C5-6 joint space to inject cortisone.  If she failed to have relief, then she would know that even if the facet joint injection had been to be completed, it would have been unlikely to provide relief.  She was also interested in anterior scalene trigger point injections.  Discussed the risks and benefits of this injection which include pneumothorax as well as increased pain)..  Discussed that this will be done under ultrasound guidance, as the ultrasound can see the muscle, nerves, and blood vessels.  This would be the safest way to have this type of injection given the proximity of the brachial plexus and major arteries in the neck in relation to the anterior scalene muscles.  With discussing the risks and benefits of both the medial branch blocks and the anterior scalene trigger point injection, she wished to proceed with the anterior scalene trigger point injection.  She also asked if she could have injections into the right upper trapezius muscle.  Dr. Lozano has been requested to perform the injections in the right anterior scalene area and into the right upper trapezius muscle (2 or 3 locations).  Potentially if she has the injections done both anteriorly and posteriorly, she may have more relief than when she just had the trapezius trigger point injections alone.  The patient was told that she must have a  with her for the trigger point injections.  5.   PATIENT EDUCATION:    -All of the patient's questions were answered to her satisfaction today.  Discussed with the patient that her prognosis is guarded at this point.  The trigger point injections can be tried.  If she fails to have relief, then the medial branch block injections can be tried.  If she fails to have relief with the medial branch blocks and the trigger point injections, there may be nothing else that can be done interventionally for her neck pain.  6.  FOLLOW-UP: Patient can follow-up first available for the injections with Dr. Lozano and then she is asked to follow-up with Dr. Chilel 2 weeks after the trigger point injections with Dr. Lozano.  She has any questions, concerns, or any significant worsening of her pain in the interim, she is encouraged to contact the clinic via the nurse navigation line or Dr. Chilel via [employer].    Total of 25 minutes was spent in the care of this patient.  Greater than 50% of the time was spent counseling, teaching, answering the patient's questions.    Subjective:     Layla Starks is a 58 y.o. female who presents today for follow-up regarding chronic right-sided neck pain.  The patient had attempted right C4-5 and C5-6 facet joint injections on November 19, 2018.  These were not able to be completed and had to be aborted, as the joint space was not able to be accessed.  The patient is here today to discuss other treatment options.  She continues to have pain just in the right side of her neck.  It does not radiate into the right shoulder at all.  It does not radiate down the right arm.  She feels that the right neck is very tight.  She rates her pain at an 8 out of 10.  At best is a 7 out of 10.  At worst it is a 9 out of 10.  Her pain is worse with being upright.  The only thing that relieves it is laying down.  She continues to take baclofen 10-20 mg every 8 hours as needed for pain.  She denies any new symptoms at this time.  She would like to  discuss the option of anterior scalene trigger point injections, she did have a massage therapist who told her that her scalene muscles are very tight and could be contributing to her pain.  She also read a case report where having this type of injection provided significant relief.  Continues to have headaches and dizziness that have accompanied this neck pain.  She denies any other new symptoms at this time.    Past medical history is reviewed and is unchanged for any new medical diagnoses in the interim.      Family history is reviewed and is unchanged in the interim.        Review of Systems:  Positive for headaches and dizziness.  Negative for numbness/tingling, weakness, bowel/bladder dysfunction, foot drop, nausea/vomiting, blurred vision, balance changes.     Objective:   CONSTITUTIONAL:  Vital signs as above.  No acute distress.  The patient is well nourished and well groomed.    PSYCHIATRIC:  The patient is awake, alert, oriented to person, place and time.  The patient is answering questions appropriately with clear speech.  Normal affect.  SKIN:  Skin over the face, neck bilateral upper extremities is clean, dry, intact without rashes.  MUSCULOSKELETAL: The patient has 5/5 strength for the bilateral shoulder abductors, elbow flexors/extensors, wrist extensors, finger flexors/abductors.  Patient does have tenderness over the bilateral anterior scalene muscles, slightly worse on the right side compared to the left.  With palpation of the right anterior scalene muscle, there is hypertonicity.  The pain is just at the area of palpation, does not trigger pain to any other area.  Patient does have pain and tenderness over the right upper trapezius area.  The right upper trapezius muscle is quite hypertonic.  This does seem slightly improved though compared to prior to the Botox injection.  NEUROLOGICAL: 2/4 biceps, brachioradialis, triceps reflexes bilaterally.  Negative Mantilla's bilaterally.  Sensation to  light touch is intact in all the digits of both upper extremities.    RESULTS: Patient CT scan of the cervical spine was personally reviewed today.  She does have C5-7 laminoplasty changes.  This is prior to her anterior cervical fusion.  There is facet arthropathy seen at the C4-5 and C5-6 levels.  Please refer to the report for details.

## 2021-06-21 NOTE — PROGRESS NOTES
Assessment:   Layla Starks is a 57 y.o. y.o. female who is previously healthy who presents today for follow-up regarding right-sided neck pain thought to be myofascial pain in etiology secondary to a mild form of cervical dystonia.  The patient also experiences persistent headaches.  Patient underwent an anterior cervical fusion at the C5-6 level on August 13th 2018.  It has now been 6 weeks since that surgery and there is consensus among surgeons that Botox is okay 6 weeks postop.  Patient continues to have the neck pain on the right side without radicular arm symptoms.  She is neurologically intact without any red flag symptoms.       Plan:     A shared decision making plan was used.  The patient's values and choices were respected.  The following represents what was discussed and decided upon by the physician and the patient.      1.  DIAGNOSTIC TESTS: The patient's MRI report of the cervical spine was reviewed today.  This performed in December 2017 through the [facility] system and was done prior to her surgery.  This showed degenerative changes seen throughout the cervical spine with laminoplasty changes from C5-7.  No further diagnostic images are necessary at this time.  2.  PHYSICAL THERAPY: Will defer physical therapy to her surgeon.  3.  MEDICATIONS: Patient can continue to take baclofen 10 mg every 8 hours as needed for pain/spasms.  4.  INTERVENTIONS: The risks and benefits of botulinum toxin injections were discussed with the patient.  These include infection, bleeding, pneumothorax, muscle weakness, significantly increased pain.  The black box warning of death was also discussed with the patient.  Botulinum toxin injections were performed to 3 locations in her right neck/upper trap area.  Please see the procedure note below for details on this procedure.  5.  PATIENT EDUCATION:    -The patient was advised that the Botox injections will take 3 days to start working.  The peak effect  will be at 3 weeks.  They will last for approximately 3 months.  -Patient was asked to refrain from submerging the injection sites under water for 48 hours, as this could increase her risk for infection.  -Patient was asked to use ice and not heat if she has soreness from the injections.  -She monitor for any redness, drainage, swelling and monitor for any symptoms of fever/chills.  If she experiences any of the signs/symptoms, she is asked to call the clinic immediately or go to the nearest emergency room.  6.  FOLLOW-UP: The patient is asked to follow-up in 4 weeks.  She is encouraged to call the nurse navigation phone line if she has any questions or concerns prior to that time.    Subjective:     Layla Starks is a 57 y.o. female who presents today for follow-up regarding right sided neck pain.  She is here today specifically for botulinum toxin injections.  Her pain is unchanged since she was last seen in clinic on September 19, 2018.  The patient just has pain in the right side of her neck.  She denies any pain radiating into the right arm.  She denies any numbness tingling or weakness in the right arm.  She is having headaches secondary to the neck pain.  She occasionally has dizziness and has experienced some mild changes in vision.  She feels better with laying down.  Her pain is aggravated anytime that she is up and moving around.  She denies any new symptoms at this time.    Past medical history is reviewed and is unchanged for any new medical diagnoses in the interim.      Family history is reviewed and is unchanged in the interim.        Review of Systems:  Positive for headaches, dizziness, changes in vision.  Negative for any numbness or tingling, bowel or bladder dysfunction, footdrop, weakness, balance changes.     Objective:   CONSTITUTIONAL:  Vital signs as above.  No acute distress.  The patient is well nourished and well groomed.    PSYCHIATRIC:  The patient is awake, alert, oriented to  person, place and time.  The patient is answering questions appropriately with clear speech.  Normal affect.  SKIN:  Skin over the face, neck bilateral upper extremities is clean, dry, intact without rashes.  Well-healed surgical incision in the posterior cervical spine.  MUSCULOSKELETAL: The patient has 5/5 strength for the bilateral shoulder abductors, elbow flexors/extensors, wrist extensors, finger flexors/abductors.  She has significant hypertonicity over the right cervical paraspinal muscles and over the right upper trapezius muscle.  NEUROLOGICAL: 2/4 biceps, brachioradialis, triceps reflexes bilaterally.  Negative Mantilla's bilaterally.      RESULTS: Patient's MRI of the cervical spine from December 2017 performed to the [facility] is reviewed.  She has degenerative changes seen throughout with laminoplasty changes from C5 through 7.      PROCEDURE NOTE:  Pre-procedure diagnosis:   Neck pain, Cervical dystonia  Post-procedure diagnosis:  Same    The risks of botulinum toxin injections were discussed with the patient.  This includes infection, bleeding, bruising, pneumothorax, functional weakness of the muscle to the point that a brace needs to be worn to support the head, difficulty breathing, difficulty swallowing.  The black box warning of death was also discussed with the patient.  The patient gave written and verbal consent to proceed.      Botox brand was used today.  100 unit vial reconstituted with 2 mL of sterile normal saline.  5 units = 0.1 mL    PROCEDURE: Right upper trapezius botulinum toxin injections.      The most hypertonic areas were marked with indelible marking pen:  1.  Right upper trapezius just medial to the midclavicular line  2.  Right upper trapezius at approximately the level of C4/C5  3.  Right middle trapezius at approximately the level of T2/3    The skin over the first gunnar was cleansed with an alcohol scrub.  A 27 guage 1.25 inch needle was inserted and repositioned  until the most tender area of the muscle was found.  After aspiration was negative 0.15 mL of the solution was injected.  This exact procedure was repeated at the other two locations     A total of 22.5  units were injected.  A total of 77.5 units were wasted.    There was minimal bleeding and no bandaids were placed.  The patient tolerated the procedure well.

## 2021-06-22 NOTE — PROGRESS NOTES
Assessment:   Layla Starks is a 58 y.o. y.o. female who is previously healthy who presents today for follow-up regarding chronic right-sided neck pain that is likely myofascial pain in etiology secondary to mild form of cervical dystonia.  She has also had persistent headaches with dizziness.  The patient has failed to have relief with several types of conservative treatment which have included botulinum toxin injections as well as ultrasound-guided trigger point injections.  It may be that the patient has an element of cervical facet joint syndrome and she does have cervical facet arthropathy/cervical spondylosis at the right C4-5 and C5-6 facet joints.  Attempts were made at the right C4-5 and C5-6 facet joint injections but the joint was unable to be accessed.  At this point the joints have not been ruled out as an etiology of pain.  She is without radicular or red flag symptoms.       Plan:     A shared decision making plan was used.  The patient's values and choices were respected.  The following represents what was discussed and decided upon by the physician and the patient.      1.  DIAGNOSTIC TESTS: The patient CT scan of the cervical spine was not available.  Dr. Chilel did pull up her MRI of her cervical spine and personally reviewed it performing her own interpretation.  She does have some disc degenerative changes with loss of normal cervical lordosis.  There is facet degeneration seen at the C4-5 and C5-6 joints.  -No further diagnostic testing necessary at this time.  2.  PHYSICAL THERAPY: Patient states that she is scheduled to start physical therapy specifically for the neck around December 20.  She is encouraged to go to physical therapy and do exercises on her own at home.  3.  MEDICATIONS: At this time the patient discontinue the baclofen.  The only medications that she is taking include over-the-counter Aleve and Advil.  She alternates between these 2 medications.  -No other changes are  made today.  -In the future could consider a different muscle relaxant medication, but given that she will be proceeding with medial branch blocks, the decision was made to hold off on prescribing any.  4.  INTERVENTIONS: The patient had an attempt at the right C4-5 and C5-6 facet joint injections.  The physician was unable to access the joints.  Discussed with the patient that right C4, C5, C6 medial branch blocks could be performed which could help be diagnostic to determine if the pain is coming from the facet joints.  She does have relief with the medial branch blocks, then it potentially would be worthwhile to try a second attempt at the right C4-5 and C5-6 facet joint injections, potentially using different positioning or a different angle with the fluoroscope.  She is not a candidate for radiofrequency ablation given her laminoplasty on the right side.  5.  PATIENT EDUCATION:    -Discussed with the patient that she is not a candidate for radiofrequency ablation given the laminoplasty hardware on the right side.  Discussed that if the facet joint injections do not provide long lasting relief, she can continue with physical therapy.  Osteopathic manipulation was not discussed today, but this could be an option.  -Discussed with the patient that a repeat trial of Botox injections could be performed once it has been 3 months since her last Botox injections (in October).  A higher dose could be used to see if she would better respond to the Botox.  6.  FOLLOW-UP: She would like to follow-up first available for the medial branch block injections.  She is welcome to call with any questions or concerns in the interim.    Subjective:     Layla Starks is a 58 y.o. female who presents today for follow-up regarding right-sided neck pain without radicular arm pain.  The patient reports no change following the ultrasound-guided trigger point injection that was performed on November 27, 2018.  She did not have any  relief even immediately following the injection.  Her pain remains unchanged.  Is just on the right side of the neck.  It does not radiate into the arm.  She denies any numbness tingling or weakness in the arm.  She only has pain in the right side of her neck and no pain in the left side.  She rates her pain at an 8 out of 10.  At best it is an 8 out of 10.  At worst it is a 10 out of 10.  Her pain is worse anytime that she is upright.  The only relief she has is when she lays down.  She has been cleared to start physical therapy by her surgeon and this is scheduled for later this month.  She denies any new symptoms at this time.  Patient continues to have persistent headaches with dizziness.    Past medical history is reviewed and is unchanged for any new medical diagnoses in the interim.      Family history is reviewed and is unchanged in the interim.        Review of Systems:  Positive for headache and dizziness.  Negative for numbness/tingling, weakness, bowel/bladder dysfunction, foot drop, nausea/vomiting, blurred vision, balance changes.     Objective:   CONSTITUTIONAL:  Vital signs as above.  No acute distress.  The patient is well nourished and well groomed.    PSYCHIATRIC:  The patient is awake, alert, oriented to person, place and time.  The patient is answering questions appropriately with clear speech.  Normal affect.  SKIN:  Skin over the face, neck bilateral upper extremities is clean, dry, intact without rashes.  MUSCULOSKELETAL: The patient has 5/5 strength for the bilateral shoulder abductors, elbow flexors/extensors, wrist extensors, finger flexors/abductors.  The patient has restricted range of motion of the cervical spine with flexion and extension.  She denies any pain with cervical flexion or extension but does state that she feels very tight with these motions.  She has moderate to severe restriction with bilateral cervical sidebending, more restriction going to the left than to the right.   Patient has mild restriction with cervical rotation bilaterally.  The patient has tenderness over the base of the neck on the right side.  She has hypertonicity of the bilateral upper trapezius muscles, slightly worse on the right side compared to the left.  The right upper trapezius muscle is more tender to palpation in the left.  NEUROLOGICAL: 2/4 biceps, brachioradialis, triceps reflexes bilaterally.  Negative Mantilla's bilaterally.  Sensation to touch is intact in all of the digits of both upper extremities.

## 2021-06-23 NOTE — PROGRESS NOTES
"CC: Dizziness      HPI      Patient is here for having dizziness described as head and room spinning started around 8 am today. She said she was at a massage therapy earlier today and while in the bathroom she briefly \"passed out\" hitting her right side against the wall and was able to hold onto a bar and did not hit her head. She continues to feel dizzy, worsened with head movement since the. Associated symptoms included nausea and blurry vision. She also reported worsening of her chronic right sided neck pain and frontal headache. NO vomiting, fever, chills.    ROS: Pertinent ROS noted in HPI.     Allergies   Allergen Reactions     Morphine (Pf) Nausea Only     Oxycodone Nausea Only     Tramadol Nausea And Vomiting       Patient Active Problem List   Diagnosis     Degenerative disc disease     Foot pain     Fatigue     Obesity     Cord compression myelopathy (H)     Other cord compression (H)     Myelomalacia of cervical cord (H)     Nerve root compression     Obesity (BMI 30-39.9)     Intractable headache     Osteoarthritis of cervical spine, unspecified spinal osteoarthritis complication status       Family History   Problem Relation Age of Onset     Arthritis Mother      Macular degeneration Mother      Hearing loss Father      Multiple sclerosis Sister        Social History     Socioeconomic History     Marital status:      Spouse name: Not on file     Number of children: Not on file     Years of education: Not on file     Highest education level: Not on file   Social Needs     Financial resource strain: Not on file     Food insecurity - worry: Not on file     Food insecurity - inability: Not on file     Transportation needs - medical: Not on file     Transportation needs - non-medical: Not on file   Occupational History     Not on file   Tobacco Use     Smoking status: Never Smoker     Smokeless tobacco: Never Used     Tobacco comment: was only social smoker   Substance and Sexual Activity     Alcohol " "use: Yes     Alcohol/week: 0.0 oz     Comment: 1 every week - no alcohol since July 2016     Drug use: No     Sexual activity: Yes     Partners: Male   Other Topics Concern     Not on file   Social History Narrative     Not on file         Past Surgical History:   Procedure Laterality Date     BACK SURGERY  09/16/2016    Cervical     BREAST SURGERY  1980    breast reduction     Excision hemorrhoid skin tags        KNEE SURGERY Left 05/17/2016    partial meniscectomy     LAMINOPLASTY Right 9/9/2016    Procedure: CERVICAL LAMINOPLASTY C5 C6 C7 OPEN ON RIGHT WITH FORAMINOTOMIES AT RIGHT C4-5, BILATERAL C5-6 C6-7 ;  Surgeon: Laine Valdez MD;  Location: [facility];  Service:      Left Knee Meniscus Repair Left 08/22/2016         Objective:    Vitals:    01/19/19 1421   BP: 110/78   Pulse: 78   SpO2: 95%       Gen: appeared tired, but no apparent distress  Head: atraumatic, normal inspection and palpation  Ears: TMs clear without effusion, ear canals normal with small cerumen  Nose: no discharge  Eyes: normal conjunctiva, PERRLA, EOMI, no nystagmus   Neck: normal inspection, mild pain to palpation at the base of right neck. No cervical spine pain to palpation. Reduced ROM of neck in all planes except flexion due to pain.  CV: RRR, normal S1S2, no M, R, G  Pulm: CTAB, normal effort  Neuro: Cranial nerves: Normal facial movements, normal speech.  Tongue is midline.  Normal eye movement.  Shoulder shrug is strong and symmetrical.  Skin: dry, warm, no acute lesions    A&P:  Layla Starks is a 58 y.o. female presented with vertigo symptoms with a brief episode of \"passing out\", and continues to have nausea, dizziness, and blurry vision. Exam here is non-focal. Recommended further evaluation in ER. Spoke with [facility] ER provider. Patient going to ER with her .     "

## 2021-06-25 NOTE — PROGRESS NOTES
"[facility] Daily Progress     Patient Name: Layla Starks  Date: 6/3/2021  Visit #:9  PTA visit #: 0  Referral Diagnosis: Cervical Pain  Referring provider: Stevo Waite MD  Visit Diagnosis:     ICD-10-CM    1. Cervical radiculopathy  M54.12    2. Cord compression myelopathy (H)  G95.20    3. Degeneration of intervertebral disc of cervical region  M50.30    4. Intractable headache, unspecified chronicity pattern, unspecified headache type  R51.9    5. Neck pain  M54.2    6. Osteoarthritis of cervical spine, unspecified spinal osteoarthritis complication status  M47.812    7. Other cord compression (H)  G95.29          Assessment:     MRI reveals a C3-4 impingement R    Goal Status:  Pt. will demonstrate/verbalize independence in self-management of condition in : 12 weeks  Pt. will be independent with home exercise program in : 12 weeks    Pt will: improve neck ROM by 10 degrees or greater in all directions for increased function in 12 weeks  Pt will: improve headache frequency and intensity by 50% in 12 weeks      Plan / Patient Education:     Continue with initial plan of care.   Patient agrees with POC.    Patient looking into book \"why do I hurt.\"   Continue with manual therapy.   Continue with UE exercises - patient doing them at [employer] now -  started them on 5/19/2021    Subjective:     Pain: at least an 8/10  Still having the same neck pain.     Objective:      4 sec,   Temporal bone restriction    Lymphatic restriction to neck, Head, Thorax 4 sec   Dural Restrictions to C3,5,7,T2    Treatment Today     TREATMENT MINUTES COMMENTS   Evaluation     Self-care/ Home management     Manual therapy 43 MFR OA   Dural tube pull,   Temporal bone release   Strain Counterstrain  To Jugulodigastric nodes,   Ext Jugular Vein,   Ant Jugular Vein,   C1 Epidural Veins,   Cervical Spinal Veins C3      Neuromuscular Re-education 16 Kinesio Taping of Cervical Strain and R Upper trap "   Therapeutic Activity     Therapeutic Exercises     Gait training     Modality__________________                Total 59    Blank areas are intentional and mean the treatment did not include these items.       Johnny Verma  6/3/2021

## 2021-06-25 NOTE — PROGRESS NOTES
"[facility] Daily Progress     Patient Name: Layla Starks  Date: 5/27/2021  Visit #:8  PTA visit #: 0  Referral Diagnosis: Cervical Pain  Referring provider: Stevo Waite MD  Visit Diagnosis:     ICD-10-CM    1. Cervical radiculopathy  M54.12    2. Cord compression myelopathy (H)  G95.20    3. Degeneration of intervertebral disc of cervical region  M50.30    4. Intractable headache, unspecified chronicity pattern, unspecified headache type  R51.9    5. Neck pain  M54.2    6. Osteoarthritis of cervical spine, unspecified spinal osteoarthritis complication status  M47.812    7. Other cord compression (H)  G95.29    8. S/P cervical spinal fusion  Z98.1          Assessment:     Does feel slightly better with Manual therapy, but still has headaches.    Goal Status:  Pt. will demonstrate/verbalize independence in self-management of condition in : 12 weeks  Pt. will be independent with home exercise program in : 12 weeks    Pt will: improve neck ROM by 10 degrees or greater in all directions for increased function in 12 weeks  Pt will: improve headache frequency and intensity by 50% in 12 weeks      Plan / Patient Education:     Continue with initial plan of care.   Patient agrees with POC.    Patient looking into book \"why do I hurt.\"   Continue with manual therapy.   Continue with UE exercises - patient doing them at [employer] now -  started them on 5/19/2021    Subjective:     Pain: at least an 8-9/10  Still having the same neck pain.     Objective:     Suboccipitals with notable tension and tenderness per patient report.    Treatment Today     TREATMENT MINUTES COMMENTS   Evaluation     Self-care/ Home management 10 Education on referral patterns for neck pain    k tape applied for right UT inhibition   Manual therapy 45 Patient supine: right UT, levator, suboccipital MFR   Neuromuscular Re-education     Therapeutic Activity     Therapeutic Exercises     Gait training   "   Modality__________________                Total 55    Blank areas are intentional and mean the treatment did not include these items.       Shiela Menjivar  5/27/2021

## 2021-06-26 NOTE — PROGRESS NOTES
"[facility] Daily Progress     Patient Name: Layla Starks  Date: 6/9/2021  Visit #:10/20 through 7/19/2021 per cert  PTA visit #: 0  Referral Diagnosis: Cervical Pain  Referring provider: Stevo Waite MD  Visit Diagnosis:     ICD-10-CM    1. Cervical radiculopathy  M54.12    2. Cord compression myelopathy (H)  G95.20    3. Degeneration of intervertebral disc of cervical region  M50.30    4. Intractable headache, unspecified chronicity pattern, unspecified headache type  R51.9    5. Neck pain  M54.2    6. Osteoarthritis of cervical spine, unspecified spinal osteoarthritis complication status  M47.812          Assessment:     MRI reveals a C3-4 impingement R. Patient going to get injection tomorrow.   She has discussed with a surgeon about the possibility of a disc replacement in the future.   Patient states the manual therapy is so far helpful, though it's temporary.   Continues to benefit from further PT.     Patient is working on exercise, diet changes to decrease inflammation, pain education, visits with pain clinics and surgeons, manual therapy in PT.    Goal Status:  Pt. will demonstrate/verbalize independence in self-management of condition in : 12 weeks  Pt. will be independent with home exercise program in : 12 weeks    Pt will: improve neck ROM by 10 degrees or greater in all directions for increased function in 12 weeks  Pt will: improve headache frequency and intensity by 50% in 12 weeks      Plan / Patient Education:     Continue with initial plan of care.   Patient agrees with POC.    Patient getting injection tomorrow.     Patient looking into book \"why do I hurt.\"   Continue with manual therapy.   Continue with UE exercises - patient doing them at [employer] now -  started them on 5/19/2021    Subjective:     Pain: at least an 8/10  Felt good for about an hour after last session.     Did try doing more sets of exercises at the gym and she hurt significantly afterward. " "    Objective:     Right cervical myofascial restrictions.       Treatment Today     TREATMENT MINUTES COMMENTS   Evaluation     Self-care/ Home management 8 Ed on how pain works. Showed patient \"your nerves are having surgery\" book. She will borrow it.   Manual therapy 40 MFR OA   Dural tube pull,   Temporal bone release   Strain Counterstrain  To Jugulodigastric nodes,   Ext Jugular Vein,   Ant Jugular Vein,   C1 Epidural Veins,   Cervical Spinal Veins C3      Neuromuscular Re-education 5 Kinesio Taping of right Upper trap, inhibition of muscles. Did the mini version of taping d/t it being hot outside and patient states, less tape is helpful today.    Therapeutic Activity     Therapeutic Exercises     Gait training     Modality__________________                Total 53    Blank areas are intentional and mean the treatment did not include these items.       Shiela Menjivar, PT, CLT  6/9/2021  "

## 2021-06-26 NOTE — PROGRESS NOTES
[facility] Daily Progress     Patient Name: Layla Starks  Date: 6/17/2021  Visit #:12/20 through 7/19/2021 per cert  PTA visit #: 0  Referral Diagnosis: Cervical Pain  Referring provider: Stevo Waite MD  Visit Diagnosis:     ICD-10-CM    1. Cervical radiculopathy  M54.12    2. Cord compression myelopathy (H)  G95.20    3. Degeneration of intervertebral disc of cervical region  M50.30    4. Intractable headache, unspecified chronicity pattern, unspecified headache type  R51.9    5. Neck pain  M54.2    6. Osteoarthritis of cervical spine, unspecified spinal osteoarthritis complication status  M47.812    7. Other cord compression (H)  G95.29    8. Chronic neck pain  M54.2     G89.29          Assessment:     HA reduced 9/10 to 5/10    Neck pain relaxed but tightens as soon as the pt stands up.  Pt to bring up the issue of possible middle trapezius muscle tension being a possible source of tension to the lower cervical spine to her Dr. When she meets for her Botox injection    Goal Status:  Pt. will demonstrate/verbalize independence in self-management of condition in : 12 weeks  Pt. will be independent with home exercise program in : 12 weeks    Pt will: improve neck ROM by 10 degrees or greater in all directions for increased function in 12 weeks  Pt will: improve headache frequency and intensity by 50% in 12 weeks      Plan / Patient Education:     Continue with initial plan of care.   Patient agrees with POC.    Continue with manual therapy.   Continue with UE exercises - patient doing them at [employer] now -  started them on 5/19/2021    Subjective:     9/10 level neck and head pain   Had C3,4 epidurals done   Botox planned for 6/22    Objective:      3 sec      Treatment Today     TREATMENT MINUTES COMMENTS   Evaluation     Self-care/ Home management     Manual therapy 47 MFR OA,   Sphenoid, Frontal,   Nasal  Releases,   Strain Counterstrain to Ethmoid bone  C1 Extended, Lateral  Dysf   Foreamen Magnum Lat to Med   Ant to Post compression Dysf,   R Occipito mastoid Ant Shear Dysf    Cervical Spinal medularies C3,5,6,  Cervical Spinal R C2,3,4,5,67   Neuromuscular Re-education 13 Kinesio Taping of Middle Trap, Cervical strain   Therapeutic Activity     Therapeutic Exercises     Gait training     Modality__________________                Total 60    Blank areas are intentional and mean the treatment did not include these items.       Johnny Verma, PT, CLT  6/17/2021

## 2021-06-26 NOTE — PROGRESS NOTES
[facility] Daily Progress     Patient Name: Layla Starks  Date: 6/14/2021  Visit #:11/20 through 7/19/2021 per cert  PTA visit #: 0  Referral Diagnosis: Cervical Pain  Referring provider: Stevo Waite MD  Visit Diagnosis:     ICD-10-CM    1. Cervical radiculopathy  M54.12    2. Cord compression myelopathy (H)  G95.20    3. Degeneration of intervertebral disc of cervical region  M50.30    4. Intractable headache, unspecified chronicity pattern, unspecified headache type  R51.9    5. Neck pain  M54.2    6. Osteoarthritis of cervical spine, unspecified spinal osteoarthritis complication status  M47.812    7. Other cord compression (H)  G95.29    8. Chronic neck pain  M54.2     G89.29    9. S/P cervical spinal fusion  Z98.1          Assessment:     At this time, patient states she is still dealing with right neck pain. She reports the manual therapy is helpful temporarily. She has had some injections recently and is looking into getting botox injections.   As she is going through various injections, it would be good to continue with PT to help her progress. If no further progress made, will discharge episode of care.   Patient is working on exercise, diet changes to decrease inflammation, pain education, visits with pain clinics and surgeons, manual therapy in PT.    Goal Status:  Pt. will demonstrate/verbalize independence in self-management of condition in : 12 weeks  Pt. will be independent with home exercise program in : 12 weeks    Pt will: improve neck ROM by 10 degrees or greater in all directions for increased function in 12 weeks  Pt will: improve headache frequency and intensity by 50% in 12 weeks      Plan / Patient Education:     Continue with initial plan of care.   Patient agrees with POC.    Continue with manual therapy.   Continue with UE exercises - patient doing them at [employer] now -  started them on 5/19/2021    Subjective:     Pain: at least an 8/10  Felt good again  "for about an hour after last session.     Going to the gym regularly and has added in PT exercises.    Objective:     Right cervical myofascial restrictions.       Treatment Today     TREATMENT MINUTES COMMENTS   Evaluation     Self-care/ Home management  Ed on how pain works. Showed patient \"your nerves are having surgery\" book. She will borrow it.   Manual therapy 53 MFR to sub-os, right cervical paraspinals, manual stretching    Patient prone and supine.   Neuromuscular Re-education 2 Kinesio Taping of right Upper trap, inhibition of muscles. Did the mini version of taping d/t it being hot outside and patient states, less tape is helpful today.    Therapeutic Activity     Therapeutic Exercises     Gait training     Modality__________________                Total 55    Blank areas are intentional and mean the treatment did not include these items.       Shiela Menjivar, PT, CLT  6/14/2021  "

## 2021-07-03 NOTE — ADDENDUM NOTE
Addendum Note by Alicia Cage DO at 4/4/2018  4:20 PM     Author: Alicia Cage DO Service: -- Author Type: Physician    Filed: 4/4/2018  4:20 PM Date of Service: 4/4/2018  4:20 PM Status: Signed    : Alicia Cage DO (Physician)    Encounter addended by: Alicia Valles DO on: 4/4/2018  4:20 PM<BR>     Actions taken: Sign clinical note

## 2021-07-06 VITALS — SYSTOLIC BLOOD PRESSURE: 112 MMHG | DIASTOLIC BLOOD PRESSURE: 72 MMHG | OXYGEN SATURATION: 97 % | HEART RATE: 68 BPM

## 2021-07-13 ENCOUNTER — RECORDS - HEALTHEAST (OUTPATIENT)
Dept: ADMINISTRATIVE | Facility: CLINIC | Age: 61
End: 2021-07-13

## 2021-07-15 ENCOUNTER — TELEPHONE (OUTPATIENT)
Dept: ORTHOPEDICS | Facility: CLINIC | Age: 61
End: 2021-07-15

## 2021-07-15 ENCOUNTER — HOSPITAL ENCOUNTER (OUTPATIENT)
Dept: PHYSICAL THERAPY | Facility: REHABILITATION | Age: 61
End: 2021-07-15
Payer: COMMERCIAL

## 2021-07-15 ENCOUNTER — OFFICE VISIT (OUTPATIENT)
Dept: FAMILY MEDICINE | Facility: CLINIC | Age: 61
End: 2021-07-15
Payer: COMMERCIAL

## 2021-07-15 VITALS
DIASTOLIC BLOOD PRESSURE: 60 MMHG | HEART RATE: 64 BPM | HEIGHT: 65 IN | SYSTOLIC BLOOD PRESSURE: 100 MMHG | OXYGEN SATURATION: 97 % | WEIGHT: 188.6 LBS | BODY MASS INDEX: 31.42 KG/M2

## 2021-07-15 DIAGNOSIS — G95.20 CORD COMPRESSION MYELOPATHY (H): ICD-10-CM

## 2021-07-15 DIAGNOSIS — M54.2 CHRONIC NECK PAIN: ICD-10-CM

## 2021-07-15 DIAGNOSIS — M54.12 CERVICAL RADICULOPATHY: ICD-10-CM

## 2021-07-15 DIAGNOSIS — M47.812 OSTEOARTHRITIS OF CERVICAL SPINE, UNSPECIFIED SPINAL OSTEOARTHRITIS COMPLICATION STATUS: ICD-10-CM

## 2021-07-15 DIAGNOSIS — M54.2 NECK PAIN: ICD-10-CM

## 2021-07-15 DIAGNOSIS — Z00.00 ANNUAL PHYSICAL EXAM: ICD-10-CM

## 2021-07-15 DIAGNOSIS — M50.30 DEGENERATION OF INTERVERTEBRAL DISC OF CERVICAL REGION: ICD-10-CM

## 2021-07-15 DIAGNOSIS — M54.12 CERVICAL RADICULOPATHY: Primary | ICD-10-CM

## 2021-07-15 DIAGNOSIS — Z12.31 ENCOUNTER FOR SCREENING MAMMOGRAM FOR BREAST CANCER: Primary | ICD-10-CM

## 2021-07-15 DIAGNOSIS — R51.9 INTRACTABLE HEADACHE, UNSPECIFIED CHRONICITY PATTERN, UNSPECIFIED HEADACHE TYPE: ICD-10-CM

## 2021-07-15 DIAGNOSIS — G89.29 CHRONIC NECK PAIN: ICD-10-CM

## 2021-07-15 PROBLEM — M50.10 CERVICAL DISC DISORDER WITH RADICULOPATHY OF CERVICAL REGION: Status: RESOLVED | Noted: 2017-11-27 | Resolved: 2021-07-15

## 2021-07-15 LAB
ALBUMIN SERPL-MCNC: 4.2 G/DL (ref 3.5–5)
ANION GAP SERPL CALCULATED.3IONS-SCNC: 14 MMOL/L (ref 5–18)
BUN SERPL-MCNC: 14 MG/DL (ref 8–22)
CALCIUM SERPL-MCNC: 9.5 MG/DL (ref 8.5–10.5)
CHLORIDE BLD-SCNC: 105 MMOL/L (ref 98–107)
CHOLEST SERPL-MCNC: 195 MG/DL
CO2 SERPL-SCNC: 23 MMOL/L (ref 22–31)
CREAT SERPL-MCNC: 0.76 MG/DL (ref 0.6–1.1)
FASTING STATUS PATIENT QL REPORTED: YES
GFR SERPL CREATININE-BSD FRML MDRD: 86 ML/MIN/1.73M2
GLUCOSE BLD-MCNC: 87 MG/DL (ref 70–125)
HDLC SERPL-MCNC: 63 MG/DL
HIV 1+2 AB+HIV1 P24 AG SERPL QL IA: NEGATIVE
LDLC SERPL CALC-MCNC: 117 MG/DL
PHOSPHATE SERPL-MCNC: 3.8 MG/DL (ref 2.5–4.5)
POTASSIUM BLD-SCNC: 4.6 MMOL/L (ref 3.5–5)
SODIUM SERPL-SCNC: 142 MMOL/L (ref 136–145)
TRIGL SERPL-MCNC: 73 MG/DL
TSH SERPL DL<=0.005 MIU/L-ACNC: 1.63 UIU/ML (ref 0.3–5)

## 2021-07-15 PROCEDURE — 36415 COLL VENOUS BLD VENIPUNCTURE: CPT | Performed by: FAMILY MEDICINE

## 2021-07-15 PROCEDURE — 87389 HIV-1 AG W/HIV-1&-2 AB AG IA: CPT | Performed by: FAMILY MEDICINE

## 2021-07-15 PROCEDURE — 97110 THERAPEUTIC EXERCISES: CPT | Mod: GP | Performed by: PHYSICAL THERAPIST

## 2021-07-15 PROCEDURE — 84443 ASSAY THYROID STIM HORMONE: CPT | Performed by: FAMILY MEDICINE

## 2021-07-15 PROCEDURE — 82306 VITAMIN D 25 HYDROXY: CPT | Performed by: FAMILY MEDICINE

## 2021-07-15 PROCEDURE — 80061 LIPID PANEL: CPT | Performed by: FAMILY MEDICINE

## 2021-07-15 PROCEDURE — 86803 HEPATITIS C AB TEST: CPT | Performed by: FAMILY MEDICINE

## 2021-07-15 PROCEDURE — 99396 PREV VISIT EST AGE 40-64: CPT | Performed by: FAMILY MEDICINE

## 2021-07-15 PROCEDURE — 80069 RENAL FUNCTION PANEL: CPT | Performed by: FAMILY MEDICINE

## 2021-07-15 RX ORDER — DULOXETIN HYDROCHLORIDE 60 MG/1
60 CAPSULE, DELAYED RELEASE ORAL DAILY
Qty: 90 CAPSULE | Refills: 3 | Status: SHIPPED | OUTPATIENT
Start: 2021-07-15 | End: 2022-07-29

## 2021-07-15 RX ORDER — GABAPENTIN 300 MG/1
300 CAPSULE ORAL
Qty: 90 CAPSULE | Refills: 3
Start: 2021-07-15 | End: 2021-09-09

## 2021-07-15 RX ORDER — DULOXETIN HYDROCHLORIDE 30 MG/1
60 CAPSULE, DELAYED RELEASE ORAL
COMMUNITY
Start: 2021-05-24 | End: 2021-07-15

## 2021-07-15 ASSESSMENT — MIFFLIN-ST. JEOR: SCORE: 1423.42

## 2021-07-15 NOTE — PROGRESS NOTES
SUBJECTIVE:   CC: Layla Starks is an 60 year old woman who presents for preventive health visit.       Patient has been advised of split billing requirements and indicates understanding: Yes     HPI Layla is a 60-year-old female presenting today for an annual physical exam.  She has no specific complaints or concerns at today's visit.  She continues to work closely with her spine team to try to relieve her chronic neck pain.        Today's PHQ-2 Score:   PHQ-2 (  [employer]) 2021   Q1: Little interest or pleasure in doing things 0   Q2: Feeling down, depressed or hopeless 0   PHQ-2 Score 0   Q1: Little interest or pleasure in doing things Not at all   Q2: Feeling down, depressed or hopeless Not at all   PHQ-2 Score 0       Abuse: Current or Past (Physical, Sexual or Emotional) - No  Do you feel safe in your environment? Yes    Have you ever done Advance Care Planning? (For example, a Health Directive, POLST, or a discussion with a medical provider or your loved ones about your wishes): Patient declined    Social History     Tobacco Use     Smoking status: Former Smoker     Packs/day: 0.50     Years: 1.00     Pack years: 0.50     Types: Cigarettes     Quit date:      Years since quittin.5     Smokeless tobacco: Never Used     Tobacco comment: Quit 30 years ago   Substance Use Topics     Alcohol use: Yes     Alcohol/week: 1.0 - 2.0 standard drinks     Types: 1 - 2 Standard drinks or equivalent per week     Comment: rare     If you drink alcohol do you typically have >3 drinks per day or >7 drinks per week? No    Alcohol Use 7/15/2021   Prescreen: >3 drinks/day or >7 drinks/week? -   Prescreen: >3 drinks/day or >7 drinks/week? No       Reviewed orders with patient.  Reviewed health maintenance and updated orders accordingly - Yes      Breast Cancer Screening:  Any new diagnosis of family breast, ovarian, or bowel cancer? No    FHS-7: No flowsheet data found.  click delete button to remove this  "line now  Mammogram Screening: Recommended mammography every 1-2 years with patient discussion and risk factor consideration  Pertinent mammograms are reviewed under the imaging tab.    History of abnormal Pap smear: YES - other categories - see link Cervical Cytology Screening Guidelines     Reviewed and updated as needed this visit by clinical staff  Tobacco  Allergies  Meds              Reviewed and updated as needed this visit by Provider                    Review of Systems  CONSTITUTIONAL: NEGATIVE for fever, chills, change in weight  INTEGUMENTARY/SKIN: NEGATIVE for worrisome rashes, moles or lesions  EYES: NEGATIVE for vision changes or irritation  ENT: NEGATIVE for ear, mouth and throat problems  RESP: NEGATIVE for significant cough or SOB  BREAST: NEGATIVE for masses, tenderness or discharge  CV: NEGATIVE for chest pain, palpitations or peripheral edema  GI: NEGATIVE for nausea, abdominal pain, heartburn, or change in bowel habits  : NEGATIVE for unusual urinary or vaginal symptoms. No vaginal bleeding.  NEURO: NEGATIVE for weakness, dizziness or paresthesias  PSYCHIATRIC: NEGATIVE for changes in mood or affect      OBJECTIVE:   /60 (BP Location: Left arm, Patient Position: Left side, Cuff Size: Adult Large)   Pulse 64   Ht 1.638 m (5' 4.5\")   Wt 85.5 kg (188 lb 9.6 oz)   LMP 02/01/2017   SpO2 97%   BMI 31.87 kg/m    Physical Exam  GENERAL: healthy, alert and no distress  EYES: Eyes grossly normal to inspection, PERRL and conjunctivae and sclerae normal  HENT: ear canals and TM's normal, nose and mouth without ulcers or lesions  NECK: no adenopathy, no asymmetry, masses, or scars and thyroid normal to palpation  RESP: lungs clear to auscultation - no rales, rhonchi or wheezes  BREAST: normal without masses, tenderness or nipple discharge and no palpable axillary masses or adenopathy  CV: regular rate and rhythm, normal S1 S2, no S3 or S4, no murmur, click or rub, no peripheral edema and " "peripheral pulses strong  ABDOMEN: soft, nontender, no hepatosplenomegaly, no masses and bowel sounds normal  MS: no gross musculoskeletal defects noted, no edema  SKIN: no suspicious lesions or rashes  NEURO: Normal strength and tone, mentation intact and speech normal  PSYCH: mentation appears normal, affect normal/bright        ASSESSMENT/PLAN:     Problem List Items Addressed This Visit        Nervous and Auditory    Cervical radiculopathy    Relevant Medications    DULoxetine (CYMBALTA) 60 MG capsule    gabapentin (NEURONTIN) 300 MG capsule      Other Visit Diagnoses     Encounter for screening mammogram for breast cancer    -  Primary    Relevant Orders    *MA Screening Digital Bilateral    Annual physical exam        Relevant Orders    Renal panel    Lipid Profile    TSH with free T4 reflex    Vitamin D Deficiency    HIV Antigen Antibody Combo    Hepatitis C antibody          Patient has been advised of split billing requirements and indicates understanding: Yes  COUNSELING:  Reviewed preventive health counseling, as reflected in patient instructions       Regular exercise       Healthy diet/nutrition       Osteoporosis prevention/bone health       Advance Care Planning    Estimated body mass index is 31.87 kg/m  as calculated from the following:    Height as of this encounter: 1.638 m (5' 4.5\").    Weight as of this encounter: 85.5 kg (188 lb 9.6 oz).    Weight management plan: Discussed healthy diet and exercise guidelines    She reports that she quit smoking about 42 years ago. Her smoking use included cigarettes. She has a 0.50 pack-year smoking history. She has never used smokeless tobacco.      Counseling Resources:  ATP IV Guidelines  Pooled Cohorts Equation Calculator  Breast Cancer Risk Calculator  BRCA-Related Cancer Risk Assessment: FHS-7 Tool  FRAX Risk Assessment  ICSI Preventive Guidelines  Dietary Guidelines for Americans, 2010  USDA's MyPlate  ASA Prophylaxis  Lung CA Screening    MELIA BROWN" MARS  [facility]

## 2021-07-16 LAB
DEPRECATED CALCIDIOL+CALCIFEROL SERPL-MC: 52 UG/L (ref 30–80)
HCV AB SERPL QL IA: NEGATIVE

## 2021-07-16 NOTE — TELEPHONE ENCOUNTER
See last dictation addendum 5-13-21 after  reviewed case with  & ordered Right C4 NRB with steroid to be done by Radiology not .    I called pt &  relayed above & she stated she saw pain specialist at [facility] for spinal cord stimulator trial  & had an injection done with sedation she thinks it was Right C3-4 Transforaminal epidural injection done in June but unsure if done with imaging.  When I asked about pain response after injection she stated 100% even after sedation wore off.  I asked her to call [facility] & sign MARYCRUZ & have imaging & report &  records sent to us & then call us back to confirm  we got them & then make RTN  appt with  to discuss results & plan.  She agreed.  Call back prn. Pt. Agreed.    Informed  who agreed with plan. Kellie Castillo RN.

## 2021-07-19 ENCOUNTER — HOSPITAL ENCOUNTER (OUTPATIENT)
Dept: PHYSICAL THERAPY | Facility: REHABILITATION | Age: 61
End: 2021-07-19
Payer: COMMERCIAL

## 2021-07-19 DIAGNOSIS — M25.511 PAIN IN JOINT OF RIGHT SHOULDER: ICD-10-CM

## 2021-07-19 DIAGNOSIS — R51.9 INTRACTABLE HEADACHE, UNSPECIFIED CHRONICITY PATTERN, UNSPECIFIED HEADACHE TYPE: ICD-10-CM

## 2021-07-19 DIAGNOSIS — M54.2 CHRONIC NECK PAIN: ICD-10-CM

## 2021-07-19 DIAGNOSIS — M50.30 DEGENERATION OF INTERVERTEBRAL DISC OF CERVICAL REGION: ICD-10-CM

## 2021-07-19 DIAGNOSIS — Z98.1 S/P CERVICAL SPINAL FUSION: ICD-10-CM

## 2021-07-19 DIAGNOSIS — M54.2 NECK PAIN: ICD-10-CM

## 2021-07-19 DIAGNOSIS — G89.29 CHRONIC NECK PAIN: ICD-10-CM

## 2021-07-19 DIAGNOSIS — M54.12 CERVICAL RADICULOPATHY: Primary | ICD-10-CM

## 2021-07-19 DIAGNOSIS — M47.812 OSTEOARTHRITIS OF CERVICAL SPINE, UNSPECIFIED SPINAL OSTEOARTHRITIS COMPLICATION STATUS: ICD-10-CM

## 2021-07-19 PROCEDURE — 97110 THERAPEUTIC EXERCISES: CPT | Mod: GP | Performed by: PHYSICAL THERAPIST

## 2021-07-19 PROCEDURE — 97112 NEUROMUSCULAR REEDUCATION: CPT | Mod: GP | Performed by: PHYSICAL THERAPIST

## 2021-07-19 PROCEDURE — 97140 MANUAL THERAPY 1/> REGIONS: CPT | Mod: GP | Performed by: PHYSICAL THERAPIST

## 2021-07-19 NOTE — PROGRESS NOTES
OUTPATIENT PHYSICAL THERAPY  PLAN OF TREATMENT FOR OUTPATIENT REHABILITATION AND PROGRESS NOTE           Patient's Last Name, First Name, Layla Rob Date of Birth  1960   Provider's Name  [facility] Medical Record No.  [id number]    Onset Date  01/2016 Start of Care Date  04/28/21   Type:     _X_PT   ___OT   ___SLP Medical Diagnosis  Cervical Pain   PT Diagnosis  Cervical Pain Plan of Treatment  Frequency/Duration: 1x/week  Certification date from 07/19/21 to 09/30/21     Goals:  Goal Identifier     Goal Description     Target Date     Date Met      Progress (detail required for progress note):     Goal Identifier     Goal Description     Target Date     Date Met      Progress (detail required for progress note):     Goal Identifier     Goal Description  patient will improve neck range of motion by 10 degrees or greater directions in 12 weeks   Target Date     Date Met      Progress (detail required for progress note): Patient demonstrates 5-6 degrees of cervical rotation improvement and 7-9 degrees of flexion extension improved     Goal Identifier     Goal Description  patient will improve headache frequency and intensity by 50% to 12 weeks   Target Date     Date Met      Progress (detail required for progress note): Unfortunately patient continues to have daily headaches though not as severe as in the past     Goal Identifier     Goal Description     Target Date     Date Met      Progress (detail required for progress note):     Goal Identifier     Goal Description     Target Date     Date Met      Progress (detail required for progress note):     Goal Identifier     Goal Description     Target Date     Date Met      Progress (detail required for progress note):     Goal Identifier     Goal Description  patient will be independent with her home exercise program in 12 weeks   Target Date     Date Met      Progress (detail required for progress note):  Patient has embarked upon a Walter cervical extension protocol which was performed in the supine position.  This was started today and will continue.  She had been on a home pneumatic cervical traction program but with no results.       Beginning/End Dates of Progress Note Reporting Period:  07/19/21 to 09/28/21    Progress Toward Goals:   Progress this reporting period: 04/28/21    Client Self (Subjective) Report for Progress Note Reporting Period:      Outcome Measures (Most Recent Score):         Objective Measurements:                                         Yes         I CERTIFY THE NEED FOR THESE SERVICES FURNISHED UNDER        THIS PLAN OF TREATMENT AND WHILE UNDER MY CARE     (Physician co-signature of this document indicates review and certification of the therapy plan).                Referring Provider: Stevo Leroy, PT

## 2021-07-22 NOTE — ADDENDUM NOTE
Encounter addended by: Johnny Verma, PT on: 7/22/2021 1:47 PM   Actions taken: Charge Capture section accepted

## 2021-07-26 ENCOUNTER — HOSPITAL ENCOUNTER (OUTPATIENT)
Dept: PHYSICAL THERAPY | Facility: REHABILITATION | Age: 61
End: 2021-07-26
Payer: COMMERCIAL

## 2021-07-26 DIAGNOSIS — M54.12 CERVICAL RADICULOPATHY: Primary | ICD-10-CM

## 2021-07-26 DIAGNOSIS — G89.29 CHRONIC NECK PAIN: ICD-10-CM

## 2021-07-26 DIAGNOSIS — M54.2 CHRONIC NECK PAIN: ICD-10-CM

## 2021-07-26 DIAGNOSIS — M47.812 OSTEOARTHRITIS OF CERVICAL SPINE, UNSPECIFIED SPINAL OSTEOARTHRITIS COMPLICATION STATUS: ICD-10-CM

## 2021-07-26 DIAGNOSIS — R51.9 INTRACTABLE HEADACHE, UNSPECIFIED CHRONICITY PATTERN, UNSPECIFIED HEADACHE TYPE: ICD-10-CM

## 2021-07-26 DIAGNOSIS — M54.2 NECK PAIN: ICD-10-CM

## 2021-07-26 DIAGNOSIS — M50.30 DEGENERATION OF INTERVERTEBRAL DISC OF CERVICAL REGION: ICD-10-CM

## 2021-07-26 DIAGNOSIS — G95.20 CORD COMPRESSION MYELOPATHY (H): ICD-10-CM

## 2021-07-26 PROCEDURE — 97140 MANUAL THERAPY 1/> REGIONS: CPT | Mod: GP | Performed by: PHYSICAL THERAPIST

## 2021-07-29 ENCOUNTER — HOSPITAL ENCOUNTER (OUTPATIENT)
Dept: PHYSICAL THERAPY | Facility: REHABILITATION | Age: 61
End: 2021-07-29
Payer: COMMERCIAL

## 2021-07-29 DIAGNOSIS — M50.30 DEGENERATION OF INTERVERTEBRAL DISC OF CERVICAL REGION: ICD-10-CM

## 2021-07-29 DIAGNOSIS — G89.29 CHRONIC NECK PAIN: ICD-10-CM

## 2021-07-29 DIAGNOSIS — M54.12 CERVICAL RADICULOPATHY: Primary | ICD-10-CM

## 2021-07-29 DIAGNOSIS — R51.9 INTRACTABLE HEADACHE, UNSPECIFIED CHRONICITY PATTERN, UNSPECIFIED HEADACHE TYPE: ICD-10-CM

## 2021-07-29 DIAGNOSIS — M54.2 NECK PAIN: ICD-10-CM

## 2021-07-29 DIAGNOSIS — M54.2 CHRONIC NECK PAIN: ICD-10-CM

## 2021-07-29 PROCEDURE — 97140 MANUAL THERAPY 1/> REGIONS: CPT | Mod: GP | Performed by: PHYSICAL THERAPIST

## 2021-07-29 NOTE — PROGRESS NOTES
Outpatient Physical Therapy Discharge Note     Patient: Layla Starks  : 1960    Beginning/End Dates of Reporting Period:  21 to 21    Referring Provider: Stevo Waite Diagnosis: Cervical Pain     Client Self Report:      Objective Measurements:   Patient shows a  slight improv-  ement in her  range of motion  measurements  over the course  of the past  month flexion  from 15 degrees  to 24 degrees  extension slig-  ht regression  34 degrees to  32 degrees rig-  ht sidebending  improved 14  degrees to 22  degrees left  sidebending 10  degrees to 60  degrees.  Cerv-  ical rotation  to the right is  currently at  47 degrees and  to the left at  55 degrees       Goals:  Goal Identifier     Goal Description Patient will improve her neck range of motion by 10 degrees or greater in all planes of motion for increased function in her daily routine.  Time frame 12 weeks   Target Date     Date Met      Progress (detail required for progress note): Met for some selective ROM measurents but pain levels remain unchanged     Goal Identifier     Goal Description Patient will improve her headache frequency and intensity by 50% in 12 weeks.   Target Date     Date Met      Progress (detail required for progress note): Not met.  Headache frequency and occurrences persist unusual daily basis     Goal Identifier     Goal Description Patient will demonstrate and verbalize independence in self management of her neck pain and headache issues.   Target Date     Date Met      Progress (detail required for progress note): Met patient independent with exercises however, no significant effect upon pain level.     Goal Identifier     Goal Description     Target Date     Date Met      Progress (detail required for progress note):     Goal Identifier     Goal Description     Target Date     Date Met      Progress (detail required for progress note):     Goal Identifier     Goal Description     Target  Date     Date Met      Progress (detail required for progress note):     Goal Identifier     Goal Description     Target Date     Date Met      Progress (detail required for progress note):     Goal Identifier     Goal Description     Target Date     Date Met      Progress (detail required for progress note):           Plan:  Discharge from therapy.  Despite having gained some range of motion there has been no effect on her pain levels.  When releases were performed to the patient's discs in the cervical spine there is no corresponding relief from pain achieved.  Pressure on the nerves persists with the resulting maintenance of pain levels which can be as high as 9/10.  Patient is able to relieve her pain by lying supine.  There are also present cervical facet restrictions some of which have been released.  These may be contributing to this pain issue.    Discharge:    Reason for Discharge: No further expectation of progress.    Equipment Issued: None    Discharge Plan: Other services: Patient will be seeing a rheumatoid specialist as her next step.  Further Botox injections have been planned but, patient will be discussing this with her physician..

## 2021-07-31 ENCOUNTER — HEALTH MAINTENANCE LETTER (OUTPATIENT)
Age: 61
End: 2021-07-31

## 2021-09-09 ENCOUNTER — ANCILLARY PROCEDURE (OUTPATIENT)
Dept: GENERAL RADIOLOGY | Facility: CLINIC | Age: 61
End: 2021-09-09
Attending: FAMILY MEDICINE
Payer: COMMERCIAL

## 2021-09-09 ENCOUNTER — OFFICE VISIT (OUTPATIENT)
Dept: FAMILY MEDICINE | Facility: CLINIC | Age: 61
End: 2021-09-09
Payer: COMMERCIAL

## 2021-09-09 VITALS
SYSTOLIC BLOOD PRESSURE: 100 MMHG | DIASTOLIC BLOOD PRESSURE: 62 MMHG | BODY MASS INDEX: 31.49 KG/M2 | HEART RATE: 74 BPM | WEIGHT: 189 LBS | OXYGEN SATURATION: 97 % | HEIGHT: 65 IN

## 2021-09-09 DIAGNOSIS — M54.2 NECK PAIN: ICD-10-CM

## 2021-09-09 DIAGNOSIS — Z01.818 PREOP GENERAL PHYSICAL EXAM: ICD-10-CM

## 2021-09-09 DIAGNOSIS — G44.86 CERVICOGENIC HEADACHE: ICD-10-CM

## 2021-09-09 DIAGNOSIS — Z01.818 PREOP GENERAL PHYSICAL EXAM: Primary | ICD-10-CM

## 2021-09-09 LAB
ALBUMIN SERPL-MCNC: 4.2 G/DL (ref 3.5–5)
ANION GAP SERPL CALCULATED.3IONS-SCNC: 10 MMOL/L (ref 5–18)
ATRIAL RATE - MUSE: 59 BPM
BUN SERPL-MCNC: 12 MG/DL (ref 8–22)
CALCIUM SERPL-MCNC: 9.4 MG/DL (ref 8.5–10.5)
CHLORIDE BLD-SCNC: 104 MMOL/L (ref 98–107)
CO2 SERPL-SCNC: 26 MMOL/L (ref 22–31)
CREAT SERPL-MCNC: 0.77 MG/DL (ref 0.6–1.1)
DIASTOLIC BLOOD PRESSURE - MUSE: NORMAL MMHG
ERYTHROCYTE [DISTWIDTH] IN BLOOD BY AUTOMATED COUNT: 12.9 % (ref 10–15)
GFR SERPL CREATININE-BSD FRML MDRD: 85 ML/MIN/1.73M2
GLUCOSE BLD-MCNC: 84 MG/DL (ref 70–125)
HCT VFR BLD AUTO: 42.4 % (ref 35–47)
HGB BLD-MCNC: 14.1 G/DL (ref 11.7–15.7)
INTERPRETATION ECG - MUSE: NORMAL
MCH RBC QN AUTO: 29.7 PG (ref 26.5–33)
MCHC RBC AUTO-ENTMCNC: 33.3 G/DL (ref 31.5–36.5)
MCV RBC AUTO: 89 FL (ref 78–100)
P AXIS - MUSE: 21 DEGREES
PHOSPHATE SERPL-MCNC: 3.7 MG/DL (ref 2.5–4.5)
PLATELET # BLD AUTO: 283 10E3/UL (ref 150–450)
POTASSIUM BLD-SCNC: 3.8 MMOL/L (ref 3.5–5)
PR INTERVAL - MUSE: 174 MS
QRS DURATION - MUSE: 80 MS
QT - MUSE: 432 MS
QTC - MUSE: 427 MS
R AXIS - MUSE: 26 DEGREES
RBC # BLD AUTO: 4.75 10E6/UL (ref 3.8–5.2)
SODIUM SERPL-SCNC: 140 MMOL/L (ref 136–145)
SYSTOLIC BLOOD PRESSURE - MUSE: NORMAL MMHG
T AXIS - MUSE: 17 DEGREES
VENTRICULAR RATE- MUSE: 59 BPM
WBC # BLD AUTO: 5.5 10E3/UL (ref 4–11)

## 2021-09-09 PROCEDURE — 93005 ELECTROCARDIOGRAM TRACING: CPT | Performed by: FAMILY MEDICINE

## 2021-09-09 PROCEDURE — 99214 OFFICE O/P EST MOD 30 MIN: CPT | Performed by: FAMILY MEDICINE

## 2021-09-09 PROCEDURE — 80069 RENAL FUNCTION PANEL: CPT | Performed by: FAMILY MEDICINE

## 2021-09-09 PROCEDURE — 36415 COLL VENOUS BLD VENIPUNCTURE: CPT | Performed by: FAMILY MEDICINE

## 2021-09-09 PROCEDURE — 85027 COMPLETE CBC AUTOMATED: CPT | Performed by: FAMILY MEDICINE

## 2021-09-09 PROCEDURE — 71046 X-RAY EXAM CHEST 2 VIEWS: CPT | Mod: TC | Performed by: RADIOLOGY

## 2021-09-09 PROCEDURE — 93010 ELECTROCARDIOGRAM REPORT: CPT | Performed by: INTERNAL MEDICINE

## 2021-09-09 ASSESSMENT — MIFFLIN-ST. JEOR: SCORE: 1425.24

## 2021-09-09 NOTE — PROGRESS NOTES
[facility]  [address]  [facility] [address]  Phone: [phone]  Fax: [phone]  Primary Provider: Alicia Bettencourt  Pre-op Performing Provider: ALICIA BETTENCOURT    PREOPERATIVE EVALUATION:  Today's date: 9/9/2021    Layla Starks is a 60 year old female who presents for a preoperative evaluation.    Surgical Information:  Surgery/Procedure: Cervical Artificial Disc Replacement  Surgery Location: [facility]  Surgeon: Dr Garvin  Surgery Date: 09/23/2021  Time of Surgery: 6-730  Where patient plans to recover: At home with family  Fax number for surgical facility: [phone]    Type of Anesthesia Anticipated: General    Assessment & Plan     The proposed surgical procedure is considered INTERMEDIATE risk.    Problem List Items Addressed This Visit     None      Visit Diagnoses     Preop general physical exam    -  Primary    Relevant Orders    EKG 12-lead, tracing only (Completed)    CBC with platelets (Completed)    Renal panel (Completed)    XR Chest 2 Views (Completed)    Neck pain        Relevant Medications    acetaminophen-codeine (TYLENOL #3) 300-30 MG tablet    Cervicogenic headache        Relevant Medications    acetaminophen-codeine (TYLENOL #3) 300-30 MG tablet               Risks and Recommendations:  The patient has the following additional risks and recommendations for perioperative complications:   - No identified additional risk factors other than previously addressed    Medication Instructions:   - ibuprofen (Advil, Motrin): HOLD 1 day before surgery.     RECOMMENDATION:  APPROVAL GIVEN to proceed with proposed procedure, without further diagnostic evaluation.  [id number]}    Subjective     HPI related to upcoming procedure: The patient has a history of cervical spine fusion, however, the surgery did not provide any relief of her pain. She is hoping that a cervical disc replacement will provide some relief.     Preop Questions  9/6/2021   1. Have you ever had a heart attack or stroke? No   2. Have you ever had surgery on your heart or blood vessels, such as a stent placement, a coronary artery bypass, or surgery on an artery in your head, neck, heart, or legs? No   3. Do you have chest pain with activity? No   4. Do you have a history of  heart failure? No   5. Do you currently have a cold, bronchitis or symptoms of other infection? No   6. Do you have a cough, shortness of breath, or wheezing? No   7. Do you or anyone in your family have previous history of blood clots? No   8. Do you or does anyone in your family have a serious bleeding problem such as prolonged bleeding following surgeries or cuts? No   9. Have you ever had problems with anemia or been told to take iron pills? No   10. Have you had any abnormal blood loss such as black, tarry or bloody stools, or abnormal vaginal bleeding? No   11. Have you ever had a blood transfusion? No   12. Are you willing to have a blood transfusion if it is medically needed before, during, or after your surgery? Yes   13. Have you or any of your relatives ever had problems with anesthesia? No   14. Do you have sleep apnea, excessive snoring or daytime drowsiness? No   15. Do you have any artifical heart valves or other implanted medical devices like a pacemaker, defibrillator, or continuous glucose monitor? No   16. Do you have artificial joints? No   17. Are you allergic to latex? No   18. Is there any chance that you may be pregnant? No       Health Care Directive:  Patient does not have a Health Care Directive or Living Will: Discussed advance care planning with patient; however, patient declined at this time.    Preoperative Review of :   reviewed - controlled substances reflected in medication list.      Status of Chronic Conditions:  See problem list for active medical problems.  Problems all longstanding and stable, except as noted/documented.  See ROS for pertinent symptoms related  to these conditions.      Review of Systems  CONSTITUTIONAL: NEGATIVE for fever, chills, change in weight  INTEGUMENTARY/SKIN: NEGATIVE for worrisome rashes, moles or lesions  EYES: NEGATIVE for vision changes or irritation  ENT/MOUTH: NEGATIVE for ear, mouth and throat problems  RESP: NEGATIVE for significant cough or SOB  CV: NEGATIVE for chest pain, palpitations or peripheral edema  GI: NEGATIVE for nausea, abdominal pain, heartburn, or change in bowel habits  : NEGATIVE for frequency, dysuria, or hematuria  MUSCULOSKELETAL: NEGATIVE for significant arthralgias or myalgia  NEURO: NEGATIVE for weakness, dizziness or paresthesias  ENDOCRINE: NEGATIVE for temperature intolerance, skin/hair changes  HEME: NEGATIVE for bleeding problems  PSYCHIATRIC: NEGATIVE for changes in mood or affect    Patient Active Problem List    Diagnosis Date Noted     Cervical radiculopathy 01/22/2021     Priority: Medium     Added automatically from request for surgery [id number]       Cervical spondylosis 01/22/2021     Priority: Medium     Added automatically from request for surgery [id number]       S/P cervical spinal fusion 01/22/2021     Priority: Medium     Added automatically from request for surgery [id number]       Cervicalgia 12/13/2017     Priority: Medium      Past Medical History:   Diagnosis Date     Cervical disc disorder with radiculopathy of cervical region 11/27/2017     Cervical spinal stenosis      Cervical spinal stenosis      Constipation      DDD (degenerative disc disease), cervical      Degenerative disc disease      Depression      Fatigue      Foot pain      SHAHIDA (generalized anxiety disorder)      Medial meniscus tear     knee     OA (osteoarthritis)      Obesity      Obesity      Perimenopause      Personal history of covid-19      PONV (postoperative nausea and vomiting)      PONV (postoperative nausea and vomiting)      Past Surgical History:   Procedure Laterality Date     AS HYSTEROSCOPY, SURGICAL; W/ ENDOMETRIAL  ABLATION, ANY METHOD N/A      BACK SURGERY  09/16/2016    Cervical     BREAST SURGERY  1980    breast reduction     C5-7 laminoplasty Right 09/09/2016     COLONOSCOPY  2016     DECOMPRESSION, FUSION CERVICAL ANTERIOR THREE+ LEVELS, COMBINED N/A 03/13/2019    C4-5, C6-7, C5-6 with hardware removal     Excision anal skin tags N/A 01/18/2010     EXPLORE SPINE, REMOVE HARDWARE, COMBINED N/A 2/19/2021    Procedure: Removal of Laminoplasty Plates Cervical 4-7;;  Surgeon: Stevo Waite MD;  Location: [facility] OR     FORAMINOTOMY CERVICAL POSTERIOR MINIMALLY INVASIVE ONE LEVEL Right 12/13/2017    Procedure: FORAMINOTOMY CERVICAL POSTERIOR MINIMALLY INVASIVE ONE LEVEL;  Right Minimally Invasive Posterior Cervical 4-5 Foraminotomy;  Surgeon: Byron Barclay MD;  Location: [facility] OR     FUSION CERVICAL ANTERIOR ONE LEVEL N/A 08/06/2018    unspecified cervical fusion     GRAFT BONE FROM ILIAC CREST Right 2/19/2021    Procedure: Right Posterior Iliac Crest Bone Graft [place];  Surgeon: Stevo Waite MD;  Location:  OR     HEAD & NECK SURGERY  9-9-2016, 12/13/17, 8/6/18, 3/13/19    laminoplasty C-4 to C-6, Foraminotomy C45, Fuse C56, C34 C67     INJECT BLOCK MEDIAL BRANCH CERVICAL/THORACIC/LUMBAR Right 5/24/2021    Procedure: BLOCK, NERVE, FACET JOINT, MEDIAL BRANCH, DIAGNOSTIC - Right C2-3 Third Occipital Nerve (TON);  Surgeon: Dyan Ackerman MD;  Location: [facility] OR     KNEE SURGERY Left 05/17/2016    partial meniscectomy     LAMINOPLASTY Right 9/9/2016    Procedure: CERVICAL LAMINOPLASTY C5 C6 C7 OPEN ON RIGHT WITH FORAMINOTOMIES AT RIGHT C4-5, BILATERAL C5-6 C6-7 ;  Surgeon: Laine Valdez MD;  Location: [facility];  Service:      MAMMOPLASTY REDUCTION  1980     MAMMOPLASTY REDUCTION BILATERAL Bilateral 1980     OPTICAL TRACKING SYSTEM FUSION POSTERIOR CERVICAL THREE + LEVELS N/A 2/19/2021    Procedure: Posterior Instrumented Spinal Fusion Cervical 4 to Thoracic 1; Revision  Foraminotomies/Factectomies Right Cervical 4-5;  Surgeon: Stevo Waite MD;  Location: [facility] OR     OTHER SURGICAL HISTORY      Excision hemorrhoid skin tags      OTHER SURGICAL HISTORY Left 2016    Left Knee Meniscus Repair     Partial meniscectomy Left 2016     OR KNEE SCOPE,MED/LAT MENISCUS REPAIR Left      Current Outpatient Medications   Medication Sig Dispense Refill     acetaminophen (TYLENOL) 325 MG tablet Take 2 tablets (650 mg) by mouth every 8 hours as needed for mild pain 100 tablet 2     acetaminophen-codeine (TYLENOL #3) 300-30 MG tablet Take 1-2 tablets by mouth every 4 hours as needed for severe pain 10 tablet 0     Bacillus Coagulans-Inulin (PROBIOTIC-PREBIOTIC PO)        CALCIUM CITRATE PO Take 1 tablet by mouth every morning       diclofenac (VOLTAREN) 1 % topical gel APPLY TO THE AFFECTED AREA FOUR TIMES DAILY OR AS DIRECTED       DULoxetine (CYMBALTA) 60 MG capsule Take 1 capsule (60 mg) by mouth daily 90 capsule 3     Flax Oil-Fish Oil-Borage Oil ([employer] OMEGA-3 PO) Take 1 capsule by mouth 2 times daily       ibuprofen (ADVIL) 200 MG capsule Take 600 mg by mouth every 8 hours as needed        lidocaine (LIDODERM) 5 % patch lidocaine 5 % topical patch       MAGNESIUM GLYCINATE PO Take 300 mg by mouth At Bedtime       Multiple Vitamins-Minerals (MULTIVITAL PO) Take 1 tablet by mouth 2 times daily Twice a day       Vitamin D3 (CHOLECALCIFEROL) 125 MCG (5000 UT) tablet          Allergies   Allergen Reactions     Dilaudid [Hydromorphone] GI Disturbance     Per patient, tolerates IV version well.     Morphine Nausea and Vomiting     Oxycodone Nausea and Vomiting     Tramadol Nausea and Vomiting     Liquid Adhesive Rash     Other reaction(s): Rash        Social History     Tobacco Use     Smoking status: Former Smoker     Packs/day: 0.50     Years: 1.00     Pack years: 0.50     Types: Cigarettes     Quit date:      Years since quittin.7     Smokeless tobacco: Never Used      "Tobacco comment: Quit 30 years ago   Substance Use Topics     Alcohol use: Yes     Alcohol/week: 1.0 - 2.0 standard drinks     Types: 1 - 2 Standard drinks or equivalent per week     Comment: rare     Family History   Problem Relation Age of Onset     Arthritis Mother      Macular Degeneration Mother      Coronary Artery Disease Mother      Hypertension Mother      Osteoporosis Mother      Hearing Loss Father      Hyperlipidemia Father      Multiple Sclerosis Sister      Multiple Sclerosis Sister      History   Drug Use No         Objective     /62 (BP Location: Left arm, Patient Position: Left side, Cuff Size: Adult Large)   Pulse 74   Ht 1.638 m (5' 4.5\")   Wt 85.7 kg (189 lb)   LMP 02/01/2017   SpO2 97%   BMI 31.94 kg/m      Physical Exam    GENERAL APPEARANCE: healthy, alert and no distress     EYES: EOMI, PERRL     HENT: ear canals and TM's normal and nose and mouth without ulcers or lesions     NECK: no adenopathy, no asymmetry, masses, or scars and thyroid normal to palpation     RESP: lungs clear to auscultation - no rales, rhonchi or wheezes     CV: regular rates and rhythm, normal S1 S2, no S3 or S4 and no murmur, click or rub     ABDOMEN:  soft, nontender, no HSM or masses and bowel sounds normal     MS: extremities normal- no gross deformities noted, no evidence of inflammation in joints, FROM in all extremities.     SKIN: no suspicious lesions or rashes     NEURO: Normal strength and tone, sensory exam grossly normal, mentation intact and speech normal     PSYCH: mentation appears normal. and affect normal/bright     LYMPHATICS: No cervical adenopathy    Recent Labs   Lab Test 07/15/21  1155 02/21/21  0545 02/20/21  0628 01/25/21  0922   HGB  --  11.2* 11.5* 14.7   PLT  --   --   --  296     --   --  141   POTASSIUM 4.6  --   --  4.0   CR 0.76  --   --  0.82        Diagnostics:  Labs pending at this time.  Results will be reviewed when available.   EKG required by the surgeon for this " procedure and not completed in the last 90 days.     Revised Cardiac Risk Index (RCRI):  The patient has the following serious cardiovascular risks for perioperative complications:   - No serious cardiac risks = 0 points     RCRI Interpretation: 0 points: Class I (very low risk - 0.4% complication rate)           Signed Electronically by: MELIA CREWS  Copy of this evaluation report is provided to requesting physician.

## 2021-09-25 ENCOUNTER — HEALTH MAINTENANCE LETTER (OUTPATIENT)
Age: 61
End: 2021-09-25

## 2021-11-15 NOTE — PHARMACY-ADMISSION MEDICATION HISTORY
Admission Medication History Completed by Pharmacy    See [facility] Admission Navigator for allergy information, preferred outpatient pharmacy, prior to admission medications and immunization status.     Medication History Sources:     Patient, surescripts fill history     Changes made to PTA medication list (reason):    Added: None    Deleted: None    Changed: vitamin D from 5000 units daily to 1000 units daily per patient report    Additional Information:    None    Prior to Admission medications    Medication Sig Last Dose Taking? Auth Provider   Biotin 5000 MCG TABS Take 1 tablet by mouth every morning Past Month at Unknown time Yes Reported, Patient   CALCIUM CITRATE PO Take 1 tablet by mouth every morning Past Month at Unknown time Yes Reported, Patient   Flax Oil-Fish Oil-Borage Oil ([employer] OMEGA-3 PO) Take 1 capsule by mouth 2 times daily Past Month at Unknown time Yes Reported, Patient   ibuprofen (ADVIL) 200 MG capsule Take 600 mg by mouth every 8 hours as needed  Past Month at Unknown time Yes Reported, Patient   MAGNESIUM GLYCINATE PO Take 300 mg by mouth At Bedtime Past Month at Unknown time Yes Reported, Patient   MEDI-PATCH-LIDOCAINE 0.5-0.035-5-20 % PTCH Externally apply 1 Box topically 2 times daily as needed Past Month at Unknown time Yes SandMckenzie MD   [facility] Natural Products (ADVANCED JOINT RELIEF PO) Take 1 tablet by mouth 2 times daily Past Month at Unknown time Yes Reported, Patient   Multiple Vitamins-Minerals (MULTIVITAL PO) Take 1 tablet by mouth 2 times daily Twice a day Past Month at Unknown time Yes Reported, Patient   Probiotic Product (PROBIOTIC-10 PO) Take 1 capsule by mouth 2 times daily Combined prebiotic and probiotic product Past Month at Unknown time Yes Reported, Patient   VITAMIN D PO Take 1,000 Units by mouth every morning  Past Month at Unknown time Yes Reported, Patient       Date completed: 02/19/21    Medication history completed by: Hai Aparicio [facility]             Vomiting and diarrhea

## 2021-12-30 ENCOUNTER — IMMUNIZATION (OUTPATIENT)
Dept: FAMILY MEDICINE | Facility: CLINIC | Age: 61
End: 2021-12-30
Payer: COMMERCIAL

## 2021-12-30 PROCEDURE — 90471 IMMUNIZATION ADMIN: CPT

## 2021-12-30 PROCEDURE — 90682 RIV4 VACC RECOMBINANT DNA IM: CPT

## 2022-02-14 ENCOUNTER — TRANSFERRED RECORDS (OUTPATIENT)
Dept: HEALTH INFORMATION MANAGEMENT | Facility: CLINIC | Age: 62
End: 2022-02-14
Payer: COMMERCIAL

## 2022-02-28 ENCOUNTER — TRANSFERRED RECORDS (OUTPATIENT)
Dept: HEALTH INFORMATION MANAGEMENT | Facility: CLINIC | Age: 62
End: 2022-02-28
Payer: COMMERCIAL

## 2022-03-17 ENCOUNTER — OFFICE VISIT (OUTPATIENT)
Dept: FAMILY MEDICINE | Facility: CLINIC | Age: 62
End: 2022-03-17
Payer: COMMERCIAL

## 2022-03-17 VITALS
SYSTOLIC BLOOD PRESSURE: 110 MMHG | HEIGHT: 65 IN | WEIGHT: 196.5 LBS | OXYGEN SATURATION: 97 % | HEART RATE: 76 BPM | BODY MASS INDEX: 32.74 KG/M2 | DIASTOLIC BLOOD PRESSURE: 68 MMHG

## 2022-03-17 DIAGNOSIS — Z98.890 HISTORY OF NECK SURGERY: ICD-10-CM

## 2022-03-17 DIAGNOSIS — Z01.818 PREOP GENERAL PHYSICAL EXAM: Primary | ICD-10-CM

## 2022-03-17 LAB
ALBUMIN SERPL-MCNC: 3.9 G/DL (ref 3.5–5)
ANION GAP SERPL CALCULATED.3IONS-SCNC: 11 MMOL/L (ref 5–18)
BUN SERPL-MCNC: 16 MG/DL (ref 8–22)
CALCIUM SERPL-MCNC: 9.1 MG/DL (ref 8.5–10.5)
CHLORIDE BLD-SCNC: 104 MMOL/L (ref 98–107)
CO2 SERPL-SCNC: 25 MMOL/L (ref 22–31)
CREAT SERPL-MCNC: 0.77 MG/DL (ref 0.6–1.1)
ERYTHROCYTE [DISTWIDTH] IN BLOOD BY AUTOMATED COUNT: 12.6 % (ref 10–15)
GFR SERPL CREATININE-BSD FRML MDRD: 88 ML/MIN/1.73M2
GLUCOSE BLD-MCNC: 90 MG/DL (ref 70–125)
HCT VFR BLD AUTO: 43.8 % (ref 35–47)
HGB BLD-MCNC: 14.4 G/DL (ref 11.7–15.7)
MCH RBC QN AUTO: 29.6 PG (ref 26.5–33)
MCHC RBC AUTO-ENTMCNC: 32.9 G/DL (ref 31.5–36.5)
MCV RBC AUTO: 90 FL (ref 78–100)
PHOSPHATE SERPL-MCNC: 3.7 MG/DL (ref 2.5–4.5)
PLATELET # BLD AUTO: 289 10E3/UL (ref 150–450)
POTASSIUM BLD-SCNC: 4 MMOL/L (ref 3.5–5)
RBC # BLD AUTO: 4.87 10E6/UL (ref 3.8–5.2)
SODIUM SERPL-SCNC: 140 MMOL/L (ref 136–145)
WBC # BLD AUTO: 5 10E3/UL (ref 4–11)

## 2022-03-17 PROCEDURE — 36415 COLL VENOUS BLD VENIPUNCTURE: CPT | Performed by: FAMILY MEDICINE

## 2022-03-17 PROCEDURE — 80069 RENAL FUNCTION PANEL: CPT | Performed by: FAMILY MEDICINE

## 2022-03-17 PROCEDURE — 93010 ELECTROCARDIOGRAM REPORT: CPT | Performed by: INTERNAL MEDICINE

## 2022-03-17 PROCEDURE — 99214 OFFICE O/P EST MOD 30 MIN: CPT | Performed by: FAMILY MEDICINE

## 2022-03-17 PROCEDURE — 93005 ELECTROCARDIOGRAM TRACING: CPT | Performed by: FAMILY MEDICINE

## 2022-03-17 PROCEDURE — 85027 COMPLETE CBC AUTOMATED: CPT | Performed by: FAMILY MEDICINE

## 2022-03-17 RX ORDER — DICLOFENAC SODIUM 75 MG/1
TABLET, DELAYED RELEASE ORAL
COMMUNITY
Start: 2022-01-09 | End: 2022-05-11

## 2022-03-17 NOTE — PROGRESS NOTES
[facility]  [address]  [facility] [address]  Phone: [phone]  Fax: [phone]  Primary Provider: Melia Bettencourt  Pre-op Performing Provider: MELIA BETTENCOURT      PREOPERATIVE EVALUATION:  Today's date: 3/17/2022    Layla Starks is a 61 year old female who presents for a preoperative evaluation.    Surgical Information:  Surgery/Procedure: Hardware removal in neck  Surgery Location: [facility]  Surgeon: Dr Mayer  Surgery Date: 04/07/2022  Where patient plans to recover: Other: stay in 1 night then home  Fax number for surgical facility: [phone]    Type of Anesthesia Anticipated: General    Assessment & Plan     The proposed surgical procedure is considered LOW risk.    Problem List Items Addressed This Visit     None      Visit Diagnoses     Preop general physical exam    -  Primary    History of neck surgery                   Risks and Recommendations:  The patient has the following additional risks and recommendations for perioperative complications:   - No identified additional risk factors other than previously addressed    Medication Instructions:   - diclofenac (Voltaren): HOLD 1 day before surgery.    - ibuprofen (Advil, Motrin): HOLD 1 day before surgery.    - SSRIs, SNRIs, TCAs, Antipsychotics: Continue without modification.     RECOMMENDATION:  APPROVAL GIVEN to proceed with proposed procedure, without further diagnostic evaluation.          Subjective     HPI related to upcoming procedure: Surgery related to PMH of stenosis;s/p spinal fusion. Patient has had 6 surgeries prior to this one.This surgery is for hank/harware removal.     Preop Questions 3/15/2022   1. Have you ever had a heart attack or stroke? No   2. Have you ever had surgery on your heart or blood vessels, such as a stent placement, a coronary artery bypass, or surgery on an artery in your head, neck, heart, or legs? No   3. Do you have chest pain with activity? No   4. Do you  have a history of  heart failure? No   5. Do you currently have a cold, bronchitis or symptoms of other infection? No   6. Do you have a cough, shortness of breath, or wheezing? No   7. Do you or anyone in your family have previous history of blood clots? No   8. Do you or does anyone in your family have a serious bleeding problem such as prolonged bleeding following surgeries or cuts? No   9. Have you ever had problems with anemia or been told to take iron pills? No   10. Have you had any abnormal blood loss such as black, tarry or bloody stools, or abnormal vaginal bleeding? No   11. Have you ever had a blood transfusion? No   12. Are you willing to have a blood transfusion if it is medically needed before, during, or after your surgery? Yes   13. Have you or any of your relatives ever had problems with anesthesia? No   14. Do you have sleep apnea, excessive snoring or daytime drowsiness? No   15. Do you have any artifical heart valves or other implanted medical devices like a pacemaker, defibrillator, or continuous glucose monitor? No   16. Do you have artificial joints? YES - cervical disk replacement   17. Are you allergic to latex? No   18. Is there any chance that you may be pregnant? No       Health Care Directive:  Patient does not have a Health Care Directive or Living Will: Patient states has Advance Directive and will bring in a copy to clinic.    Preoperative Review of :   reviewed - controlled substances prescribed by other outside provider(s).      Status of Chronic Conditions:  See problem list for active medical problems.  Problems all longstanding and stable, except as noted/documented.  See ROS for pertinent symptoms related to these conditions.      Review of Systems  CONSTITUTIONAL: NEGATIVE for fever, chills, change in weight  INTEGUMENTARY/SKIN: NEGATIVE for worrisome rashes, moles or lesions  EYES: NEGATIVE for vision changes or irritation  ENT/MOUTH: NEGATIVE for ear, mouth and throat  problems  RESP: NEGATIVE for significant cough or SOB  CV: NEGATIVE for chest pain, palpitations or peripheral edema  GI: NEGATIVE for nausea, abdominal pain, heartburn, or change in bowel habits  : NEGATIVE for frequency, dysuria, or hematuria  MUSCULOSKELETAL: NEGATIVE for significant arthralgias or myalgia  NEURO: NEGATIVE for weakness, dizziness or paresthesias  ENDOCRINE: NEGATIVE for temperature intolerance, skin/hair changes  HEME: NEGATIVE for bleeding problems  PSYCHIATRIC: NEGATIVE for changes in mood or affect    Patient Active Problem List    Diagnosis Date Noted     Cervical radiculopathy 01/22/2021     Priority: Medium     Added automatically from request for surgery [id number]       Cervical spondylosis 01/22/2021     Priority: Medium     Added automatically from request for surgery [id number]       S/P cervical spinal fusion 01/22/2021     Priority: Medium     Added automatically from request for surgery [id number]       Cervicalgia 12/13/2017     Priority: Medium      Past Medical History:   Diagnosis Date     Cervical disc disorder with radiculopathy of cervical region 11/27/2017     Cervical spinal stenosis      Cervical spinal stenosis      Constipation      DDD (degenerative disc disease), cervical      Degenerative disc disease      Depression      Fatigue      Foot pain      SHAHIDA (generalized anxiety disorder)      Medial meniscus tear     knee     OA (osteoarthritis)      Obesity      Obesity      Perimenopause      Personal history of COVID-19      PONV (postoperative nausea and vomiting)      PONV (postoperative nausea and vomiting)      Past Surgical History:   Procedure Laterality Date     AS HYSTEROSCOPY, SURGICAL; W/ ENDOMETRIAL ABLATION, ANY METHOD N/A      BACK SURGERY  09/16/2016    Cervical     BREAST SURGERY  1980    breast reduction     C5-7 laminoplasty Right 09/09/2016     COLONOSCOPY  2016     DECOMPRESSION, FUSION CERVICAL ANTERIOR THREE+ LEVELS, COMBINED N/A 03/13/2019    C4-5, C6-7,  C5-6 with hardware removal     Excision anal skin tags N/A 01/18/2010     EXPLORE SPINE, REMOVE HARDWARE, COMBINED N/A 2/19/2021    Procedure: Removal of Laminoplasty Plates Cervical 4-7;;  Surgeon: Stevo Waite MD;  Location: [facility] OR     FORAMINOTOMY CERVICAL POSTERIOR MINIMALLY INVASIVE ONE LEVEL Right 12/13/2017    Procedure: FORAMINOTOMY CERVICAL POSTERIOR MINIMALLY INVASIVE ONE LEVEL;  Right Minimally Invasive Posterior Cervical 4-5 Foraminotomy;  Surgeon: Byron Barclay MD;  Location: [facility] OR     FUSION CERVICAL ANTERIOR ONE LEVEL N/A 08/06/2018    unspecified cervical fusion     GRAFT BONE FROM ILIAC CREST Right 2/19/2021    Procedure: Right Posterior Iliac Crest Bone Graft [place];  Surgeon: Stevo Waite MD;  Location: [facility] OR     HEAD & NECK SURGERY  9-9-2016, 12/13/17, 8/6/18, 3/13/19    laminoplasty C-4 to C-6, Foraminotomy C45, Fuse C56, C34 C67     INJECT BLOCK MEDIAL BRANCH CERVICAL/THORACIC/LUMBAR Right 5/24/2021    Procedure: BLOCK, NERVE, FACET JOINT, MEDIAL BRANCH, DIAGNOSTIC - Right C2-3 Third Occipital Nerve (TON);  Surgeon: Dyan Ackerman MD;  Location: [facility] OR     KNEE SURGERY Left 05/17/2016    partial meniscectomy     LAMINOPLASTY Right 9/9/2016    Procedure: CERVICAL LAMINOPLASTY C5 C6 C7 OPEN ON RIGHT WITH FORAMINOTOMIES AT RIGHT C4-5, BILATERAL C5-6 C6-7 ;  Surgeon: Laine Valdez MD;  Location: [facility];  Service:      MAMMOPLASTY REDUCTION  1980     MAMMOPLASTY REDUCTION BILATERAL Bilateral 1980     OPTICAL TRACKING SYSTEM FUSION POSTERIOR CERVICAL THREE + LEVELS N/A 2/19/2021    Procedure: Posterior Instrumented Spinal Fusion Cervical 4 to Thoracic 1; Revision Foraminotomies/Factectomies Right Cervical 4-5;  Surgeon: Setvo Waite MD;  Location: [facility] OR     OTHER SURGICAL HISTORY      Excision hemorrhoid skin tags      OTHER SURGICAL HISTORY Left 08/22/2016    Left Knee Meniscus Repair     Partial meniscectomy Left 05/17/2016      TX KNEE SCOPE,MED/LAT MENISCUS REPAIR Left      Current Outpatient Medications   Medication Sig Dispense Refill     acetaminophen (TYLENOL) 325 MG tablet Take 2 tablets (650 mg) by mouth every 8 hours as needed for mild pain 100 tablet 2     Bacillus Coagulans-Inulin (PROBIOTIC-PREBIOTIC PO)        CALCIUM CITRATE PO Take 1 tablet by mouth every morning       diclofenac (VOLTAREN) 1 % topical gel APPLY TO THE AFFECTED AREA FOUR TIMES DAILY OR AS DIRECTED       DULoxetine (CYMBALTA) 60 MG capsule Take 1 capsule (60 mg) by mouth daily 90 capsule 3     Flax Oil-Fish Oil-Borage Oil ([employer] OMEGA-3 PO) Take 1 capsule by mouth 2 times daily       ibuprofen (ADVIL) 200 MG capsule Take 600 mg by mouth every 8 hours as needed        lidocaine (LIDODERM) 5 % patch lidocaine 5 % topical patch       MAGNESIUM GLYCINATE PO Take 300 mg by mouth At Bedtime       Multiple Vitamins-Minerals (MULTIVITAL PO) Take 1 tablet by mouth 2 times daily Twice a day       Vitamin D3 (CHOLECALCIFEROL) 125 MCG (5000 UT) tablet          Allergies   Allergen Reactions     Dilaudid [Hydromorphone] GI Disturbance     Per patient, tolerates IV version well.     Morphine Nausea and Vomiting     Oxycodone Nausea and Vomiting     Tramadol Nausea and Vomiting     Liquid Adhesive Rash     Other reaction(s): Rash        Social History     Tobacco Use     Smoking status: Former Smoker     Packs/day: 0.50     Years: 1.00     Pack years: 0.50     Types: Cigarettes     Quit date:      Years since quittin.2     Smokeless tobacco: Never Used     Tobacco comment: Quit 30 years ago   Substance Use Topics     Alcohol use: Yes     Alcohol/week: 1.0 - 2.0 standard drink     Types: 1 - 2 Standard drinks or equivalent per week     Comment: rare     Family History   Problem Relation Age of Onset     Arthritis Mother      Macular Degeneration Mother      Coronary Artery Disease Mother      Hypertension Mother      Osteoporosis Mother      Other Cancer Mother      "     from pancreatic cancer 2021     Hearing Loss Father      Hyperlipidemia Father      Multiple Sclerosis Sister      Multiple Sclerosis Sister      History   Drug Use No         Objective     /68 (BP Location: Left arm, Patient Position: Left side, Cuff Size: Adult Large)   Pulse 76   Ht 1.638 m (5' 4.5\")   Wt 89.1 kg (196 lb 8 oz)   LMP 2017   SpO2 97%   BMI 33.21 kg/m      Physical Exam    GENERAL APPEARANCE: healthy, alert and no distress     EYES: EOMI, PERRL     HENT: ear canals and TM's normal and nose and mouth without ulcers or lesions     NECK: no adenopathy, no asymmetry, masses, or scars and thyroid normal to palpation     RESP: lungs clear to auscultation - no rales, rhonchi or wheezes     CV: regular rates and rhythm, normal S1 S2, no S3 or S4 and no murmur, click or rub     ABDOMEN:  soft, nontender, no HSM or masses and bowel sounds normal     MS: extremities normal- no gross deformities noted, no evidence of inflammation in joints, FROM in all extremities.     SKIN: no suspicious lesions or rashes     NEURO: Normal strength and tone, sensory exam grossly normal, mentation intact and speech normal     PSYCH: mentation appears normal. and affect normal/bright     LYMPHATICS: No cervical adenopathy    Recent Labs   Lab Test 21  1508 07/15/21  1155 21  0545 21  0934 21  0922   HGB 14.1  --  11.2*   < > 14.7     --   --   --  296    142  --   --  141   POTASSIUM 3.8 4.6  --   --  4.0   CR 0.77 0.76  --   --  0.82    < > = values in this interval not displayed.      EKG performed today: NSR, normal EKG    Diagnostics:  Labs pending at this time.  Results will be reviewed when available.   EKG required for surgeon request and not completed in the last 90 days.     Revised Cardiac Risk Index (RCRI):  The patient has the following serious cardiovascular risks for perioperative complications:   - No serious cardiac risks = 0 points     RCRI " Interpretation: 0 points: Class I (very low risk - 0.4% complication rate)           Signed Electronically by: MELIA CREWS  Copy of this evaluation report is provided to requesting physician.

## 2022-03-18 LAB
ATRIAL RATE - MUSE: 63 BPM
DIASTOLIC BLOOD PRESSURE - MUSE: NORMAL MMHG
INTERPRETATION ECG - MUSE: NORMAL
P AXIS - MUSE: 42 DEGREES
PR INTERVAL - MUSE: 164 MS
QRS DURATION - MUSE: 78 MS
QT - MUSE: 416 MS
QTC - MUSE: 425 MS
R AXIS - MUSE: 33 DEGREES
SYSTOLIC BLOOD PRESSURE - MUSE: NORMAL MMHG
T AXIS - MUSE: 22 DEGREES
VENTRICULAR RATE- MUSE: 63 BPM

## 2022-05-11 ENCOUNTER — OFFICE VISIT (OUTPATIENT)
Dept: FAMILY MEDICINE | Facility: CLINIC | Age: 62
End: 2022-05-11
Payer: COMMERCIAL

## 2022-05-11 VITALS
HEART RATE: 76 BPM | DIASTOLIC BLOOD PRESSURE: 70 MMHG | WEIGHT: 197 LBS | BODY MASS INDEX: 33.29 KG/M2 | SYSTOLIC BLOOD PRESSURE: 110 MMHG | OXYGEN SATURATION: 96 %

## 2022-05-11 DIAGNOSIS — M54.2 CERVICALGIA: Primary | ICD-10-CM

## 2022-05-11 PROCEDURE — 99213 OFFICE O/P EST LOW 20 MIN: CPT | Performed by: FAMILY MEDICINE

## 2022-05-11 RX ORDER — IBUPROFEN 800 MG/1
800 TABLET, FILM COATED ORAL
COMMUNITY
Start: 2022-04-07 | End: 2023-06-05

## 2022-05-11 RX ORDER — HYDROCODONE BITARTRATE AND ACETAMINOPHEN 5; 325 MG/1; MG/1
1-2 TABLET ORAL EVERY 6 HOURS PRN
Qty: 40 TABLET | Refills: 0 | Status: SHIPPED | OUTPATIENT
Start: 2022-05-11 | End: 2022-09-13

## 2022-05-11 RX ORDER — HYDROCODONE BITARTRATE AND ACETAMINOPHEN 5; 325 MG/1; MG/1
1-2 TABLET ORAL
COMMUNITY
Start: 2022-04-07 | End: 2022-05-11

## 2022-05-11 RX ORDER — METHOCARBAMOL 750 MG/1
750 TABLET, FILM COATED ORAL
COMMUNITY
Start: 2022-04-07 | End: 2022-05-11

## 2022-05-11 RX ORDER — IBUPROFEN 800 MG/1
800 TABLET, FILM COATED ORAL
Status: CANCELLED | OUTPATIENT
Start: 2022-05-11

## 2022-05-11 RX ORDER — METHOCARBAMOL 750 MG/1
750 TABLET, FILM COATED ORAL 2 TIMES DAILY PRN
Qty: 60 TABLET | Refills: 3 | Status: SHIPPED | OUTPATIENT
Start: 2022-05-11 | End: 2022-09-13

## 2022-05-11 NOTE — LETTER
Opioid / Opioid Plus Controlled Substance Agreement    This is an agreement between you and your provider about the safe and appropriate use of controlled substance/opioids prescribed by your care team. Controlled substances are medicines that can cause physical and mental dependence (abuse).    There are strict laws about having and using these medicines. We here at [facility] are committing to working with you in your efforts to get better. To support you in this work, we ll help you schedule regular office appointments for medicine refills. If we must cancel or change your appointment for any reason, we ll make sure you have enough medicine to last until your next appointment.     As a Provider, I will:    Listen carefully to your concerns and treat you with respect.     Recommend a treatment plan that I believe is in your best interest. This plan may involve therapies other than opioid pain medication.     Talk with you often about the possible benefits, and the risk of harm of any medicine that we prescribe for you.     Provide a plan on how to taper (discontinue or go off) using this medicine if the decision is made to stop its use.    As a Patient, I understand that opioid(s):     Are a controlled substance prescribed by my care team to help me function or work and manage my condition(s).     Are strong medicines and can cause serious side effects such as:    Drowsiness, which can seriously affect my driving ability    A lower breathing rate, enough to cause death    Harm to my thinking ability     Depression     Abuse of and addiction to this medicine    Need to be taken exactly as prescribed. Combining opioids with certain medicines or chemicals (such as illegal drugs, sedatives, sleeping pills, and benzodiazepines) can be dangerous or even fatal. If I stop opioids suddenly, I may have severe withdrawal symptoms.    Do not work for all types of pain nor for all patients. If they re not helpful, I may  be asked to stop them.    {Benzo / Stimulant (Optional):899462}    The risks, benefits and side effects of these medicine(s) were explained to me. I agree that:  1. I will take part in other treatments as advised by my care team. This may be psychiatry or counseling, physical therapy, behavioral therapy, group treatment or a referral to a specialist.     2. I will keep all my appointments. I understand that this is part of the monitoring of opioids. My care team may require an office visit for EVERY opioid/controlled substance refill. If I miss appointments or don t follow instructions, my care team may stop my medicine.    3. I will take my medicines as prescribed. I will not change the dose or schedule unless my care team tells me to. There will be no refills if I run out early.     4. I may be asked to come to the clinic and complete a urine drug test or complete a pill count at any time. If I don t give a urine sample or participate in a pill count, the care team may stop my medicine.    5. I will only receive prescriptions from this clinic for chronic pain. If I am treated by another provider for acute pain issues, I will tell them that I am taking opioid pain medication for chronic pain and that I have a treatment agreement with this provider. I will inform my [facility] care team within one business day if I am given a prescription for any pain medication by another healthcare provider. My [facility] care team can contact other providers and pharmacists about my use of any medicines.    6. It is up to me to make sure that I don t run out of my medicines on weekends or holidays. If my care team is willing to refill my opioid prescription without a visit, I must request refills only during office hours. Refills may take up to 3 business days to process. I will use one pharmacy to fill all my opioid and other controlled substance prescriptions. I will notify the clinic about any changes to my  insurance or medication availability.    7. I am responsible for my prescriptions. If the medicine/prescription is lost, stolen or destroyed, it will not be replaced. I also agree not to share controlled substance medicines with anyone.    8. I am aware I should not use any illegal or recreational drugs. I agree not to drink alcohol unless my care team says I can.       9. If I enroll in the [place] Medical Cannabis program, I will tell my care team prior to my next refill.     10. I will tell my care team right away if I become pregnant, have a new medical problem treated outside of my regular clinic, or have a change in my medications.    11. I understand that this medicine can affect my thinking, judgment and reaction time. Alcohol and drugs affect the brain and body, which can affect the safety of my driving. Being under the influence of alcohol or drugs can affect my decision-making, behaviors, personal safety, and the safety of others. Driving while impaired (DWI) can occur if a person is driving, operating, or in physical control of a car, motorcycle, boat, snowmobile, ATV, motorbike, off-road vehicle, or any other motor vehicle ([place] Statute 169A.20). I understand the risk if I choose to drive or operate any vehicle or machinery.    I understand that if I do not follow any of the conditions above, my prescriptions or treatment may be stopped or changed.          Opioids  What You Need to Know    What are opioids?   Opioids are pain medicines that must be prescribed by a doctor. They are also known as narcotics.     Examples are:   1. morphine (MS Contin, Sarah)  2. oxycodone (Oxycontin)  3. oxycodone and acetaminophen (Percocet)  4. hydrocodone and acetaminophen (Vicodin, Norco)   5. fentanyl patch (Duragesic)   6. hydromorphone (Dilaudid)   7. methadone  8. codeine (Tylenol #3)     What do opioids do well?   Opioids are best for severe short-term pain such as after a surgery or injury. They may work well  for cancer pain. They may help some people with long-lasting (chronic) pain.     What do opioids NOT do well?   Opioids never get rid of pain entirely, and they don t work well for most patients with chronic pain. Opioids don t reduce swelling, one of the causes of pain.                                    Other ways to manage chronic pain and improve function include:       Treat the health problem that may be causing pain    Anti-inflammation medicines, which reduce swelling and tenderness, such as ibuprofen (Advil, Motrin) or naproxen (Aleve)    Acetaminophen (Tylenol)    Antidepressants and anti-seizure medicines, especially for nerve pain    Topical treatments such as patches or creams    Injections or nerve blocks    Chiropractic or osteopathic treatment    Acupuncture, massage, deep breathing, meditation, visual imagery, aromatherapy    Use heat or ice at the pain site    Physical therapy     Exercise    Stop smoking    Take part in therapy       Risks and side effects     Talk to your doctor before you start or decide to keep taking opioids. Possible side effects include:      Lowering your breathing rate enough to cause death    Overdose, including death, especially if taking higher than prescribed doses    Worse depression symptoms; less pleasure in things you usually enjoy    Feeling tired or sluggish    Slower thoughts or cloudy thinking    Being more sensitive to pain over time; pain is harder to control    Trouble sleeping or restless sleep    Changes in hormone levels (for example, less testosterone)    Changes in sex drive or ability to have sex    Constipation    Unsafe driving    Itching and sweating    Dizziness    Nausea, throwing up and dry mouth    What else should I know about opioids?    Opioids may lead to dependence, tolerance, or addiction.      Dependence means that if you stop or reduce the medicine too quickly, you will have withdrawal symptoms. These include loose poop (diarrhea),  jitters, flu-like symptoms, nervousness and tremors. Dependence is not the same as addiction.                       Tolerance means needing higher doses over time to get the same effect. This may increase the chance of serious side effects.      Addiction is when people improperly use a substance that harms their body, their mind or their relations with others. Use of opiates can cause a relapse of addiction if you have a history of drug or alcohol abuse.      People who have used opioids for a long time may have a lower quality of life, worse depression, higher levels of pain and more visits to doctors.    You can overdose on opioids. Take these steps to lower your risk of overdose:    1. Recognize the signs:  Signs of overdose include decrease or loss of consciousness (blackout), slowed breathing, trouble waking up and blue lips. If someone is worried about overdose, they should call 911.    2. Talk to your doctor about Narcan (naloxone).   If you are at risk for overdose, you may be given a prescription for Narcan. This medicine very quickly reverses the effects of opioids.   If you overdose, a friend or family member can give you Narcan while waiting for the ambulance. They need to know the signs of overdose and how to give Narcan.     3. Don't use alcohol or street drugs.   Taking them with opioids can cause death.    4. Do not take any of these medicines unless your doctor says it s OK. Taking these with opioids can cause death:    Benzodiazepines, such as lorazepam (Ativan), alprazolam (Xanax) or diazepam (Valium)    Muscle relaxers, such as cyclobenzaprine (Flexeril)    Sleeping pills like zolpidem (Ambien)     Other opioids      How to keep you and other people safe while taking opioids:    1. Never share your opioids with others.  Opioid medicines are regulated by the Drug Enforcement Agency (TALA). Selling or sharing medications is a criminal act.    2. Be sure to store opioids in a secure place, locked up  if possible. Young children can easily swallow them and overdose.    3. When you are traveling with your medicines, keep them in the original bottles. If you use a pill box, be sure you also carry a copy of your medicine list from your clinic or pharmacy.    4. Safe disposal of opioids    Most pharmacies have places to get rid of medicine, called disposal kiosks. Medicine disposal options are also available in every [facility]. Search your county and  medication disposal  to find more options. You can find more details at:  https://www.pca.[facility].[place]./living-green/managing-unwanted-medications     I agree that my provider, clinic care team, and pharmacy may work with any city, state or federal law enforcement agency that investigates the misuse, sale, or other diversion of my controlled medicine. I will allow my provider to discuss my care with, or share a copy of, this agreement with any other treating provider, pharmacy or emergency room where I receive care.    I have read this agreement and have asked questions about anything I did not understand.    _______________________________________________________  Patient Signature - Layla Starks _____________________                   Date     _______________________________________________________  Provider Signature - MELIA CREWS   _____________________                   Date     _______________________________________________________  Witness Signature (required if provider not present while patient signing)   _____________________                   Date

## 2022-05-16 NOTE — PROGRESS NOTES
Problem List Items Addressed This Visit        Nervous and Auditory    Cervicalgia - Primary    Relevant Medications    HYDROcodone-acetaminophen (NORCO) 5-325 MG tablet    methocarbamol (ROBAXIN) 750 MG tablet        The patient has been dealing with pain that is moderate to severe for the past few years and his last surgery was not effective at relieving her pain.  I prescribed the Robaxin today and I do think it is reasonable to take 1-2 Vicodin per day.  I would like her to follow-up in 1 month.  We discussed the potential risk of dependency as it relates to the use of opioids and she is hopeful that she will not need to take these long-term.    Return in about 4 weeks (around 6/8/2022) for recheck.    MELIA STEPHANIE CREWS    Alicia Rich is a 61 year old who presents today regarding ongoing issues with neck pain.  The patient recently underwent surgery to remove significant amount of hardware from her neck.  The patient has had neck surgeries due to cervical spondylosis and spinal stenosis.  Unfortunately, the patient has had longstanding pain that is persisted in the right side of her neck.  She rarely has a radicular nature to the pain but rather has a moderate to severe pain just lateral to her spine at the base of her neck.  She was hopeful that removing the hardware may help but it did not make a difference.  She is healing well from that surgery and has had appropriate surgical follow-up.  Her surgeon recommended she come in to see me regarding pain management.  The patient is Cymbalta currently and takes ibuprofen and Tylenol as needed.  However, those medications do not adequately relieve her pain.  The patient has found Robaxin to be helpful and is requesting a refill on that medication today.  She is also wondering about taking Vicodin on a daily basis in order to help improve her functioning until she can figure something out.  The patient does intend to follow-up with another surgeon  regarding a second opinion.     Objective    /70 (BP Location: Left arm, Patient Position: Left side, Cuff Size: Adult Large)   Pulse 76   Wt 89.4 kg (197 lb)   LMP 02/01/2017   SpO2 96%   BMI 33.29 kg/m    Body mass index is 33.29 kg/m .  Physical Exam   GENERAL: patient appears mild-moderately uncomfortable with obvious reduced ROM with her neck. Mildly tearful today

## 2022-05-18 ENCOUNTER — TRANSFERRED RECORDS (OUTPATIENT)
Dept: HEALTH INFORMATION MANAGEMENT | Facility: CLINIC | Age: 62
End: 2022-05-18
Payer: COMMERCIAL

## 2022-06-06 ENCOUNTER — TRANSFERRED RECORDS (OUTPATIENT)
Dept: HEALTH INFORMATION MANAGEMENT | Facility: CLINIC | Age: 62
End: 2022-06-06
Payer: COMMERCIAL

## 2022-06-23 ENCOUNTER — TRANSFERRED RECORDS (OUTPATIENT)
Dept: HEALTH INFORMATION MANAGEMENT | Facility: CLINIC | Age: 62
End: 2022-06-23

## 2022-08-11 ENCOUNTER — TRANSFERRED RECORDS (OUTPATIENT)
Dept: HEALTH INFORMATION MANAGEMENT | Facility: CLINIC | Age: 62
End: 2022-08-11

## 2022-08-18 ENCOUNTER — TRANSFERRED RECORDS (OUTPATIENT)
Dept: HEALTH INFORMATION MANAGEMENT | Facility: CLINIC | Age: 62
End: 2022-08-18

## 2022-08-27 ENCOUNTER — HEALTH MAINTENANCE LETTER (OUTPATIENT)
Age: 62
End: 2022-08-27

## 2022-09-13 ENCOUNTER — OFFICE VISIT (OUTPATIENT)
Dept: NEUROSURGERY | Facility: CLINIC | Age: 62
End: 2022-09-13
Payer: COMMERCIAL

## 2022-09-13 VITALS
DIASTOLIC BLOOD PRESSURE: 68 MMHG | SYSTOLIC BLOOD PRESSURE: 130 MMHG | OXYGEN SATURATION: 96 % | WEIGHT: 197 LBS | HEIGHT: 65 IN | HEART RATE: 70 BPM | BODY MASS INDEX: 32.82 KG/M2

## 2022-09-13 DIAGNOSIS — M54.12 CERVICAL RADICULOPATHY: ICD-10-CM

## 2022-09-13 DIAGNOSIS — G89.29 NECK PAIN, CHRONIC: Primary | ICD-10-CM

## 2022-09-13 DIAGNOSIS — M54.2 NECK PAIN, CHRONIC: Primary | ICD-10-CM

## 2022-09-13 PROCEDURE — 99215 OFFICE O/P EST HI 40 MIN: CPT | Performed by: SURGERY

## 2022-09-13 NOTE — LETTER
9/13/2022         RE: Layla Straks  [address] [facility] [address]        Dear Colleague,    Thank you for referring your patient, Layla Starks, to the [facility]. Please see a copy of my visit note below.    NEUROSURGERY CONSULTATION NOTE      Neurosurgery was asked to see this patient by self for evaluation of neck pain.       CONSULTATION ASSESSMENT AND PLAN:     61-year-old female who has an extensive cervical surgical history.  All of her surgeries are listed below.  Reviewed her films in detail which shows solid fusion across the cervical 4-T1 levels.  She has an artificial disc replacement device at the cervical 3-4 level appears to be in adequate position.  Her past EMG showed a right C5 chronic radiculopathy.  Symptoms are most consistent with a right C5 radiculopathy even though she did have a positive result with a selective nerve root injection at C6.  These levels are all solidly fused on imaging.  Discussed with patient it makes most sense to proceed with a spinal cord stimulator trial and possible placement.  I do not recommend further surgery at this time.  I also strongly encouraged her to seek out cognitive behavioral therapy and information was provided about the program at [facility]. All questions answered and she appeared to understand.     I spent more than 45 minutes in this apt, examining the pt, reviewing the scans, reviewing notes from chart, discussing treatment options with risks and benefits and coordinating care.     Shiela Ramirez MD      HPI: Patient is a 61-year-old female who has an extensive cervical surgical history.  Patient underwent laminoplasty of 5-cervical 7 with foraminotomies at right cervical 4-5 and bilateral cervical 5-6 and cervical 6-7 with Dr. Valdez in 2016.  He states that surgery was for cervical stenosis.  She subsequently underwent a minimally invasive right redo foraminotomy at cervical 4-5 with  Dr. Barclay in 2017.  She then underwent a cervical 5-6 anterior cervical decompression and fusion in 2018 with Dr. Lopez.  In 2019 she then underwent cervical 4-5 and cervical 6-7 anterior cervical decompression and fusion with removal of her hardware at cervical 5-6 with Dr. Kat and later artifical disc replacement with Dr Kat as well.  Then in 2021, she underwent cervical 4 to-thoracic 1 posterior instrumented fusion and removal of her laminoplasty plates as well as redo foraminotomy at the right cervical 4-5 with Dr. Waite.  Lastly she underwent posterior cervical hardware removal in 2022 in April with Dr. Mayer.    Most of her surgeries were done for neck pain which patient continues to have.  She states that her neck pain used to be between her shoulder blades and now is on her right side of her neck. However, we discussed upon review of Dr. Waite notes it appears that she always had some element of right-sided neck pain.  States her right-sided neck pain will radiate down her arm to her elbow.  Increased pain with raising of that arm.  He denies any left-sided symptoms.  She has tried physical therapy, diclofenac gel, Robaxin, Norco, and gabapentin without relief.  She has underwent Botox and a C6 selective nerve root injection which did help some of her symptoms including her headache with Botox but her symptoms always return.    Past Medical History:   Diagnosis Date     Cervical disc disorder with radiculopathy of cervical region 11/27/2017     Cervical spinal stenosis      Cervical spinal stenosis      Constipation      DDD (degenerative disc disease), cervical      Degenerative disc disease      Depression      Fatigue      Foot pain      SHAHIDA (generalized anxiety disorder)      Medial meniscus tear     knee     OA (osteoarthritis)      Obesity      Obesity      Perimenopause      Personal history of COVID-19      PONV (postoperative nausea and vomiting)      PONV (postoperative nausea  and vomiting)        Past Surgical History:   Procedure Laterality Date     AS HYSTEROSCOPY, SURGICAL; W/ ENDOMETRIAL ABLATION, ANY METHOD N/A      BACK SURGERY  09/16/2016    Cervical     BREAST SURGERY  1980    breast reduction     C5-7 laminoplasty Right 09/09/2016     COLONOSCOPY  2016     DECOMPRESSION, FUSION CERVICAL ANTERIOR THREE+ LEVELS, COMBINED N/A 03/13/2019    C4-5, C6-7, C5-6 with hardware removal     Excision anal skin tags N/A 01/18/2010     EXPLORE SPINE, REMOVE HARDWARE, COMBINED N/A 2/19/2021    Procedure: Removal of Laminoplasty Plates Cervical 4-7;;  Surgeon: Stevo Waite MD;  [facility]     FORAMINOTOMY CERVICAL POSTERIOR MINIMALLY INVASIVE ONE LEVEL Right 12/13/2017    Procedure: FORAMINOTOMY CERVICAL POSTERIOR MINIMALLY INVASIVE ONE LEVEL;  Right Minimally Invasive Posterior Cervical 4-5 Foraminotomy;  Surgeon: Byron Barclay MD;  [facility]: [facility] OR     FUSION CERVICAL ANTERIOR ONE LEVEL N/A 08/06/2018    unspecified cervical fusion     GRAFT BONE FROM ILIAC CREST Right 2/19/2021    Procedure: Right Posterior Iliac Crest Bone Graft [place];  Surgeon: Stevo Waite MD;  Location:  OR     HEAD & NECK SURGERY  9-9-2016, 12/13/17, 8/6/18, 3/13/19    laminoplasty C-4 to C-6, Foraminotomy C45, Fuse C56, C34 C67     INJECT BLOCK MEDIAL BRANCH CERVICAL/THORACIC/LUMBAR Right 5/24/2021    Procedure: BLOCK, NERVE, FACET JOINT, MEDIAL BRANCH, DIAGNOSTIC - Right C2-3 Third Occipital Nerve (TON);  Surgeon: Dyan Ackerman MD;  [place] [facility] [place]     KNEE SURGERY Left 05/17/2016    partial meniscectomy     LAMINOPLASTY Right 9/9/2016    Procedure: CERVICAL LAMINOPLASTY C5 C6 C7 OPEN ON RIGHT WITH FORAMINOTOMIES AT RIGHT C4-5, BILATERAL C5-6 C6-7 ;  Surgeon: Laine Valdez MD;  Location: [facility];  Service:      MAMMOPLASTY REDUCTION  1980     MAMMOPLASTY REDUCTION BILATERAL Bilateral 1980     OPTICAL TRACKING SYSTEM FUSION POSTERIOR CERVICAL THREE +  LEVELS N/A 2/19/2021    Procedure: Posterior Instrumented Spinal Fusion Cervical 4 to Thoracic 1; Revision Foraminotomies/Factectomies Right Cervical 4-5;  Surgeon: Stevo Waite MD;  [facility] OR     OTHER SURGICAL HISTORY      Excision hemorrhoid skin tags      OTHER SURGICAL HISTORY Left 08/22/2016    Left Knee Meniscus Repair     Partial meniscectomy Left 05/17/2016     SC KNEE SCOPE,MED/LAT MENISCUS REPAIR Left        REVIEW OF SYSTEMS:  See ROS Form under media     MEDICATIONS:  Current Outpatient Medications   Medication Sig Dispense Refill     acetaminophen (TYLENOL) 325 MG tablet Take 2 tablets (650 mg) by mouth every 8 hours as needed for mild pain 100 tablet 2     Bacillus Coagulans-Inulin (PROBIOTIC-PREBIOTIC PO)        CALCIUM CITRATE PO Take 1 tablet by mouth every morning       diclofenac (VOLTAREN) 1 % topical gel APPLY TO THE AFFECTED AREA FOUR TIMES DAILY OR AS DIRECTED       DULoxetine (CYMBALTA) 60 MG capsule TAKE 1 CAPSULE(60 MG) BY MOUTH DAILY 90 capsule 2     Flax Oil-Fish Oil-Borage Oil ([employer] OMEGA-3 PO) Take 1 capsule by mouth 2 times daily       HYDROcodone-acetaminophen (NORCO) 5-325 MG tablet Take 1-2 tablets by mouth every 6 hours as needed for severe pain (max of 40 per month) 40 tablet 0     ibuprofen (ADVIL/MOTRIN) 800 MG tablet Take 800 mg by mouth       lidocaine (LIDODERM) 5 % patch lidocaine 5 % topical patch       MAGNESIUM GLYCINATE PO Take 300 mg by mouth At Bedtime       methocarbamol (ROBAXIN) 750 MG tablet Take 1 tablet (750 mg) by mouth 2 times daily as needed for muscle spasms 60 tablet 3     Multiple Vitamins-Minerals (MULTIVITAL PO) Take 1 tablet by mouth 2 times daily Twice a day       Vitamin D3 (CHOLECALCIFEROL) 125 MCG (5000 UT) tablet            ALLERGIES/SENSITIVITIES:     Allergies   Allergen Reactions     Dilaudid [Hydromorphone] GI Disturbance     Per patient, tolerates IV version well.     Morphine Nausea and Vomiting     Oxycodone Nausea and  "Vomiting     Tramadol Nausea and Vomiting     Liquid Adhesive Rash     Other reaction(s): Rash       PERTINENT SOCIAL HISTORY:   Social History     Socioeconomic History     Marital status:    Tobacco Use     Smoking status: Former Smoker     Packs/day: 0.50     Years: 1.00     Pack years: 0.50     Types: Cigarettes     Quit date:      Years since quittin.7     Smokeless tobacco: Never Used     Tobacco comment: Quit 30 years ago   Substance and Sexual Activity     Alcohol use: Yes     Alcohol/week: 1.0 - 2.0 standard drink     Comment: rare     Drug use: No     Sexual activity: Yes     Partners: Male     Birth control/protection: None   Other Topics Concern     Parent/sibling w/ CABG, MI or angioplasty before 65F 55M? No         FAMILY HISTORY:  Family History   Problem Relation Age of Onset     Arthritis Mother      Macular Degeneration Mother      Coronary Artery Disease Mother      Hypertension Mother      Osteoporosis Mother      Other Cancer Mother          from pancreatic cancer 2021     Hearing Loss Father      Hyperlipidemia Father      Multiple Sclerosis Sister      Multiple Sclerosis Sister         PHYSICAL EXAM:   Constitutional: /68   Pulse 70   Ht 5' 4.5\" (1.638 m)   Wt 197 lb (89.4 kg)   LMP 2017   SpO2 96%   BMI 33.29 kg/m       Mental Status: A & O in no acute distress.  Affect is appropriate.  Speech is fluent.  Recent and remote memory are intact.  Attention span and concentration are normal.     Motor: Normal bulk and tone all muscle groups of upper and lower extremities.    Strength: 5/5x4    Sensory: Sensation intact bilaterally to light touch.     Coordination:  Heel/toe/tandem gait intact.  Normal gait and station.     Reflexes:  supinator, biceps, triceps, knee/ ankle jerk intact. No hoffmans    IMAGING:  I personally reviewed all radiographic images         Cc:   Alicia Bettencourt, thank you for allowing me to participate in the " care of your patient.        Sincerely,        Shiela Ramirez MD

## 2022-09-13 NOTE — PROGRESS NOTES
NEUROSURGERY CONSULTATION NOTE      Neurosurgery was asked to see this patient by self for evaluation of neck pain.       CONSULTATION ASSESSMENT AND PLAN:     61-year-old female who has an extensive cervical surgical history.  All of her surgeries are listed below.  Reviewed her films in detail which shows solid fusion across the cervical 4-T1 levels.  She has an artificial disc replacement device at the cervical 3-4 level appears to be in adequate position.  Her past EMG showed a right C5 chronic radiculopathy.  Symptoms are most consistent with a right C5 radiculopathy even though she did have a positive result with a selective nerve root injection at C6.  These levels are all solidly fused on imaging.  Discussed with patient it makes most sense to proceed with a spinal cord stimulator trial and possible placement.  I do not recommend further surgery at this time.  I also strongly encouraged her to seek out cognitive behavioral therapy and information was provided about the program at [facility]. All questions answered and she appeared to understand.     I spent more than 45 minutes in this apt, examining the pt, reviewing the scans, reviewing notes from chart, discussing treatment options with risks and benefits and coordinating care.     Shiela Ramirez MD      HPI: Patient is a 61-year-old female who has an extensive cervical surgical history.  Patient underwent laminoplasty of 5-cervical 7 with foraminotomies at right cervical 4-5 and bilateral cervical 5-6 and cervical 6-7 with Dr. Valdez in 2016.  He states that surgery was for cervical stenosis.  She subsequently underwent a minimally invasive right redo foraminotomy at cervical 4-5 with Dr. Barclay in 2017.  She then underwent a cervical 5-6 anterior cervical decompression and fusion in 2018 with Dr. Lopez.  In 2019 she then underwent cervical 4-5 and cervical 6-7 anterior cervical decompression and fusion with removal of her hardware at  cervical 5-6 with Dr. Kat and later artifical disc replacement with Dr Kat as well.  Then in 2021, she underwent cervical 4 to-thoracic 1 posterior instrumented fusion and removal of her laminoplasty plates as well as redo foraminotomy at the right cervical 4-5 with Dr. Waite.  Lastly she underwent posterior cervical hardware removal in 2022 in April with Dr. Mayer.    Most of her surgeries were done for neck pain which patient continues to have.  She states that her neck pain used to be between her shoulder blades and now is on her right side of her neck. However, we discussed upon review of Dr. Waite notes it appears that she always had some element of right-sided neck pain.  States her right-sided neck pain will radiate down her arm to her elbow.  Increased pain with raising of that arm.  He denies any left-sided symptoms.  She has tried physical therapy, diclofenac gel, Robaxin, Norco, and gabapentin without relief.  She has underwent Botox and a C6 selective nerve root injection which did help some of her symptoms including her headache with Botox but her symptoms always return.    Past Medical History:   Diagnosis Date     Cervical disc disorder with radiculopathy of cervical region 11/27/2017     Cervical spinal stenosis      Cervical spinal stenosis      Constipation      DDD (degenerative disc disease), cervical      Degenerative disc disease      Depression      Fatigue      Foot pain      SHAHIDA (generalized anxiety disorder)      Medial meniscus tear     knee     OA (osteoarthritis)      Obesity      Obesity      Perimenopause      Personal history of COVID-19      PONV (postoperative nausea and vomiting)      PONV (postoperative nausea and vomiting)        Past Surgical History:   Procedure Laterality Date     AS HYSTEROSCOPY, SURGICAL; W/ ENDOMETRIAL ABLATION, ANY METHOD N/A      BACK SURGERY  09/16/2016    Cervical     BREAST SURGERY  1980    breast reduction     C5-7 laminoplasty Right  09/09/2016     COLONOSCOPY  2016     DECOMPRESSION, FUSION CERVICAL ANTERIOR THREE+ LEVELS, COMBINED N/A 03/13/2019    C4-5, C6-7, C5-6 with hardware removal     Excision anal skin tags N/A 01/18/2010     EXPLORE SPINE, REMOVE HARDWARE, COMBINED N/A 2/19/2021    Procedure: Removal of Laminoplasty Plates Cervical 4-7;;  Surgeon: Stevo Waite MD;  [facility]     FORAMINOTOMY CERVICAL POSTERIOR MINIMALLY INVASIVE ONE LEVEL Right 12/13/2017    Procedure: FORAMINOTOMY CERVICAL POSTERIOR MINIMALLY INVASIVE ONE LEVEL;  Right Minimally Invasive Posterior Cervical 4-5 Foraminotomy;  Surgeon: Byron Barclay MD;  Location: [facility] OR     FUSION CERVICAL ANTERIOR ONE LEVEL N/A 08/06/2018    unspecified cervical fusion     GRAFT BONE FROM ILIAC CREST Right 2/19/2021    Procedure: Right Posterior Iliac Crest Bone Graft [place];  Surgeon: Setvo Waite MD;  [facility] OR     HEAD & NECK SURGERY  9-9-2016, 12/13/17, 8/6/18, 3/13/19    laminoplasty C-4 to C-6, Foraminotomy C45, Fuse C56, C34 C67     INJECT BLOCK MEDIAL BRANCH CERVICAL/THORACIC/LUMBAR Right 5/24/2021    Procedure: BLOCK, NERVE, FACET JOINT, MEDIAL BRANCH, DIAGNOSTIC - Right C2-3 Third Occipital Nerve (TON);  Surgeon: Dyan Ackerman MD;  Location: [facility] [place]     KNEE SURGERY Left 05/17/2016    partial meniscectomy     LAMINOPLASTY Right 9/9/2016    Procedure: CERVICAL LAMINOPLASTY C5 C6 C7 OPEN ON RIGHT WITH FORAMINOTOMIES AT RIGHT C4-5, BILATERAL C5-6 C6-7 ;  Surgeon: Laine Valdez MD;  Location: [facility];  Service:      MAMMOPLASTY REDUCTION  1980     MAMMOPLASTY REDUCTION BILATERAL Bilateral 1980     OPTICAL TRACKING SYSTEM FUSION POSTERIOR CERVICAL THREE + LEVELS N/A 2/19/2021    Procedure: Posterior Instrumented Spinal Fusion Cervical 4 to Thoracic 1; Revision Foraminotomies/Factectomies Right Cervical 4-5;  Surgeon: Stevo Waite MD;  [facility] OR     OTHER SURGICAL HISTORY      Excision  hemorrhoid skin tags      OTHER SURGICAL HISTORY Left 08/22/2016    Left Knee Meniscus Repair     Partial meniscectomy Left 05/17/2016     MT KNEE SCOPE,MED/LAT MENISCUS REPAIR Left        REVIEW OF SYSTEMS:  See ROS Form under media     MEDICATIONS:  Current Outpatient Medications   Medication Sig Dispense Refill     acetaminophen (TYLENOL) 325 MG tablet Take 2 tablets (650 mg) by mouth every 8 hours as needed for mild pain 100 tablet 2     Bacillus Coagulans-Inulin (PROBIOTIC-PREBIOTIC PO)        CALCIUM CITRATE PO Take 1 tablet by mouth every morning       diclofenac (VOLTAREN) 1 % topical gel APPLY TO THE AFFECTED AREA FOUR TIMES DAILY OR AS DIRECTED       DULoxetine (CYMBALTA) 60 MG capsule TAKE 1 CAPSULE(60 MG) BY MOUTH DAILY 90 capsule 2     Flax Oil-Fish Oil-Borage Oil ([employer] OMEGA-3 PO) Take 1 capsule by mouth 2 times daily       HYDROcodone-acetaminophen (NORCO) 5-325 MG tablet Take 1-2 tablets by mouth every 6 hours as needed for severe pain (max of 40 per month) 40 tablet 0     ibuprofen (ADVIL/MOTRIN) 800 MG tablet Take 800 mg by mouth       lidocaine (LIDODERM) 5 % patch lidocaine 5 % topical patch       MAGNESIUM GLYCINATE PO Take 300 mg by mouth At Bedtime       methocarbamol (ROBAXIN) 750 MG tablet Take 1 tablet (750 mg) by mouth 2 times daily as needed for muscle spasms 60 tablet 3     Multiple Vitamins-Minerals (MULTIVITAL PO) Take 1 tablet by mouth 2 times daily Twice a day       Vitamin D3 (CHOLECALCIFEROL) 125 MCG (5000 UT) tablet            ALLERGIES/SENSITIVITIES:     Allergies   Allergen Reactions     Dilaudid [Hydromorphone] GI Disturbance     Per patient, tolerates IV version well.     Morphine Nausea and Vomiting     Oxycodone Nausea and Vomiting     Tramadol Nausea and Vomiting     Liquid Adhesive Rash     Other reaction(s): Rash       PERTINENT SOCIAL HISTORY:   Social History     Socioeconomic History     Marital status:    Tobacco Use     Smoking status: Former Smoker      "Packs/day: 0.50     Years: 1.00     Pack years: 0.50     Types: Cigarettes     Quit date:      Years since quittin.7     Smokeless tobacco: Never Used     Tobacco comment: Quit 30 years ago   Substance and Sexual Activity     Alcohol use: Yes     Alcohol/week: 1.0 - 2.0 standard drink     Comment: rare     Drug use: No     Sexual activity: Yes     Partners: Male     Birth control/protection: None   Other Topics Concern     Parent/sibling w/ CABG, MI or angioplasty before 65F 55M? No         FAMILY HISTORY:  Family History   Problem Relation Age of Onset     Arthritis Mother      Macular Degeneration Mother      Coronary Artery Disease Mother      Hypertension Mother      Osteoporosis Mother      Other Cancer Mother          from pancreatic cancer 2021     Hearing Loss Father      Hyperlipidemia Father      Multiple Sclerosis Sister      Multiple Sclerosis Sister         PHYSICAL EXAM:   Constitutional: /68   Pulse 70   Ht 5' 4.5\" (1.638 m)   Wt 197 lb (89.4 kg)   LMP 2017   SpO2 96%   BMI 33.29 kg/m       Mental Status: A & O in no acute distress.  Affect is appropriate.  Speech is fluent.  Recent and remote memory are intact.  Attention span and concentration are normal.     Motor: Normal bulk and tone all muscle groups of upper and lower extremities.    Strength: 5/5x4    Sensory: Sensation intact bilaterally to light touch.     Coordination:  Heel/toe/tandem gait intact.  Normal gait and station.     Reflexes:  supinator, biceps, triceps, knee/ ankle jerk intact. No hoffmans    IMAGING:  I personally reviewed all radiographic images         Cc:   Alicia Bettencourt"

## 2022-09-13 NOTE — NURSING NOTE
Neurosurgery consultation was requested by: Self referral   Pain: neck pain   Radicular Pain is present: right arm pain to elbow   Lhermitte sign: no   Motor complaints: denies weakness   Sensory complaints: denies numbness and tingling   Gait and balance issues: denies   Bowel or bladder issues: denies   Duration of SX is: years   The symptoms are worse with: activity of arm   The symptoms are better with: nothing   Injury: no  Severity is: chronic   Patient has tried the following conservative measures: She has had injection and PT which have helped  NDI score is : 72%  BEAN Chavez

## 2022-09-22 ENCOUNTER — TRANSFERRED RECORDS (OUTPATIENT)
Dept: HEALTH INFORMATION MANAGEMENT | Facility: CLINIC | Age: 62
End: 2022-09-22

## 2022-12-05 ENCOUNTER — IMMUNIZATION (OUTPATIENT)
Dept: FAMILY MEDICINE | Facility: CLINIC | Age: 62
End: 2022-12-05
Payer: COMMERCIAL

## 2022-12-05 PROCEDURE — G0008 ADMIN INFLUENZA VIRUS VAC: HCPCS

## 2022-12-05 PROCEDURE — 90682 RIV4 VACC RECOMBINANT DNA IM: CPT

## 2022-12-13 NOTE — PROGRESS NOTES
NEUROSURGERY FOLLOWUP  NOTE    Layla Starks comes today in f/u after prior laminoplasty for cervical myelopathy Sept 9 2016 for cervical myelopathy.         Developed HA  At Some point (after surgery) not sure how long,  Seen twice postop with muscle spasm in neck.     HA started sometime after 10/28 (when she had dizziness, nausea and anxiety) and before 11/25 when she had a nonpositional HA treated with ibuprofen and called for a referral to neurology.          Mainly frontal.  Not positional.  Not noticed when she goes to bed.  When she gets up she is fine for an hour or hour and a half and then notices them coming on every single day.  CROSS make her feel off kilter like she is on a ship.  Room spins.   Makes her feel unsteady.     Never lies down during the day.  Dark makes her better has photophobia, triggers with sound.   Nothing discernable with a cough or sneeze.  (other than pressure) worse with coming back up after tying shoes. Had brain MRI with contrast at neurology (no dural thickening no evidence of sag)    Has taken gabapentin, verapamil.    Has been on nortryptylline (2 mos, just taken off),  has not tried clonzipam (prescribed by omaira), tried trigger point injections.  Had eyes checked - ok.   Went to [facility] dizzy and balance.     was  Admitted to [place] for CT myelogram which was initially read as a potential dye leak and then f/u didn't show any accumulation  and [facility] repeated CT myelogram without leak or accumulation.    Gave her a blood patch but that didn't help.     Since our last visit  She has seen Dr. Rosales for her CROSS who is treating her.  She has also tried accupuncture.         The pts PMH, PSH, ROS, Meds, Allergies, SH, FH are all unchanged and summarized in the pts health history from last visit        PHYSICAL EXAM:   Constitutional: There were no vitals taken for this visit.     Mental Status: A & O in no acute distress.  Affect is appropriate.  Speech is fluent.  Recent and  remote memory are intact.  Attention span and concentration are normal.     Cranial Nerves: CN1: grossly intact per patient recall. CN2: No funduscopic exam performed. CN3,4 & 6: Pupillary light response, lateral and vertical gaze normal.  No nystagmus.  Visual fields are full to confrontation. CN5: Intact to touch CN7: No facial weakness, smile, facial symmetry intact. CN8: Intact to spoken voice. CN9&10: Gag reflex, uvula midline, palate rises with phonation. CN11: Shoulder shrug 5/5 intact bilaterally. CN12: Tongue midline and moves freely from side to side.     Motor: No pronator drift of upper extremity. Normal bulk and tone all muscle groups of upper and lower extremities.     Sensory: Sensation intact bilaterally to light touch.       Coordination:   Heel/toe/ gait intact.  No problem  tandem gait      Reflexes; supinator, biceps, triceps, knee/ ankle jerk intact.  Mild left  hoffmans/   No  babinski/ clonus.    IMAGING:   I personally reviewed all radiographic images     12/16 MRI brain with contrast - no dural enhancement, no downward slope of tonsils, no space around brain.    CT myelogram.  No pseudomeningocele, no accumulation of fluid.  Initially read as possible pin hole site on [place] CT myelo but not seen on f/u images.       CONSULTATION ASSESSMENT AND PLAN:  My concerns is that the thought that her headaches are from a dural leak is not supported by the imaging.  There is no evidence on MRI brain  that she has sag and no evidence on CT myelo or MRI of cervical spine that she has accumulation.  I believe that the HA are coincidental and caused by some other issue.  I would be very comfortable with a HA neurologist trying to find the source and treat.    If she can not find an etiology or a cure then I believe there is a headache specialist at [facility] that might serve as a second opinion. (fourth).    I don't see  Evidence on the chart of a workup like CRP/ESR,   MRA (vasculitis?) or MRV  Or an opening  pressure done at the time of the CT myelogram.  I don't know if these tests were already done either by Dr. Rosales or by Stephanie  Or by [facility].  I don't want to order tests that are repetitive.  I will defer to Dr. Rosales as this is out of my area of practice.  I have heard of post traumatic headaches that are seen with head injuries or post virus that are long lasting (like a year) that improve with neuropathic drugs like what she is on and improve with returning to normal activities.         I spent more than 30  minutes in this apt, examining the pt, reviewing the scans, reviewing notes from chart, discussing treatment options with risks and benefits and coordinating care. >50 % clinic time was spent in face to face counseling and coordinating care    Laine Valdez      CC:     Brittani Grant MD  [address] [facility] [id number]     Dr. Rosales  [address] [facility].  [facility]       Mustarde Flap Text: The defect edges were debeveled with a #15 scalpel blade.  Given the size, depth and location of the defect and the proximity to free margins a Mustarde flap was deemed most appropriate.  Using a sterile surgical marker, an appropriate flap was drawn incorporating the defect. The area thus outlined was incised with a #15 scalpel blade.  The skin margins were undermined to an appropriate distance in all directions utilizing iris scissors.

## 2023-01-31 NOTE — TELEPHONE ENCOUNTER
[facility]    Phone Message:  Pt has an appt this coming Tuesday, with MD Ravindra.  Pt has some urgent paperwork that she would like to get to MD Ravindra, prior to her appt.  Pt is unable to fax at this time.  Pt is wondering if she can email it, or if it has to be posted by mail.  Please call pt back as soon as possible, in case she needs to mail it out.    May a detailed message be left on voicemail: Yes     Reason for Call: Other: Urgent Paperwork     Action Taken: Message routed to:  [facility] TEAM    Travel Screening: Not Applicable                                                                       yes

## 2023-02-06 ENCOUNTER — TRANSFERRED RECORDS (OUTPATIENT)
Dept: HEALTH INFORMATION MANAGEMENT | Facility: CLINIC | Age: 63
End: 2023-02-06
Payer: COMMERCIAL

## 2023-02-06 ENCOUNTER — MYC MEDICAL ADVICE (OUTPATIENT)
Dept: FAMILY MEDICINE | Facility: CLINIC | Age: 63
End: 2023-02-06
Payer: COMMERCIAL

## 2023-02-06 DIAGNOSIS — Z12.11 SCREEN FOR COLON CANCER: Primary | ICD-10-CM

## 2023-02-15 ENCOUNTER — MYC MEDICAL ADVICE (OUTPATIENT)
Dept: FAMILY MEDICINE | Facility: CLINIC | Age: 63
End: 2023-02-15

## 2023-02-15 ENCOUNTER — LAB (OUTPATIENT)
Dept: LAB | Facility: CLINIC | Age: 63
End: 2023-02-15
Payer: COMMERCIAL

## 2023-02-15 DIAGNOSIS — Z20.822 SUSPECTED COVID-19 VIRUS INFECTION: ICD-10-CM

## 2023-02-15 DIAGNOSIS — U07.1 INFECTION DUE TO 2019 NOVEL CORONAVIRUS: Primary | ICD-10-CM

## 2023-02-15 LAB — SARS-COV-2 RNA RESP QL NAA+PROBE: POSITIVE

## 2023-02-15 PROCEDURE — U0003 INFECTIOUS AGENT DETECTION BY NUCLEIC ACID (DNA OR RNA); SEVERE ACUTE RESPIRATORY SYNDROME CORONAVIRUS 2 (SARS-COV-2) (CORONAVIRUS DISEASE [COVID-19]), AMPLIFIED PROBE TECHNIQUE, MAKING USE OF HIGH THROUGHPUT TECHNOLOGIES AS DESCRIBED BY CMS-2020-01-R: HCPCS

## 2023-02-15 PROCEDURE — U0005 INFEC AGEN DETEC AMPLI PROBE: HCPCS

## 2023-02-16 ENCOUNTER — NURSE TRIAGE (OUTPATIENT)
Dept: NURSING | Facility: CLINIC | Age: 63
End: 2023-02-16
Payer: COMMERCIAL

## 2023-02-16 NOTE — TELEPHONE ENCOUNTER
Pt reports she started Paxlovid this morning for Covid. Pt reports she has a fever of 102.0 and she is wondering if she can take Tylenol with Paxlovid.     Writer advised pt she can take 1,000 mg of Tylenol every eight hours. Increase fluids. Call back if new or worsening symptoms.     Pt verbalizes understanding and agrees to plan.     Reason for Disposition    Caller has medicine question, adult has minor symptoms, caller declines triage, and triager answers question    Protocols used: MEDICATION QUESTION CALL-A-OH

## 2023-02-16 NOTE — TELEPHONE ENCOUNTER
RN COVID TREATMENT VISIT  02/16/23      The patient has been triaged and does not require a higher level of care.    Layla Starks  62 year old  Current weight? 197lbs    Has the patient been seen by a primary care provider at an [facility] or [facility] within the past two years? Yes.   Have you been in close proximity to/do you have a known exposure to a person with a confirmed case of influenza? No.     General treatment eligibility:  Date of positive COVID test (PCR or at home)?  2/15/23    Are you or have you been hospitalized for this COVID-19 infection? No.   Have you received monoclonal antibodies or antiviral treatment for COVID-19 since this positive test? No.   Do you have any of the following conditions that place you at risk of being very sick from COVID-19?   - Age 50 years or older  Yes, patient has at least one high risk condition as noted above.     Current COVID symptoms:   - muscle or body aches  - headache  - sore throat  Yes. Patient has at least one symptom as selected.     How many days since symptoms started? 5 days or less. Established patient, 12 years or older weighing at least 88.2 lbs, who has symptoms that started in the past 5 days, has not been hospitalized nor received treatment already, and is at risk for being very sick from COVID-19.     Treatment eligibility by RN:    Are you currently pregnant or nursing? No    Do you have a clinically significant hypersensitivity to nirmatrelvir or ritonavir, or toxic epidermal necrolysis (TEN) or Jaramillo-Tony Syndrome? No    Do you have a history of hepatitis, any hepatic impairment on the Problem List (such as Child-Amin Class C, cirrhosis, fatty liver disease, alcoholic liver disease), or was the last liver lab (hepatic panel, ALT, AST, ALK Phos, bilirubin) elevated in the past 6 months? No    Do you have any history of severe renal impairment (eGFR < 30mL/min)? No    Is patient eligible to continue?    Yes, patient meets all eligibility requirements for the RN COVID treatment (as denoted by all no responses above).     Current Outpatient Medications   Medication Sig Dispense Refill     acetaminophen (TYLENOL) 325 MG tablet Take 2 tablets (650 mg) by mouth every 8 hours as needed for mild pain 100 tablet 2     Bacillus Coagulans-Inulin (PROBIOTIC-PREBIOTIC PO)        CALCIUM CITRATE PO Take 1 tablet by mouth every morning       diclofenac (VOLTAREN) 1 % topical gel APPLY TO THE AFFECTED AREA FOUR TIMES DAILY OR AS DIRECTED       DULoxetine (CYMBALTA) 60 MG capsule TAKE 1 CAPSULE(60 MG) BY MOUTH DAILY 90 capsule 2     Flax Oil-Fish Oil-Borage Oil ([employer] OMEGA-3 PO) Take 1 capsule by mouth 2 times daily       ibuprofen (ADVIL/MOTRIN) 800 MG tablet Take 800 mg by mouth       lidocaine (LIDODERM) 5 % patch lidocaine 5 % topical patch       MAGNESIUM GLYCINATE PO Take 300 mg by mouth At Bedtime       Multiple Vitamins-Minerals (MULTIVITAL PO) Take 1 tablet by mouth 2 times daily Twice a day       Vitamin D3 (CHOLECALCIFEROL) 125 MCG (5000 UT) tablet          Medications from List 1 of the standing order (on medications that exclude the use of Paxlovid) that patient is taking: NONE. Is patient taking Alysia's Wort? No (patient reports not taking duloxetine or lidocaine patch)  Is patient taking Alysia's Wort or any meds from List 1? No.   Medications from List 2 of the standing order (on meds that provider needs to adjust) that patient is taking: NONE. Is patient on any of the meds from List 2? No.   Medications from List 3 of standing order (on meds that a RN needs to adjust) that patient is taking: NONE. Is patient on any meds from List 3? No.     Paxlovid has an approximate 90% reduction in hospitalization. Paxlovid can possibly cause altered sense of taste, diarrhea (loose, watery stools), high blood pressure, muscle aches.     Would patient like a Paxlovid prescription?   Yes.   Lab Results   Component Value  Date    GFRESTIMATED 88 03/17/2022       Was last eGFR reduced? No, eGFR 60 or greater/ No Result on record. Patient can receive the normal renal function dose. Paxlovid Rx sent to [facility]   [place]      Temporary change to home medications: None    All medication adjustments (holds, etc) were discussed with the patient and patient was asked to repeat back (teachback) their med adjustment.  Did patient understand med adjustment? Yes, patient repeated back and understood correctly.        Reviewed the following instructions with the patient:    Paxlovid (nimatrelvir and ritonavir)    How it works  Two medicines (nirmatrelvir and ritonavir) are taken together. They stop the virus from growing. Less amount of virus is easier for your body to fight.    How to take    Medicine comes in a daily container with both medicine tablets. Take by mouth twice daily (once in the morning, once at night) for 5 days.    The number of tablets to take varies by patient.    Don't chew or break capsules. Swallow whole.    When to take  Take as soon as possible after positive COVID-19 test result, and within 5 days of your first symptoms.    Possible side effects  Can cause altered sense of taste, diarrhea (loose, watery stools), high blood pressure, muscle aches.    Billy Grover RN

## 2023-02-17 ENCOUNTER — LAB (OUTPATIENT)
Dept: FAMILY MEDICINE | Facility: CLINIC | Age: 63
End: 2023-02-17
Attending: NURSE PRACTITIONER
Payer: COMMERCIAL

## 2023-02-17 ENCOUNTER — TELEPHONE (OUTPATIENT)
Dept: FAMILY MEDICINE | Facility: CLINIC | Age: 63
End: 2023-02-17
Payer: COMMERCIAL

## 2023-02-17 ENCOUNTER — E-VISIT (OUTPATIENT)
Dept: URGENT CARE | Facility: URGENT CARE | Age: 63
End: 2023-02-17
Payer: COMMERCIAL

## 2023-02-17 ENCOUNTER — NURSE TRIAGE (OUTPATIENT)
Dept: NURSING | Facility: CLINIC | Age: 63
End: 2023-02-17

## 2023-02-17 DIAGNOSIS — J02.9 SORE THROAT: ICD-10-CM

## 2023-02-17 DIAGNOSIS — U07.1 INFECTION DUE TO 2019 NOVEL CORONAVIRUS: Primary | ICD-10-CM

## 2023-02-17 LAB
DEPRECATED S PYO AG THROAT QL EIA: NEGATIVE
GROUP A STREP BY PCR: NOT DETECTED

## 2023-02-17 PROCEDURE — 99421 OL DIG E/M SVC 5-10 MIN: CPT | Mod: CS | Performed by: NURSE PRACTITIONER

## 2023-02-17 PROCEDURE — 99207 PR NO CHARGE LOS: CPT

## 2023-02-17 PROCEDURE — 87651 STREP A DNA AMP PROBE: CPT

## 2023-02-17 NOTE — TELEPHONE ENCOUNTER
Has covid-19. Thinks she has strep throat since the 15th on top of it. Can't drink water. Reason for Disposition was Drooling or spitting out saliva (because she can't swallow).  Declined triage disposition of Go To ED/UCC Now (Or To Office With PCP Approval).  She wants an order to get the throat swab done at the lab, and not be seen.  Please call her at:  [phone].  May leave a detailed message.  Nurse Triage SBAR    Is this a 2nd Level Triage? YES, LICENSED PRACTITIONER REVIEW IS REQUIRED    Situation: Has covid-19. Thinks she has strep throat since the 15th on top of it. Can't drink water. Reason for Disposition was Drooling or spitting out saliva (because she can't swallow).  Declined triage disposition of Go To ED/UCC Now (Or To Office With PCP Approval).  She wants an order to get the throat swab done at the lab, and not be seen.  Please call her at:  [phone].  May leave a detailed message.    Background: thinks she has strep on top of covid0-19, wants order for throat swab with the lab instead of being seen.    Assessment: unable to swallow secretions or water.    Protocol Recommended Disposition:   Go To ED/UCC Now (Or To Office With PCP Approval)    Recommendation: 2nd level triage, wants to do throat swab only.     Routed to provider    Does the patient meet one of the following criteria for ADS visit consideration? No  Zenaida March RN  [facility]    Reason for Disposition    Drooling or spitting out saliva (because can't swallow)    Additional Information    Negative: SEVERE difficulty breathing (e.g., struggling for each breath, speaks in single words)    Negative: Sounds like a life-threatening emergency to the triager    Negative: Throat culture results, call about    Negative: Productive cough is main symptom    Negative: Runny nose is main symptom    Protocols used: SORE THROAT-A-OH

## 2023-02-17 NOTE — PATIENT INSTRUCTIONS
Dear Layla,      I have entered an order for strep testing.  There should be a link in your MyChart to select where you would like to have it scheduled.  If you have any difficulty getting that at those 2 sites please call the number below to have them help schedule you    If you are unable to complete these [facility] scheduling steps, please call [phone] to schedule your testing.     Watch your MyChart for results.  If it is positive we will contact you and get an antibiotic sent in.    How do I self-isolate?  You isolate when you have symptoms of COVID or a test shows you have COVID, even if you don t have symptoms.     If you DO have symptoms:  o Stay home and away from others  - For at least 5 days after your symptoms started, AND   - You are fever free for 24 hours (with no medicine that reduces fever), AND  - Your other symptoms are better.  o Wear a mask for 10 full days any time you are around others.    If you DON T have symptoms:  o Stay at home and away from others for at least 5 days after your positive test.  o Wear a mask for 10 full days any time you are around others.    How can I take care of myself?  Over the counter medications may help with your symptoms such as runny or stuffy nose, cough, chills, or fever.  Talk to your care team about your options.     Some people are at high risk of severe illness (for example, you have a weak immune system, you re 65 years or older, or you have certain medical problems). If your risk is high and your symptoms started in the last 5 days, we strongly recommend for you to get COVID treatment as soon as possible. Paxlovid and Molnupiravir are proven safe and effective, make you feel better faster, and prevent hospitalization and death.       To schedule an appointment to discuss COVID treatment, request an appointment on [facility] (select  COVID-19 Treatment ) or call [facility] ([phone]).      Get lots of rest. Drink extra fluids (unless a  doctor has told you not to)    Take Tylenol (acetaminophen) or ibuprofen for fever or pain. If you have liver or kidney problems, ask your family doctor if it's okay to take Tylenol or ibuprofen    Take over the counter medications for your symptoms, as directed by your doctor. You may also talk to your pharmacist.      If you have other health problems (like cancer, heart failure, an organ transplant or severe kidney disease): Call your specialty clinic if you don't feel better in the next 2 days.    Know when to call 911. Emergency warning signs include:  o Trouble breathing or shortness of breath  o Pain or pressure in the chest that doesn't go away  o Feeling confused like you haven't felt before, or not being able to wake up  o Bluish-colored lips or face    Where can I get more information?     [facility] - About COVID-19: [email or web address]     CDC - What to Do If You're Sick: https://www.cdc.gov/coronavirus/2019-ncov/if-you-are-sick/index.html     CDC -  Isolation https://www.cdc.gov/coronavirus/2019-ncov/your-health/isolation.html

## 2023-02-17 NOTE — TELEPHONE ENCOUNTER
Left message to call back for: Layla  Information to relay to patient: Put her on Mara Cortés's schedule for 1040 for a office visit to rule out strep.

## 2023-02-17 NOTE — TELEPHONE ENCOUNTER
-- DO NOT REPLY / DO NOT REPLY ALL --  -- Message is from the [facility]--    General Patient Message      Reason for Call: Patient will not stop crying for last 3 to 4 hour patient mom states she feed her, changed the her, burp her and laid her down and she still keeps crying     Caller Information       Type Contact Phone    01/02/2022 11:45 PM CST Phone (Incoming) Ladi Galarza (Mother) [phone] (M)          Alternative phone number: na        Did the caller agree that this message can wait until the office reopens on Monday (or after the holiday)? YES - The Message Can Wait      Send a message to the provider's clinical support pool.     Turnaround time given to caller:   \"This message will be sent to [Minoo Tomlinson]. The clinical team will fulfill your request as soon as they review your message when the office opens on Monday (or after the holiday).\"       Provider E-Visit time total (minutes): 3

## 2023-04-19 ENCOUNTER — TRANSFERRED RECORDS (OUTPATIENT)
Dept: HEALTH INFORMATION MANAGEMENT | Facility: CLINIC | Age: 63
End: 2023-04-19
Payer: COMMERCIAL

## 2023-05-01 NOTE — TELEPHONE ENCOUNTER
FUTURE VISIT INFORMATION      FUTURE VISIT INFORMATION:    Date: 7/19/2023    Time: 10 AM    Location: [facility]  REFERRAL INFORMATION:    Referring provider: Self-Referred    Referring providers clinic: N/A    Reason for visit/diagnosis: Neck Lift    RECORDS REQUESTED FROM:       Clinic name Comments Records Status Imaging Status   [facility] 2/2/21 - ORTHO OV with Dr. Waite     [facility] - Surgery 2/19/21 - OP Note for POSTERIOR SPINAL FUSION C4 TO T1 REVISION with Dr. Waite  12/13/17 - OP Note for POSTERIOR C4-5 FORAMINOTOMY with Dr. Barclay Epic    [facility] - Imaging 8/22/22 - MRI Neck  8/11/22 - CT Neck Received  PACs   [facility] 7/29/22 - SPINE OV with Dr. Mayer Care Everywhere    [facility] - Surgery 4/7/22 - OP Note for POSTERIOR HARDWARE REMOVAL C4 TO T1 with Dr. Mayer  9/23/21 - OP Note for ANTERIOR HARDWARE REMOVAL C4-7, TOTAL DISC REPLACEMENT C3-4 with Dr. Mayer  3/13/19 - OP Note for ANTERIOR CERVICAL DECOMPRESSION AND FUSION C4-5, C6-7 WITH HARDWARE REMOVAL C5-6 with Dr. Kat  * Additional in Care Everywhere Care Everywhere

## 2023-05-29 ASSESSMENT — ENCOUNTER SYMPTOMS
BREAST MASS: 0
MYALGIAS: 1
WEAKNESS: 0
NERVOUS/ANXIOUS: 0
FEVER: 0
JOINT SWELLING: 1
DIZZINESS: 0
SHORTNESS OF BREATH: 0
SORE THROAT: 0
HEADACHES: 1
HEARTBURN: 0
PALPITATIONS: 0
NAUSEA: 0
PARESTHESIAS: 0
EYE PAIN: 0
FREQUENCY: 0
ABDOMINAL PAIN: 1
CONSTIPATION: 0
ARTHRALGIAS: 1
CHILLS: 0
DIARRHEA: 0
COUGH: 0
HEMATOCHEZIA: 0
DYSURIA: 0
HEMATURIA: 0

## 2023-05-30 ENCOUNTER — TELEPHONE (OUTPATIENT)
Dept: FAMILY MEDICINE | Facility: CLINIC | Age: 63
End: 2023-05-30
Payer: COMMERCIAL

## 2023-05-30 ENCOUNTER — DOCUMENTATION ONLY (OUTPATIENT)
Dept: LAB | Facility: CLINIC | Age: 63
End: 2023-05-30
Payer: COMMERCIAL

## 2023-05-30 NOTE — PROGRESS NOTES
Patient has appointment with PCP 1 day prior to lab appointment. Labs will be ordered at time of appointment.

## 2023-05-30 NOTE — TELEPHONE ENCOUNTER
OV 6/5/23 late in the day.  Layla does not think she can stay NPO all day.  Lab appointment made for next morning 6/6/23 at 8:30am for a fasting labs.

## 2023-05-31 NOTE — TELEPHONE ENCOUNTER
We can do most labs nonfasting these days.  She should plan on going ahead and eating that day and we may go ahead and simply draw labs at her appointment.  We can discuss it at that time

## 2023-06-05 ENCOUNTER — OFFICE VISIT (OUTPATIENT)
Dept: FAMILY MEDICINE | Facility: CLINIC | Age: 63
End: 2023-06-05
Payer: COMMERCIAL

## 2023-06-05 VITALS
WEIGHT: 199.6 LBS | HEART RATE: 63 BPM | BODY MASS INDEX: 33.26 KG/M2 | HEIGHT: 65 IN | OXYGEN SATURATION: 97 % | DIASTOLIC BLOOD PRESSURE: 75 MMHG | SYSTOLIC BLOOD PRESSURE: 113 MMHG

## 2023-06-05 DIAGNOSIS — E66.811 CLASS 1 OBESITY DUE TO EXCESS CALORIES WITHOUT SERIOUS COMORBIDITY WITH BODY MASS INDEX (BMI) OF 33.0 TO 33.9 IN ADULT: ICD-10-CM

## 2023-06-05 DIAGNOSIS — F32.0 MAJOR DEPRESSIVE DISORDER, SINGLE EPISODE, MILD (H): ICD-10-CM

## 2023-06-05 DIAGNOSIS — Z12.4 SCREENING FOR CERVICAL CANCER: ICD-10-CM

## 2023-06-05 DIAGNOSIS — Z00.00 ROUTINE GENERAL MEDICAL EXAMINATION AT A HEALTH CARE FACILITY: Primary | ICD-10-CM

## 2023-06-05 DIAGNOSIS — M54.2 CERVICALGIA: ICD-10-CM

## 2023-06-05 DIAGNOSIS — Z78.0 POSTMENOPAUSAL STATUS: ICD-10-CM

## 2023-06-05 DIAGNOSIS — E66.09 CLASS 1 OBESITY DUE TO EXCESS CALORIES WITHOUT SERIOUS COMORBIDITY WITH BODY MASS INDEX (BMI) OF 33.0 TO 33.9 IN ADULT: ICD-10-CM

## 2023-06-05 DIAGNOSIS — R53.83 OTHER FATIGUE: ICD-10-CM

## 2023-06-05 DIAGNOSIS — Z12.11 SCREEN FOR COLON CANCER: ICD-10-CM

## 2023-06-05 LAB
ERYTHROCYTE [DISTWIDTH] IN BLOOD BY AUTOMATED COUNT: 12.7 % (ref 10–15)
HCT VFR BLD AUTO: 41.2 % (ref 35–47)
HGB BLD-MCNC: 13.9 G/DL (ref 11.7–15.7)
MCH RBC QN AUTO: 29.9 PG (ref 26.5–33)
MCHC RBC AUTO-ENTMCNC: 33.7 G/DL (ref 31.5–36.5)
MCV RBC AUTO: 89 FL (ref 78–100)
PLATELET # BLD AUTO: 290 10E3/UL (ref 150–450)
RBC # BLD AUTO: 4.65 10E6/UL (ref 3.8–5.2)
WBC # BLD AUTO: 5 10E3/UL (ref 4–11)

## 2023-06-05 PROCEDURE — 82607 VITAMIN B-12: CPT | Performed by: FAMILY MEDICINE

## 2023-06-05 PROCEDURE — 87624 HPV HI-RISK TYP POOLED RSLT: CPT | Performed by: FAMILY MEDICINE

## 2023-06-05 PROCEDURE — 82728 ASSAY OF FERRITIN: CPT | Performed by: FAMILY MEDICINE

## 2023-06-05 PROCEDURE — 85027 COMPLETE CBC AUTOMATED: CPT | Performed by: FAMILY MEDICINE

## 2023-06-05 PROCEDURE — 99396 PREV VISIT EST AGE 40-64: CPT | Performed by: FAMILY MEDICINE

## 2023-06-05 PROCEDURE — 99213 OFFICE O/P EST LOW 20 MIN: CPT | Mod: 25 | Performed by: FAMILY MEDICINE

## 2023-06-05 PROCEDURE — 84443 ASSAY THYROID STIM HORMONE: CPT | Performed by: FAMILY MEDICINE

## 2023-06-05 PROCEDURE — G0145 SCR C/V CYTO,THINLAYER,RESCR: HCPCS | Performed by: FAMILY MEDICINE

## 2023-06-05 PROCEDURE — 80053 COMPREHEN METABOLIC PANEL: CPT | Performed by: FAMILY MEDICINE

## 2023-06-05 PROCEDURE — 36415 COLL VENOUS BLD VENIPUNCTURE: CPT | Performed by: FAMILY MEDICINE

## 2023-06-05 ASSESSMENT — ENCOUNTER SYMPTOMS
HEADACHES: 1
DIARRHEA: 0
DYSURIA: 0
ABDOMINAL PAIN: 1
NERVOUS/ANXIOUS: 0
ARTHRALGIAS: 1
JOINT SWELLING: 1
PARESTHESIAS: 0
HEARTBURN: 0
DIZZINESS: 0
FEVER: 0
NAUSEA: 0
BREAST MASS: 0
HEMATOCHEZIA: 0
FREQUENCY: 0
SHORTNESS OF BREATH: 0
HEMATURIA: 0
SORE THROAT: 0
CONSTIPATION: 0
MYALGIAS: 1
COUGH: 0
CHILLS: 0
WEAKNESS: 0
EYE PAIN: 0
PALPITATIONS: 0

## 2023-06-05 NOTE — PROGRESS NOTES
SUBJECTIVE:   CC: Layla is an 62 year old who presents for preventive health visit.        View : No data to display.              HPI: Layla is a pleasant 62-year-old female presenting today for an annual physical exam.  She does have a couple of questions and concerns today.  First, the patient states that she is tired all the time.  She wonders if it is due to her chronic neck pain as she knows that that pain affects every aspect of her life.  She does not sleep adequately and even affects her mood and she feels down at times.  She exercise much due to worsening neck pain after she does so and she is now having some left knee pain as well.  She is wondering about what labs could be done today to make sure there is nothing else going on.  She recently was down in [place] to get a specialized imaging test done of her spine and inflammation was found.  She is working with a surgeon locally regarding trying to find out and localize a source of that pain.    Healthy Habits:     Getting at least 3 servings of Calcium per day:  Yes    Bi-annual eye exam:  Yes    Dental care twice a year:  Yes    Sleep apnea or symptoms of sleep apnea:  None    Diet:  Regular (no restrictions)    Frequency of exercise:  2-3 days/week    Duration of exercise:  45-60 minutes    Taking medications regularly:  Yes    Medication side effects:  None    PHQ-2 Total Score: 0    Additional concerns today:  No        Today's PHQ-2 Score:       6/5/2023    10:40 AM   PHQ-2 ( 1999 [employer])   Q1: Little interest or pleasure in doing things 0   Q2: Feeling down, depressed or hopeless 0   PHQ-2 Score 0   Q1: Little interest or pleasure in doing things Not at all   Q2: Feeling down, depressed or hopeless Not at all   PHQ-2 Score 0       Have you ever done Advance Care Planning? (For example, a Health Directive, POLST, or a discussion with a medical provider or your loved ones about your wishes): No, advance care planning information given to  patient to review.  Patient declined advance care planning discussion at this time.    Social History     Tobacco Use     Smoking status: Former     Packs/day: 0.50     Years: 1.00     Pack years: 0.50     Types: Cigarettes     Quit date:      Years since quittin.4     Smokeless tobacco: Never     Tobacco comments:     Quit 30 years ago   Vaping Use     Vaping status: Not on file   Substance Use Topics     Alcohol use: Yes     Alcohol/week: 1.0 - 2.0 standard drink of alcohol     Comment: rare             2023     9:11 AM   Alcohol Use   Prescreen: >3 drinks/day or >7 drinks/week? No     Reviewed orders with patient.  Reviewed health maintenance and updated orders accordingly - Yes  Patient Active Problem List   Diagnosis     Cervicalgia     Cervical radiculopathy     Cervical spondylosis     S/P cervical spinal fusion     Major depressive disorder, single episode, mild (H)     Class 1 obesity due to excess calories without serious comorbidity with body mass index (BMI) of 33.0 to 33.9 in adult     Past Surgical History:   Procedure Laterality Date     AS HYSTEROSCOPY, SURGICAL; W/ ENDOMETRIAL ABLATION, ANY METHOD N/A      BACK SURGERY  2016    Cervical     BREAST SURGERY      breast reduction     C5-7 laminoplasty Right 2016     COLONOSCOPY  2016     DECOMPRESSION, FUSION CERVICAL ANTERIOR THREE+ LEVELS, COMBINED N/A 2019    C4-5, C6-7, C5-6 with hardware removal     Excision anal skin tags N/A 2010     EXPLORE SPINE, REMOVE HARDWARE, COMBINED N/A 2021    Procedure: Removal of Laminoplasty Plates Cervical 4-7;;  Surgeon: Stevo Waite MD;  Location: [facility]     FORAMINOTOMY CERVICAL POSTERIOR MINIMALLY INVASIVE ONE LEVEL Right 2017    Procedure: FORAMINOTOMY CERVICAL POSTERIOR MINIMALLY INVASIVE ONE LEVEL;  Right Minimally Invasive Posterior Cervical 4-5 Foraminotomy;  Surgeon: Byron Barclay MD;  Location: [facility] OR     FUSION CERVICAL ANTERIOR ONE  LEVEL N/A 2018    unspecified cervical fusion     GRAFT BONE FROM ILIAC CREST Right 2021    Procedure: Right Posterior Iliac Crest Bone Graft [place];  Surgeon: Stevo Waite MD;  Location: [facility]     HEAD & NECK SURGERY  2016, 17, 18, 3/13/19    laminoplasty C-4 to C-6, Foraminotomy C45, Fuse C56, C34 C67     INJECT BLOCK MEDIAL BRANCH CERVICAL/THORACIC/LUMBAR Right 2021    Procedure: BLOCK, NERVE, FACET JOINT, MEDIAL BRANCH, DIAGNOSTIC - Right C2-3 Third Occipital Nerve (TON);  Surgeon: Dyan Ackerman MD;  Location: [facility] OR     KNEE SURGERY Left 2016    partial meniscectomy     LAMINOPLASTY Right 2016    Procedure: CERVICAL LAMINOPLASTY C5 C6 C7 OPEN ON RIGHT WITH FORAMINOTOMIES AT RIGHT C4-5, BILATERAL C5-6 C6-7 ;  Surgeon: Laine Valdez MD;  Location: [facility];  Service:      MAMMOPLASTY REDUCTION       MAMMOPLASTY REDUCTION BILATERAL Bilateral      OPTICAL TRACKING SYSTEM FUSION POSTERIOR CERVICAL THREE + LEVELS N/A 2021    Procedure: Posterior Instrumented Spinal Fusion Cervical 4 to Thoracic 1; Revision Foraminotomies/Factectomies Right Cervical 4-5;  Surgeon: Stevo Waite MD;  Location: [facility]     OTHER SURGICAL HISTORY      Excision hemorrhoid skin tags      OTHER SURGICAL HISTORY Left 2016    Left Knee Meniscus Repair     Partial meniscectomy Left 2016     ID KNEE SCOPE,MED/LAT MENISCUS REPAIR Left        Social History     Tobacco Use     Smoking status: Former     Packs/day: 0.50     Years: 1.00     Pack years: 0.50     Types: Cigarettes     Quit date:      Years since quittin.4     Smokeless tobacco: Never     Tobacco comments:     Quit 30 years ago   Vaping Use     Vaping status: Not on file   Substance Use Topics     Alcohol use: Yes     Alcohol/week: 1.0 - 2.0 standard drink of alcohol     Comment: rare     Family History   Problem Relation Age of Onset     Arthritis Mother       Macular Degeneration Mother      Coronary Artery Disease Mother      Hypertension Mother      Osteoporosis Mother      Other Cancer Mother          from pancreatic cancer 2021     Hearing Loss Father      Hyperlipidemia Father      Multiple Sclerosis Sister      Multiple Sclerosis Sister          Current Outpatient Medications   Medication Sig Dispense Refill     acetaminophen (TYLENOL) 325 MG tablet Take 2 tablets (650 mg) by mouth every 8 hours as needed for mild pain 100 tablet 2     Bacillus Coagulans-Inulin (PROBIOTIC-PREBIOTIC PO)        CALCIUM CITRATE PO Take 1 tablet by mouth every morning       diclofenac (VOLTAREN) 1 % topical gel APPLY TO THE AFFECTED AREA FOUR TIMES DAILY OR AS DIRECTED       Flax Oil-Fish Oil-Borage Oil ([employer] OMEGA-3 PO) Take 1 capsule by mouth 2 times daily       lidocaine (LIDODERM) 5 % patch lidocaine 5 % topical patch       MAGNESIUM GLYCINATE PO Take 300 mg by mouth At Bedtime       Multiple Vitamins-Minerals (MULTIVITAL PO) Take 1 tablet by mouth 2 times daily Twice a day       Vitamin D3 (CHOLECALCIFEROL) 125 MCG (5000 UT) tablet        Allergies   Allergen Reactions     Dilaudid [Hydromorphone] GI Disturbance     Per patient, tolerates IV version well.     Morphine Nausea and Vomiting     Oxycodone Nausea and Vomiting     Tramadol Nausea and Vomiting     Liquid Adhesive Rash     Other reaction(s): Rash       Breast Cancer Screening:        3/9/2022     9:54 AM   Breast CA Risk Assessment (FHS-7)   Do you have a family history of breast, colon, or ovarian cancer? No / Unknown    No / Unknown       Mammogram Screening: Recommended mammography every 1-2 years with patient discussion and risk factor consideration  Pertinent mammograms are reviewed under the imaging tab.    History of abnormal Pap smear: NO - age 30-65 PAP every 5 years with negative HPV co-testing recommended     Reviewed and updated as needed this visit by clinical staff                  Reviewed and updated  "as needed this visit by Provider                     Review of Systems   Constitutional: Negative for chills and fever.   HENT: Negative for congestion, ear pain, hearing loss and sore throat.    Eyes: Negative for pain and visual disturbance.   Respiratory: Negative for cough and shortness of breath.    Cardiovascular: Positive for peripheral edema. Negative for chest pain and palpitations.   Gastrointestinal: Positive for abdominal pain. Negative for constipation, diarrhea, heartburn, hematochezia and nausea.   Breasts:  Negative for tenderness, breast mass and discharge.   Genitourinary: Negative for dysuria, frequency, genital sores, hematuria, pelvic pain, urgency, vaginal bleeding and vaginal discharge.   Musculoskeletal: Positive for arthralgias, joint swelling and myalgias.   Skin: Negative for rash.   Neurological: Positive for headaches. Negative for dizziness, weakness and paresthesias.   Psychiatric/Behavioral: Negative for mood changes. The patient is not nervous/anxious.           OBJECTIVE:   /75 (BP Location: Left arm, Patient Position: Left side, Cuff Size: Adult Large)   Pulse 63   Ht 1.638 m (5' 4.5\")   Wt 90.5 kg (199 lb 9.6 oz)   LMP 02/01/2017   SpO2 97%   BMI 33.73 kg/m    Physical Exam  GENERAL APPEARANCE: healthy, alert and no distress  EYES: Eyes grossly normal to inspection, PERRL and conjunctivae and sclerae normal  HENT: ear canals and TM's normal, nose and mouth without ulcers or lesions, oropharynx clear and oral mucous membranes moist  NECK: no adenopathy, no asymmetry, masses, or scars and thyroid normal to palpation  RESP: lungs clear to auscultation - no rales, rhonchi or wheezes  BREAST: normal without masses, tenderness or nipple discharge and no palpable axillary masses or adenopathy  CV: regular rate and rhythm, normal S1 S2, no S3 or S4, no murmur, click or rub, no peripheral edema and peripheral pulses strong  ABDOMEN: soft, nontender, no hepatosplenomegaly, no " masses and bowel sounds normal   (female): normal female external genitalia, normal urethral meatus, vaginal mucosal atrophy noted, normal cervix  MS: no musculoskeletal defects are noted and gait is age appropriate without ataxia  SKIN: no suspicious lesions or rashes  NEURO: Normal strength and tone, sensory exam grossly normal, mentation intact and speech normal  PSYCH: mentation appears normal and affect normal/bright    Labs reviewed in Epic    ASSESSMENT/PLAN:     Problem List Items Addressed This Visit        Nervous and Auditory    Cervicalgia     The patient has chronic neck pain following surgical intervention.  She recently traveled down to [place] to have special testing done and is working with Dr. Bello up here regarding interventions.  She does have significant pain on a daily basis which she feels significantly affects her quality of life and functioning.            Digestive    Class 1 obesity due to excess calories without serious comorbidity with body mass index (BMI) of 33.0 to 33.9 in adult     The patient expressed concern regarding her weight and asked about weight loss medication; she is set up with me for a formal weight management consultation.             Behavioral    Major depressive disorder, single episode, mild (H)     The patient struggles with mood due to her chronic pain, however, is not interested in taking medication for this.         Other Visit Diagnoses     Routine general medical examination at a health care facility    -  Primary    Screen for colon cancer        Relevant Orders    Colonoscopy Screening  Referral    Other fatigue        Relevant Orders    CBC with platelets (Completed)    TSH with free T4 reflex (Completed)    Ferritin (Completed)    Comprehensive metabolic panel (BMP + Alb, Alk Phos, ALT, AST, Total. Bili, TP) (Completed)    Vitamin B12 (Completed)    Postmenopausal status        Relevant Orders    DX Hip/Pelvis/Spine    Screening for cervical  "cancer        Relevant Orders    Pap screen with HPV - recommended age 30 - 65 years    HPV Hold (Lab Only)              COUNSELING:  Reviewed preventive health counseling, as reflected in patient instructions       Regular exercise       Healthy diet/nutrition       Osteoporosis prevention/bone health       Colorectal Cancer Screening      BMI:   Estimated body mass index is 33.73 kg/m  as calculated from the following:    Height as of this encounter: 1.638 m (5' 4.5\").    Weight as of this encounter: 90.5 kg (199 lb 9.6 oz).   Weight management plan: Patient referred to endocrine and/or weight management specialty      She reports that she quit smoking about 44 years ago. Her smoking use included cigarettes. She has a 0.50 pack-year smoking history. She has never used smokeless tobacco.      MELIA CREWS MD  [facility]  "

## 2023-06-06 LAB
ALBUMIN SERPL BCG-MCNC: 4.4 G/DL (ref 3.5–5.2)
ALP SERPL-CCNC: 83 U/L (ref 35–104)
ALT SERPL W P-5'-P-CCNC: 21 U/L (ref 10–35)
ANION GAP SERPL CALCULATED.3IONS-SCNC: 11 MMOL/L (ref 7–15)
AST SERPL W P-5'-P-CCNC: 23 U/L (ref 10–35)
BILIRUB SERPL-MCNC: 0.2 MG/DL
BUN SERPL-MCNC: 16.1 MG/DL (ref 8–23)
CALCIUM SERPL-MCNC: 9.1 MG/DL (ref 8.8–10.2)
CHLORIDE SERPL-SCNC: 106 MMOL/L (ref 98–107)
CREAT SERPL-MCNC: 0.74 MG/DL (ref 0.51–0.95)
DEPRECATED HCO3 PLAS-SCNC: 24 MMOL/L (ref 22–29)
FERRITIN SERPL-MCNC: 65 NG/ML (ref 11–328)
GFR SERPL CREATININE-BSD FRML MDRD: >90 ML/MIN/1.73M2
GLUCOSE SERPL-MCNC: 89 MG/DL (ref 70–99)
POTASSIUM SERPL-SCNC: 3.9 MMOL/L (ref 3.4–5.3)
PROT SERPL-MCNC: 6.7 G/DL (ref 6.4–8.3)
SODIUM SERPL-SCNC: 141 MMOL/L (ref 136–145)
TSH SERPL DL<=0.005 MIU/L-ACNC: 2.38 UIU/ML (ref 0.3–4.2)
VIT B12 SERPL-MCNC: 875 PG/ML (ref 232–1245)

## 2023-06-06 NOTE — ASSESSMENT & PLAN NOTE
The patient expressed concern regarding her weight and asked about weight loss medication; she is set up with me for a formal weight management consultation.

## 2023-06-06 NOTE — ASSESSMENT & PLAN NOTE
The patient struggles with mood due to her chronic pain, however, is not interested in taking medication for this.

## 2023-06-06 NOTE — ASSESSMENT & PLAN NOTE
The patient has chronic neck pain following surgical intervention.  She recently traveled down to [place] to have special testing done and is working with Dr. Bello up here regarding interventions.  She does have significant pain on a daily basis which she feels significantly affects her quality of life and functioning.

## 2023-06-07 LAB
BKR LAB AP GYN ADEQUACY: NORMAL
BKR LAB AP GYN INTERPRETATION: NORMAL
BKR LAB AP HPV REFLEX: NORMAL
BKR LAB AP PREVIOUS ABNL DX: NORMAL
BKR LAB AP PREVIOUS ABNORMAL: NORMAL
PATH REPORT.COMMENTS IMP SPEC: NORMAL
PATH REPORT.COMMENTS IMP SPEC: NORMAL
PATH REPORT.RELEVANT HX SPEC: NORMAL

## 2023-06-08 LAB
HUMAN PAPILLOMA VIRUS 16 DNA: NEGATIVE
HUMAN PAPILLOMA VIRUS 18 DNA: NEGATIVE
HUMAN PAPILLOMA VIRUS FINAL DIAGNOSIS: NORMAL
HUMAN PAPILLOMA VIRUS OTHER HR: NEGATIVE

## 2023-06-09 PROBLEM — R87.610 ASCUS OF CERVIX WITH NEGATIVE HIGH RISK HPV: Status: ACTIVE | Noted: 2019-08-01

## 2023-06-12 ENCOUNTER — TRANSFERRED RECORDS (OUTPATIENT)
Dept: HEALTH INFORMATION MANAGEMENT | Facility: CLINIC | Age: 63
End: 2023-06-12
Payer: COMMERCIAL

## 2023-06-16 ENCOUNTER — ANCILLARY PROCEDURE (OUTPATIENT)
Dept: BONE DENSITY | Facility: CLINIC | Age: 63
End: 2023-06-16
Attending: FAMILY MEDICINE
Payer: COMMERCIAL

## 2023-06-16 DIAGNOSIS — Z78.0 POSTMENOPAUSAL STATUS: ICD-10-CM

## 2023-06-16 PROCEDURE — 77080 DXA BONE DENSITY AXIAL: CPT | Mod: TC | Performed by: PHYSICIAN ASSISTANT

## 2023-06-20 ENCOUNTER — TELEPHONE (OUTPATIENT)
Dept: FAMILY MEDICINE | Facility: CLINIC | Age: 63
End: 2023-06-20
Payer: COMMERCIAL

## 2023-06-20 NOTE — TELEPHONE ENCOUNTER
Reason for Call:  Appointment Request    Patient requesting this type of appt: Pre-op    Requested provider: sandro    Reason patient unable to be scheduled: Not within requested timeframe    When does patient want to be seen/preferred time: 1-2 days    Comments: Layla was just at her annual appt on 6/5/23, and is wondering if she can just come in for an EKG.  Hgb completed was completed on 6/5/23    Could we send this information to you in [facility] or would you prefer to receive a phone call?:   Patient would prefer a phone call   Okay to leave a detailed message?: Yes at Home number on file [phone] (home)    Call taken on 6/20/2023 at 10:45 AM by Rola Gilbert RN  Surgery is Wednesday, needs ECG and preop appt.     Appointment made for Thursday at 11:00am.

## 2023-06-21 ASSESSMENT — PATIENT HEALTH QUESTIONNAIRE - PHQ9
SUM OF ALL RESPONSES TO PHQ QUESTIONS 1-9: 1
10. IF YOU CHECKED OFF ANY PROBLEMS, HOW DIFFICULT HAVE THESE PROBLEMS MADE IT FOR YOU TO DO YOUR WORK, TAKE CARE OF THINGS AT HOME, OR GET ALONG WITH OTHER PEOPLE: NOT DIFFICULT AT ALL
SUM OF ALL RESPONSES TO PHQ QUESTIONS 1-9: 1

## 2023-06-22 ENCOUNTER — OFFICE VISIT (OUTPATIENT)
Dept: FAMILY MEDICINE | Facility: CLINIC | Age: 63
End: 2023-06-22
Payer: COMMERCIAL

## 2023-06-22 VITALS
OXYGEN SATURATION: 97 % | BODY MASS INDEX: 32.65 KG/M2 | HEIGHT: 65 IN | DIASTOLIC BLOOD PRESSURE: 72 MMHG | WEIGHT: 196 LBS | SYSTOLIC BLOOD PRESSURE: 113 MMHG | HEART RATE: 65 BPM

## 2023-06-22 DIAGNOSIS — Z01.818 PREOP GENERAL PHYSICAL EXAM: Primary | ICD-10-CM

## 2023-06-22 DIAGNOSIS — M54.12 CERVICAL RADICULOPATHY: ICD-10-CM

## 2023-06-22 DIAGNOSIS — M47.812 CERVICAL SPONDYLOSIS: ICD-10-CM

## 2023-06-22 LAB
ATRIAL RATE - MUSE: 62 BPM
DIASTOLIC BLOOD PRESSURE - MUSE: NORMAL MMHG
INTERPRETATION ECG - MUSE: NORMAL
P AXIS - MUSE: 37 DEGREES
PR INTERVAL - MUSE: 164 MS
QRS DURATION - MUSE: 78 MS
QT - MUSE: 424 MS
QTC - MUSE: 430 MS
R AXIS - MUSE: 28 DEGREES
SYSTOLIC BLOOD PRESSURE - MUSE: NORMAL MMHG
T AXIS - MUSE: 4 DEGREES
VENTRICULAR RATE- MUSE: 62 BPM

## 2023-06-22 PROCEDURE — 93010 ELECTROCARDIOGRAM REPORT: CPT | Performed by: INTERNAL MEDICINE

## 2023-06-22 PROCEDURE — 93005 ELECTROCARDIOGRAM TRACING: CPT | Performed by: FAMILY MEDICINE

## 2023-06-22 PROCEDURE — 99214 OFFICE O/P EST MOD 30 MIN: CPT | Performed by: FAMILY MEDICINE

## 2023-06-22 NOTE — PROGRESS NOTES
[place] [id number]  [facility] [address]  Phone: [phone]  Fax: [phone]  Primary Provider: Melia Bettencourt  Pre-op Performing Provider: MELIA BETTENCOURT      PREOPERATIVE EVALUATION:  Today's date: 6/22/2023    Layla Starks is a 62 year old female who presents for a preoperative evaluation.      6/22/2023    10:38 AM   Additional Questions   Roomed by as   Accompanied by self     Forms 6/22/2023   Any forms needing to be completed Yes         6/22/2023    10:38 AM   Patient Reported Additional Medications   Patient reports taking the following new medications no     Surgical Information:  Surgery/Procedure: Laminectomy  Surgery Location: [facility]  Surgeon: Dr Goyal  Surgery Date: 06/28/2023  Where patient plans to recover: At home with family  Fax number for surgical facility: [phone]  And [phone]    Assessment & Plan     The proposed surgical procedure is considered LOW risk.    Problem List Items Addressed This Visit        Nervous and Auditory    Cervical radiculopathy       Musculoskeletal and Integumentary    Cervical spondylosis   Other Visit Diagnoses     Preop general physical exam    -  Primary    Relevant Orders    EKG 12-lead, tracing only (Completed)                  - No identified additional risk factors other than previously addressed    Antiplatelet or Anticoagulation Medication Instructions:   - Patient is on no antiplatelet or anticoagulation medications.    Additional Medication Instructions:  Patient is on no additional chronic medications    RECOMMENDATION:  APPROVAL GIVEN to proceed with proposed procedure, without further diagnostic evaluation.      Subjective       HPI related to upcoming procedure: The patient has had issues with neck pain since 2014 and underwent her first surgery in 2016, which was a laminoplasty. She subsequently has additional surgeries including cervical spine fusion. The pain on the right  side of her neck radiating into her right upper arm has been present since her last surgery. She has had some diagnostic injections suggest that she would benefit from release of the nerves at levels C5-6 and C6-7 and she is scheduled for laminectomy at those levels.         6/20/2023     3:32 PM   Preop Questions   1. Have you ever had a heart attack or stroke? No   2. Have you ever had surgery on your heart or blood vessels, such as a stent placement, a coronary artery bypass, or surgery on an artery in your head, neck, heart, or legs? No   3. Do you have chest pain with activity? No   4. Do you have a history of  heart failure? No   5. Do you currently have a cold, bronchitis or symptoms of other infection? No   6. Do you have a cough, shortness of breath, or wheezing? No   7. Do you or anyone in your family have previous history of blood clots? No   8. Do you or does anyone in your family have a serious bleeding problem such as prolonged bleeding following surgeries or cuts? No   9. Have you ever had problems with anemia or been told to take iron pills? No   10. Have you had any abnormal blood loss such as black, tarry or bloody stools, or abnormal vaginal bleeding? No   11. Have you ever had a blood transfusion? No   12. Are you willing to have a blood transfusion if it is medically needed before, during, or after your surgery? Yes   13. Have you or any of your relatives ever had problems with anesthesia? No   14. Do you have sleep apnea, excessive snoring or daytime drowsiness? No   15. Do you have any artifical heart valves or other implanted medical devices like a pacemaker, defibrillator, or continuous glucose monitor? No   16. Do you have artificial joints? YES    17. Are you allergic to latex? No       Health Care Directive:  Patient does not have a Health Care Directive or Living Will: Discussed advance care planning with patient; however, patient declined at this time.    Preoperative Review of  :   reviewed - controlled substances prescribed by other outside provider(s).      Status of Chronic Conditions:  See problem list for active medical problems.  Problems all longstanding and stable, except as noted/documented.  See ROS for pertinent symptoms related to these conditions.      Review of Systems  CONSTITUTIONAL: NEGATIVE for fever, chills, change in weight  INTEGUMENTARY/SKIN: NEGATIVE for worrisome rashes, moles or lesions  EYES: NEGATIVE for vision changes or irritation  ENT/MOUTH: NEGATIVE for ear, mouth and throat problems  RESP: NEGATIVE for significant cough or SOB  CV: NEGATIVE for chest pain, palpitations or peripheral edema  GI: NEGATIVE for nausea, POSITIVE for LLQ abdominal pain since her colonoscopy last week.   : NEGATIVE for frequency, dysuria, or hematuria  MUSCULOSKELETAL: NEGATIVE for significant arthralgias or myalgia  NEURO: NEGATIVE for weakness, dizziness or paresthesias  ENDOCRINE: NEGATIVE for temperature intolerance, skin/hair changes  HEME: NEGATIVE for bleeding problems  PSYCHIATRIC: NEGATIVE for changes in mood or affect    Patient Active Problem List    Diagnosis Date Noted     Major depressive disorder, single episode, mild (H) 06/05/2023     Priority: Medium     Class 1 obesity due to excess calories without serious comorbidity with body mass index (BMI) of 33.0 to 33.9 in adult 06/05/2023     Priority: Medium     Cervical radiculopathy 01/22/2021     Priority: Medium     Added automatically from request for surgery [id number]       Cervical spondylosis 01/22/2021     Priority: Medium     Added automatically from request for surgery [id number]       S/P cervical spinal fusion 01/22/2021     Priority: Medium     Added automatically from request for surgery [id number]       ASCUS of cervix with negative high risk HPV 08/01/2019     Priority: Medium     2/20/13 ASCUS, neg HPV  11/3/14 NIL  3/16/18 ASCUS, neg HPV  8/1/19 ASCUS, neg HPV  9/16/22 NIL pap, neg HPV  Above per  CE  6/5/23 NIL pap, neg HPV       Cervicalgia 12/13/2017     Priority: Medium      Past Medical History:   Diagnosis Date     Cervical disc disorder with radiculopathy of cervical region 11/27/2017     Cervical spinal stenosis      Cervical spinal stenosis      Constipation      DDD (degenerative disc disease), cervical      Degenerative disc disease      Depression      Fatigue      Foot pain      SHAHIDA (generalized anxiety disorder)      Major depressive disorder, single episode, mild (H) 6/5/2023     Medial meniscus tear     knee     OA (osteoarthritis)      Obesity      Obesity      Perimenopause      Personal history of COVID-19      PONV (postoperative nausea and vomiting)      PONV (postoperative nausea and vomiting)      Past Surgical History:   Procedure Laterality Date     AS HYSTEROSCOPY, SURGICAL; W/ ENDOMETRIAL ABLATION, ANY METHOD N/A      BACK SURGERY  09/16/2016    Cervical     BREAST SURGERY  1980    breast reduction     C5-7 laminoplasty Right 09/09/2016     COLONOSCOPY  2016     DECOMPRESSION, FUSION CERVICAL ANTERIOR THREE+ LEVELS, COMBINED N/A 03/13/2019    C4-5, C6-7, C5-6 with hardware removal     Excision anal skin tags N/A 01/18/2010     EXPLORE SPINE, REMOVE HARDWARE, COMBINED N/A 2/19/2021    Procedure: Removal of Laminoplasty Plates Cervical 4-7;;  Surgeon: Stevo Waite MD;  Location: [facility] OR     FORAMINOTOMY CERVICAL POSTERIOR MINIMALLY INVASIVE ONE LEVEL Right 12/13/2017    Procedure: FORAMINOTOMY CERVICAL POSTERIOR MINIMALLY INVASIVE ONE LEVEL;  Right Minimally Invasive Posterior Cervical 4-5 Foraminotomy;  Surgeon: Byron Barclay MD;  Location: [facility] OR     FUSION CERVICAL ANTERIOR ONE LEVEL N/A 08/06/2018    unspecified cervical fusion     GRAFT BONE FROM ILIAC CREST Right 2/19/2021    Procedure: Right Posterior Iliac Crest Bone Graft Poneto;  Surgeon: Stevo Waite MD;  Location: [facility] OR     HEAD & NECK SURGERY  9-9-2016, 12/13/17, 8/6/18, 3/13/19     laminoplasty C-4 to C-6, Foraminotomy C45, Fuse C56, C34 C67     INJECT BLOCK MEDIAL BRANCH CERVICAL/THORACIC/LUMBAR Right 5/24/2021    Procedure: BLOCK, NERVE, FACET JOINT, MEDIAL BRANCH, DIAGNOSTIC - Right C2-3 Third Occipital Nerve (TON);  Surgeon: Dyan Ackerman MD;  [place] [facility] [place]     KNEE SURGERY Left 05/17/2016    partial meniscectomy     LAMINOPLASTY Right 9/9/2016    Procedure: CERVICAL LAMINOPLASTY C5 C6 C7 OPEN ON RIGHT WITH FORAMINOTOMIES AT RIGHT C4-5, BILATERAL C5-6 C6-7 ;  Surgeon: Laine Valdez MD;  [facility]: [facility];  Service:      MAMMOPLASTY REDUCTION  1980     MAMMOPLASTY REDUCTION BILATERAL Bilateral 1980     OPTICAL TRACKING SYSTEM FUSION POSTERIOR CERVICAL THREE + LEVELS N/A 2/19/2021    Procedure: Posterior Instrumented Spinal Fusion Cervical 4 to Thoracic 1; Revision Foraminotomies/Factectomies Right Cervical 4-5;  Surgeon: Stevo Waite MD;  Location:  OR     OTHER SURGICAL HISTORY      Excision hemorrhoid skin tags      OTHER SURGICAL HISTORY Left 08/22/2016    Left Knee Meniscus Repair     Partial meniscectomy Left 05/17/2016     OK KNEE SCOPE,MED/LAT MENISCUS REPAIR Left      Current Outpatient Medications   Medication Sig Dispense Refill     acetaminophen (TYLENOL) 325 MG tablet Take 2 tablets (650 mg) by mouth every 8 hours as needed for mild pain 100 tablet 2     Bacillus Coagulans-Inulin (PROBIOTIC-PREBIOTIC PO)        CALCIUM CITRATE PO Take 1 tablet by mouth every morning       diclofenac (VOLTAREN) 1 % topical gel APPLY TO THE AFFECTED AREA FOUR TIMES DAILY OR AS DIRECTED       Flax Oil-Fish Oil-Borage Oil ([employer] OMEGA-3 PO) Take 1 capsule by mouth 2 times daily       lidocaine (LIDODERM) 5 % patch lidocaine 5 % topical patch       MAGNESIUM GLYCINATE PO Take 300 mg by mouth At Bedtime       Multiple Vitamins-Minerals (MULTIVITAL PO) Take 1 tablet by mouth 2 times daily Twice a day       Vitamin D3 (CHOLECALCIFEROL) 125 MCG (5000 UT) tablet  "         Allergies   Allergen Reactions     Dilaudid [Hydromorphone] GI Disturbance     Per patient, tolerates IV version well.     Morphine Nausea and Vomiting     Oxycodone Nausea and Vomiting     Tramadol Nausea and Vomiting     Liquid Adhesive Rash     Other reaction(s): Rash        Social History     Tobacco Use     Smoking status: Former     Packs/day: 0.50     Years: 1.00     Pack years: 0.50     Types: Cigarettes     Quit date:      Years since quittin.5     Smokeless tobacco: Never     Tobacco comments:     Quit 30 years ago   Substance Use Topics     Alcohol use: Not Currently     Alcohol/week: 1.0 - 2.0 standard drink of alcohol     Comment: rare     Family History   Problem Relation Age of Onset     Arthritis Mother      Macular Degeneration Mother      Coronary Artery Disease Mother      Hypertension Mother      Osteoporosis Mother      Other Cancer Mother          from pancreatic cancer 2021     Hearing Loss Father      Hyperlipidemia Father      Multiple Sclerosis Sister      Multiple Sclerosis Sister      History   Drug Use No         Objective     Ht 1.638 m (5' 4.5\")   Wt 88.9 kg (196 lb)   LMP 2017   BMI 33.12 kg/m      Physical Exam    GENERAL APPEARANCE: healthy, alert and no distress     EYES: EOMI, PERRL     HENT: ear canals and TM's normal and nose and mouth without ulcers or lesions     NECK: no adenopathy, no asymmetry, masses, or scars and thyroid normal to palpation     RESP: lungs clear to auscultation - no rales, rhonchi or wheezes     CV: regular rates and rhythm, normal S1 S2, no S3 or S4 and no murmur, click or rub     ABDOMEN:  soft, nontender, no HSM or masses and bowel sounds normal     MS: extremities normal- no gross deformities noted, no evidence of inflammation in joints, FROM in all extremities.     SKIN: no suspicious lesions or rashes     NEURO: Normal strength and tone, sensory exam grossly normal, mentation intact and speech normal     PSYCH: " mentation appears normal. and affect normal/bright     LYMPHATICS: No cervical adenopathy    Recent Labs   Lab Test 06/05/23  1725 03/17/22  1628   HGB 13.9 14.4    289    140   POTASSIUM 3.9 4.0   CR 0.74 0.77        Diagnostics:  Labs pending at this time.  Results will be reviewed when available.   EKG: appears normal, NSR, normal axis, normal intervals, no acute ST/T changes c/w ischemia, no LVH by voltage criteria, unchanged from previous tracings    Revised Cardiac Risk Index (RCRI):  The patient has the following serious cardiovascular risks for perioperative complications:   - No serious cardiac risks = 0 points     RCRI Interpretation: 0 points: Class I (very low risk - 0.4% complication rate)           Signed Electronically by: MELIA CREWS MD  Copy of this evaluation report is provided to requesting physician.

## 2023-07-19 ENCOUNTER — PRE VISIT (OUTPATIENT)
Dept: PLASTIC SURGERY | Facility: CLINIC | Age: 63
End: 2023-07-19

## 2023-07-27 ENCOUNTER — TELEPHONE (OUTPATIENT)
Dept: SURGERY | Facility: CLINIC | Age: 63
End: 2023-07-27
Payer: COMMERCIAL

## 2023-07-27 NOTE — TELEPHONE ENCOUNTER
PREVISIT INFORMATION                                                    Layla ONEIL Tereza scheduled for future visit at [facility] specialty clinics.    Patient is scheduled to see Dr. Silvestre on 7/28/23  Reason for visit: Neck lift consult  Referring provider   Has patient seen previous specialist? No  Medical Records:  Available in chart.  Patient was previously seen at a [facility] or [facility] facility.    REVIEW                                                      New patient packet mailed to patient: No  Medication reconciliation complete: No      Current Outpatient Medications   Medication Sig Dispense Refill    acetaminophen (TYLENOL) 325 MG tablet Take 2 tablets (650 mg) by mouth every 8 hours as needed for mild pain 100 tablet 2    Bacillus Coagulans-Inulin (PROBIOTIC-PREBIOTIC PO)       CALCIUM CITRATE PO Take 1 tablet by mouth every morning      diclofenac (VOLTAREN) 1 % topical gel APPLY TO THE AFFECTED AREA FOUR TIMES DAILY OR AS DIRECTED      Flax Oil-Fish Oil-Borage Oil (CVS OMEGA-3 PO) Take 1 capsule by mouth 2 times daily      lidocaine (LIDODERM) 5 % patch lidocaine 5 % topical patch      MAGNESIUM GLYCINATE PO Take 300 mg by mouth At Bedtime      Multiple Vitamins-Minerals (MULTIVITAL PO) Take 1 tablet by mouth 2 times daily Twice a day      Vitamin D3 (CHOLECALCIFEROL) 125 MCG (5000 UT) tablet          Allergies: Dilaudid [hydromorphone], Morphine, Oxycodone, Tramadol, and Liquid adhesive        PLAN/FOLLOW-UP NEEDED                                                      Previsit review complete.  Patient will see provider at future scheduled appointment.         Patient Reminders Given:  Please, make sure you bring an updated list of your medications.   If you are having a procedure, please, present 15 minutes early.  If you need to cancel or reschedule,please call [phone].    Jenny Emerson LPN

## 2023-07-28 ENCOUNTER — TELEPHONE (OUTPATIENT)
Dept: SURGERY | Facility: CLINIC | Age: 63
End: 2023-07-28

## 2023-07-28 ENCOUNTER — OFFICE VISIT (OUTPATIENT)
Dept: SURGERY | Facility: CLINIC | Age: 63
End: 2023-07-28
Payer: COMMERCIAL

## 2023-07-28 VITALS — BODY MASS INDEX: 33.19 KG/M2 | HEIGHT: 65 IN | WEIGHT: 199.2 LBS

## 2023-07-28 DIAGNOSIS — R23.8 FACIAL AGING: Primary | ICD-10-CM

## 2023-07-28 PROCEDURE — 99204 OFFICE O/P NEW MOD 45 MIN: CPT | Mod: GC | Performed by: STUDENT IN AN ORGANIZED HEALTH CARE EDUCATION/TRAINING PROGRAM

## 2023-07-28 NOTE — PROGRESS NOTES
"Plastic Surgery Clinic Note:    HPI: 62-year-old female who presents for neck fullness, \"hanging tissue\" with past surgical history of multiple cervical spine surgeries including at least 3 anterior incisions, and most recently posterior cervical spine surgery approximately 30 days ago.  She states that the neck bulging started after her last anterior incision in 2019.  She also endorses difficulty with swallowing, but states that she has no issues eating or obtaining nutrition.  Denies any other treatment such as Kybella Botox, fillers.  Lost 15 to 20 pounds in the last year however now states she had a stable weight    Ros: negative except stated in HPI     Past medical history:   Cervical stenosis    Past surgical history:  Multiple cervical surgeries including anterior and posterior approaches with hardware    Medications:  Robaxin  NSAIDs    Allergies:  Oxycodone, Dilaudid, tramadol nausea    Social history:  Denies tobacco use states that she drinks occasionally    Physical exam:  Ht 1.638 m (5' 4.5\")   Wt 90.4 kg (199 lb 3.2 oz)   LMP 02/01/2017   BMI 33.66 kg/m      Patient has a BMI of 33.6    Upper face:   Bilateral lower lid, herniation of fat pads   Left platysma with possible myotomy during cervical incision.  Right platysma firing appropriately  Pre-platysmal fat palpable.   Lower face with significant jowling  Patient does not have significant excess of skin in the lower third or neck area.    Assessment and plan: This is a 62-year-old female with multiple cervical surgeries including anterior incisions who is most bothered by neck fullness/hanging tissue.  She also is concerned about lower eyelid fat herniation, and jowling.  Discussed different surgical and nonsurgical treatment options.  This includes lower third facelift, neck lift, liposuction, and lower lid blepharoplasty. Quotes were given today. Since patient is only 30 days out from last cervical spine surgery, will allow her time to think " about different options. Plan to re-visit and discuss in next few months.    Anooj Fernandez   Plastic & Reconstructive Surgery

## 2023-07-28 NOTE — TELEPHONE ENCOUNTER
Cosmetic quotes emailed to patient today at 12:01pm ([email or web address]).    Quotes also scanned into media tab.    Jenny Emerson LPN

## 2023-07-28 NOTE — NURSING NOTE
"Layla Starks's chief complaint for this visit includes:  Chief Complaint   Patient presents with    New Patient     Neck lift, per patient. All records in [facility], per patient.     PCP: Alicia Bettencourt    Referring Provider:  No referring provider defined for this encounter.    Ht 1.638 m (5' 4.5\")   Wt 90.4 kg (199 lb 3.2 oz)   LMP 02/01/2017   BMI 33.66 kg/m    Data Unavailable        Allergies   Allergen Reactions    Dilaudid [Hydromorphone] GI Disturbance     Per patient, tolerates IV version well.    Morphine Nausea and Vomiting    Oxycodone Nausea and Vomiting    Tramadol Nausea and Vomiting    Liquid Adhesive Rash     Other reaction(s): Rash         Do you need any medication refills at today's visit? No    [facility] Visit Facilitator- Surgical Specialties         "

## 2023-07-28 NOTE — LETTER
"    7/28/2023         RE: Layla Starks  [id number] [facility] [address]        Dear Colleague,    Thank you for referring your patient, Layla Starks, to the [facility]. Please see a copy of my visit note below.    Plastic Surgery Clinic Note:    HPI: 62-year-old female who presents for neck fullness, \"hanging tissue\" with past surgical history of multiple cervical spine surgeries including at least 3 anterior incisions, and most recently posterior cervical spine surgery approximately 30 days ago.  She states that the neck bulging started after her last anterior incision in 2019.  She also endorses difficulty with swallowing, but states that she has no issues eating or obtaining nutrition.  Denies any other treatment such as Kybella Botox, fillers.  Lost 15 to 20 pounds in the last year however now states she had a stable weight    Ros: negative except stated in HPI     Past medical history:   Cervical stenosis    Past surgical history:  Multiple cervical surgeries including anterior and posterior approaches with hardware    Medications:  Robaxin  NSAIDs    Allergies:  Oxycodone, Dilaudid, tramadol nausea    Social history:  Denies tobacco use states that she drinks occasionally    Physical exam:  Ht 1.638 m (5' 4.5\")   Wt 90.4 kg (199 lb 3.2 oz)   LMP 02/01/2017   BMI 33.66 kg/m      Patient has a BMI of 33.6    Upper face:   Bilateral lower lid, herniation of fat pads   Left platysma with possible myotomy during cervical incision.  Right platysma firing appropriately  Pre-platysmal fat palpable.   Lower face with significant jowling  Patient does not have significant excess of skin in the lower third or neck area.    Assessment and plan: This is a 62-year-old female with multiple cervical surgeries including anterior incisions who is most bothered by neck fullness/hanging tissue.  She also is concerned about lower eyelid fat herniation, and jowling.  Discussed different " surgical and nonsurgical treatment options.  This includes lower third facelift, neck lift, liposuction, and lower lid blepharoplasty. Quotes were given today. Since patient is only 30 days out from last cervical spine surgery, will allow her time to think about different options. Plan to re-visit and discuss in next few months.    [facility]   Plastic & Reconstructive Surgery         Attestation signed by Hai Vargas MD at 7/28/2023 12:47 PM (Updated):  Attending Attestation:    62-year-old female with history of multiple anterior neck cervical surgeries presenting with neck skin excess with lipodystrophy and concerns of facial aging.  Her primary complaint is extra tissue of her neck.  She states that she started noticing this after one of her more recent anterior spinal surgeries 4 years ago.  Of note, she has also gained 15 pounds over the last year.  Also, patient had a posterior neck fusion surgery 1 month ago.  She also inquires about lower blepharoplasty.    On exam, Cowart skin type II with good skin quality.  Patient predominantly with prepatellar small fat, bilateral jowling, prominent right platysmal band that is fairly lateral and no platysmal band on the left, and several transversely oriented lower neck scars.  She also has bilateral lower eyelid tear trough deformities with little fat herniation and orbicularis ptosis with reasonable malar prominence.    Discussed options for management.  Her predominant complaint is excess tissue of the neckline.  This is primarily attributable to preplatysmal fat as well as some jowling.  She has generalized neck lipodystrophy which could be treated less invasively with either deoxycholic acid injections versus liposuction.  The benefit of liposuction, is that it also introduces scar and potentially may cause some favorable skin retraction.  Another option is a lower third face and neck lift surgery, which would more directly address some of the skin  laxity, with direct excision of fat as well as improvement of jowling by release of mandibular ligaments.  However, this is more invasive.  For the lower eyelids, patient would benefit from transconjunctival fat excision, release of oral, micronized fat injection and possible skin pinch excision, and lateral canthopexy.  Patient would also benefit from more formalized skin care regimen.  In either case, since patient is just recovering from neck surgery, my recommendation is for us to provide cosmetic quotes and to return to clinic via phone encounter in 3 months.  Patient is agreeable to plan.    A total of 45 minutes was devoted to review of chart, direct face-to-face patient counseling and documentation during this encounter, exclusive of any procedure performed.    Hai Vargas MD, PhD      Again, thank you for allowing me to participate in the care of your patient.        Sincerely,        Hai Vargas MD

## 2023-08-09 ENCOUNTER — THERAPY VISIT (OUTPATIENT)
Dept: PHYSICAL THERAPY | Facility: REHABILITATION | Age: 63
End: 2023-08-09
Payer: COMMERCIAL

## 2023-08-09 DIAGNOSIS — G89.28 CHRONIC NECK PAIN WITH HISTORY OF CERVICAL SPINAL SURGERY: ICD-10-CM

## 2023-08-09 DIAGNOSIS — Z98.890 CHRONIC NECK PAIN WITH HISTORY OF CERVICAL SPINAL SURGERY: ICD-10-CM

## 2023-08-09 DIAGNOSIS — M54.2 CHRONIC NECK PAIN WITH HISTORY OF CERVICAL SPINAL SURGERY: ICD-10-CM

## 2023-08-09 DIAGNOSIS — R29.3 POOR POSTURE: ICD-10-CM

## 2023-08-09 DIAGNOSIS — M62.81 GENERALIZED MUSCLE WEAKNESS: Primary | ICD-10-CM

## 2023-08-09 PROCEDURE — 97112 NEUROMUSCULAR REEDUCATION: CPT | Mod: GP | Performed by: PHYSICAL THERAPIST

## 2023-08-09 PROCEDURE — 97110 THERAPEUTIC EXERCISES: CPT | Mod: GP | Performed by: PHYSICAL THERAPIST

## 2023-08-09 PROCEDURE — 97162 PT EVAL MOD COMPLEX 30 MIN: CPT | Mod: GP | Performed by: PHYSICAL THERAPIST

## 2023-08-09 NOTE — PROGRESS NOTES
PHYSICAL THERAPY EVALUATION  Type of Visit: Evaluation    See electronic medical record for Abuse and Falls Screening details.    Subjective       Presenting condition or subjective complaint: She has a long history of neck pain.  Since the last surgery she has B neck pain with R>L.  The neck continues to get tight which causes pain and headaches.  Denies numbness and tingling into UE.  The pain is intermittent.  Date of onset: 06/28/23    Relevant medical history: Migraines or headaches; Severe headaches   Dates & types of surgery: 6/28/23: R C5-6, 6-7 foraminotomy, 5/23/23 MBB C 3-4, 2/2022 cervical disc rplacement, 5/24/21 MBB cervical, 2/19/21 removal of lamonoplasty plates C4-7, 3/3/2019: decompression, anterior fusion and hardware removal, 8/6/18: cervical fusion, 12/13/17: C4-5 foraminotomy, 9/9/16: C laminoplasty:  Surgical information obtained from medical chart    Prior diagnostic imaging/testing results: MRI; CT scan; X-ray; EMG; Bone scan     Prior therapy history for the same diagnosis, illness or injury: Yes Not since current surgery but has had therapy in the past as well as injections.    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    Pain assessment:      Objective   CERVICAL SPINE EVALUATION  PAIN: Pain Level at Rest: 8/10  Pain Level with Use: 8/10  Pain is Exacerbated By: when she is upright, turning head  Pain is Relieved By: laying down  INTEGUMENTARY (edema, incisions):  well healed scar/incision  POSTURE:   rounded shoulders and forward head  GAIT:   Weightbearing Status:   Assistive Device(s):   Gait Deviations:   BALANCE/PROPRIOCEPTION:   WEIGHTBEARING ALIGNMENT:   ROM:   (Degrees) Left AROM Right AROM    Cervical Flexion 16, pull, stretch    Cervical Extension 38    Cervical Side bend      Cervical Rotation 48 tightness 30, tightness    Cervical Protrusion     Cervical Retraction     Thoracic Flexion     Thoracic Extension     Thoracic Rotation       Left AROM Left PROM Right AROM Right PROM   Shoulder Flexion       Shoulder Extension       Shoulder Abduction       Shoulder Adduction       Shoulder IR       Shoulder ER       Shoulder Horiz Abduction       Shoulder Horiz Adduction       Pain: ALL SHOULDER MOTION WNL THROUGHOUT, SLIGHT PULL WITH R SHOULDER ABDUCTION ON R NECK  End Feel:     MYOTOMES:    Left Right   C1-2 (Neck Flexion) 4 4   C3 (Neck Side Bend)  4+ 4+   C4 (Shrug) 5 5   C5 (Deltoid) 5 5-   C6 (Biceps) 5 5   C7 (Triceps) 5- 5-   C8 (Thumb Ext) 5 5   T1 (Intrinsics) 5 5     DTR S:   CORD SIGNS:   DERMATOMES:   NEURAL TENSION:   FLEXIBILITY:    SPECIAL TESTS:  Deep Neck Flexor: 21s with increase in tightness and pain, compensations noted in shoulders  PALPATION:   + Tenderness At Location Left Right   Sternocleidomastoid - +   Scalenes - +   Rhomboids - -   Facet - +   Upper Trap - +   Levator - +   Erector Spinae - +   Suboccipitals - -     SPINAL SEGMENTAL CONCLUSIONS:  decreased throughout      Assessment & Plan   CLINICAL IMPRESSIONS  Medical Diagnosis: Chronic Cervical pain    Treatment Diagnosis: Cervical Weakness, headaches, Chronic cervical pain   Impression/Assessment: Patient is a 62 year old female with cervical pain and headache complaints.  The following significant findings have been identified: Pain, Decreased ROM/flexibility, Decreased joint mobility, Decreased strength, Impaired muscle performance, and Decreased activity tolerance. These impairments interfere with their ability to  perform self care tasks, work tasks, and recreational activities as compared to previous level of function.     Clinical Decision Making (Complexity):  Clinical Presentation: Evolving/Changing  Clinical Presentation Rationale: based on medical and personal factors listed in PT evaluation  Clinical Decision Making (Complexity): Moderate complexity    PLAN OF CARE  Treatment Interventions:  Interventions: Manual Therapy, Neuromuscular Re-education, Therapeutic Activity, Therapeutic Exercise    Long Term Goals     PT Goal 1  Goal Identifier: headaches  Goal Description: Pt will be able to decrease the daily headaches to 2X/week  Rationale: to maximize safety and independence within the home  Target Date: 11/07/23  PT Goal 2  Goal Identifier: turn head  Goal Description: Pt will be able to turn head easier, such as to drive, as AROM improves by 10 degrees  Rationale: to maximize safety and independence with performance of ADLs and functional tasks  Target Date: 11/07/23      Frequency of Treatment: 1-2X/week  Duration of Treatment: 90 days    Recommended Referrals to Other Professionals:   Education Assessment:   Learner/Method: Patient    Risks and benefits of evaluation/treatment have been explained.   Patient/Family/caregiver agrees with Plan of Care.     Evaluation Time:     PT Eval, Moderate Complexity Minutes (34283): 22       Signing Clinician: Orquidea Colon PT

## 2023-08-17 ENCOUNTER — THERAPY VISIT (OUTPATIENT)
Dept: PHYSICAL THERAPY | Facility: REHABILITATION | Age: 63
End: 2023-08-17
Payer: COMMERCIAL

## 2023-08-17 DIAGNOSIS — R29.3 POOR POSTURE: ICD-10-CM

## 2023-08-17 DIAGNOSIS — M62.81 GENERALIZED MUSCLE WEAKNESS: Primary | ICD-10-CM

## 2023-08-17 DIAGNOSIS — G89.28 CHRONIC NECK PAIN WITH HISTORY OF CERVICAL SPINAL SURGERY: ICD-10-CM

## 2023-08-17 DIAGNOSIS — Z98.890 CHRONIC NECK PAIN WITH HISTORY OF CERVICAL SPINAL SURGERY: ICD-10-CM

## 2023-08-17 DIAGNOSIS — M54.2 CHRONIC NECK PAIN WITH HISTORY OF CERVICAL SPINAL SURGERY: ICD-10-CM

## 2023-08-17 PROCEDURE — 97110 THERAPEUTIC EXERCISES: CPT | Mod: GP | Performed by: PHYSICAL THERAPIST

## 2023-08-23 ENCOUNTER — THERAPY VISIT (OUTPATIENT)
Dept: PHYSICAL THERAPY | Facility: REHABILITATION | Age: 63
End: 2023-08-23
Payer: COMMERCIAL

## 2023-08-23 DIAGNOSIS — M54.2 CHRONIC NECK PAIN WITH HISTORY OF CERVICAL SPINAL SURGERY: ICD-10-CM

## 2023-08-23 DIAGNOSIS — Z98.890 CHRONIC NECK PAIN WITH HISTORY OF CERVICAL SPINAL SURGERY: ICD-10-CM

## 2023-08-23 DIAGNOSIS — G89.28 CHRONIC NECK PAIN WITH HISTORY OF CERVICAL SPINAL SURGERY: ICD-10-CM

## 2023-08-23 DIAGNOSIS — R29.3 POOR POSTURE: ICD-10-CM

## 2023-08-23 DIAGNOSIS — M62.81 GENERALIZED MUSCLE WEAKNESS: Primary | ICD-10-CM

## 2023-08-23 PROCEDURE — 97110 THERAPEUTIC EXERCISES: CPT | Mod: GP | Performed by: PHYSICAL THERAPIST

## 2023-08-30 ENCOUNTER — THERAPY VISIT (OUTPATIENT)
Dept: PHYSICAL THERAPY | Facility: REHABILITATION | Age: 63
End: 2023-08-30
Payer: COMMERCIAL

## 2023-08-30 ENCOUNTER — TRANSFERRED RECORDS (OUTPATIENT)
Dept: HEALTH INFORMATION MANAGEMENT | Facility: CLINIC | Age: 63
End: 2023-08-30

## 2023-08-30 DIAGNOSIS — R29.3 POOR POSTURE: ICD-10-CM

## 2023-08-30 DIAGNOSIS — M54.2 CHRONIC NECK PAIN WITH HISTORY OF CERVICAL SPINAL SURGERY: ICD-10-CM

## 2023-08-30 DIAGNOSIS — G89.28 CHRONIC NECK PAIN WITH HISTORY OF CERVICAL SPINAL SURGERY: ICD-10-CM

## 2023-08-30 DIAGNOSIS — Z98.890 CHRONIC NECK PAIN WITH HISTORY OF CERVICAL SPINAL SURGERY: ICD-10-CM

## 2023-08-30 DIAGNOSIS — M62.81 GENERALIZED MUSCLE WEAKNESS: Primary | ICD-10-CM

## 2023-08-30 PROCEDURE — 97140 MANUAL THERAPY 1/> REGIONS: CPT | Mod: GP | Performed by: PHYSICAL THERAPIST

## 2023-08-30 PROCEDURE — 97110 THERAPEUTIC EXERCISES: CPT | Mod: GP | Performed by: PHYSICAL THERAPIST

## 2023-09-06 ASSESSMENT — SLEEP AND FATIGUE QUESTIONNAIRES
HOW LIKELY ARE YOU TO NOD OFF OR FALL ASLEEP WHILE SITTING AND TALKING TO SOMEONE: WOULD NEVER DOZE
HOW LIKELY ARE YOU TO NOD OFF OR FALL ASLEEP WHILE SITTING QUIETLY AFTER LUNCH WITHOUT ALCOHOL: SLIGHT CHANCE OF DOZING
HOW LIKELY ARE YOU TO NOD OFF OR FALL ASLEEP WHILE WATCHING TV: SLIGHT CHANCE OF DOZING
HOW LIKELY ARE YOU TO NOD OFF OR FALL ASLEEP WHILE LYING DOWN TO REST IN THE AFTERNOON WHEN CIRCUMSTANCES PERMIT: SLIGHT CHANCE OF DOZING
HOW LIKELY ARE YOU TO NOD OFF OR FALL ASLEEP WHILE SITTING AND READING: SLIGHT CHANCE OF DOZING
HOW LIKELY ARE YOU TO NOD OFF OR FALL ASLEEP WHEN YOU ARE A PASSENGER IN A CAR FOR AN HOUR WITHOUT A BREAK: SLIGHT CHANCE OF DOZING
HOW LIKELY ARE YOU TO NOD OFF OR FALL ASLEEP WHILE SITTING INACTIVE IN A PUBLIC PLACE: WOULD NEVER DOZE
HOW LIKELY ARE YOU TO NOD OFF OR FALL ASLEEP IN A CAR, WHILE STOPPED FOR A FEW MINUTES IN TRAFFIC: WOULD NEVER DOZE

## 2023-09-07 ENCOUNTER — TELEPHONE (OUTPATIENT)
Dept: FAMILY MEDICINE | Facility: CLINIC | Age: 63
End: 2023-09-07

## 2023-09-07 ENCOUNTER — OFFICE VISIT (OUTPATIENT)
Dept: FAMILY MEDICINE | Facility: CLINIC | Age: 63
End: 2023-09-07
Payer: COMMERCIAL

## 2023-09-07 VITALS
HEIGHT: 65 IN | WEIGHT: 199 LBS | DIASTOLIC BLOOD PRESSURE: 73 MMHG | SYSTOLIC BLOOD PRESSURE: 108 MMHG | BODY MASS INDEX: 33.15 KG/M2 | HEART RATE: 77 BPM | OXYGEN SATURATION: 96 %

## 2023-09-07 DIAGNOSIS — E66.09 CLASS 1 OBESITY DUE TO EXCESS CALORIES WITHOUT SERIOUS COMORBIDITY WITH BODY MASS INDEX (BMI) OF 33.0 TO 33.9 IN ADULT: Primary | ICD-10-CM

## 2023-09-07 DIAGNOSIS — E66.811 CLASS 1 OBESITY DUE TO EXCESS CALORIES WITHOUT SERIOUS COMORBIDITY WITH BODY MASS INDEX (BMI) OF 33.0 TO 33.9 IN ADULT: Primary | ICD-10-CM

## 2023-09-07 PROCEDURE — 99214 OFFICE O/P EST MOD 30 MIN: CPT | Performed by: FAMILY MEDICINE

## 2023-09-07 RX ORDER — PHENTERMINE HYDROCHLORIDE 15 MG/1
15 CAPSULE ORAL EVERY MORNING
Qty: 30 CAPSULE | Refills: 1 | Status: SHIPPED | OUTPATIENT
Start: 2023-09-07 | End: 2023-10-05

## 2023-09-07 RX ORDER — METHOCARBAMOL 750 MG/1
750 TABLET, FILM COATED ORAL
COMMUNITY
Start: 2023-06-29

## 2023-09-07 RX ORDER — CELECOXIB 200 MG/1
200 CAPSULE ORAL
COMMUNITY
Start: 2023-07-19 | End: 2024-05-01

## 2023-09-07 NOTE — TELEPHONE ENCOUNTER
Patient calling stating that her pharmacy didn't received her medication order when she had her visit today for medication refill. Explain that will send message to clinic to try to get this situated tomorrow it could be that the provider must had a lot going on or where very busy with patient and might not have the time to get back to send orders. Patient said if she doesn't have from her pharmacy by tomorrow afternoon patient will call back again to get this medication to be send through. Will send clinic a message regarding to her medication for phentermine 15 mg. Please call patient back to confirm when orders for meds has been send.

## 2023-09-07 NOTE — PROGRESS NOTES
"    New Medical Weight Management Consult    PATIENT:  Layla Starks  MRN:         [id number]  :         1960  SAL:         2023    Full intake/assessment was done to determine barriers to weight loss success and develop a treatment plan. Lyala Starks is a 62 year old female interested in treatment of medical problems associated with excess weight. She has a height of 5' 4.5\", a weight of 199 lbs 0 oz, and the calculated Body mass index is 33.63 kg/m .    ASSESSMENT/PLAN:    Problem List Items Addressed This Visit          Digestive    Class 1 obesity due to excess calories without serious comorbidity with body mass index (BMI) of 33.0 to 33.9 in adult - Primary     We had a good discussion today as it relates to approaching obesity as a chronic disease.  I reviewed the patient's questionnaire as well as recent laboratory results and medical history.  I reviewed with the patient an approach to nutrition to support her goals of weight loss and overall long-term weight management.  We discussed the importance of whole foods versus processed foods as well as focusing on the macronutrients of protein and healthy fats and controlling overall carbohydrate intake.  We discussed the importance of incorporating vegetables into her diet.  We discussed the important role of exercise and weight management.  Lastly, we did discuss medications that could be utilized as a tool to support weight loss.  Ultimately, prescription for phentermine 15 mg daily was provided as she has had good benefit from this medication in the more distant past.  Follow-up in 1 month for a weight check and blood pressure check.         Relevant Medications    phentermine (ADIPEX-P) 15 MG capsule         She has the following co-morbidities:        2023     5:07 PM   --   I have the following health issues associated with obesity Osteoarthritis (joint disease)    None of the above   I have the following symptoms associated with " "obesity Knee Pain    Back Pain           9/6/2023     5:07 PM   Patient Goals   If yes, please indicate which surgery? Neck and knee           9/6/2023     5:07 PM   Referring Provider   Please name the provider who referred you to Medical Weight Management  If you do not know, please answer \"I Don't Know\" No one           9/6/2023     5:07 PM   Weight History   How concerned are you about your weight? Very Concerned   I became overweight After Pregnancy   The following factors have contributed to my weight gain A Health Crisis    Eating Wrong Types of Food   I have tried the following methods to lose weight Watching Portions or Calories    Exercise   My lowest weight since age 19 was 123   My highest weight since age 19 was 199   The most weight I have ever lost was (lbs) 40   I have the following family history of obesity/being overweight I am the only one in my immediate family who is overweight   How has your weight changed over the last year? Gained   How many pounds? 10           9/6/2023     5:07 PM   Diet Recall Review with Patient   If you do eat breakfast, what types of food do you eat? Oatmeal-eggs-Kashi cereal   If you do eat supper, what types of food do you typically eat? Hamburgers-tacos   If you do snack, what types of food do you typically eat? Candy   How many glasses of juice do you drink in a typical day? 0   How many of glasses of milk do you drink in a typical day? 1   If you do drink milk, what type? 2%   How many 8oz glasses of sugar containing drinks such as Raymond-Aid/sweet tea do you drink in a day? 0   How many cans/bottles of sugar pop/soda/tea/sports drinks do you drink in a day? 0   How many cans/bottles of diet pop/soda/tea or sports drink do you drink in a day? 1   How often do you have a drink of alcohol? Monthly or Less   If you do drink, how many drinks might you have in a day? 1 or 2           9/6/2023     5:07 PM   Eating Habits   Generally, my meals include foods like these " bread, pasta, rice, potatoes, corn, crackers, sweet dessert, pop, or juice Everyday   Generally, my meals include foods like these fried meats, brats, burgers, french fries, pizza, cheese, chips, or ice cream Everyday   Eat fast food (like McDonalds, Burger Charly, Taco Bell) Less Than Weekly   Eat at a buffet or sit-down restaurant Less Than Weekly   Eat most of my meals in front of the TV or computer Everyday   Often skip meals, eat at random times, have no regular eating times A Few Times a Week   Rarely sit down for a meal but snack or graze throughout Everyday   Eat extra snacks between meals Everyday   Eat most of my food at the end of the day Once a Week   Eat in the middle of the night or wake up at night to eat Never   Eat extra snacks to prevent or correct low blood sugar Never   Eat to prevent acid reflux or stomach pain Never   Worry about not having enough food to eat Never   I eat when I am depressed A Few Times a Week   I eat when I am bored A Few Times a Week   I eat when I am anxious Once a Week   I eat when I am happy or as a reward Once a Week   I feel hungry all the time even if I just have eaten Less Than Weekly   Feeling full is important to me A Few Times a Week   I finish all the food on my plate even if I am already full Once a Week   I can't resist eating delicious food or walk past the good food/smell Everyday   I eat/snack without noticing that I am eating A Few Times a Week   I eat when I am preparing the meal Once a Week   I eat more than usual when I see others eating A Few Times a Week   I have trouble not eating sweets, ice cream, cookies, or chips if they are around the house Everyday   I think about food all day A Few Times a Week   What foods, if any, do you crave? Sweets/Candy/Chocolate           9/6/2023     5:07 PM   Amount of Food   I feel out of control when eating Weekly   I eat a large amount of food, like a loaf of bread, a box of cookies, a pint/quart of ice cream, all at  once Never   I eat a large amount of food even when I am not hungry Never   I eat rapidly Never   I eat alone because I feel embarrassed and do not want others to see how much I have eaten Never   I eat until I am uncomfortably full Monthly   I feel bad, disgusted, or guilty after I overeat Monthly           9/6/2023     5:07 PM   Activity/Exercise History   How much of a typical 12 hour day do you spend sitting? Half the Day   How much of a typical 12 hour day do you spend lying down? Less Than Half the Day   How much of a typical day do you spend walking/standing? Less Than Half the Day   How many hours (not including work) do you spend on the TV/Video Games/Computer/Tablet/Phone? 2-3 Hours   How many times a week are you active for the purpose of exercise? 2-3 Times a Week   What keeps you from being more active? Pain   How many total minutes do you spend doing some activity for the purpose of exercising when you exercise? More Than 30 Minutes       PAST MEDICAL HISTORY:  Past Medical History:   Diagnosis Date    Cervical disc disorder with radiculopathy of cervical region 11/27/2017    Cervical spinal stenosis     Cervical spinal stenosis     Constipation     DDD (degenerative disc disease), cervical     Degenerative disc disease     Depression     Fatigue     Foot pain     SHAHIDA (generalized anxiety disorder)     Major depressive disorder, single episode, mild (H) 6/5/2023    Medial meniscus tear     knee    OA (osteoarthritis)     Obesity     Obesity     Perimenopause     Personal history of COVID-19     PONV (postoperative nausea and vomiting)     PONV (postoperative nausea and vomiting)            9/6/2023     5:07 PM   Work/Social History Reviewed With Patient   My employment status is Full-Time    Part-Time   My job is Seditary and active   How much of your job is spent on the computer or phone? 50%   How many hours do you spend commuting to work daily? 1   What is your marital status? /In a  Relationship   If in a relationship, is your significant other overweight? Yes   If you have children, are they overweight? No   Who do you live with?    Who does the food shopping? Both           9/6/2023     5:07 PM   Mental Health History Reviewed With Patient   Have you ever been physically or sexually abused? No   How often in the past 2 weeks have you felt little interest or pleasure in doing things? Not at all   Over the past 2 weeks how often have you felt down, depressed, or hopeless? Not at all           9/6/2023     5:07 PM   Sleep History Reviewed With Patient   How many hours do you sleep at night? 8       MEDICATIONS:   Current Outpatient Medications   Medication Sig Dispense Refill    acetaminophen (TYLENOL) 325 MG tablet Take 2 tablets (650 mg) by mouth every 8 hours as needed for mild pain 100 tablet 2    Bacillus Coagulans-Inulin (PROBIOTIC-PREBIOTIC PO)       CALCIUM CITRATE PO Take 1 tablet by mouth every morning      celecoxib (CELEBREX) 200 MG capsule Take 200 mg by mouth      diclofenac (VOLTAREN) 1 % topical gel APPLY TO THE AFFECTED AREA FOUR TIMES DAILY OR AS DIRECTED      Flax Oil-Fish Oil-Borage Oil ([employer] OMEGA-3 PO) Take 1 capsule by mouth 2 times daily      lidocaine (LIDODERM) 5 % patch lidocaine 5 % topical patch      MAGNESIUM GLYCINATE PO Take 300 mg by mouth At Bedtime      methocarbamol (ROBAXIN) 750 MG tablet Take 750 mg by mouth      Multiple Vitamins-Minerals (MULTIVITAL PO) Take 1 tablet by mouth 2 times daily Twice a day      Vitamin D3 (CHOLECALCIFEROL) 125 MCG (5000 UT) tablet          ALLERGIES:   Allergies   Allergen Reactions    Dilaudid [Hydromorphone] GI Disturbance     Per patient, tolerates IV version well.    Morphine Nausea and Vomiting    Oxycodone Nausea and Vomiting    Tramadol Nausea and Vomiting    Liquid Adhesive Rash     Other reaction(s): Rash       PHYSICAL EXAM:  /73 (BP Location: Left arm, Patient Position: Left side, Cuff Size: Adult Large)   " Pulse 77   Ht 1.638 m (5' 4.5\")   Wt 90.3 kg (199 lb)   LMP 02/01/2017   SpO2 96%   BMI 33.63 kg/m      Waist circumference:      Wt Readings from Last 4 Encounters:   09/07/23 90.3 kg (199 lb)   07/28/23 90.4 kg (199 lb 3.2 oz)   06/22/23 88.9 kg (196 lb)   06/05/23 90.5 kg (199 lb 9.6 oz)     A & O x 3  HEENT: NCAT, mucous membranes moist  Respirations unlabored  Location of obesity: Central Obesity    FOLLOW-UP:   4 weeks.    TIME: 39 min spent on evaluation, management, counseling, education, & motivational interviewing with greater than 50 % of the total time was spent on counseling and coordinating care    Sincerely,    MELIA CREWS MD        "

## 2023-09-08 ENCOUNTER — THERAPY VISIT (OUTPATIENT)
Dept: PHYSICAL THERAPY | Facility: REHABILITATION | Age: 63
End: 2023-09-08
Payer: COMMERCIAL

## 2023-09-08 DIAGNOSIS — G89.28 CHRONIC NECK PAIN WITH HISTORY OF CERVICAL SPINAL SURGERY: ICD-10-CM

## 2023-09-08 DIAGNOSIS — R29.3 POOR POSTURE: ICD-10-CM

## 2023-09-08 DIAGNOSIS — M54.2 CHRONIC NECK PAIN WITH HISTORY OF CERVICAL SPINAL SURGERY: ICD-10-CM

## 2023-09-08 DIAGNOSIS — M62.81 GENERALIZED MUSCLE WEAKNESS: Primary | ICD-10-CM

## 2023-09-08 DIAGNOSIS — Z98.890 CHRONIC NECK PAIN WITH HISTORY OF CERVICAL SPINAL SURGERY: ICD-10-CM

## 2023-09-08 PROCEDURE — 97140 MANUAL THERAPY 1/> REGIONS: CPT | Mod: GP | Performed by: PHYSICAL THERAPIST

## 2023-09-08 NOTE — TELEPHONE ENCOUNTER
Left message to call back for: Patient  Information to relay to patient: Please notify patient prescription has been sent to pharmacy.

## 2023-09-10 NOTE — ASSESSMENT & PLAN NOTE
We had a good discussion today as it relates to approaching obesity as a chronic disease.  I reviewed the patient's questionnaire as well as recent laboratory results and medical history.  I reviewed with the patient an approach to nutrition to support her goals of weight loss and overall long-term weight management.  We discussed the importance of whole foods versus processed foods as well as focusing on the macronutrients of protein and healthy fats and controlling overall carbohydrate intake.  We discussed the importance of incorporating vegetables into her diet.  We discussed the important role of exercise and weight management.  Lastly, we did discuss medications that could be utilized as a tool to support weight loss.  Ultimately, prescription for phentermine 15 mg daily was provided as she has had good benefit from this medication in the more distant past.  Follow-up in 1 month for a weight check and blood pressure check.

## 2023-09-20 ENCOUNTER — THERAPY VISIT (OUTPATIENT)
Dept: PHYSICAL THERAPY | Facility: REHABILITATION | Age: 63
End: 2023-09-20
Payer: COMMERCIAL

## 2023-09-20 DIAGNOSIS — M62.81 GENERALIZED MUSCLE WEAKNESS: Primary | ICD-10-CM

## 2023-09-20 DIAGNOSIS — G89.28 CHRONIC NECK PAIN WITH HISTORY OF CERVICAL SPINAL SURGERY: ICD-10-CM

## 2023-09-20 DIAGNOSIS — Z98.890 CHRONIC NECK PAIN WITH HISTORY OF CERVICAL SPINAL SURGERY: ICD-10-CM

## 2023-09-20 DIAGNOSIS — M54.2 CHRONIC NECK PAIN WITH HISTORY OF CERVICAL SPINAL SURGERY: ICD-10-CM

## 2023-09-20 DIAGNOSIS — R29.3 POOR POSTURE: ICD-10-CM

## 2023-09-20 PROCEDURE — 97110 THERAPEUTIC EXERCISES: CPT | Mod: GP | Performed by: PHYSICAL THERAPIST

## 2023-09-20 PROCEDURE — 97140 MANUAL THERAPY 1/> REGIONS: CPT | Mod: GP | Performed by: PHYSICAL THERAPIST

## 2023-09-21 ENCOUNTER — TRANSFERRED RECORDS (OUTPATIENT)
Dept: HEALTH INFORMATION MANAGEMENT | Facility: CLINIC | Age: 63
End: 2023-09-21
Payer: COMMERCIAL

## 2023-10-02 ENCOUNTER — THERAPY VISIT (OUTPATIENT)
Dept: PHYSICAL THERAPY | Facility: REHABILITATION | Age: 63
End: 2023-10-02
Payer: COMMERCIAL

## 2023-10-02 DIAGNOSIS — Z98.890 CHRONIC NECK PAIN WITH HISTORY OF CERVICAL SPINAL SURGERY: ICD-10-CM

## 2023-10-02 DIAGNOSIS — R29.3 POOR POSTURE: ICD-10-CM

## 2023-10-02 DIAGNOSIS — M62.81 GENERALIZED MUSCLE WEAKNESS: Primary | ICD-10-CM

## 2023-10-02 DIAGNOSIS — M54.2 CHRONIC NECK PAIN WITH HISTORY OF CERVICAL SPINAL SURGERY: ICD-10-CM

## 2023-10-02 DIAGNOSIS — G89.28 CHRONIC NECK PAIN WITH HISTORY OF CERVICAL SPINAL SURGERY: ICD-10-CM

## 2023-10-02 PROCEDURE — 97140 MANUAL THERAPY 1/> REGIONS: CPT | Mod: GP | Performed by: PHYSICAL THERAPIST

## 2023-10-05 ENCOUNTER — OFFICE VISIT (OUTPATIENT)
Dept: FAMILY MEDICINE | Facility: CLINIC | Age: 63
End: 2023-10-05
Payer: COMMERCIAL

## 2023-10-05 VITALS
SYSTOLIC BLOOD PRESSURE: 113 MMHG | DIASTOLIC BLOOD PRESSURE: 73 MMHG | TEMPERATURE: 98.2 F | OXYGEN SATURATION: 97 % | BODY MASS INDEX: 31.16 KG/M2 | RESPIRATION RATE: 18 BRPM | HEART RATE: 75 BPM | HEIGHT: 65 IN | WEIGHT: 187 LBS

## 2023-10-05 DIAGNOSIS — E66.09 CLASS 1 OBESITY DUE TO EXCESS CALORIES WITHOUT SERIOUS COMORBIDITY WITH BODY MASS INDEX (BMI) OF 33.0 TO 33.9 IN ADULT: ICD-10-CM

## 2023-10-05 DIAGNOSIS — E66.811 CLASS 1 OBESITY DUE TO EXCESS CALORIES WITHOUT SERIOUS COMORBIDITY WITH BODY MASS INDEX (BMI) OF 33.0 TO 33.9 IN ADULT: ICD-10-CM

## 2023-10-05 PROCEDURE — 99213 OFFICE O/P EST LOW 20 MIN: CPT | Performed by: FAMILY MEDICINE

## 2023-10-05 RX ORDER — PHENTERMINE HYDROCHLORIDE 15 MG/1
15 CAPSULE ORAL EVERY MORNING
Qty: 90 CAPSULE | Refills: 0 | Status: SHIPPED | OUTPATIENT
Start: 2023-10-05 | End: 2024-01-15

## 2023-10-06 NOTE — ASSESSMENT & PLAN NOTE
The patient is quite pleased overall with her results thus far.  She is down 12 pounds or about 6% of her weight in just 1 month on phentermine 15 mg daily.  She is tolerating the medication well and finds it quite effective at reducing appetite.  We discussed the importance of adhering to a healthy diet.  She is limited with her exercise due to her chronic pain issues.  Follow-up in 6 weeks.

## 2023-10-06 NOTE — PROGRESS NOTES
"Problem List Items Addressed This Visit          Digestive    Class 1 obesity due to excess calories without serious comorbidity with body mass index (BMI) of 33.0 to 33.9 in adult     The patient is quite pleased overall with her results thus far.  She is down 12 pounds or about 6% of her weight in just 1 month on phentermine 15 mg daily.  She is tolerating the medication well and finds it quite effective at reducing appetite.  We discussed the importance of adhering to a healthy diet.  She is limited with her exercise due to her chronic pain issues.  Follow-up in 6 weeks.         Relevant Medications    phentermine (ADIPEX-P) 15 MG capsule         MELIA CREWS MD    Alicia Rich is a 62 year old who presents today for follow-up regarding medical weight management.  The patient was seen for a weight management consultation and counseled regarding nutrition and other aspects of weight management.  A discussion regarding medications resulted in a prescription for phentermine 15 mg daily.  The patient comes in today stating that the medication is very helpful.  It has reduced both cravings and appetite and assisted her in staying on track with her food intake.  She is quite pleased with her results and does not have any side effects to the medication.     Objective    /73 (BP Location: Left arm, Patient Position: Left side, Cuff Size: Adult Large)   Pulse 75   Temp 98.2  F (36.8  C) (Oral)   Resp 18   Ht 1.638 m (5' 4.5\")   Wt 84.8 kg (187 lb)   LMP 02/01/2017   SpO2 97%   BMI 31.60 kg/m    Body mass index is 31.6 kg/m .  Physical Exam   GENERAL: healthy, alert and no distress            "

## 2023-10-16 ENCOUNTER — THERAPY VISIT (OUTPATIENT)
Dept: PHYSICAL THERAPY | Facility: REHABILITATION | Age: 63
End: 2023-10-16
Payer: COMMERCIAL

## 2023-10-16 DIAGNOSIS — G89.28 CHRONIC NECK PAIN WITH HISTORY OF CERVICAL SPINAL SURGERY: ICD-10-CM

## 2023-10-16 DIAGNOSIS — M54.2 CHRONIC NECK PAIN WITH HISTORY OF CERVICAL SPINAL SURGERY: ICD-10-CM

## 2023-10-16 DIAGNOSIS — M62.81 GENERALIZED MUSCLE WEAKNESS: Primary | ICD-10-CM

## 2023-10-16 DIAGNOSIS — Z98.890 CHRONIC NECK PAIN WITH HISTORY OF CERVICAL SPINAL SURGERY: ICD-10-CM

## 2023-10-16 DIAGNOSIS — R29.3 POOR POSTURE: ICD-10-CM

## 2023-10-16 PROCEDURE — 97140 MANUAL THERAPY 1/> REGIONS: CPT | Mod: GP | Performed by: PHYSICAL THERAPIST

## 2023-10-26 ENCOUNTER — THERAPY VISIT (OUTPATIENT)
Dept: PHYSICAL THERAPY | Facility: REHABILITATION | Age: 63
End: 2023-10-26
Payer: COMMERCIAL

## 2023-10-26 DIAGNOSIS — M62.81 GENERALIZED MUSCLE WEAKNESS: Primary | ICD-10-CM

## 2023-10-26 DIAGNOSIS — Z98.890 CHRONIC NECK PAIN WITH HISTORY OF CERVICAL SPINAL SURGERY: ICD-10-CM

## 2023-10-26 DIAGNOSIS — G89.28 CHRONIC NECK PAIN WITH HISTORY OF CERVICAL SPINAL SURGERY: ICD-10-CM

## 2023-10-26 DIAGNOSIS — M54.2 CHRONIC NECK PAIN WITH HISTORY OF CERVICAL SPINAL SURGERY: ICD-10-CM

## 2023-10-26 DIAGNOSIS — R29.3 POOR POSTURE: ICD-10-CM

## 2023-10-26 PROCEDURE — 97140 MANUAL THERAPY 1/> REGIONS: CPT | Mod: GP | Performed by: PHYSICAL THERAPIST

## 2024-01-15 DIAGNOSIS — E66.09 CLASS 1 OBESITY DUE TO EXCESS CALORIES WITHOUT SERIOUS COMORBIDITY WITH BODY MASS INDEX (BMI) OF 33.0 TO 33.9 IN ADULT: ICD-10-CM

## 2024-01-15 DIAGNOSIS — E66.811 CLASS 1 OBESITY DUE TO EXCESS CALORIES WITHOUT SERIOUS COMORBIDITY WITH BODY MASS INDEX (BMI) OF 33.0 TO 33.9 IN ADULT: ICD-10-CM

## 2024-01-15 NOTE — TELEPHONE ENCOUNTER
Medication Request    Medication name: phentermine 15MG    Requested Pharmacy:  [facility]      When was patient last seen by provider?:  10/05/23    Patient offered appointment:  Yes 01/22/24    Okay to leave a detailed message: yes    Pt only has 3 caps left of this medication, will not last her until appointment next Monday 01/22/24    Please advise.

## 2024-01-16 RX ORDER — PHENTERMINE HYDROCHLORIDE 15 MG/1
15 CAPSULE ORAL EVERY MORNING
Qty: 90 CAPSULE | Refills: 0 | Status: SHIPPED | OUTPATIENT
Start: 2024-01-16 | End: 2024-04-25

## 2024-02-07 NOTE — PROGRESS NOTES
DISCHARGE  Reason for Discharge: Patient chooses to discontinue therapy.  Patient has failed to schedule further appointments.    Equipment Issued:     Discharge Plan: Patient to continue home program.    Referring Provider:  Referred Self

## 2024-03-20 ENCOUNTER — TELEPHONE (OUTPATIENT)
Dept: FAMILY MEDICINE | Facility: CLINIC | Age: 64
End: 2024-03-20
Payer: COMMERCIAL

## 2024-03-20 NOTE — TELEPHONE ENCOUNTER
Prior Authorization Request  Who s requesting:  Pharmacy  Pharmacy Name and Location: [facility]  Medication Name: phentermine (ADIPEX-P) 15 MG capsule   Insurance Plan: [employer]  Insurance Member ID Number:  [id number]   CoverMyMeds Key: [id number]  Informed patient that prior authorizations can take up to 10 business days for response:   NA  Okay to leave a detailed message: No

## 2024-04-02 NOTE — TELEPHONE ENCOUNTER
Central Prior Authorization Team   Phone: [phone]    PA Initiation    Medication: phentermine (ADIPEX-P) 15 MG capsule   Insurance Company: [facility] FEDERAL - Phone [phone] Fax [phone]  Pharmacy Filling the Rx: [facility] - [place] - [address]  Filling Pharmacy Phone: [phone]  Filling Pharmacy Fax:    Start Date: 4/2/2024

## 2024-04-03 NOTE — TELEPHONE ENCOUNTER
Prior Authorization Approval    Authorization Effective Date: 3/3/2024  Authorization Expiration Date: 4/2/2025  Medication: phentermine (ADIPEX-P) 15 MG capsule   Reference #:     Insurance Company: [employer] FEDERAL - Phone [phone] Fax [phone]  Which Pharmacy is filling the prescription (Not needed for infusion/clinic administered): [place], [place] - [address]  Pharmacy Notified: Yes  Patient Notified: Instructed pharmacy to notify patient when script is ready to /ship.

## 2024-04-25 DIAGNOSIS — E66.09 CLASS 1 OBESITY DUE TO EXCESS CALORIES WITHOUT SERIOUS COMORBIDITY WITH BODY MASS INDEX (BMI) OF 33.0 TO 33.9 IN ADULT: ICD-10-CM

## 2024-04-25 DIAGNOSIS — E66.811 CLASS 1 OBESITY DUE TO EXCESS CALORIES WITHOUT SERIOUS COMORBIDITY WITH BODY MASS INDEX (BMI) OF 33.0 TO 33.9 IN ADULT: ICD-10-CM

## 2024-04-25 RX ORDER — PHENTERMINE HYDROCHLORIDE 15 MG/1
15 CAPSULE ORAL EVERY MORNING
Qty: 30 CAPSULE | Refills: 0 | Status: SHIPPED | OUTPATIENT
Start: 2024-04-25 | End: 2024-05-01

## 2024-04-25 RX ORDER — PHENTERMINE HYDROCHLORIDE 15 MG/1
15 CAPSULE ORAL EVERY MORNING
Qty: 90 CAPSULE | Refills: 0 | Status: CANCELLED | OUTPATIENT
Start: 2024-04-25

## 2024-04-25 RX ORDER — PHENTERMINE HYDROCHLORIDE 15 MG/1
15 CAPSULE ORAL EVERY MORNING
Qty: 30 CAPSULE | OUTPATIENT
Start: 2024-04-25

## 2024-04-25 NOTE — TELEPHONE ENCOUNTER
.  Medication Question or Refill    Contacts         Type Contact Phone/Fax    04/25/2024 01:31 PM CDT Phone (Incoming) Layla Starks (Self) [phone] (M)            What medication are you calling about (include dose and sig)?:     phentermine (ADIPEX-P) 15 MG capsule     Preferred Pharmacy:  [place], [place] - [address]  [address]  [facility] [address]  Phone: [phone] Fax: [phone]      Controlled Substance Agreement on file:   CSA -- Patient Level:     [Media Unavailable] Controlled Substance Agreement - Opioid - Scan on 5/11/2022  2:56 PM       Who prescribed the medication?: [facility]    Do you need a refill? Yes    When did you use the medication last? Today, 2 days left    Patient offered an appointment? Yes:     Declined at this time    Do you have any questions or concerns?  Yes:  please call if any questions      Could we send this information to you in [facility] or would you prefer to receive a phone call?:   Patient would prefer a phone call   Okay to leave a detailed message?: Yes at Cell number on file:    Telephone Information:   Mobile [phone]

## 2024-04-25 NOTE — TELEPHONE ENCOUNTER
This patient needed to be seen a couple months ago for a follow-up.  I could give her 30 day supply until she can get in for a weight check and blood pressure check.y

## 2024-04-30 ASSESSMENT — PATIENT HEALTH QUESTIONNAIRE - PHQ9
SUM OF ALL RESPONSES TO PHQ QUESTIONS 1-9: 0
SUM OF ALL RESPONSES TO PHQ QUESTIONS 1-9: 0
10. IF YOU CHECKED OFF ANY PROBLEMS, HOW DIFFICULT HAVE THESE PROBLEMS MADE IT FOR YOU TO DO YOUR WORK, TAKE CARE OF THINGS AT HOME, OR GET ALONG WITH OTHER PEOPLE: NOT DIFFICULT AT ALL

## 2024-05-01 ENCOUNTER — OFFICE VISIT (OUTPATIENT)
Dept: FAMILY MEDICINE | Facility: CLINIC | Age: 64
End: 2024-05-01
Payer: COMMERCIAL

## 2024-05-01 VITALS
HEIGHT: 65 IN | HEART RATE: 84 BPM | SYSTOLIC BLOOD PRESSURE: 125 MMHG | DIASTOLIC BLOOD PRESSURE: 87 MMHG | TEMPERATURE: 97.9 F | RESPIRATION RATE: 16 BRPM | WEIGHT: 191 LBS | OXYGEN SATURATION: 97 % | BODY MASS INDEX: 31.82 KG/M2

## 2024-05-01 DIAGNOSIS — E66.811 CLASS 1 OBESITY DUE TO EXCESS CALORIES WITHOUT SERIOUS COMORBIDITY WITH BODY MASS INDEX (BMI) OF 33.0 TO 33.9 IN ADULT: ICD-10-CM

## 2024-05-01 DIAGNOSIS — E66.09 CLASS 1 OBESITY DUE TO EXCESS CALORIES WITHOUT SERIOUS COMORBIDITY WITH BODY MASS INDEX (BMI) OF 33.0 TO 33.9 IN ADULT: ICD-10-CM

## 2024-05-01 PROCEDURE — 99213 OFFICE O/P EST LOW 20 MIN: CPT | Performed by: FAMILY MEDICINE

## 2024-05-01 RX ORDER — PHENTERMINE HYDROCHLORIDE 15 MG/1
15 CAPSULE ORAL EVERY MORNING
Qty: 90 CAPSULE | Refills: 0 | Status: SHIPPED | OUTPATIENT
Start: 2024-05-01

## 2024-05-01 NOTE — PROGRESS NOTES
"  Assessment & Plan   Problem List Items Addressed This Visit       Class 1 obesity due to excess calories without serious comorbidity with body mass index (BMI) of 33.0 to 33.9 in adult     The patient has not had much progress as it relates to weight loss and in fact is up a little bit from her last visit in October.  She was not taking her phentermine for a while but has resumed it recently and does feel like the medication is helping.  We had a good conversation today regarding getting back on track and reviewed recommendations as a relates to nutrition, exercise, sleep as well as stress management.  I asked the patient to follow-up in 3 months.         Relevant Medications    phentermine 15 MG capsule           BMI  Estimated body mass index is 32.28 kg/m  as calculated from the following:    Height as of this encounter: 1.638 m (5' 4.5\").    Weight as of this encounter: 86.6 kg (191 lb).         Alicia Rich is a 63 year old who comes in today for follow-up regarding phentermine.  The patient has been continuing to struggle with chronic pain issues.  The patient follows closely with a pain specialist and is anticipating an intervention in the near future.  She is optimistic and hopeful that will help.  She feels that her pain definitely contributes to difficulty managing her weight.  She does not sleep as well as she should and struggles with exercise because that can sometimes exacerbate the pain.  She does feel that the phentermine is helpful but has not seen any weight loss since her last visit.  Med check    History of Present Illness       Reason for visit:  Phentermine re-check    She eats 2-3 servings of fruits and vegetables daily.She consumes 1 sweetened beverage(s) daily.She exercises with enough effort to increase her heart rate 9 or less minutes per day.  She exercises with enough effort to increase her heart rate 3 or less days per week.   She is taking medications regularly.         " "  Objective    /87 (BP Location: Left arm, Patient Position: Left side, Cuff Size: Adult Large)   Pulse 84   Temp 97.9  F (36.6  C) (Oral)   Resp 16   Ht 1.638 m (5' 4.5\")   Wt 86.6 kg (191 lb)   LMP 02/01/2017   SpO2 97%   BMI 32.28 kg/m    Body mass index is 32.28 kg/m .  Physical Exam   GENERAL: alert and no distress            Signed Electronically by: MELIA CREWS MD    "

## 2024-05-06 ENCOUNTER — PATIENT OUTREACH (OUTPATIENT)
Dept: CARE COORDINATION | Facility: CLINIC | Age: 64
End: 2024-05-06
Payer: COMMERCIAL

## 2024-05-20 ENCOUNTER — PATIENT OUTREACH (OUTPATIENT)
Dept: CARE COORDINATION | Facility: CLINIC | Age: 64
End: 2024-05-20
Payer: COMMERCIAL

## 2024-05-24 NOTE — ASSESSMENT & PLAN NOTE
The patient has not had much progress as it relates to weight loss and in fact is up a little bit from her last visit in October.  She was not taking her phentermine for a while but has resumed it recently and does feel like the medication is helping.  We had a good conversation today regarding getting back on track and reviewed recommendations as a relates to nutrition, exercise, sleep as well as stress management.  I asked the patient to follow-up in 3 months.

## 2024-06-07 ENCOUNTER — TRANSFERRED RECORDS (OUTPATIENT)
Dept: HEALTH INFORMATION MANAGEMENT | Facility: CLINIC | Age: 64
End: 2024-06-07
Payer: COMMERCIAL

## 2024-06-17 ENCOUNTER — TRANSFERRED RECORDS (OUTPATIENT)
Dept: HEALTH INFORMATION MANAGEMENT | Facility: CLINIC | Age: 64
End: 2024-06-17
Payer: COMMERCIAL

## 2024-06-19 ENCOUNTER — LAB (OUTPATIENT)
Dept: LAB | Facility: CLINIC | Age: 64
End: 2024-06-19
Attending: FAMILY MEDICINE
Payer: COMMERCIAL

## 2024-06-19 ENCOUNTER — E-VISIT (OUTPATIENT)
Dept: FAMILY MEDICINE | Facility: CLINIC | Age: 64
End: 2024-06-19
Payer: COMMERCIAL

## 2024-06-19 ENCOUNTER — TELEPHONE (OUTPATIENT)
Dept: FAMILY MEDICINE | Facility: CLINIC | Age: 64
End: 2024-06-19
Payer: COMMERCIAL

## 2024-06-19 DIAGNOSIS — J06.9 VIRAL UPPER RESPIRATORY TRACT INFECTION: ICD-10-CM

## 2024-06-19 DIAGNOSIS — J06.9 VIRAL UPPER RESPIRATORY TRACT INFECTION: Primary | ICD-10-CM

## 2024-06-19 LAB
FLUAV AG SPEC QL IA: NEGATIVE
FLUBV AG SPEC QL IA: NEGATIVE

## 2024-06-19 PROCEDURE — 87635 SARS-COV-2 COVID-19 AMP PRB: CPT | Performed by: FAMILY MEDICINE

## 2024-06-19 PROCEDURE — 99421 OL DIG E/M SVC 5-10 MIN: CPT | Performed by: FAMILY MEDICINE

## 2024-06-19 PROCEDURE — 87804 INFLUENZA ASSAY W/OPTIC: CPT

## 2024-06-19 NOTE — TELEPHONE ENCOUNTER
Symptoms    Describe your symptoms: flu-like symptoms, she was negative for covid    Any pain: No    How long have you been having symptoms: 2  days    Have you been seen for this:  No    Appointment offered?: Yes: Layla instructed to complete an eVisit    Triage offered?: No Layla will complete an eVisit        Preferred Pharmacy:   [place], [place] - [address]  [address] [facility] [address]  Phone: [phone] Fax: [phone]      Could we send this information to you in [employer] or would you prefer to receive a phone call?:   Patient would prefer a phone call   Okay to leave a detailed message?: Yes at Cell number on file:    Telephone Information:   Mobile [phone]

## 2024-06-20 ENCOUNTER — TELEPHONE (OUTPATIENT)
Dept: FAMILY MEDICINE | Facility: CLINIC | Age: 64
End: 2024-06-20
Payer: COMMERCIAL

## 2024-06-20 LAB — SARS-COV-2 RNA RESP QL NAA+PROBE: NEGATIVE

## 2024-06-21 NOTE — TELEPHONE ENCOUNTER
"Patient called.  Layla states that she went to ER early this morning due to palpitation, nausea, body aches and fatigue.  Layla stated that she was told that the palpitation were from the prednisone she is on.    Patient told to see PCP in 2-3 days for ER follow up as they noted on CT image:  \"IMPRESSION:   1.  Numerous low-attenuation lesions scattered within the liver. These appear higher in attenuation than simple cysts, but are otherwise nonspecific. Possibilities include metastases and complex cysts. Comparison with prior imaging studies would be useful, if available. If not available, an MR of the liver is recommended for further characterization.   2.  No other acute abnormality identified in the chest, abdomen or pelvis.   3.  The aorta is normal in caliber without dissection. \"    Patient declined to schedule an OV at this time.    "
----- Message from MELIA CREWS sent at 6/20/2024 12:47 PM CDT -----  Please call patient and let her know that her COVID test came back negative as well.  Please find out how she is doing today  
Please assist patient in getting an appointment to see me on Monday or Tuesday.  
Spoke to patient, she states that she is still sick and scheduled the follow-up for Wednesday 06/26/24 instead.   
CXR 8/17/2023  "IMPRESSION:    Endotracheal tube in good position. Nasogastric tube courses off image   below hemidiaphragm. Small bibasilar focal atelectasis/infiltrates. Upper   lungs clear. No pneumothorax. Cannot exclude small pleural effusions."    CT Angiography Chest PE Protocol 8/17/2023  "IMPRESSION:  There are new, bibasilar patchy lung opacities since prior CT chest   8/8/2023, most suggestive of pneumonia.    Stable upper thoracic esophageal mass and stable lymphadenopathy, likely   metastatic."    CT Soft Tissue Neck with IV Contrast 8/8/2023  "  IMPRESSION:    A 3.9 cm proximal esophagus mass is most concerning for malignancy.    There is a right paratracheal node adjacent to it with gross extranodal   spread invading the thyroid gland and possibly the recurrent laryngeal   nerve. Correlate for a right vocal palsy.    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 Please refer to chest CT for complete detail thereof."    CT Chest with IV Contrast 8/8/2023  "IMPRESSION:    Mass in the upper thoracic esophagus suspicious for neoplasm.    Thoracic lymphadenopathy as above, likely metastatic.    Nonspecific right adrenal nodule. Questionable lesion in the upper pole   of the right kidney. Consider follow-up MRI."    CT Head without Contrast 8/8/2023  "IMPRESSION:  No acute intracranial hemorrhage, mass effect or other acute finding."
normal sinus rhythm

## 2024-07-09 ENCOUNTER — OFFICE VISIT (OUTPATIENT)
Dept: FAMILY MEDICINE | Facility: CLINIC | Age: 64
End: 2024-07-09
Payer: COMMERCIAL

## 2024-07-09 ENCOUNTER — TELEPHONE (OUTPATIENT)
Dept: FAMILY MEDICINE | Facility: CLINIC | Age: 64
End: 2024-07-09

## 2024-07-09 VITALS
OXYGEN SATURATION: 96 % | SYSTOLIC BLOOD PRESSURE: 100 MMHG | BODY MASS INDEX: 29.19 KG/M2 | HEIGHT: 65 IN | RESPIRATION RATE: 16 BRPM | TEMPERATURE: 99.1 F | WEIGHT: 175.19 LBS | HEART RATE: 88 BPM | DIASTOLIC BLOOD PRESSURE: 68 MMHG

## 2024-07-09 DIAGNOSIS — K75.0 LIVER ABSCESS: ICD-10-CM

## 2024-07-09 DIAGNOSIS — Z09 HOSPITAL DISCHARGE FOLLOW-UP: Primary | ICD-10-CM

## 2024-07-09 PROCEDURE — 99214 OFFICE O/P EST MOD 30 MIN: CPT

## 2024-07-09 RX ORDER — IBUPROFEN 400 MG/1
400-800 TABLET, FILM COATED ORAL EVERY 6 HOURS PRN
COMMUNITY
Start: 2024-07-03

## 2024-07-09 RX ORDER — CEFTRIAXONE 2 G/1
2 INJECTION, POWDER, FOR SOLUTION INTRAMUSCULAR; INTRAVENOUS
COMMUNITY
Start: 2024-07-03

## 2024-07-09 RX ORDER — METRONIDAZOLE 500 MG/1
500 TABLET ORAL 2 TIMES DAILY
COMMUNITY
Start: 2024-07-03 | End: 2024-07-15

## 2024-07-09 NOTE — PROGRESS NOTES
Dx: liver abscess, elevated LFT, diverticulitis  Home care  IV abx through 7/29/2024.   Infectious disease f/u    Presented to  ED with fevers, night sweats, and body aches. Imaging confirmed multiple hepatic abscesses as well as thickening of the sigmoid colon consistent with diverticulitis, likely the source of infection.Had drainage of larger abscess 6/25, cultures grew strep intermedius. Drain removed. ID involved in care. Repeat imaging over the weekend showed an increase in size of the larger abscess, this was drained again with IR on 7/1. Repeat CT 7/3 shows ongoing improvement. Hemodynamically stable by time of discharge.  - discharged on IV ceftriaxone and oral Flagyl. Continue ceftriaxone through 7/29 (4 weeks from the last drainage procedure) and the oral Flagyl will go through 7/15.   - PICC placed prior to discharge  - home care for weekly PICC dressing change and labs  - recommend repeat CT abdomen on 07/11 or soon thereafter. Follow-up with ID in clinic  - PRN Robaxin and ibuprofen for pain    Rash, resolved  Contact dermatitis versus irritation.    Severe malnutrition  Patient with severe malnutrition in the context of acute illness/injury during hospital stay and remains with severe malnutrition in the context of acute illness/injury. Patient requiring additional resources due to degree of malnutrition during hospital stay. Dietitian following with the following nutrition therapy plan: 1.)Adjust Breeze BID to daily. 2.)Add Boost HP daily & continue supplement prn. 3.)Continue high protein snacks TID between meals. 4.)Handout provided on what to eat with diverticulitis.     atraumatic/normal range of motion

## 2024-07-09 NOTE — TELEPHONE ENCOUNTER
Notified Frances BLAKE with [facility] of providers message below, she verbalized understanding.

## 2024-07-09 NOTE — TELEPHONE ENCOUNTER
"  Home Care is calling regarding an established patient with [facility].       Requesting orders from: Alicia Bettencourt  Provider is following patient: No       Orders Requested    Skilled Nursing  Request for delay in care   Delay start to date: 7/10/24 due to:  \"hospital discharging provider (from [facility]) forgot to place delayed SOC visit to 7/10/24, and instead put referral start date of the standard 1-2 days after discharge.\"       Information was gathered and will be sent to provider for review.  RN will contact Home Care with information after provider review.  Information was gathered and will be sent to provider to confirm provider will be following patient.  RN will contact Home Care with information after provider review.  Please call Frances FUNES , back at [phone] - and ask for Frances FUNES     Contact information confirmed and updated as needed.    Darinaa Beckman, RN   "

## 2024-07-09 NOTE — ASSESSMENT & PLAN NOTE
Indication for recent hospitalization.  Patient was discharged on IV antibiotics with a PICC line.  She has had all follow-up appointment scheduled and will be receiving home care starting tomorrow.  Her  has been assisting with administering the IV antibiotics and they feel comfortable with this plan.  White count today shows improvement although she does have a mild increase in her neutrophils.  She plans to obtain regular blood work through infectious disease and follow-up with them for repeat CT scan.  She is aware of reasons that necessitate return to care, including fevers, severe abdominal pain or worsening malaise.

## 2024-07-09 NOTE — PROGRESS NOTES
Assessment & Plan   Problem List Items Addressed This Visit       Hospital discharge follow-up - Primary    Liver abscess     Indication for recent hospitalization.  Patient was discharged on IV antibiotics with a PICC line.  She has had all follow-up appointment scheduled and will be receiving home care starting tomorrow.  Her  has been assisting with administering the IV antibiotics and they feel comfortable with this plan.  White count today shows improvement although she does have a mild increase in her neutrophils.  She plans to obtain regular blood work through infectious disease and follow-up with them for repeat CT scan.  She is aware of reasons that necessitate return to care, including fevers, severe abdominal pain or worsening malaise.            MED REC REQUIRED  Post Medication Reconciliation Status:  Discharge medications reconciled, continue medications without change    Subjective   Layla is a 63 year old, presenting for the following health issues:  Follow Up ([facility] 06/22/24 to 07/03/24 Liver abscess. Will be having CT done to see if PIC line antibx is working.)        7/9/2024    11:25 AM   Additional Questions   Roomed by sac   Accompanied by self         7/9/2024    11:25 AM   Patient Reported Additional Medications   Patient reports taking the following new medications no     HPI     Hospital Follow-up Visit:    Hospital/Nursing Home/IP Rehab Facility:  [facility]  Date of Admission: 06/22/24  Date of Discharge: 07/03/24  Reason(s) for Admission: Liver abscess  Was the patient in the ICU or did the patient experience delirium during hospitalization?  No  Do you have any other stressors you would like to discuss with your provider? No    Problems taking medications regularly:  None  Medication changes since discharge: None  Problems adhering to non-medication therapy:  None    Summary of hospitalization:  CareEverywhere information obtained and reviewed  Diagnostic Tests/Treatments  "reviewed.  Follow up needed: none  Other Healthcare Providers Involved in Patient s Care:         Homecare and Specialist appointment - infectious disease  Update since discharge: stable. She continues to have overall weakness and some intermittent chills although no fevers. Her  has chris assisting with IV antibiotics and she had follow up including a drain pulled this morning with Infectious Disease.     Plan of care communicated with patient             Objective    /68 (BP Location: Left arm, Patient Position: Sitting, Cuff Size: Adult Large)   Pulse 88   Temp 99.1  F (37.3  C) (Oral)   Resp 16   Ht 1.638 m (5' 4.5\")   Wt 79.5 kg (175 lb 3 oz)   LMP 02/01/2017   SpO2 96%   BMI 29.61 kg/m    Body mass index is 29.61 kg/m .    Physical Exam  Vitals and nursing note reviewed.   Constitutional:       General: She is not in acute distress.     Appearance: Normal appearance.   Cardiovascular:      Rate and Rhythm: Normal rate and regular rhythm.   Pulmonary:      Effort: Pulmonary effort is normal. No respiratory distress.   Neurological:      Mental Status: She is alert.   Psychiatric:         Mood and Affect: Mood normal.         Behavior: Behavior normal.         Thought Content: Thought content normal.            Signed Electronically by: SIA Owen CNP    "

## 2024-07-10 ENCOUNTER — MEDICAL CORRESPONDENCE (OUTPATIENT)
Dept: HEALTH INFORMATION MANAGEMENT | Facility: CLINIC | Age: 64
End: 2024-07-10
Payer: COMMERCIAL

## 2024-07-23 ENCOUNTER — MEDICAL CORRESPONDENCE (OUTPATIENT)
Dept: HEALTH INFORMATION MANAGEMENT | Facility: CLINIC | Age: 64
End: 2024-07-23
Payer: COMMERCIAL

## 2024-07-29 ENCOUNTER — MEDICAL CORRESPONDENCE (OUTPATIENT)
Dept: HEALTH INFORMATION MANAGEMENT | Facility: CLINIC | Age: 64
End: 2024-07-29
Payer: COMMERCIAL

## 2024-07-31 ENCOUNTER — MYC MEDICAL ADVICE (OUTPATIENT)
Dept: FAMILY MEDICINE | Facility: CLINIC | Age: 64
End: 2024-07-31
Payer: COMMERCIAL

## 2024-07-31 NOTE — LETTER
July 31, 2024      Layla Starks  [address] [address] [place] [id number]        To Whom It May Concern:    Layla Starks was seen in our clinic. She may return to work on August 5 without restrictions.    Sincerely,      Dr. Alicia Bettencourt

## 2024-07-31 NOTE — TELEPHONE ENCOUNTER
Patient calling to check on letter request. Original form completed by Mara Cortés at 7/9/24 visit and is available in media (dated 7/10/2024). Patient reports new form is not needed, just letter to extend date. Requesting this be written today.    Please advise if visit is needed.

## 2024-08-02 ENCOUNTER — MEDICAL CORRESPONDENCE (OUTPATIENT)
Dept: HEALTH INFORMATION MANAGEMENT | Facility: CLINIC | Age: 64
End: 2024-08-02
Payer: COMMERCIAL

## 2024-08-09 ENCOUNTER — MEDICAL CORRESPONDENCE (OUTPATIENT)
Dept: HEALTH INFORMATION MANAGEMENT | Facility: CLINIC | Age: 64
End: 2024-08-09
Payer: COMMERCIAL

## 2024-09-01 ENCOUNTER — HEALTH MAINTENANCE LETTER (OUTPATIENT)
Age: 64
End: 2024-09-01

## 2024-09-05 ENCOUNTER — PATIENT OUTREACH (OUTPATIENT)
Dept: CARE COORDINATION | Facility: CLINIC | Age: 64
End: 2024-09-05
Payer: COMMERCIAL

## 2024-10-03 ENCOUNTER — PATIENT OUTREACH (OUTPATIENT)
Dept: CARE COORDINATION | Facility: CLINIC | Age: 64
End: 2024-10-03
Payer: COMMERCIAL

## 2024-10-16 SDOH — HEALTH STABILITY: PHYSICAL HEALTH: ON AVERAGE, HOW MANY MINUTES DO YOU ENGAGE IN EXERCISE AT THIS LEVEL?: 40 MIN

## 2024-10-16 SDOH — HEALTH STABILITY: PHYSICAL HEALTH: ON AVERAGE, HOW MANY DAYS PER WEEK DO YOU ENGAGE IN MODERATE TO STRENUOUS EXERCISE (LIKE A BRISK WALK)?: 3 DAYS

## 2024-10-16 ASSESSMENT — SOCIAL DETERMINANTS OF HEALTH (SDOH): HOW OFTEN DO YOU GET TOGETHER WITH FRIENDS OR RELATIVES?: ONCE A WEEK

## 2024-10-20 ASSESSMENT — PATIENT HEALTH QUESTIONNAIRE - PHQ9
10. IF YOU CHECKED OFF ANY PROBLEMS, HOW DIFFICULT HAVE THESE PROBLEMS MADE IT FOR YOU TO DO YOUR WORK, TAKE CARE OF THINGS AT HOME, OR GET ALONG WITH OTHER PEOPLE: NOT DIFFICULT AT ALL
SUM OF ALL RESPONSES TO PHQ QUESTIONS 1-9: 0
SUM OF ALL RESPONSES TO PHQ QUESTIONS 1-9: 0

## 2024-10-21 ENCOUNTER — OFFICE VISIT (OUTPATIENT)
Dept: FAMILY MEDICINE | Facility: CLINIC | Age: 64
End: 2024-10-21
Payer: COMMERCIAL

## 2024-10-21 VITALS
HEART RATE: 79 BPM | OXYGEN SATURATION: 97 % | BODY MASS INDEX: 30.82 KG/M2 | WEIGHT: 185 LBS | SYSTOLIC BLOOD PRESSURE: 118 MMHG | HEIGHT: 65 IN | RESPIRATION RATE: 16 BRPM | TEMPERATURE: 98.1 F | DIASTOLIC BLOOD PRESSURE: 74 MMHG

## 2024-10-21 DIAGNOSIS — K75.0 LIVER ABSCESS: ICD-10-CM

## 2024-10-21 DIAGNOSIS — E66.811 CLASS 1 OBESITY DUE TO EXCESS CALORIES WITHOUT SERIOUS COMORBIDITY WITH BODY MASS INDEX (BMI) OF 30.0 TO 30.9 IN ADULT: ICD-10-CM

## 2024-10-21 DIAGNOSIS — E66.09 CLASS 1 OBESITY DUE TO EXCESS CALORIES WITHOUT SERIOUS COMORBIDITY WITH BODY MASS INDEX (BMI) OF 33.0 TO 33.9 IN ADULT: ICD-10-CM

## 2024-10-21 DIAGNOSIS — E66.09 CLASS 1 OBESITY DUE TO EXCESS CALORIES WITHOUT SERIOUS COMORBIDITY WITH BODY MASS INDEX (BMI) OF 30.0 TO 30.9 IN ADULT: ICD-10-CM

## 2024-10-21 DIAGNOSIS — F32.5 MAJOR DEPRESSIVE DISORDER WITH SINGLE EPISODE, IN FULL REMISSION (H): ICD-10-CM

## 2024-10-21 DIAGNOSIS — E66.811 CLASS 1 OBESITY DUE TO EXCESS CALORIES WITHOUT SERIOUS COMORBIDITY WITH BODY MASS INDEX (BMI) OF 33.0 TO 33.9 IN ADULT: ICD-10-CM

## 2024-10-21 DIAGNOSIS — M54.2 CHRONIC NECK PAIN: ICD-10-CM

## 2024-10-21 DIAGNOSIS — Z00.00 ROUTINE GENERAL MEDICAL EXAMINATION AT A HEALTH CARE FACILITY: Primary | ICD-10-CM

## 2024-10-21 DIAGNOSIS — G89.29 CHRONIC NECK PAIN: ICD-10-CM

## 2024-10-21 DIAGNOSIS — Z12.31 VISIT FOR SCREENING MAMMOGRAM: ICD-10-CM

## 2024-10-21 PROBLEM — M54.12 CERVICAL RADICULOPATHY: Status: RESOLVED | Noted: 2021-01-22 | Resolved: 2024-10-21

## 2024-10-21 PROBLEM — Z09 HOSPITAL DISCHARGE FOLLOW-UP: Status: RESOLVED | Noted: 2024-07-09 | Resolved: 2024-10-21

## 2024-10-21 LAB
ALBUMIN SERPL BCG-MCNC: 4.5 G/DL (ref 3.5–5.2)
ALP SERPL-CCNC: 101 U/L (ref 40–150)
ALT SERPL W P-5'-P-CCNC: 23 U/L (ref 0–50)
ANION GAP SERPL CALCULATED.3IONS-SCNC: 10 MMOL/L (ref 7–15)
AST SERPL W P-5'-P-CCNC: 31 U/L (ref 0–45)
BILIRUB SERPL-MCNC: 0.4 MG/DL
BUN SERPL-MCNC: 18.5 MG/DL (ref 8–23)
CALCIUM SERPL-MCNC: 9.5 MG/DL (ref 8.8–10.4)
CHLORIDE SERPL-SCNC: 105 MMOL/L (ref 98–107)
CREAT SERPL-MCNC: 0.76 MG/DL (ref 0.51–0.95)
EGFRCR SERPLBLD CKD-EPI 2021: 88 ML/MIN/1.73M2
ERYTHROCYTE [DISTWIDTH] IN BLOOD BY AUTOMATED COUNT: 13 % (ref 10–15)
FERRITIN SERPL-MCNC: 71 NG/ML (ref 11–328)
GLUCOSE SERPL-MCNC: 95 MG/DL (ref 70–99)
HCO3 SERPL-SCNC: 26 MMOL/L (ref 22–29)
HCT VFR BLD AUTO: 44.8 % (ref 35–47)
HGB BLD-MCNC: 14.5 G/DL (ref 11.7–15.7)
MCH RBC QN AUTO: 28.9 PG (ref 26.5–33)
MCHC RBC AUTO-ENTMCNC: 32.4 G/DL (ref 31.5–36.5)
MCV RBC AUTO: 89 FL (ref 78–100)
PLATELET # BLD AUTO: 309 10E3/UL (ref 150–450)
POTASSIUM SERPL-SCNC: 4.4 MMOL/L (ref 3.4–5.3)
PROT SERPL-MCNC: 7.1 G/DL (ref 6.4–8.3)
RBC # BLD AUTO: 5.01 10E6/UL (ref 3.8–5.2)
SODIUM SERPL-SCNC: 141 MMOL/L (ref 135–145)
TSH SERPL DL<=0.005 MIU/L-ACNC: 1.15 UIU/ML (ref 0.3–4.2)
VIT B12 SERPL-MCNC: 701 PG/ML (ref 232–1245)
VIT D+METAB SERPL-MCNC: 60 NG/ML (ref 20–50)
WBC # BLD AUTO: 6.4 10E3/UL (ref 4–11)

## 2024-10-21 PROCEDURE — 90715 TDAP VACCINE 7 YRS/> IM: CPT | Performed by: FAMILY MEDICINE

## 2024-10-21 PROCEDURE — 82607 VITAMIN B-12: CPT | Performed by: FAMILY MEDICINE

## 2024-10-21 PROCEDURE — 96127 BRIEF EMOTIONAL/BEHAV ASSMT: CPT | Performed by: FAMILY MEDICINE

## 2024-10-21 PROCEDURE — 99396 PREV VISIT EST AGE 40-64: CPT | Mod: 25 | Performed by: FAMILY MEDICINE

## 2024-10-21 PROCEDURE — 82728 ASSAY OF FERRITIN: CPT | Performed by: FAMILY MEDICINE

## 2024-10-21 PROCEDURE — 36415 COLL VENOUS BLD VENIPUNCTURE: CPT | Performed by: FAMILY MEDICINE

## 2024-10-21 PROCEDURE — 90673 RIV3 VACCINE NO PRESERV IM: CPT | Performed by: FAMILY MEDICINE

## 2024-10-21 PROCEDURE — 90471 IMMUNIZATION ADMIN: CPT | Performed by: FAMILY MEDICINE

## 2024-10-21 PROCEDURE — 80053 COMPREHEN METABOLIC PANEL: CPT | Performed by: FAMILY MEDICINE

## 2024-10-21 PROCEDURE — 85027 COMPLETE CBC AUTOMATED: CPT | Performed by: FAMILY MEDICINE

## 2024-10-21 PROCEDURE — 82306 VITAMIN D 25 HYDROXY: CPT | Performed by: FAMILY MEDICINE

## 2024-10-21 PROCEDURE — 99213 OFFICE O/P EST LOW 20 MIN: CPT | Mod: 25 | Performed by: FAMILY MEDICINE

## 2024-10-21 PROCEDURE — 90472 IMMUNIZATION ADMIN EACH ADD: CPT | Performed by: FAMILY MEDICINE

## 2024-10-21 PROCEDURE — 84443 ASSAY THYROID STIM HORMONE: CPT | Performed by: FAMILY MEDICINE

## 2024-10-21 RX ORDER — PHENTERMINE HYDROCHLORIDE 15 MG/1
15 CAPSULE ORAL EVERY MORNING
Qty: 90 CAPSULE | Refills: 0 | Status: SHIPPED | OUTPATIENT
Start: 2024-10-21

## 2024-10-21 NOTE — PROGRESS NOTES
Preventive Care Visit  [facility]  MELIA CREWS MD, Family Medicine  Oct 21, 2024      Assessment & Plan   Problem List Items Addressed This Visit       Chronic neck pain     The patient works closely with her pain medicine provider regarding pain management of this condition.         Major depressive disorder with single episode, in full remission (H)     Patient's PHQ-9 score today was 0.         Class 1 obesity due to excess calories without serious comorbidity with body mass index (BMI) of 30.0 to 30.9 in adult     She was prescribed phentermine 15 mg daily but because of her illness in July really did not start taking it on a consistent basis until more recently.  She reports that she does need a refill on it at this time and feels that the medication is well-tolerated and provides a definite reduction in her appetite and cravings.  Unfortunately, she admits that she really has not been necessarily following the nutrition plan.  She does not feel she gets enough protein nor does she eat adequate amounts of whole foods including fruits and vegetables.  We discussed focusing more on nutrition and I provided the patient with some nutrition guidelines and ideas today.  In addition, I recommend increasing cardiovascular exercise to 150 minutes/week.         Relevant Medications    phentermine 15 MG capsule    Liver abscess     I reviewed the patient's hospitalization notes from July where she had multiple liver abscesses confirmed by a biopsy.  She had appropriate follow-up with infectious disease and received 6 weeks worth of IV antibiotics.  She is not having any ongoing symptoms although expresses concern regarding this possibly happening again.  Comprehensive metabolic panel was checked today.         Relevant Orders    Comprehensive metabolic panel (BMP + Alb, Alk Phos, ALT, AST, Total. Bili, TP)     Other Visit Diagnoses       Routine general medical examination at a [facility]  "facility    -  Primary    Relevant Orders    Vitamin B12    CBC with platelets    TSH with free T4 reflex    Ferritin    Vitamin D Deficiency    Visit for screening mammogram        Relevant Orders    MA Screening Bilateral w/ Juan M    Class 1 obesity due to excess calories without serious comorbidity with body mass index (BMI) of 33.0 to 33.9 in adult        Relevant Medications    phentermine 15 MG capsule                  BMI  Estimated body mass index is 30.79 kg/m  as calculated from the following:    Height as of this encounter: 1.651 m (5' 5\").    Weight as of this encounter: 83.9 kg (185 lb).   Weight management plan: Discussed healthy diet and exercise guidelines    Counseling  Appropriate preventive services were addressed with this patient via screening, questionnaire, or discussion as appropriate for fall prevention, nutrition, physical activity, Tobacco-use cessation, social engagement, weight loss and cognition.  Checklist reviewing preventive services available has been given to the patient.  Reviewed patient's diet, addressing concerns and/or questions.   She is at risk for lack of exercise and has been provided with information to increase physical activity for the benefit of her well-being.         Alicia Rich is a 63 year old who presents today for an annual physical exam and medication check visit.  The patient also would like to discuss her hospitalization in July where she was diagnosed with multiple hepatic abscesses probably from diverticulitis.  She is feeling much better but worries about whether that could happen again.  She continues to suffer from chronic neck pain.  The patient also states that she has not been adhering to a healthy diet and does not feel she has been getting adequate amounts of protein in.  She just recently picked up some protein drinks and is going to start trying to take in 1 of those daily.  She feels she has had some excessive hair loss recently.  " Lastly, the patient states that she tore some skin off of the lateral aspect of her pinky yesterday and would like me to take a look at it.        Health Care Directive  Patient does not have a Health Care Directive or Living Will: Discussed advance care planning with patient; however, patient declined at this time.        10/16/2024   General Health   How would you rate your overall physical health? Good   Feel stress (tense, anxious, or unable to sleep) Not at all            10/16/2024   Nutrition   Three or more servings of calcium each day? Yes   Diet: Regular (no restrictions)   How many servings of fruit and vegetables per day? (!) 2-3   How many sweetened beverages each day? 0-1            10/16/2024   Exercise   Days per week of moderate/strenous exercise 3 days   Average minutes spent exercising at this level 40 min            10/16/2024   Social Factors   Frequency of gathering with friends or relatives Once a week   Worry food won't last until get money to buy more No   Food not last or not have enough money for food? No   Do you have housing? (Housing is defined as stable permanent housing and does not include staying ouside in a car, in a tent, in an abandoned building, in an overnight shelter, or couch-surfing.) Yes   Are you worried about losing your housing? No   Lack of transportation? No   Unable to get utilities (heat,electricity)? No            10/16/2024   Fall Risk   Fallen 2 or more times in the past year? No   Trouble with walking or balance? No             10/16/2024   Dental   Dentist two times every year? Yes            10/16/2024   TB Screening   Were you born outside of the [place]? No          Today's PHQ-9 Score:       10/20/2024     1:35 PM   PHQ-9 SCORE   PHQ-9 Total Score MyChart 0   PHQ-9 Total Score 0         10/16/2024   Substance Use   Alcohol more than 3/day or more than 7/wk No   Do you use any other substances recreationally? No        Social History     Tobacco Use    Smoking  status: Former     Current packs/day: 0.00     Average packs/day: 0.5 packs/day for 1 year (0.5 ttl pk-yrs)     Types: Cigarettes     Start date: 1978     Quit date: 1979     Years since quittin.8     Passive exposure: Past    Smokeless tobacco: Never    Tobacco comments:     Quit 30 years ago   Vaping Use    Vaping status: Never Used   Substance Use Topics    Alcohol use: Not Currently     Alcohol/week: 1.0 - 2.0 standard drink of alcohol     Comment: rare    Drug use: No          Mammogram Screening - Mammogram every 1-2 years updated in Health Maintenance based on mutual decision making        10/16/2024   STI Screening   New sexual partner(s) since last STI/HIV test? No        History of abnormal Pap smear: No - age 30- 64 PAP with HPV every 5 years recommended        Latest Ref Rng & Units 2023     5:03 PM 2019     4:13 PM   PAP / HPV   PAP  Negative for Intraepithelial Lesion or Malignancy (NILM)     HPV 16 DNA Negative Negative     HPV 18 DNA Negative Negative     Other HR HPV Negative Negative     PAP-ABSTRACT   See Scanned Document           This result is from an external source.     ASCVD Risk   The 10-year ASCVD risk score (Devon CAMP, et al., 2019) is: 2.3%    Values used to calculate the score:      Age: 63 years      Sex: Female      Is Non- : No      Diabetic: No      Tobacco smoker: No      Systolic Blood Pressure: 100 mmHg      Is BP treated: No      HDL Cholesterol: 63 mg/dL      Total Cholesterol: 195 mg/dL       Reviewed and updated as needed this visit by Provider                    Patient Active Problem List   Diagnosis    Chronic neck pain    Cervical spondylosis    S/P cervical spinal fusion    Major depressive disorder with single episode, in full remission (H)    Class 1 obesity due to excess calories without serious comorbidity with body mass index (BMI) of 30.0 to 30.9 in adult    ASCUS of cervix with negative high risk HPV    Liver  abscess     Past Surgical History:   Procedure Laterality Date    AS HYSTEROSCOPY, SURGICAL; W/ ENDOMETRIAL ABLATION, ANY METHOD N/A     BACK SURGERY  09/16/2016    Cervical    BREAST SURGERY  1980    breast reduction    C5-7 laminoplasty Right 09/09/2016    COLONOSCOPY  2016    DECOMPRESSION, FUSION CERVICAL ANTERIOR THREE+ LEVELS, COMBINED N/A 03/13/2019    C4-5, C6-7, C5-6 with hardware removal    Excision anal skin tags N/A 01/18/2010    EXPLORE SPINE, REMOVE HARDWARE, COMBINED N/A 2/19/2021    Procedure: Removal of Laminoplasty Plates Cervical 4-7;;  Surgeon: Stevo Waite MD;  Location: [facility] OR    FORAMINOTOMY CERVICAL POSTERIOR MINIMALLY INVASIVE ONE LEVEL Right 12/13/2017    Procedure: FORAMINOTOMY CERVICAL POSTERIOR MINIMALLY INVASIVE ONE LEVEL;  Right Minimally Invasive Posterior Cervical 4-5 Foraminotomy;  Surgeon: Byron Barclay MD;  Location: [facility] OR    FUSION CERVICAL ANTERIOR ONE LEVEL N/A 08/06/2018    unspecified cervical fusion    GRAFT BONE FROM ILIAC CREST Right 2/19/2021    Procedure: Right Posterior Iliac Crest Bone Graft [place];  Surgeon: Stevo Waite MD;  Location:  OR    HEAD & NECK SURGERY  9-9-2016, 12/13/17, 8/6/18, 3/13/19    laminoplasty C-4 to C-6, Foraminotomy C45, Fuse C56, C34 C67    INJECT BLOCK MEDIAL BRANCH CERVICAL/THORACIC/LUMBAR Right 5/24/2021    Procedure: BLOCK, NERVE, FACET JOINT, MEDIAL BRANCH, DIAGNOSTIC - Right C2-3 Third Occipital Nerve (TON);  Surgeon: Dyan Ackerman MD;  Location: [facility] OR    KNEE SURGERY Left 05/17/2016    partial meniscectomy    LAMINOPLASTY Right 9/9/2016    Procedure: CERVICAL LAMINOPLASTY C5 C6 C7 OPEN ON RIGHT WITH FORAMINOTOMIES AT RIGHT C4-5, BILATERAL C5-6 C6-7 ;  Surgeon: Laine Valdez MD;  Location: [facility];  Service:     MAMMOPLASTY REDUCTION  1980    MAMMOPLASTY REDUCTION BILATERAL Bilateral 1980    OPTICAL TRACKING SYSTEM FUSION POSTERIOR CERVICAL THREE + LEVELS N/A 2/19/2021     Procedure: Posterior Instrumented Spinal Fusion Cervical 4 to Thoracic 1; Revision Foraminotomies/Factectomies Right Cervical 4-5;  Surgeon: tSevo Waite MD;  Location: [facility] OR    OTHER SURGICAL HISTORY      Excision hemorrhoid skin tags     OTHER SURGICAL HISTORY Left 2016    Left Knee Meniscus Repair    Partial meniscectomy Left 2016    CO KNEE SCOPE,MED/LAT MENISCUS REPAIR Left        Social History     Tobacco Use    Smoking status: Former     Current packs/day: 0.00     Average packs/day: 0.5 packs/day for 1 year (0.5 ttl pk-yrs)     Types: Cigarettes     Start date: 1978     Quit date: 1979     Years since quittin.8     Passive exposure: Past    Smokeless tobacco: Never    Tobacco comments:     Quit 30 years ago   Substance Use Topics    Alcohol use: Not Currently     Alcohol/week: 1.0 - 2.0 standard drink of alcohol     Comment: rare     Family History   Problem Relation Age of Onset    Arthritis Mother     Macular Degeneration Mother     Coronary Artery Disease Mother     Hypertension Mother     Osteoporosis Mother     Other Cancer Mother          from pancreatic cancer 2021    Hearing Loss Father     Hyperlipidemia Father     Multiple Sclerosis Sister     Multiple Sclerosis Sister          Current Outpatient Medications   Medication Sig Dispense Refill    acetaminophen (TYLENOL) 325 MG tablet Take 2 tablets (650 mg) by mouth every 8 hours as needed for mild pain 100 tablet 2    Bacillus Coagulans-Inulin (PROBIOTIC-PREBIOTIC PO)       CALCIUM CITRATE PO Take 1 tablet by mouth every morning      diclofenac (VOLTAREN) 1 % topical gel APPLY TO THE AFFECTED AREA FOUR TIMES DAILY OR AS DIRECTED      Flax Oil-Fish Oil-Borage Oil ([employer] OMEGA-3 PO) Take 1 capsule by mouth 2 times daily      ibuprofen (ADVIL/MOTRIN) 400 MG tablet Take 400-800 mg by mouth every 6 hours as needed      lidocaine (LIDODERM) 5 % patch lidocaine 5 % topical patch      MAGNESIUM GLYCINATE PO Take  "300 mg by mouth At Bedtime      methocarbamol (ROBAXIN) 750 MG tablet Take 750 mg by mouth      Multiple Vitamins-Minerals (MULTIVITAL PO) Take 1 tablet by mouth 2 times daily Twice a day      phentermine 15 MG capsule Take 1 capsule (15 mg) by mouth every morning. 90 capsule 0    Vitamin D3 (CHOLECALCIFEROL) 125 MCG (5000 UT) tablet        Allergies   Allergen Reactions    Dilaudid [Hydromorphone] GI Disturbance     Per patient, tolerates IV version well.    Morphine Nausea and Vomiting    Oxycodone Nausea and Vomiting    Tramadol Nausea and Vomiting    Liquid Adhesive Rash     Other reaction(s): Rash            Objective    Exam  Ht 1.651 m (5' 5\")   Wt 83.9 kg (185 lb)   LMP 02/01/2017   BMI 30.79 kg/m     Estimated body mass index is 30.79 kg/m  as calculated from the following:    Height as of this encounter: 1.651 m (5' 5\").    Weight as of this encounter: 83.9 kg (185 lb).    Physical Exam  GENERAL: alert and no distress  EYES: Eyes grossly normal to inspection, PERRL and conjunctivae and sclerae normal  HENT: ear canals and TM's normal, nose and mouth without ulcers or lesions  NECK: no adenopathy, no asymmetry, masses, or scars  RESP: lungs clear to auscultation - no rales, rhonchi or wheezes  BREAST: normal without masses, tenderness or nipple discharge and no palpable axillary masses or adenopathy  CV: regular rate and rhythm, normal S1 S2, no S3 or S4, no murmur, click or rub, no peripheral edema  ABDOMEN: soft, nontender, no hepatosplenomegaly, no masses and bowel sounds normal  MS: no gross musculoskeletal defects noted, no edema  SKIN: The patient does have disruption of the epithelial layer along a approximately 2 cm long area and 2 mm wide area on the lateral aspect of her pinky.  It is not actively bleeding at this time and looks clean.  NEURO: Normal strength and tone, mentation intact and speech normal  PSYCH: mentation appears normal, affect normal/bright        Signed Electronically by: " MELIA CREWS MD    Answers submitted by the patient for this visit:  Patient Health Questionnaire (Submitted on 10/20/2024)  If you checked off any problems, how difficult have these problems made it for you to do your work, take care of things at home, or get along with other people?: Not difficult at all  PHQ9 TOTAL SCORE: 0

## 2024-10-21 NOTE — ASSESSMENT & PLAN NOTE
The patient works closely with her pain medicine provider regarding pain management of this condition.

## 2024-10-21 NOTE — ASSESSMENT & PLAN NOTE
I reviewed the patient's hospitalization notes from July where she had multiple liver abscesses confirmed by a biopsy.  She had appropriate follow-up with infectious disease and received 6 weeks worth of IV antibiotics.  She is not having any ongoing symptoms although expresses concern regarding this possibly happening again.  Comprehensive metabolic panel was checked today.

## 2024-10-21 NOTE — ASSESSMENT & PLAN NOTE
She was prescribed phentermine 15 mg daily but because of her illness in July really did not start taking it on a consistent basis until more recently.  She reports that she does need a refill on it at this time and feels that the medication is well-tolerated and provides a definite reduction in her appetite and cravings.  Unfortunately, she admits that she really has not been necessarily following the nutrition plan.  She does not feel she gets enough protein nor does she eat adequate amounts of whole foods including fruits and vegetables.  We discussed focusing more on nutrition and I provided the patient with some nutrition guidelines and ideas today.  In addition, I recommend increasing cardiovascular exercise to 150 minutes/week.

## 2024-10-21 NOTE — PATIENT INSTRUCTIONS
Patient Education   Preventive Care Advice   This is general advice given by our system to help you stay healthy. However, your care team may have specific advice just for you. Please talk to your care team about your preventive care needs.  Nutrition  Eat 5 or more servings of fruits and vegetables each day.  Try wheat bread, brown rice and whole grain pasta (instead of white bread, rice, and pasta).  Get enough calcium and vitamin D. Check the label on foods and aim for 100% of the RDA (recommended daily allowance).  Lifestyle  Exercise at least 150 minutes each week  (30 minutes a day, 5 days a week).  Do muscle strengthening activities 2 days a week. These help control your weight and prevent disease.  No smoking.  Wear sunscreen to prevent skin cancer.  Have a dental exam and cleaning every 6 months.  Yearly exams  See your health care team every year to talk about:  Any changes in your health.  Any medicines your care team has prescribed.  Preventive care, family planning, and ways to prevent chronic diseases.  Shots (vaccines)   HPV shots (up to age 26), if you've never had them before.  Hepatitis B shots (up to age 59), if you've never had them before.  COVID-19 shot: Get this shot when it's due.  Flu shot: Get a flu shot every year.  Tetanus shot: Get a tetanus shot every 10 years.  Pneumococcal, hepatitis A, and RSV shots: Ask your care team if you need these based on your risk.  Shingles shot (for age 50 and up)  General health tests  Diabetes screening:  Starting at age 35, Get screened for diabetes at least every 3 years.  If you are younger than age 35, ask your care team if you should be screened for diabetes.  Cholesterol test: At age 39, start having a cholesterol test every 5 years, or more often if advised.  Bone density scan (DEXA): At age 50, ask your care team if you should have this scan for osteoporosis (brittle bones).  Hepatitis C: Get tested at least once in your life.  STIs (sexually  transmitted infections)  Before age 24: Ask your care team if you should be screened for STIs.  After age 24: Get screened for STIs if you're at risk. You are at risk for STIs (including HIV) if:  You are sexually active with more than one person.  You don't use condoms every time.  You or a partner was diagnosed with a sexually transmitted infection.  If you are at risk for HIV, ask about PrEP medicine to prevent HIV.  Get tested for HIV at least once in your life, whether you are at risk for HIV or not.  Cancer screening tests  Cervical cancer screening: If you have a cervix, begin getting regular cervical cancer screening tests starting at age 21.  Breast cancer scan (mammogram): If you've ever had breasts, begin having regular mammograms starting at age 40. This is a scan to check for breast cancer.  Colon cancer screening: It is important to start screening for colon cancer at age 45.  Have a colonoscopy test every 10 years (or more often if you're at risk) Or, ask your provider about stool tests like a FIT test every year or Cologuard test every 3 years.  To learn more about your testing options, visit:   .  For help making a decision, visit:   https://bit.ly/sn07591.  Prostate cancer screening test: If you have a prostate, ask your care team if a prostate cancer screening test (PSA) at age 55 is right for you.  Lung cancer screening: If you are a current or former smoker ages 50 to 80, ask your care team if ongoing lung cancer screenings are right for you.  For informational purposes only. Not to replace the advice of your health care provider. Copyright   2023 [employer]. All rights reserved. Clinically reviewed by the [facility]. [employer] [id number] - REV 01/24.

## 2024-11-06 ENCOUNTER — HOSPITAL ENCOUNTER (OUTPATIENT)
Dept: MAMMOGRAPHY | Facility: CLINIC | Age: 64
Discharge: HOME | End: 2024-11-06
Attending: FAMILY MEDICINE | Admitting: FAMILY MEDICINE
Payer: COMMERCIAL

## 2024-11-06 DIAGNOSIS — Z12.31 VISIT FOR SCREENING MAMMOGRAM: ICD-10-CM

## 2024-11-06 PROCEDURE — 77063 BREAST TOMOSYNTHESIS BI: CPT

## 2024-11-07 ENCOUNTER — PATIENT OUTREACH (OUTPATIENT)
Dept: CARE COORDINATION | Facility: CLINIC | Age: 64
End: 2024-11-07
Payer: COMMERCIAL

## 2024-11-22 ENCOUNTER — TRANSFERRED RECORDS (OUTPATIENT)
Dept: HEALTH INFORMATION MANAGEMENT | Facility: CLINIC | Age: 64
End: 2024-11-22
Payer: COMMERCIAL

## 2024-11-24 ENCOUNTER — MYC REFILL (OUTPATIENT)
Dept: FAMILY MEDICINE | Facility: CLINIC | Age: 64
End: 2024-11-24
Payer: COMMERCIAL

## 2024-11-24 DIAGNOSIS — E66.09 CLASS 1 OBESITY DUE TO EXCESS CALORIES WITHOUT SERIOUS COMORBIDITY WITH BODY MASS INDEX (BMI) OF 33.0 TO 33.9 IN ADULT: ICD-10-CM

## 2024-11-24 DIAGNOSIS — E66.811 CLASS 1 OBESITY DUE TO EXCESS CALORIES WITHOUT SERIOUS COMORBIDITY WITH BODY MASS INDEX (BMI) OF 33.0 TO 33.9 IN ADULT: ICD-10-CM

## 2024-11-25 RX ORDER — PHENTERMINE HYDROCHLORIDE 15 MG/1
15 CAPSULE ORAL EVERY MORNING
Qty: 90 CAPSULE | Refills: 0 | OUTPATIENT
Start: 2024-11-25

## 2024-12-20 ENCOUNTER — TRANSFERRED RECORDS (OUTPATIENT)
Dept: HEALTH INFORMATION MANAGEMENT | Facility: CLINIC | Age: 64
End: 2024-12-20
Payer: COMMERCIAL

## 2025-01-13 ENCOUNTER — TRANSFERRED RECORDS (OUTPATIENT)
Dept: HEALTH INFORMATION MANAGEMENT | Facility: CLINIC | Age: 65
End: 2025-01-13
Payer: COMMERCIAL

## 2025-02-18 DIAGNOSIS — E66.811 CLASS 1 OBESITY DUE TO EXCESS CALORIES WITHOUT SERIOUS COMORBIDITY WITH BODY MASS INDEX (BMI) OF 33.0 TO 33.9 IN ADULT: ICD-10-CM

## 2025-02-18 DIAGNOSIS — E66.09 CLASS 1 OBESITY DUE TO EXCESS CALORIES WITHOUT SERIOUS COMORBIDITY WITH BODY MASS INDEX (BMI) OF 33.0 TO 33.9 IN ADULT: ICD-10-CM

## 2025-02-18 RX ORDER — PHENTERMINE HYDROCHLORIDE 15 MG/1
15 CAPSULE ORAL EVERY MORNING
Qty: 30 CAPSULE | Refills: 0 | Status: SHIPPED | OUTPATIENT
Start: 2025-02-18

## 2025-02-27 ENCOUNTER — OFFICE VISIT (OUTPATIENT)
Dept: FAMILY MEDICINE | Facility: CLINIC | Age: 65
End: 2025-02-27
Payer: COMMERCIAL

## 2025-02-27 ENCOUNTER — MYC MEDICAL ADVICE (OUTPATIENT)
Dept: FAMILY MEDICINE | Facility: CLINIC | Age: 65
End: 2025-02-27

## 2025-02-27 VITALS
HEIGHT: 65 IN | HEART RATE: 77 BPM | RESPIRATION RATE: 14 BRPM | OXYGEN SATURATION: 97 % | BODY MASS INDEX: 31.99 KG/M2 | WEIGHT: 192 LBS | DIASTOLIC BLOOD PRESSURE: 76 MMHG | SYSTOLIC BLOOD PRESSURE: 113 MMHG | TEMPERATURE: 98.1 F

## 2025-02-27 DIAGNOSIS — E66.811 CLASS 1 OBESITY DUE TO EXCESS CALORIES WITHOUT SERIOUS COMORBIDITY WITH BODY MASS INDEX (BMI) OF 31.0 TO 31.9 IN ADULT: Primary | ICD-10-CM

## 2025-02-27 DIAGNOSIS — E66.09 CLASS 1 OBESITY DUE TO EXCESS CALORIES WITHOUT SERIOUS COMORBIDITY WITH BODY MASS INDEX (BMI) OF 31.0 TO 31.9 IN ADULT: Primary | ICD-10-CM

## 2025-02-27 DIAGNOSIS — E66.811 CLASS 1 OBESITY DUE TO EXCESS CALORIES WITHOUT SERIOUS COMORBIDITY WITH BODY MASS INDEX (BMI) OF 30.0 TO 30.9 IN ADULT: Primary | ICD-10-CM

## 2025-02-27 DIAGNOSIS — E66.09 CLASS 1 OBESITY DUE TO EXCESS CALORIES WITHOUT SERIOUS COMORBIDITY WITH BODY MASS INDEX (BMI) OF 30.0 TO 30.9 IN ADULT: Primary | ICD-10-CM

## 2025-02-27 PROCEDURE — 99213 OFFICE O/P EST LOW 20 MIN: CPT | Performed by: FAMILY MEDICINE

## 2025-02-27 PROCEDURE — 3078F DIAST BP <80 MM HG: CPT | Performed by: FAMILY MEDICINE

## 2025-02-27 PROCEDURE — 3074F SYST BP LT 130 MM HG: CPT | Performed by: FAMILY MEDICINE

## 2025-02-27 PROCEDURE — G2211 COMPLEX E/M VISIT ADD ON: HCPCS | Performed by: FAMILY MEDICINE

## 2025-02-27 RX ORDER — ATOGEPANT 10 MG/1
TABLET ORAL
COMMUNITY
Start: 2025-02-13

## 2025-02-27 NOTE — PROGRESS NOTES
Assessment & Plan   Assessment & Plan  Class 1 obesity due to excess calories without serious comorbidity with body mass index (BMI) of 31.0 to 31.9 in adult  Discussed nutrition recommendations, exercise and medication options. The patient would like to proceed with a GLP-1 medication for weight loss at this time. We discussed that this medication generally should be continued for maintenance following weight loss. Patient agrees and order placed.   Orders:    semaglutide-weight management (WEGOVY) 0.25 MG/0.5ML pen; Inject 0.5 mLs (0.25 mg) subcutaneously once a week.        Alicia Rich is a 64 year old who presents today for a weight loss follow-up visit.  The patient is currently on phentermine 15 mg daily.  However, despite this she has had progressive weight gain over the past few months.  She does not feel that the medication is as effective anymore.  She wonders what other options there are.  She is trying to adhere to a healthy diet.  She is limited with exercise due to her severe neck pain.  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  Weight Loss      2/27/2025     3:40 PM   Additional Questions   Roomed by as   Accompanied by self         2/27/2025     3:40 PM   Patient Reported Additional Medications   Patient reports taking the following new medications no     History of Present Illness       Reason for visit:  Weight control    She eats 2-3 servings of fruits and vegetables daily.She consumes 0 sweetened beverage(s) daily.She exercises with enough effort to increase her heart rate 9 or less minutes per day.  She exercises with enough effort to increase her heart rate 3 or less days per week.   She is taking medications regularly.            Objective    /76 (BP Location: Left arm, Patient Position: Left side, Cuff Size: Adult Large)   Pulse 77   Temp 98.1  F (36.7  C)  "(Oral)   Resp 14   Ht 1.651 m (5' 5\")   Wt 87.1 kg (192 lb)   LMP 02/01/2017   SpO2 97%   BMI 31.95 kg/m    Body mass index is 31.95 kg/m .  Physical Exam   GENERAL: alert and no distress    The longitudinal plan of care for the diagnosis(es)/condition(s) as documented were addressed during this visit. Due to the added complexity in care, I will continue to support Layla in the subsequent management and with ongoing continuity of care.        Signed Electronically by: MELIA CREWS MD    "

## 2025-03-02 NOTE — ASSESSMENT & PLAN NOTE
Discussed nutrition recommendations, exercise and medication options. The patient would like to proceed with a GLP-1 medication for weight loss at this time. We discussed that this medication generally should be continued for maintenance following weight loss. Patient agrees and order placed.   Orders:    semaglutide-weight management (WEGOVY) 0.25 MG/0.5ML pen; Inject 0.5 mLs (0.25 mg) subcutaneously once a week.

## 2025-03-03 RX ORDER — PHENTERMINE HYDROCHLORIDE 30 MG/1
30 CAPSULE ORAL EVERY MORNING
Qty: 30 CAPSULE | Refills: 1 | Status: SHIPPED | OUTPATIENT
Start: 2025-03-03

## 2025-03-03 RX ORDER — TOPIRAMATE 25 MG/1
TABLET, FILM COATED ORAL
Qty: 49 TABLET | Refills: 1 | Status: SHIPPED | OUTPATIENT
Start: 2025-03-03 | End: 2025-03-31

## 2025-04-07 ENCOUNTER — TELEPHONE (OUTPATIENT)
Dept: FAMILY MEDICINE | Facility: CLINIC | Age: 65
End: 2025-04-07
Payer: COMMERCIAL

## 2025-04-07 NOTE — TELEPHONE ENCOUNTER
Tier Exception Prior Authorization Request  Who s requesting:  Patient  Pharmacy Name and Location: [facility]  Medication Name: semaglutide-weight management (WEGOVY) 0.25 MG/0.5ML pen   Insurance Plan: [employer]  Insurance Member ID Number:  [id number]   CoverMyMeds Key:   Informed patient that prior authorizations can take up to 10 business days for response:   Yes  Okay to leave a detailed message: Yes

## 2025-04-09 NOTE — TELEPHONE ENCOUNTER
In order to do a tier exception the provider needs to physically sign the form.  Please have provider sign the form only (I will fill out the rest) and sent back to the PA team (fax )    [email or web address]

## 2025-04-10 NOTE — TELEPHONE ENCOUNTER
PA Initiation    Medication: WEGOVY 0.25 MG/0.5ML SC SOAJ  Insurance Company: [employer] - Phone [phone] Fax [phone]  Pharmacy Filling the Rx: [facility] - [place] - [address]  Filling Pharmacy Phone: [phone]  Filling Pharmacy Fax: [phone]  Start Date: 4/9/2025

## 2025-04-11 NOTE — TELEPHONE ENCOUNTER
PRIOR AUTHORIZATION DENIED    Medication: WEGOVY 0.25 MG/0.5ML SC SOAJ    Insurance Company: [employer] - Phone [phone] Fax [phone]    Denial Date: 4/11/2025    Denial Reason(s): Per insurance rep patient needs to try and fail 1 preferred stimulant and 1 preferred non stimulant for weight loss.    Appeal Information: Per rep they only mail the letter to patient. If clinic wants to appeal, they would need to obtain the denial letter from patient.

## 2025-04-15 NOTE — TELEPHONE ENCOUNTER
Please contact patient.  Her insurance sent a note back stating that she is required to try and fail both a stimulant and nonstimulant for weight loss before they will cover a GLP-1 medication such as Wegovy.  She has already tried and failed phentermine.  However, we would need to give a trial to a medication such as topiramate.  Would she be open to giving that a try?

## 2025-04-15 NOTE — TELEPHONE ENCOUNTER
Left message to call back for: Patient  Information to relay to patient: Please let patient know that Tier Exception has been denied and relay provider message below. Patient's response can be forwarded to provider.

## 2025-04-15 NOTE — TELEPHONE ENCOUNTER
Patient returning call. She reports that she tried two weeks of topiramate from 3/3/25 prescription and did not tolerate. Patient has been scheduled for weight loss follow-up visit 4/23/25 so side effects can be documented.

## 2025-04-23 ENCOUNTER — TELEPHONE (OUTPATIENT)
Dept: FAMILY MEDICINE | Facility: CLINIC | Age: 65
End: 2025-04-23

## 2025-04-23 NOTE — TELEPHONE ENCOUNTER
Detailed message left for [place] with phone number of [facility] to schedule [phone]

## 2025-04-23 NOTE — TELEPHONE ENCOUNTER
General Call      Reason for Call:  acupuncture provider in [place]    What are your questions or concerns:  patient was just seen with Dr. Bettencourt on 4/23/25, and forgot to ask the name of the acupuncture provider in [place] that was discussed during the visit. Please call patient to let her know that name.        Could we send this information to you in [place] or would you prefer to receive a phone call?:   Patient would prefer a phone call   Okay to leave a detailed message?: Yes at aCe number on file:    Telephone Information:   Mobile [phone]

## 2025-04-24 ENCOUNTER — TRANSFERRED RECORDS (OUTPATIENT)
Dept: HEALTH INFORMATION MANAGEMENT | Facility: CLINIC | Age: 65
End: 2025-04-24
Payer: COMMERCIAL

## 2025-05-01 ENCOUNTER — TELEPHONE (OUTPATIENT)
Dept: FAMILY MEDICINE | Facility: CLINIC | Age: 65
End: 2025-05-01

## 2025-05-01 ENCOUNTER — OFFICE VISIT (OUTPATIENT)
Dept: FAMILY MEDICINE | Facility: CLINIC | Age: 65
End: 2025-05-01
Payer: COMMERCIAL

## 2025-05-01 VITALS
SYSTOLIC BLOOD PRESSURE: 120 MMHG | HEIGHT: 65 IN | BODY MASS INDEX: 31.65 KG/M2 | DIASTOLIC BLOOD PRESSURE: 80 MMHG | WEIGHT: 190 LBS | RESPIRATION RATE: 16 BRPM | HEART RATE: 72 BPM | TEMPERATURE: 98.1 F | OXYGEN SATURATION: 96 %

## 2025-05-01 DIAGNOSIS — M47.812 CERVICAL SPONDYLOSIS: ICD-10-CM

## 2025-05-01 DIAGNOSIS — M54.2 CHRONIC NECK PAIN: ICD-10-CM

## 2025-05-01 DIAGNOSIS — G89.29 CHRONIC NECK PAIN: ICD-10-CM

## 2025-05-01 DIAGNOSIS — Z98.1 S/P CERVICAL SPINAL FUSION: ICD-10-CM

## 2025-05-01 DIAGNOSIS — G95.89 MYELOMALACIA OF CERVICAL CORD (H): ICD-10-CM

## 2025-05-01 DIAGNOSIS — G95.89 MYELOMALACIA OF CERVICAL CORD (H): Primary | ICD-10-CM

## 2025-05-01 NOTE — PATIENT INSTRUCTIONS
Based on our discussion, I have outlined the following instructions for you:      - Apply 2 grams of the special cream (containing diclofenac, baclofen, lidocaine, and ketamine) on your skin twice a day whenever you feel pain.  - Think about trying a special type of acupuncture that focuses on five spots on your ear to help manage pain.  - You have a procedure called a medial branch block planned for the C2-3 area of your neck in June. If it works well, there might be another procedure to follow.    Thank you again for your visit, and we look forward to supporting you in your journey to better health.

## 2025-05-01 NOTE — TELEPHONE ENCOUNTER
Call with pt and informed [facility] does not compound medications. Pt requesting prescription to be sent to [place]. Pended for provider approval.
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
## 2025-05-01 NOTE — TELEPHONE ENCOUNTER
Medication Question or Refill    Contacts       Contact Date/Time Type Contact Phone/Fax    05/01/2025 09:53 AM CDT Phone (Incoming) [facility] [place] [place] [facility] [address] [facility] [phone]            What medication are you calling about (include dose and sig)?:   COMPOUND CONTAINING CONTROLLED SUBSTANCE (CMPD RX) - PHARMACY TO MIX COMPOUNDED MEDICATION Diclofenac 3%, baclofen 2%, lidocaine 5%, ketamine 5%, apply 2 g topically twice daily as needed pain       [facility] Pharm[facility]:   [facility] [address] [place] [address]  [address] [facility] [place] [address]  Phone: [phone] Fax: [phone]      Controlled Substance Agreement on file:   CSA -- Patient Level:     [Media Unavailable] Controlled Substance Agreement - Opioid - Scan on 5/11/2022  2:56 PM       Who prescribed the medication?: pesavento    Do you have any questions or concerns?  Yes: [facility] does not compound medications, will need rx sent to compounding pharmacy.

## 2025-05-14 ENCOUNTER — TELEPHONE (OUTPATIENT)
Dept: FAMILY MEDICINE | Facility: CLINIC | Age: 65
End: 2025-05-14
Payer: COMMERCIAL

## 2025-05-14 DIAGNOSIS — E66.09 CLASS 1 OBESITY DUE TO EXCESS CALORIES WITHOUT SERIOUS COMORBIDITY WITH BODY MASS INDEX (BMI) OF 30.0 TO 30.9 IN ADULT: Primary | ICD-10-CM

## 2025-05-14 DIAGNOSIS — E66.811 CLASS 1 OBESITY DUE TO EXCESS CALORIES WITHOUT SERIOUS COMORBIDITY WITH BODY MASS INDEX (BMI) OF 30.0 TO 30.9 IN ADULT: Primary | ICD-10-CM

## 2025-05-14 RX ORDER — PHENTERMINE HYDROCHLORIDE 30 MG/1
30 CAPSULE ORAL EVERY MORNING
Qty: 30 CAPSULE | Refills: 1 | Status: CANCELLED | OUTPATIENT
Start: 2025-05-14

## 2025-05-14 NOTE — TELEPHONE ENCOUNTER
General Call    Contacts       Contact Date/Time Type Contact Phone/Fax    05/14/2025 09:41 AM CDT Phone (Incoming) Layla Starks (Self) [phone] (M)          Reason for Call:  Medication Request    What are your questions or concerns:  Patient would like to restart phentermine after Wegovy tier exception was denied. Prepped below, please advise.    Date of last appointment with provider: 4/23/25    Could we send this information to you in InnoPad or would you prefer to receive a phone call?:   Patient would prefer a phone call   Okay to leave a detailed message?: Yes at Cell number on file:    Telephone Information:   Mobile [phone]

## 2025-05-17 NOTE — TELEPHONE ENCOUNTER
We discussed that she only experienced a 2 pound weight loss while on Phentermine for 2 months so the risks associated with the medication do not seem to be outweighed by enough benefit.

## 2025-06-23 ENCOUNTER — OFFICE VISIT (OUTPATIENT)
Dept: FAMILY MEDICINE | Facility: CLINIC | Age: 65
End: 2025-06-23
Payer: COMMERCIAL

## 2025-06-23 VITALS
SYSTOLIC BLOOD PRESSURE: 104 MMHG | HEART RATE: 118 BPM | WEIGHT: 189 LBS | DIASTOLIC BLOOD PRESSURE: 72 MMHG | HEIGHT: 65 IN | RESPIRATION RATE: 16 BRPM | OXYGEN SATURATION: 97 % | TEMPERATURE: 97.9 F | BODY MASS INDEX: 31.49 KG/M2

## 2025-06-23 DIAGNOSIS — G89.29 CHRONIC NECK PAIN: ICD-10-CM

## 2025-06-23 DIAGNOSIS — Z01.818 PREOP GENERAL PHYSICAL EXAM: Primary | ICD-10-CM

## 2025-06-23 DIAGNOSIS — M54.2 CHRONIC NECK PAIN: ICD-10-CM

## 2025-06-23 LAB
ERYTHROCYTE [DISTWIDTH] IN BLOOD BY AUTOMATED COUNT: 12.8 % (ref 10–15)
HCT VFR BLD AUTO: 43.7 % (ref 35–47)
HGB BLD-MCNC: 14.2 G/DL (ref 11.7–15.7)
MCH RBC QN AUTO: 29.4 PG (ref 26.5–33)
MCHC RBC AUTO-ENTMCNC: 32.5 G/DL (ref 31.5–36.5)
MCV RBC AUTO: 91 FL (ref 78–100)
PLATELET # BLD AUTO: 272 10E3/UL (ref 150–450)
RBC # BLD AUTO: 4.83 10E6/UL (ref 3.8–5.2)
WBC # BLD AUTO: 8.5 10E3/UL (ref 4–11)

## 2025-06-23 PROCEDURE — 3074F SYST BP LT 130 MM HG: CPT | Performed by: FAMILY MEDICINE

## 2025-06-23 PROCEDURE — 85027 COMPLETE CBC AUTOMATED: CPT | Performed by: FAMILY MEDICINE

## 2025-06-23 PROCEDURE — 80053 COMPREHEN METABOLIC PANEL: CPT | Performed by: FAMILY MEDICINE

## 2025-06-23 PROCEDURE — 36415 COLL VENOUS BLD VENIPUNCTURE: CPT | Performed by: FAMILY MEDICINE

## 2025-06-23 PROCEDURE — 99213 OFFICE O/P EST LOW 20 MIN: CPT | Performed by: FAMILY MEDICINE

## 2025-06-23 PROCEDURE — 3078F DIAST BP <80 MM HG: CPT | Performed by: FAMILY MEDICINE

## 2025-06-24 ENCOUNTER — RESULTS FOLLOW-UP (OUTPATIENT)
Dept: FAMILY MEDICINE | Facility: CLINIC | Age: 65
End: 2025-06-24

## 2025-06-24 LAB
ALBUMIN SERPL BCG-MCNC: 4.4 G/DL (ref 3.5–5.2)
ALP SERPL-CCNC: 89 U/L (ref 40–150)
ALT SERPL W P-5'-P-CCNC: 32 U/L (ref 0–50)
ANION GAP SERPL CALCULATED.3IONS-SCNC: 10 MMOL/L (ref 7–15)
AST SERPL W P-5'-P-CCNC: 35 U/L (ref 0–45)
BILIRUB SERPL-MCNC: 0.2 MG/DL
BUN SERPL-MCNC: 16.7 MG/DL (ref 8–23)
CALCIUM SERPL-MCNC: 9.3 MG/DL (ref 8.8–10.4)
CHLORIDE SERPL-SCNC: 106 MMOL/L (ref 98–107)
CREAT SERPL-MCNC: 0.77 MG/DL (ref 0.51–0.95)
EGFRCR SERPLBLD CKD-EPI 2021: 86 ML/MIN/1.73M2
GLUCOSE SERPL-MCNC: 94 MG/DL (ref 70–99)
HCO3 SERPL-SCNC: 26 MMOL/L (ref 22–29)
POTASSIUM SERPL-SCNC: 4 MMOL/L (ref 3.4–5.3)
PROT SERPL-MCNC: 6.8 G/DL (ref 6.4–8.3)
SODIUM SERPL-SCNC: 142 MMOL/L (ref 135–145)

## 2025-06-30 ENCOUNTER — ALLIED HEALTH/NURSE VISIT (OUTPATIENT)
Dept: FAMILY MEDICINE | Facility: CLINIC | Age: 65
End: 2025-06-30
Payer: COMMERCIAL

## 2025-06-30 DIAGNOSIS — Z23 ENCOUNTER FOR IMMUNIZATION: Primary | ICD-10-CM

## 2025-06-30 PROCEDURE — 90750 HZV VACC RECOMBINANT IM: CPT

## 2025-06-30 PROCEDURE — 90471 IMMUNIZATION ADMIN: CPT

## 2025-06-30 NOTE — PROGRESS NOTES
Prior to immunization administration, verified patients identity using patient s name and date of birth. Please see Immunization Activity for additional information.     Is the patient's temperature normal (100.5 or less)? Yes     Patient MEETS CRITERIA. PROCEED with vaccine administration.          6/30/2025   Zoster   Have you had a serious reaction to the shingles vaccine or something in the shingles vaccine? No   Do you have shingles now? No   Are you getting treatment for cancer, organ transplant, or bone marrow transplant? No   Do you have an autoimmune or inflammatory condition? No   Is the patient immunocompromised and wanting to complete the Shingles vaccine series in less than 2 months? No   Have you had Guillain-Glendive syndrome within 6 weeks of getting a vaccine? No         Patient MEETS CRITERIA. PROCEED with vaccine administration.      Patient instructed to remain in clinic for 15 minutes afterwards, and to report any adverse reactions.      Link to Ancillary Visit Immunization Standing Orders SmartSet     Screening performed by Dariana Beckman RN on 6/30/2025 at 11:12 AM.

## (undated) DEVICE — SOL ISOPROPYL RUBBING ALCOHOL USP 70% 4OZ HDX-20 I0020

## (undated) DEVICE — BASIN SET MAJOR

## (undated) DEVICE — MITT SURGICAL PREP HAIR REMOVER LATEX FREE 617142

## (undated) DEVICE — SPONGE KITTNER 31001010

## (undated) DEVICE — DRSG GAUZE 4X8" NON21842

## (undated) DEVICE — SYR 50ML LL W/O NDL 309653

## (undated) DEVICE — BLADE BONE MILL STRK 5.0MM MED 5400-701-000

## (undated) DEVICE — SU VICRYL 1 CT-1 CR 8X18" J741D

## (undated) DEVICE — POSITIONER ARMBOARD FOAM 1PAIR LF FP-ARMB1

## (undated) DEVICE — SPONGE COTTONOID NEURO 1/2"X1/2" 30-054

## (undated) DEVICE — DRAPE POUCH INSTRUMENT 1018

## (undated) DEVICE — PIN SKULL MAYFIELD ADULT TITANIUM 3/PK A1120

## (undated) DEVICE — DRAPE MAYO STAND 23X54 8337

## (undated) DEVICE — NDL 22GA 1.5"

## (undated) DEVICE — DRAPE MICROSCOPE LEICA 54X150" AR8033650

## (undated) DEVICE — SUCTION MANIFOLD NEPTUNE 2 SYS 4 PORT 0702-020-000

## (undated) DEVICE — SU VICRYL 2-0 CT-2 27" UND J269H

## (undated) DEVICE — RX BACITRACIN OINTMENT 0.9G 1/32OZ CUR001109

## (undated) DEVICE — DECANTER TRANSFER DEVICE 2008S

## (undated) DEVICE — SU VICRYL 0 UR-6 27" J603H

## (undated) DEVICE — IMPLANTABLE DEVICE
Type: IMPLANTABLE DEVICE | Site: SPINE CERVICAL | Status: NON-FUNCTIONAL
Removed: 2021-02-19

## (undated) DEVICE — GLOVE PROTEXIS MICRO 7.5  2D73PM75

## (undated) DEVICE — SOL WATER IRRIG 1000ML BOTTLE 2F7114

## (undated) DEVICE — DRAIN ROUND W/RESERV KIT JACKSON PRATT 10FR 400ML SU130-402D

## (undated) DEVICE — SOL NACL 0.9% IRRIG 1000ML BOTTLE 2F7124

## (undated) DEVICE — PACK NEURO MINOR UMMC SNE32MNMU4

## (undated) DEVICE — Device

## (undated) DEVICE — ESU ELEC BLADE 6" COATED/INSULATED E1455-6

## (undated) DEVICE — LINEN TOWEL PACK X6 WHITE 5487

## (undated) DEVICE — PREP CHLORAPREP CLEAR 3ML 260400

## (undated) DEVICE — PREP CHLORAPREP 26ML TINTED ORANGE  260815

## (undated) DEVICE — SU VICRYL 0 CT-1 3X27" J430T

## (undated) DEVICE — ESU GROUND PAD ADULT W/CORD E7507

## (undated) DEVICE — MARKER SPHERES PASSIVE MEDT PACK 5 8801075

## (undated) DEVICE — DRSG PRIMAPORE 02X3" 7133

## (undated) DEVICE — SU DERMABOND ADVANCED .7ML DNX12

## (undated) DEVICE — NDL BLUNT 17GA 1.5" 8881202330

## (undated) DEVICE — NDL SPINAL 25GA 3.5" QUINCKE 405180

## (undated) DEVICE — LINEN TOWEL PACK X5 5464

## (undated) DEVICE — BUR MATCHSTICK 3.0MM FLUTED 15CM ANSPACH MA15-8NS

## (undated) DEVICE — SUCTION MANIFOLD DORNOCH ULTRA CART UL-CL500

## (undated) DEVICE — DRSG AQUACEL AG 3.5X9.75" HYDROFIBER 412011

## (undated) DEVICE — NDL SPINAL 20GA 3.5" 405182

## (undated) DEVICE — GLOVE ESTEEM POWDER FREE 7.0  2D72PL70

## (undated) DEVICE — DRSG GAUZE 2X2" 8042

## (undated) DEVICE — SPONGE SURGIFOAM 100 1974

## (undated) DEVICE — DRAPE O ARM TUBE 9732722

## (undated) DEVICE — SPECIMEN CONTAINER 5OZ STERILE 2600SA

## (undated) DEVICE — TOOL DISSECT MIDAS MR8 14CM MATCH HEAD 3MM MR8-14MH30

## (undated) DEVICE — ESU GROUND PAD UNIVERSAL W/O CORD

## (undated) DEVICE — SPONGE COTTONOID 1/2X1/2" 20-04S

## (undated) DEVICE — GOWN XLG DISP 9545

## (undated) DEVICE — DRAPE IOBAN INCISE 23X17" 6650EZ

## (undated) DEVICE — CATH TRAY FOLEY SURESTEP 16FR WDRAIN BAG STLK LATEX A300316A

## (undated) DEVICE — DRSG KERLIX FLUFFS X5

## (undated) DEVICE — IOM SUPPLIES/CASE FEE

## (undated) DEVICE — NDL ANGIOCATH 14GA 1.25" 4048

## (undated) DEVICE — DRAPE LAP W/ARMBOARD 29410

## (undated) DEVICE — LINEN GOWN X4 5410

## (undated) DEVICE — BLADE CLIPPER SGL USE 9680

## (undated) DEVICE — SPONGE SURGIFOAM 12 1972

## (undated) DEVICE — TAPE CLOTH 3" CARDINAL 3TRCL03

## (undated) DEVICE — GLOVE PROTEXIS W/NEU-THERA 6.5  2D73TE65

## (undated) DEVICE — GLOVE PROTEXIS BLUE W/NEU-THERA 8.5  2D73EB85

## (undated) DEVICE — ESU CORD BIPOLAR GREEN 10-4000

## (undated) DEVICE — SYR 30ML LL W/O NDL 302832

## (undated) DEVICE — SU MONOCRYL 3-0 PS-2 18" UND Y497G

## (undated) DEVICE — SU ETHILON 3-0 PS-1 18" 1663H

## (undated) DEVICE — DRSG TEGADERM 4X4 3/4" 1626W

## (undated) DEVICE — TUBING SUCTION MEDI-VAC 1/4"X20' N620A

## (undated) DEVICE — DRAPE STERI TOWEL LG 1010

## (undated) DEVICE — ADH SKIN CLOSURE PREMIERPRO EXOFIN 1.0ML 3470

## (undated) DEVICE — LINEN BACK PACK 5440

## (undated) DEVICE — SU VICRYL 2-0 CT-1 27" UND J259H

## (undated) DEVICE — SU VICRYL 2-0 UR-6 27" J602H

## (undated) DEVICE — WIPES FOLEY CARE SURESTEP PROVON DFC100

## (undated) DEVICE — ESU ELEC BLADE 2.75" COATED/INSULATED E1455

## (undated) DEVICE — LINEN TOWEL PACK X30 5481

## (undated) DEVICE — BRUSH SURGICAL SCRUB W/4% CHLORHEXIDINE GLUCONATE SOL 4458A

## (undated) DEVICE — SU VICRYL 0 CT-2 27" J334H

## (undated) DEVICE — TRAY PAIN INJECTION 97A 640

## (undated) DEVICE — DRAPE SHEET MED 44X70" 9355

## (undated) DEVICE — GLOVE PROTEXIS BLUE W/NEU-THERA 6.5  2D73EB65

## (undated) DEVICE — TAPE MEDIPORE 4"X2YD 2864

## (undated) DEVICE — GLOVE PROTEXIS MICRO 8.0  2D73PM80

## (undated) DEVICE — SU VICRYL 2-0 CT-2 27" J333H

## (undated) DEVICE — PREP CHLORAPREP W/ORANGE TINT 10.5ML 260715

## (undated) DEVICE — PACK SET-UP STD 9102

## (undated) DEVICE — PREP CHLORAPREP 26ML TINTED HI-LITE ORANGE 930815

## (undated) DEVICE — SPONGE COTTONOID 1X1" 80-1403

## (undated) DEVICE — DRAPE C-ARM W/STRAPS 42X72" 07-CA104

## (undated) RX ORDER — SODIUM CHLORIDE, SODIUM LACTATE, POTASSIUM CHLORIDE, CALCIUM CHLORIDE 600; 310; 30; 20 MG/100ML; MG/100ML; MG/100ML; MG/100ML
INJECTION, SOLUTION INTRAVENOUS
Status: DISPENSED
Start: 2021-02-19

## (undated) RX ORDER — SCOLOPAMINE TRANSDERMAL SYSTEM 1 MG/1
PATCH, EXTENDED RELEASE TRANSDERMAL
Status: DISPENSED
Start: 2017-12-13

## (undated) RX ORDER — FENTANYL CITRATE-0.9 % NACL/PF 10 MCG/ML
PLASTIC BAG, INJECTION (ML) INTRAVENOUS
Status: DISPENSED
Start: 2021-02-19

## (undated) RX ORDER — BUPIVACAINE HYDROCHLORIDE AND EPINEPHRINE 5; 5 MG/ML; UG/ML
INJECTION, SOLUTION EPIDURAL; INTRACAUDAL; PERINEURAL
Status: DISPENSED
Start: 2017-12-13

## (undated) RX ORDER — ONDANSETRON 2 MG/ML
INJECTION INTRAMUSCULAR; INTRAVENOUS
Status: DISPENSED
Start: 2017-12-13

## (undated) RX ORDER — ALBUMIN, HUMAN INJ 5% 5 %
SOLUTION INTRAVENOUS
Status: DISPENSED
Start: 2021-02-19

## (undated) RX ORDER — ACETAMINOPHEN 325 MG/1
TABLET ORAL
Status: DISPENSED
Start: 2021-02-19

## (undated) RX ORDER — DEXAMETHASONE SODIUM PHOSPHATE 4 MG/ML
INJECTION, SOLUTION INTRA-ARTICULAR; INTRALESIONAL; INTRAMUSCULAR; INTRAVENOUS; SOFT TISSUE
Status: DISPENSED
Start: 2021-02-19

## (undated) RX ORDER — FENTANYL CITRATE 50 UG/ML
INJECTION, SOLUTION INTRAMUSCULAR; INTRAVENOUS
Status: DISPENSED
Start: 2021-02-19

## (undated) RX ORDER — LIDOCAINE HYDROCHLORIDE 20 MG/ML
INJECTION, SOLUTION EPIDURAL; INFILTRATION; INTRACAUDAL; PERINEURAL
Status: DISPENSED
Start: 2017-12-13

## (undated) RX ORDER — PROPOFOL 10 MG/ML
INJECTION, EMULSION INTRAVENOUS
Status: DISPENSED
Start: 2021-02-19

## (undated) RX ORDER — CEFAZOLIN SODIUM 2 G/100ML
INJECTION, SOLUTION INTRAVENOUS
Status: DISPENSED
Start: 2021-02-19

## (undated) RX ORDER — PROPOFOL 10 MG/ML
INJECTION, EMULSION INTRAVENOUS
Status: DISPENSED
Start: 2017-12-13

## (undated) RX ORDER — GLYCOPYRROLATE 0.2 MG/ML
INJECTION INTRAMUSCULAR; INTRAVENOUS
Status: DISPENSED
Start: 2021-02-19

## (undated) RX ORDER — GLYCOPYRROLATE 0.2 MG/ML
INJECTION, SOLUTION INTRAMUSCULAR; INTRAVENOUS
Status: DISPENSED
Start: 2017-12-13

## (undated) RX ORDER — HYDROMORPHONE HYDROCHLORIDE 1 MG/ML
INJECTION, SOLUTION INTRAMUSCULAR; INTRAVENOUS; SUBCUTANEOUS
Status: DISPENSED
Start: 2021-02-19

## (undated) RX ORDER — EPHEDRINE SULFATE 50 MG/ML
INJECTION, SOLUTION INTRAMUSCULAR; INTRAVENOUS; SUBCUTANEOUS
Status: DISPENSED
Start: 2017-12-13

## (undated) RX ORDER — EPHEDRINE SULFATE 50 MG/ML
INJECTION, SOLUTION INTRAMUSCULAR; INTRAVENOUS; SUBCUTANEOUS
Status: DISPENSED
Start: 2021-02-19

## (undated) RX ORDER — HYDROMORPHONE HCL/0.9% NACL/PF 0.2MG/0.2
SYRINGE (ML) INTRAVENOUS
Status: DISPENSED
Start: 2017-12-13

## (undated) RX ORDER — LIDOCAINE HYDROCHLORIDE 20 MG/ML
INJECTION, SOLUTION EPIDURAL; INFILTRATION; INTRACAUDAL; PERINEURAL
Status: DISPENSED
Start: 2021-02-19

## (undated) RX ORDER — FENTANYL CITRATE 50 UG/ML
INJECTION, SOLUTION INTRAMUSCULAR; INTRAVENOUS
Status: DISPENSED
Start: 2017-12-13

## (undated) RX ORDER — SCOLOPAMINE TRANSDERMAL SYSTEM 1 MG/1
PATCH, EXTENDED RELEASE TRANSDERMAL
Status: DISPENSED
Start: 2021-02-19

## (undated) RX ORDER — GABAPENTIN 300 MG/1
CAPSULE ORAL
Status: DISPENSED
Start: 2021-02-19

## (undated) RX ORDER — BUPIVACAINE HYDROCHLORIDE AND EPINEPHRINE 2.5; 5 MG/ML; UG/ML
INJECTION, SOLUTION INFILTRATION; PERINEURAL
Status: DISPENSED
Start: 2021-02-19

## (undated) RX ORDER — HYDROMORPHONE HYDROCHLORIDE 1 MG/ML
INJECTION, SOLUTION INTRAMUSCULAR; INTRAVENOUS; SUBCUTANEOUS
Status: DISPENSED
Start: 2017-12-13

## (undated) RX ORDER — ONDANSETRON 2 MG/ML
INJECTION INTRAMUSCULAR; INTRAVENOUS
Status: DISPENSED
Start: 2021-02-19

## (undated) RX ORDER — PHENYLEPHRINE HCL IN 0.9% NACL 1 MG/10 ML
SYRINGE (ML) INTRAVENOUS
Status: DISPENSED
Start: 2017-12-13

## (undated) RX ORDER — VANCOMYCIN HYDROCHLORIDE 1 G/20ML
INJECTION, POWDER, LYOPHILIZED, FOR SOLUTION INTRAVENOUS
Status: DISPENSED
Start: 2021-02-19

## (undated) RX ORDER — BACITRACIN 50000 [IU]/1
INJECTION, POWDER, FOR SOLUTION INTRAMUSCULAR
Status: DISPENSED
Start: 2017-12-13